# Patient Record
Sex: FEMALE | Race: WHITE | NOT HISPANIC OR LATINO | Employment: OTHER | URBAN - METROPOLITAN AREA
[De-identification: names, ages, dates, MRNs, and addresses within clinical notes are randomized per-mention and may not be internally consistent; named-entity substitution may affect disease eponyms.]

---

## 2017-02-03 ENCOUNTER — OFFICE VISIT (OUTPATIENT)
Dept: FAMILY MEDICINE | Facility: CLINIC | Age: 62
End: 2017-02-03
Payer: COMMERCIAL

## 2017-02-03 VITALS
WEIGHT: 239.75 LBS | HEART RATE: 67 BPM | BODY MASS INDEX: 35.51 KG/M2 | DIASTOLIC BLOOD PRESSURE: 80 MMHG | OXYGEN SATURATION: 98 % | HEIGHT: 69 IN | SYSTOLIC BLOOD PRESSURE: 126 MMHG | TEMPERATURE: 98 F

## 2017-02-03 DIAGNOSIS — E03.9 ACQUIRED HYPOTHYROIDISM: ICD-10-CM

## 2017-02-03 DIAGNOSIS — Z12.11 COLON CANCER SCREENING: ICD-10-CM

## 2017-02-03 DIAGNOSIS — E78.2 MIXED HYPERLIPIDEMIA: ICD-10-CM

## 2017-02-03 DIAGNOSIS — E66.01 SEVERE OBESITY (BMI 35.0-35.9 WITH COMORBIDITY): ICD-10-CM

## 2017-02-03 DIAGNOSIS — I10 ESSENTIAL HYPERTENSION: ICD-10-CM

## 2017-02-03 DIAGNOSIS — R73.03 PREDIABETES: ICD-10-CM

## 2017-02-03 DIAGNOSIS — Z86.39 HISTORY OF VITAMIN D DEFICIENCY: ICD-10-CM

## 2017-02-03 DIAGNOSIS — D32.0 BENIGN MENINGIOMA OF BRAIN: ICD-10-CM

## 2017-02-03 DIAGNOSIS — Z00.00 ROUTINE MEDICAL EXAM: Primary | ICD-10-CM

## 2017-02-03 PROCEDURE — 3074F SYST BP LT 130 MM HG: CPT | Mod: S$GLB,,, | Performed by: INTERNAL MEDICINE

## 2017-02-03 PROCEDURE — 3079F DIAST BP 80-89 MM HG: CPT | Mod: S$GLB,,, | Performed by: INTERNAL MEDICINE

## 2017-02-03 PROCEDURE — 99999 PR PBB SHADOW E&M-EST. PATIENT-LVL III: CPT | Mod: PBBFAC,,, | Performed by: INTERNAL MEDICINE

## 2017-02-03 PROCEDURE — 99396 PREV VISIT EST AGE 40-64: CPT | Mod: S$GLB,,, | Performed by: INTERNAL MEDICINE

## 2017-02-03 RX ORDER — ERGOCALCIFEROL 1.25 MG/1
CAPSULE ORAL
Qty: 12 CAPSULE | Refills: 1 | Status: SHIPPED | OUTPATIENT
Start: 2017-02-03 | End: 2017-05-03 | Stop reason: SDUPTHER

## 2017-02-03 RX ORDER — ATENOLOL 50 MG/1
50 TABLET ORAL 2 TIMES DAILY
Qty: 180 TABLET | Refills: 1 | Status: SHIPPED | OUTPATIENT
Start: 2017-02-03 | End: 2017-05-03 | Stop reason: SDUPTHER

## 2017-02-03 RX ORDER — METFORMIN HYDROCHLORIDE 500 MG/1
1000 TABLET, EXTENDED RELEASE ORAL
Qty: 180 TABLET | Refills: 1 | Status: SHIPPED | OUTPATIENT
Start: 2017-02-03 | End: 2017-05-03 | Stop reason: SDUPTHER

## 2017-02-03 NOTE — MR AVS SNAPSHOT
[facility]  [address]  [place] [address]  Phone: [phone]  Fax: [phone]                  Claudia Rodriguez   2/3/2017 8:00 AM   Office Visit    Description:  Female : 1955   Provider:  Jessica Marquez MD   Department:  [facility]           Reason for Visit     Hypertension     Follow-up           Diagnoses this Visit        Comments    Routine medical exam    -  Primary     Essential hypertension         Mixed hyperlipidemia         Prediabetes         Acquired hypothyroidism         History of vitamin D deficiency         Severe obesity (BMI 35.0-35.9 with comorbidity)         Colon cancer screening         Benign meningioma of brain                To Do List           Goals (5 Years of Data)              10/26/16    7/25/16    2/17/16    HEMOGLOBIN A1C < 7.0   5.7  5.7  6.0    Related Problems    Prediabetes      Follow-Up and Disposition     Return in about 6 months (around 8/3/2017), or if symptoms worsen or fail to improve, for follow up chronic medical conditions..       These Medications        Disp Refills Start End    atenolol (TENORMIN) 50 MG tablet 180 tablet 1 2/3/2017     Take 1 tablet (50 mg total) by mouth 2 (two) times daily. - Oral    Pharmacy: [facility] [address] Ph #: [phone]       ergocalciferol (VITAMIN D2) 50,000 unit Cap 12 capsule 1 2/3/2017     TAKE TWO CAPSULES BY MOUTH EVERY 2 weeks    Pharmacy: [facility] - [address] Ph #: [phone]       metformin (GLUCOPHAGE-XR) 500 MG 24 hr tablet 180 tablet 1 2/3/2017 2/3/2018    Take 2 tablets (1,000 mg total) by mouth daily with dinner or evening meal. - Oral    Pharmacy: [facility] - [address] Ph #: [phone]         [facility]     [facility] -  Assistance  Registered nurses in the [facility] provide  "clinical advisement, health education, appointment booking, and other advisory services.  Call for this free service at [phone].             Medications           Message regarding Medications     Verify the changes and/or additions to your medication regime listed below are the same as discussed with your clinician today.  If any of these changes or additions are incorrect, please notify your healthcare provider.             Verify that the below list of medications is an accurate representation of the medications you are currently taking.  If none reported, the list may be blank. If incorrect, please contact your healthcare provider. Carry this list with you in case of emergency.           Current Medications     amlodipine (NORVASC) 10 MG tablet Take 1 tablet (10 mg total) by mouth once daily.    atenolol (TENORMIN) 50 MG tablet Take 1 tablet (50 mg total) by mouth 2 (two) times daily.    CALCIUM CARBONATE/VITAMIN D3 (CALCIUM 500 + D, D3, ORAL)     ergocalciferol (VITAMIN D2) 50,000 unit Cap TAKE TWO CAPSULES BY MOUTH EVERY 2 weeks    FOLIC ACID/MV,FE,OTHER MIN (CENTRUM ORAL) Take 1 tablet by mouth.    levothyroxine (SYNTHROID) 75 MCG tablet Take 1 tablet (75 mcg total) by mouth once daily.    metformin (GLUCOPHAGE-XR) 500 MG 24 hr tablet Take 2 tablets (1,000 mg total) by mouth daily with dinner or evening meal.    omega-3 fatty acids 1,000 mg Cap Take 1 capsule by mouth Daily.    terconazole (TERAZOL 3) 0.8 % vaginal cream     triamterene-hydrochlorothiazide 37.5-25 mg (MAXZIDE-25) 37.5-25 mg per tablet Take 1 tablet by mouth once daily.           Clinical Reference Information           Your Vitals Were     BP Pulse Temp Height Weight SpO2    126/80 67 98.3 °F (36.8 °C) (Oral) 5' 9" (1.753 m) 108.7 kg (239 lb 12 oz) 98%    BMI                35.4 kg/m2          Blood Pressure          Most Recent Value    BP  126/80      Allergies as of 2/3/2017     Zostavax [Zoster Vaccine Live (Pf)]    " Amlodipine-olmesartan    Atorvastatin    Demerol [Meperidine]    Ezetimibe-simvastatin    Lotrel [Amlodipine-benazepril]    Nsaids (Non-steroidal Anti-inflammatory Drug)    Phenytoin Sodium Extended      Immunizations Administered on Date of Encounter - 2/3/2017     None      Orders Placed During Today's Visit     Future Labs/Procedures Expected by Expires    Occult blood x 1, stool  2/3/2017 2/3/2018    Occult blood x 1, stool  2/3/2017 2/3/2018    Occult blood x 1, stool  2/3/2017 2/3/2018      Instructions    Physicians for weight loss management: Dr. Garrison, Dr. Lyle       Language Assistance Services     ATTENTION: Language assistance services are available, free of charge. Please call [phone].      ATENCIÓN: Si elena sherwood, tiene a toth disposición servicios gratuitos de asistencia lingüística. Llame al [phone].     CHÚ Ý: N?u b?n nói Ti?ng Vi?t, có các d?ch v? h? tr? ngôn ng? mi?n phí dành cho b?n. G?i s? [phone].         [facility] complies with applicable Federal civil rights laws and does not discriminate on the basis of race, color, national origin, age, disability, or sex.

## 2017-02-03 NOTE — PROGRESS NOTES
HISTORY OF PRESENT ILLNESS:  Claudia Rodriguez is a 61 y.o. female who presents to the clinic today for a routine medical physical exam. Her last physical exam was approximately 1 years(s) ago.    Contraception: n/a      PAST MEDICAL HISTORY:  Past Medical History   Diagnosis Date    DDD (degenerative disc disease), lumbar     Goiter, nodular     HTN (hypertension)     Hyperglycemia     Hyperlipidemia     Hypothyroidism     Hypovitaminosis D     Meningioma      right frontal lobe ; followed by Dr. Enciso once a year    Obesity     Prediabetes     Urinary bladder disorder        PAST SURGICAL HISTORY:  Past Surgical History   Procedure Laterality Date    Bladder suspension      Thyroid biopsy  7/11/12    Tonsillectomy         SOCIAL HISTORY:  Social History     Social History    Marital status:      Spouse name: N/A    Number of children: 1    Years of education: N/A     Occupational History    human  [employer]     Social History Main Topics    Smoking status: Never Smoker    Smokeless tobacco: Not on file    Alcohol use No    Drug use: No    Sexual activity: Yes     Partners: Male     Other Topics Concern    Are You Pregnant Or Think You May Be? No    Breast-Feeding No     Social History Narrative    3 adopted children.       FAMILY HISTORY:  Family History   Problem Relation Age of Onset    Coronary artery disease Father 55    Heart disease Father     Hypertension Father     Melanoma Father     Hypertension Mother     Stroke Mother      2015    Hypertension Sister      controlled by lifestyle    No Known Problems Brother     Pancreatic cancer Maternal Grandfather     Heart failure Maternal Grandmother     Thyroid nodules Sister      s/p thyroidectomy    Thyroid disease Sister     Thyroid nodules Sister     No Known Problems Brother     Diabetes Neg Hx     Psoriasis Neg Hx     Lupus Neg Hx     Eczema Neg Hx     Thyroid cancer Neg Hx        ALLERGIES AND  MEDICATIONS: updated and reviewed.  Review of patient's allergies indicates:   Allergen Reactions    Zostavax [zoster vaccine live (pf)] Rash     - injection site red, hot, indurated    Amlodipine-olmesartan      Other reaction(s): pounding in ears      Atorvastatin        Other reaction(s): Unknown    Demerol [meperidine] Other (See Comments)     Other reaction(s): Nausea  Other reaction(s): Headache    Ezetimibe-simvastatin      Other reaction(s): Headache      Lotrel [amlodipine-benazepril]        Other reaction(s): constant cough    Nsaids (non-steroidal anti-inflammatory drug) Other (See Comments)       Other reaction(s): Difficulty breathing    Other reaction(s): Difficulty breathing    Phenytoin sodium extended Other (See Comments)     Medication List with Changes/Refills   Current Medications    AMLODIPINE (NORVASC) 10 MG TABLET    Take 1 tablet (10 mg total) by mouth once daily.    CALCIUM CARBONATE/VITAMIN D3 (CALCIUM 500 + D, D3, ORAL)        FOLIC ACID/MV,FE,OTHER MIN (CENTRUM ORAL)    Take 1 tablet by mouth.    LEVOTHYROXINE (SYNTHROID) 75 MCG TABLET    Take 1 tablet (75 mcg total) by mouth once daily.    OMEGA-3 FATTY ACIDS 1,000 MG CAP    Take 1 capsule by mouth Daily.    TERCONAZOLE (TERAZOL 3) 0.8 % VAGINAL CREAM        TRIAMTERENE-HYDROCHLOROTHIAZIDE 37.5-25 MG (MAXZIDE-25) 37.5-25 MG PER TABLET    Take 1 tablet by mouth once daily.   Changed and/or Refilled Medications    Modified Medication Previous Medication    ATENOLOL (TENORMIN) 50 MG TABLET atenolol (TENORMIN) 50 MG tablet       Take 1 tablet (50 mg total) by mouth 2 (two) times daily.    Take 1 tablet (50 mg total) by mouth 2 (two) times daily.    ERGOCALCIFEROL (VITAMIN D2) 50,000 UNIT CAP ergocalciferol (VITAMIN D2) 50,000 unit Cap       TAKE TWO CAPSULES BY MOUTH EVERY 2 weeks    TAKE TWO CAPSULES BY MOUTH EVERY 2 weeks    METFORMIN (GLUCOPHAGE-XR) 500 MG 24 HR TABLET metformin (GLUCOPHAGE-XR) 500 MG 24 hr tablet       Take 2  tablets (1,000 mg total) by mouth daily with dinner or evening meal.    Take 2 tablets (1,000 mg total) by mouth daily with dinner or evening meal.   Discontinued Medications    ATENOLOL (TENORMIN) 50 MG TABLET    Take 50 mg by mouth 2 (two) times daily.             SCREENING HISTORY:  Health Maintenance       Date Due Completion Date    Fecal Occult Blood Test (FOBT) 10/7/2016 10/7/2015    Mammogram 5/25/2018 5/25/2016 (Done)    Override on 5/25/2016: Done    Override on 2/3/2014: Done (DIS - normal)    Override on 12/11/2012: Done    Override on 10/27/2010: Done    Pap Smear 4/12/2019 4/12/2016 (Done)    Override on 4/12/2016: Done    Override on 12/3/2012: Done    Lipid Panel 7/25/2021 7/25/2016    TETANUS VACCINE 9/28/2025 9/28/2015    Colonoscopy 2/3/2027 2/3/2017 (Declined)    Override on 2/3/2017: Declined (patient will do yearly fecal occult blood testing)            REVIEW OF SYSTEMS:   The patient reports fair dietary habits - she feels like she has developed a 'sugar addiction'.  The patient does not exercise regularly.  General ROS: negative for - chills, fever or malaise  Psychological ROS: positive for - sleep disturbances - she is taking half of a benadryl at bedtime to help her sleep  negative for - memory difficulties, obsessive thoughts or suicidal ideation  Ophthalmic ROS: negative for - blurry vision or eye pain  ENT ROS: negative for - epistaxis, headaches, oral lesions or sore throat  Allergy and Immunology ROS: negative for - hives  Hematological and Lymphatic ROS: negative for - swollen lymph nodes  Endocrine ROS: negative for - polydipsia/polyuria or temperature intolerance  Respiratory ROS: no cough, shortness of breath, or wheezing  Cardiovascular ROS: no chest pain or dyspnea on exertion  Gastrointestinal ROS: no abdominal pain, change in bowel habits, or black or bloody stools  Genito-Urinary ROS: no dysuria, trouble voiding, or hematuria  Breast ROS: negative for breast  "lumps  Musculoskeletal ROS: negative for - gait disturbance, joint swelling, muscle pain or muscular weakness  Neurological ROS: no TIA or stroke symptoms  Dermatological ROS: negative for mole changes and rash    Physical Examination:   Vitals:    02/03/17 0802   BP: 126/80   Pulse: 67   Temp: 98.3 °F (36.8 °C)     Weight: 108.7 kg (239 lb 12 oz)   Height: 5' 9" (175.3 cm)   Body mass index is 35.4 kg/(m^2).    General appearance - alert, well appearing, and in no distress and obese  Mental status - alert, oriented to person, place, and time, normal mood, behavior, speech, dress, motor activity, and thought processes  Eyes - pupils equal and reactive, extraocular eye movements intact, sclera anicteric  Mouth - mucous membranes moist, pharynx normal without lesions  Neck - supple, no significant adenopathy, carotids upstroke normal bilaterally, no bruits, thyroid exam: thyroid is normal in size without nodules or tenderness  Lymphatics - no palpable lymphadenopathy  Chest - clear to auscultation, no wheezes, rales or rhonchi, symmetric air entry  Heart - normal rate and regular rhythm, no gallops noted  Abdomen - soft, nontender, nondistended, no masses or organomegaly  Breasts - not examined  Neurological - alert, oriented, normal speech, no focal findings or movement disorder noted, cranial nerves II through XII intact  Musculoskeletal - no joint tenderness, deformity or swelling, no muscular tenderness noted  Extremities - no pedal edema noted, intact peripheral pulses  Skin - normal coloration and turgor, no rashes, no suspicious skin lesions noted      ASSESSMENT AND PLAN:  1. Routine medical exam  Counseled on age appropriate medical preventative services including age appropriate cancer screenings, age appropriate eye and dental exams, over all nutritional health, need for a consistent exercise regimen, and an over all push towards maintaining a vigorous and active lifestyle.  Counseled on age appropriate " vaccines and discussed upcoming health care needs based on age/gender. Discussed good sleep hygiene and stress management.     2. Essential hypertension  Discussed sodium restriction, maintaining ideal body weight and regular exercise program as physiologic means to achieve blood pressure control. The patient will strive towards this. The current medical regimen is effective;  continue present plan and medications. Recommended patient to check home readings to monitor and see me for followup as scheduled or sooner as needed. Patient was educated that both decongestant and anti-inflammatory medication may raise blood pressure.   - atenolol (TENORMIN) 50 MG tablet; Take 1 tablet (50 mg total) by mouth 2 (two) times daily.  Dispense: 180 tablet; Refill: 1    3. Mixed hyperlipidemia  We discussed low fat diet and regular exercise. No need for prescription medication at this time.     4. Prediabetes  Stable. We discussed low sugar/low carbohydrate diet and regular exercise to prevent progression. No need for prescription medication at this time.   - metformin (GLUCOPHAGE-XR) 500 MG 24 hr tablet; Take 2 tablets (1,000 mg total) by mouth daily with dinner or evening meal.  Dispense: 180 tablet; Refill: 1    5. Acquired hypothyroidism  Your urine test for protein shows a small amount of protein in your urine indicating mild kidney damage from your diabetes. Good control of your diabetes will prevent progression. Recheck in one year. Followed by endocrinology.    6. History of vitamin D deficiency  The current medical regimen is effective;  continue present plan and medications.   - ergocalciferol (VITAMIN D2) 50,000 unit Cap; TAKE TWO CAPSULES BY MOUTH EVERY 2 weeks  Dispense: 12 capsule; Refill: 1    7. Severe obesity (BMI 35.0-35.9 with comorbidity)  The patient is asked to make an attempt to improve diet and exercise patterns to aid in medical management of this problem. We will refer her to the [facility] weight loss study.  She may consider seeing a weight loss specialist.    8. Colon cancer screening    - Occult blood x 1, stool; Future  - Occult blood x 1, stool; Future  - Occult blood x 1, stool; Future    9. Benign meningioma of brain  Asymptomatic. Followed by neurosurgery.          Return in about 6 months (around 8/3/2017), or if symptoms worsen or fail to improve, for follow up chronic medical conditions.. or sooner as needed.

## 2017-03-06 DIAGNOSIS — I10 ESSENTIAL HYPERTENSION: ICD-10-CM

## 2017-03-06 RX ORDER — TRIAMTERENE/HYDROCHLOROTHIAZID 37.5-25 MG
1 TABLET ORAL DAILY
Qty: 90 TABLET | Refills: 1 | Status: SHIPPED | OUTPATIENT
Start: 2017-03-06 | End: 2017-08-30 | Stop reason: SDUPTHER

## 2017-03-15 DIAGNOSIS — I10 ESSENTIAL HYPERTENSION: ICD-10-CM

## 2017-03-15 RX ORDER — AMLODIPINE BESYLATE 10 MG/1
10 TABLET ORAL DAILY
Qty: 90 TABLET | Refills: 0 | Status: SHIPPED | OUTPATIENT
Start: 2017-03-15 | End: 2017-05-27 | Stop reason: SDUPTHER

## 2017-05-03 DIAGNOSIS — I10 ESSENTIAL HYPERTENSION: ICD-10-CM

## 2017-05-03 DIAGNOSIS — R73.03 PREDIABETES: ICD-10-CM

## 2017-05-03 DIAGNOSIS — Z86.39 HISTORY OF VITAMIN D DEFICIENCY: ICD-10-CM

## 2017-05-03 RX ORDER — METFORMIN HYDROCHLORIDE 500 MG/1
TABLET, EXTENDED RELEASE ORAL
Qty: 180 TABLET | Refills: 0 | Status: SHIPPED | OUTPATIENT
Start: 2017-05-03 | End: 2017-11-03 | Stop reason: SDUPTHER

## 2017-05-03 RX ORDER — ERGOCALCIFEROL 1.25 MG/1
CAPSULE ORAL
Qty: 12 CAPSULE | Refills: 0 | Status: SHIPPED | OUTPATIENT
Start: 2017-05-03 | End: 2018-01-10 | Stop reason: SDUPTHER

## 2017-05-03 RX ORDER — ATENOLOL 50 MG/1
TABLET ORAL
Qty: 180 TABLET | Refills: 0 | Status: SHIPPED | OUTPATIENT
Start: 2017-05-03 | End: 2017-11-03 | Stop reason: SDUPTHER

## 2017-05-27 DIAGNOSIS — I10 ESSENTIAL HYPERTENSION: ICD-10-CM

## 2017-05-27 RX ORDER — AMLODIPINE BESYLATE 10 MG/1
TABLET ORAL
Qty: 90 TABLET | Refills: 0 | Status: SHIPPED | OUTPATIENT
Start: 2017-05-27 | End: 2017-08-28 | Stop reason: SDUPTHER

## 2017-06-30 ENCOUNTER — PATIENT MESSAGE (OUTPATIENT)
Dept: ENDOCRINOLOGY | Facility: CLINIC | Age: 62
End: 2017-06-30

## 2017-07-05 ENCOUNTER — PATIENT MESSAGE (OUTPATIENT)
Dept: ENDOCRINOLOGY | Facility: CLINIC | Age: 62
End: 2017-07-05

## 2017-07-11 ENCOUNTER — TELEPHONE (OUTPATIENT)
Dept: FAMILY MEDICINE | Facility: CLINIC | Age: 62
End: 2017-07-11

## 2017-07-15 NOTE — TELEPHONE ENCOUNTER
Dr. Anirudh Selby, Alfred[facility], will send for her last visit and mammogram. Fax # [phone], phone # [phone].

## 2017-08-22 ENCOUNTER — PATIENT MESSAGE (OUTPATIENT)
Dept: ENDOCRINOLOGY | Facility: CLINIC | Age: 62
End: 2017-08-22

## 2017-08-23 ENCOUNTER — PATIENT MESSAGE (OUTPATIENT)
Dept: ENDOCRINOLOGY | Facility: CLINIC | Age: 62
End: 2017-08-23

## 2017-08-28 DIAGNOSIS — I10 ESSENTIAL HYPERTENSION: ICD-10-CM

## 2017-08-28 RX ORDER — AMLODIPINE BESYLATE 10 MG/1
TABLET ORAL
Qty: 90 TABLET | Refills: 0 | Status: SHIPPED | OUTPATIENT
Start: 2017-08-28 | End: 2017-12-15 | Stop reason: SDUPTHER

## 2017-08-30 DIAGNOSIS — I10 ESSENTIAL HYPERTENSION: ICD-10-CM

## 2017-08-30 RX ORDER — TRIAMTERENE/HYDROCHLOROTHIAZID 37.5-25 MG
1 TABLET ORAL DAILY
Qty: 90 TABLET | Refills: 0 | Status: SHIPPED | OUTPATIENT
Start: 2017-08-30 | End: 2017-11-27 | Stop reason: SDUPTHER

## 2017-09-27 DIAGNOSIS — E03.9 ACQUIRED HYPOTHYROIDISM: ICD-10-CM

## 2017-09-28 RX ORDER — LEVOTHYROXINE SODIUM 75 UG/1
TABLET ORAL
Qty: 90 TABLET | Refills: 0 | Status: SHIPPED | OUTPATIENT
Start: 2017-09-28 | End: 2017-11-20 | Stop reason: SDUPTHER

## 2017-10-20 ENCOUNTER — HOSPITAL ENCOUNTER (OUTPATIENT)
Dept: ENDOCRINOLOGY | Facility: CLINIC | Age: 62
Discharge: HOME | End: 2017-10-20
Attending: INTERNAL MEDICINE
Payer: COMMERCIAL

## 2017-10-20 DIAGNOSIS — E78.2 MIXED HYPERLIPIDEMIA: ICD-10-CM

## 2017-10-20 DIAGNOSIS — Z86.39 HISTORY OF VITAMIN D DEFICIENCY: ICD-10-CM

## 2017-10-20 DIAGNOSIS — E66.9 OBESITY (BMI 30-39.9): ICD-10-CM

## 2017-10-20 DIAGNOSIS — E03.9 ACQUIRED HYPOTHYROIDISM: ICD-10-CM

## 2017-10-20 DIAGNOSIS — E04.9 GOITER, NODULAR: ICD-10-CM

## 2017-10-20 DIAGNOSIS — R73.03 PREDIABETES: ICD-10-CM

## 2017-10-20 PROCEDURE — 76536 US EXAM OF HEAD AND NECK: CPT | Mod: S$GLB,,, | Performed by: INTERNAL MEDICINE

## 2017-11-03 DIAGNOSIS — I10 ESSENTIAL HYPERTENSION: ICD-10-CM

## 2017-11-03 DIAGNOSIS — R73.03 PREDIABETES: ICD-10-CM

## 2017-11-03 RX ORDER — ATENOLOL 50 MG/1
TABLET ORAL
Qty: 180 TABLET | Refills: 0 | Status: SHIPPED | OUTPATIENT
Start: 2017-11-03 | End: 2018-02-03 | Stop reason: SDUPTHER

## 2017-11-03 RX ORDER — METFORMIN HYDROCHLORIDE 500 MG/1
TABLET, EXTENDED RELEASE ORAL
Qty: 180 TABLET | Refills: 0 | Status: SHIPPED | OUTPATIENT
Start: 2017-11-03 | End: 2018-02-03 | Stop reason: SDUPTHER

## 2017-11-13 ENCOUNTER — OFFICE VISIT (OUTPATIENT)
Dept: FAMILY MEDICINE | Facility: CLINIC | Age: 62
End: 2017-11-13
Payer: COMMERCIAL

## 2017-11-13 VITALS
DIASTOLIC BLOOD PRESSURE: 74 MMHG | OXYGEN SATURATION: 98 % | HEIGHT: 69 IN | WEIGHT: 242.81 LBS | RESPIRATION RATE: 18 BRPM | TEMPERATURE: 99 F | BODY MASS INDEX: 35.96 KG/M2 | HEART RATE: 66 BPM | SYSTOLIC BLOOD PRESSURE: 124 MMHG

## 2017-11-13 DIAGNOSIS — D32.0 BENIGN MENINGIOMA OF BRAIN: ICD-10-CM

## 2017-11-13 DIAGNOSIS — R79.89 ELEVATED LFTS: ICD-10-CM

## 2017-11-13 DIAGNOSIS — R32 URINARY INCONTINENCE, UNSPECIFIED TYPE: ICD-10-CM

## 2017-11-13 DIAGNOSIS — E78.2 MIXED HYPERLIPIDEMIA: ICD-10-CM

## 2017-11-13 DIAGNOSIS — E66.01 SEVERE OBESITY (BMI 35.0-35.9 WITH COMORBIDITY): ICD-10-CM

## 2017-11-13 DIAGNOSIS — I10 ESSENTIAL HYPERTENSION: Primary | ICD-10-CM

## 2017-11-13 DIAGNOSIS — R73.03 PREDIABETES: ICD-10-CM

## 2017-11-13 DIAGNOSIS — E03.9 ACQUIRED HYPOTHYROIDISM: ICD-10-CM

## 2017-11-13 DIAGNOSIS — Z12.11 ENCOUNTER FOR FIT (FECAL IMMUNOCHEMICAL TEST) SCREENING: ICD-10-CM

## 2017-11-13 DIAGNOSIS — Z78.9 STATIN INTOLERANCE: ICD-10-CM

## 2017-11-13 PROCEDURE — 99999 PR PBB SHADOW E&M-EST. PATIENT-LVL IV: CPT | Mod: PBBFAC,,, | Performed by: INTERNAL MEDICINE

## 2017-11-13 PROCEDURE — 99215 OFFICE O/P EST HI 40 MIN: CPT | Mod: S$GLB,,, | Performed by: INTERNAL MEDICINE

## 2017-11-13 RX ORDER — LORAZEPAM 1 MG/1
TABLET ORAL
Refills: 0 | COMMUNITY
Start: 2017-10-30 | End: 2018-03-14

## 2017-11-13 RX ORDER — ESTRADIOL 0.1 MG/G
CREAM VAGINAL
Refills: 98 | COMMUNITY
Start: 2017-08-23 | End: 2018-07-13 | Stop reason: SDUPTHER

## 2017-11-13 NOTE — PROGRESS NOTES
HISTORY OF PRESENT ILLNESS:  Claudia Rodriguez is a 62 y.o. female who presents to the clinic today for Hypertension and prediabetes  .  The patient presents to clinic today for follow-up of her hypertension, hyperlipidemia, and prediabetes.  She recently had blood work done.  She is also followed by endocrinology.  She states she feels well.  She does not check her blood pressures or blood sugars at home on a regular basis.  She denies headaches or visual changes.  She has follow-up with her neurosurgeon regarding her benign meningioma in the near future.  This is imaged about every 2 years now. The patient reports compliance with current medication- patient denies missing any  medication doses.  She does admit to poor dietary habits at times.  She is aware of the need for weight loss.  She is having worsening problems with urinary incontinence which she thinks may be related to her weight gain.  She has had bladder surgery in the past which did not help and which caused bladder rupture and significant morbidity post surgically.  She is not interested in further surgery.  She has tried Kegel exercises which have not been very helpful to her.      PAST MEDICAL HISTORY:  Past Medical History:   Diagnosis Date    DDD (degenerative disc disease), lumbar     Goiter, nodular     HTN (hypertension)     Hyperglycemia     Hyperlipidemia     Hypothyroidism     Hypovitaminosis D     Meningioma     right frontal lobe ; followed by Dr. Enciso once a year    Obesity     Prediabetes     Urinary bladder disorder        PAST SURGICAL HISTORY:  Past Surgical History:   Procedure Laterality Date    BLADDER SUSPENSION      thyroid biopsy  7/11/12    tonsillectomy         SOCIAL HISTORY:  Social History     Social History    Marital status:      Spouse name: N/A    Number of children: 1    Years of education: N/A     Occupational History    human  [employer]     Social History Main Topics     Smoking status: Never Smoker    Smokeless tobacco: Never Used    Alcohol use No    Drug use: No    Sexual activity: Yes     Partners: Male     Other Topics Concern    Are You Pregnant Or Think You May Be? No    Breast-Feeding No     Social History Narrative    3 adopted children.       FAMILY HISTORY:  Family History   Problem Relation Age of Onset    Coronary artery disease Father 55    Heart disease Father     Hypertension Father     Melanoma Father     Hypertension Mother     Stroke Mother      2015    Hypertension Sister      controlled by lifestyle    No Known Problems Brother     Pancreatic cancer Maternal Grandfather     Heart failure Maternal Grandmother     Thyroid nodules Sister      s/p thyroidectomy    Thyroid disease Sister     Thyroid nodules Sister     No Known Problems Brother     Diabetes Neg Hx     Psoriasis Neg Hx     Lupus Neg Hx     Eczema Neg Hx     Thyroid cancer Neg Hx        ALLERGIES AND MEDICATIONS: updated and reviewed.  Review of patient's allergies indicates:   Allergen Reactions    Zostavax [zoster vaccine live (pf)] Rash     - injection site red, hot, indurated    Amlodipine-olmesartan      Other reaction(s): pounding in ears      Atorvastatin        Other reaction(s): Unknown    Demerol [meperidine] Other (See Comments)     Other reaction(s): Nausea  Other reaction(s): Headache    Ezetimibe-simvastatin      Other reaction(s): Headache      Lotrel [amlodipine-benazepril]        Other reaction(s): constant cough    Nsaids (non-steroidal anti-inflammatory drug) Other (See Comments)       Other reaction(s): Difficulty breathing    Other reaction(s): Difficulty breathing    Phenytoin sodium extended Other (See Comments)     Medication List with Changes/Refills   Current Medications    AMLODIPINE (NORVASC) 10 MG TABLET    TAKE ONE TABLET BY MOUTH EVERY DAY    ATENOLOL (TENORMIN) 50 MG TABLET    TAKE ONE Tablet BY MOUTH TWICE DAILY    CALCIUM  CARBONATE/VITAMIN D3 (CALCIUM 500 + D, D3, ORAL)        ESTRACE 0.01 % (0.1 MG/GRAM) VAGINAL CREAM        FOLIC ACID/MV,FE,OTHER MIN (CENTRUM ORAL)    Take 1 tablet by mouth.    LEVOTHYROXINE (SYNTHROID) 75 MCG TABLET    TAKE ONE TABLET BY MOUTH EVERY DAY    LORAZEPAM (ATIVAN) 1 MG TABLET        METFORMIN (GLUCOPHAGE-XR) 500 MG 24 HR TABLET    TAKE TWO TABLETS BY MOUTH EVERY DAY WITH dinner OR EVENING MEAL    OMEGA-3 FATTY ACIDS 1,000 MG CAP    Take 1 capsule by mouth Daily.    TERCONAZOLE (TERAZOL 3) 0.8 % VAGINAL CREAM        TRIAMTERENE-HYDROCHLOROTHIAZIDE 37.5-25 MG (MAXZIDE-25) 37.5-25 MG PER TABLET    Take 1 tablet by mouth once daily.    VITAMIN D2 50,000 UNIT CAPSULE    TAKE TWO CAPSULES BY MOUTH EVERY 2 weeks          CARE TEAM:  Patient Care Team:  Jessica Marquez MD as PCP - General (Internal Medicine)  Bisi Madrigal MD as Consulting Physician (Endocrinology)  Fernie Hammond MD as Consulting Physician (Neurosurgery)         REVIEW OF SYSTEMS:  General ROS: negative for - chills, fever or malaise  Psychological ROS: negative for - memory difficulties, obsessive thoughts or suicidal ideation  Ophthalmic ROS: negative for - blurry vision or eye pain  ENT ROS: negative for - epistaxis, oral lesions or sore throat  Allergy and Immunology ROS: negative for - hives  Hematological and Lymphatic ROS: negative for - swollen lymph nodes  Endocrine ROS: negative for - polydipsia/polyuria or temperature intolerance  Respiratory ROS: no cough, shortness of breath, or wheezing  Cardiovascular ROS: no chest pain or dyspnea on exertion  Gastrointestinal ROS: no abdominal pain, change in bowel habits, or black or bloody stools  Dermatological ROS: negative  Musculoskeletal ROS: negative for - gait disturbance, joint swelling or muscle pain  Neurological ROS: no TIA or stroke symptoms  Genito-Urinary ROS: no dysuria, trouble voiding, or hematuria      PHYSICAL EXAM:   Vitals:    11/13/17 0800   BP: 124/74   Pulse:  "66   Resp: 18   Temp: 98.7 °F (37.1 °C)     Weight: 110.2 kg (242 lb 13.4 oz)   Height: 5' 9" (175.3 cm)   Body mass index is 35.86 kg/m².     General appearance - alert, well appearing, and in no distress and obese  Mental status - alert, oriented to person, place, and time, normal mood, behavior, speech, dress, motor activity, and thought processes  Eyes - pupils equal and reactive, extraocular eye movements intact, sclera anicteric  Mouth - mucous membranes moist, pharynx normal without lesions  Neck - supple, no significant adenopathy, carotids upstroke normal bilaterally, no bruits, thyroid exam: thyroid is normal in size without nodules or tenderness  Lymphatics - no palpable lymphadenopathy  Chest - clear to auscultation, no wheezes, rales or rhonchi, symmetric air entry  Heart - normal rate and regular rhythm, no gallops noted  Pelvic - not examined  Neurological - alert, oriented, normal speech, no focal findings or movement disorder noted, cranial nerves II through XII intact  Musculoskeletal - no joint tenderness, deformity or swelling, no muscular tenderness noted  Extremities - pedal edema trace +  Skin - normal coloration and turgor, no rashes, no suspicious skin lesions noted      Results for orders placed or performed in visit on 10/20/17   Comprehensive metabolic panel   Result Value Ref Range    Sodium 141 136 - 145 mmol/L    Potassium 4.3 3.5 - 5.1 mmol/L    Chloride 103 95 - 110 mmol/L    CO2 29 23 - 29 mmol/L    Glucose 105 70 - 110 mg/dL    BUN, Bld 17 8 - 23 mg/dL    Creatinine 0.7 0.5 - 1.4 mg/dL    Calcium 9.6 8.7 - 10.5 mg/dL    Total Protein 7.1 6.0 - 8.4 g/dL    Albumin 3.7 3.5 - 5.2 g/dL    Total Bilirubin 0.8 0.1 - 1.0 mg/dL    Alkaline Phosphatase 73 55 - 135 U/L    AST 43 (H) 10 - 40 U/L    ALT 60 (H) 10 - 44 U/L    Anion Gap 9 8 - 16 mmol/L    eGFR if African American >60.0 >60 mL/min/1.73 m^2    eGFR if non African American >60.0 >60 mL/min/1.73 m^2   TSH   Result Value Ref Range    " TSH 1.387 0.400 - 4.000 uIU/mL   Hemoglobin A1c   Result Value Ref Range    Hemoglobin A1C 5.7 (H) 4.0 - 5.6 %    Estimated Avg Glucose 117 68 - 131 mg/dL   Vitamin D   Result Value Ref Range    Vit D, 25-Hydroxy 43 30 - 96 ng/mL          ASSESSMENT AND PLAN:  1. Essential hypertension  Discussed sodium restriction, maintaining ideal body weight and regular exercise program as physiologic means to achieve blood pressure control. The patient will strive towards this. The current medical regimen is effective;  continue present plan and medications. Recommended patient to check home readings to monitor and see me for followup as scheduled or sooner as needed. Patient was educated that both decongestant and anti-inflammatory medication may raise blood pressure.     2. Mixed hyperlipidemia/3. Statin intolerance  We discussed low fat diet and regular exercise. She cannot tolerate statin medication.     4. Acquired hypothyroidism  Patient is clinically euthyroid. Continue current regimen.     5. Prediabetes  Stable. We discussed low sugar/low carbohydrate diet and regular exercise to prevent progression. No need for prescription medication at this time.     6. Benign meningioma of brain  Stable. Asymptomatic. Observe. Followed by neurosurgery.    7. Urinary incontinence, unspecified type  The patient is not interested in any type of surgical intervention.  She is interested in possibly trying physical therapy for pelvic muscle strengthening.  Consult order place.  - Ambulatory Referral to Physical/Occupational Therapy    8. Elevated LFTs  I suspect fatty liver.  We discussed the need for weight loss.  I will check a liver ultrasound and refer to hepatology if fatty liver is confirmed. The patient had a NAFLD fibrosis score of 1.17   - US Abdomen Complete; Future    9. Severe obesity (BMI 35.0-35.9 with comorbidity)  The patient is asked to make an attempt to improve diet and exercise patterns to aid in medical management  of this problem.     10. Encounter for FIT (fecal immunochemical test) screening    - Fecal Immunochemical Test (iFOBT); Future          Return in about 6 months (around 5/13/2018) for annual exam. or sooner as needed.

## 2017-11-15 ENCOUNTER — PATIENT MESSAGE (OUTPATIENT)
Dept: FAMILY MEDICINE | Facility: CLINIC | Age: 62
End: 2017-11-15

## 2017-11-15 ENCOUNTER — LAB VISIT (OUTPATIENT)
Dept: LAB | Facility: HOSPITAL | Age: 62
End: 2017-11-15
Attending: INTERNAL MEDICINE
Payer: COMMERCIAL

## 2017-11-15 DIAGNOSIS — R30.0 DYSURIA: Primary | ICD-10-CM

## 2017-11-15 DIAGNOSIS — R30.0 DYSURIA: ICD-10-CM

## 2017-11-15 PROCEDURE — 87086 URINE CULTURE/COLONY COUNT: CPT

## 2017-11-16 ENCOUNTER — PATIENT MESSAGE (OUTPATIENT)
Dept: FAMILY MEDICINE | Facility: CLINIC | Age: 62
End: 2017-11-16

## 2017-11-17 LAB
BACTERIA UR CULT: NORMAL
BACTERIA UR CULT: NORMAL

## 2017-11-17 NOTE — TELEPHONE ENCOUNTER
----- Message from Jessica Marquez MD sent at 11/16/2017  4:28 PM CST -----  Any comment on what the patient stated in her [employer] message regarding the fit kit collection 'brush'?

## 2017-11-17 NOTE — TELEPHONE ENCOUNTER
Spoke w/patient, states according to the instructions a brush is suppose to be in the package. Informed patient no brush comes with the fitkit, the way that the illustration is, it looks like a brush. Actually it is the tip of the test stick.

## 2017-11-20 ENCOUNTER — OFFICE VISIT (OUTPATIENT)
Dept: ENDOCRINOLOGY | Facility: CLINIC | Age: 62
End: 2017-11-20
Payer: COMMERCIAL

## 2017-11-20 VITALS
HEART RATE: 70 BPM | SYSTOLIC BLOOD PRESSURE: 116 MMHG | BODY MASS INDEX: 36.44 KG/M2 | WEIGHT: 246.06 LBS | HEIGHT: 69 IN | DIASTOLIC BLOOD PRESSURE: 80 MMHG

## 2017-11-20 DIAGNOSIS — E78.2 MIXED HYPERLIPIDEMIA: ICD-10-CM

## 2017-11-20 DIAGNOSIS — E04.9 GOITER, NODULAR: Primary | ICD-10-CM

## 2017-11-20 DIAGNOSIS — R73.03 PREDIABETES: ICD-10-CM

## 2017-11-20 DIAGNOSIS — E03.9 ACQUIRED HYPOTHYROIDISM: ICD-10-CM

## 2017-11-20 DIAGNOSIS — E55.9 VITAMIN D DEFICIENCY DISEASE: ICD-10-CM

## 2017-11-20 DIAGNOSIS — R32 URINARY INCONTINENCE, UNSPECIFIED TYPE: ICD-10-CM

## 2017-11-20 DIAGNOSIS — I10 ESSENTIAL HYPERTENSION: ICD-10-CM

## 2017-11-20 PROCEDURE — 99214 OFFICE O/P EST MOD 30 MIN: CPT | Mod: S$GLB,,, | Performed by: INTERNAL MEDICINE

## 2017-11-20 PROCEDURE — 99999 PR PBB SHADOW E&M-EST. PATIENT-LVL III: CPT | Mod: PBBFAC,,, | Performed by: INTERNAL MEDICINE

## 2017-11-20 RX ORDER — LEVOTHYROXINE SODIUM 75 UG/1
75 TABLET ORAL DAILY
Qty: 90 TABLET | Refills: 0 | Status: SHIPPED | OUTPATIENT
Start: 2017-11-20 | End: 2018-03-20 | Stop reason: SDUPTHER

## 2017-11-20 NOTE — PROGRESS NOTES
Subjective:      Patient ID: Claudia Rodriguez is a 62 y.o. female.    Chief Complaint:  Hypothyroidism and Multinodular goiter      History of Present Illness  She initially presented with neck fullness  U/s confirmed the mng  2 FNAs done in 2012- both nondianostic  FNA L/isthmus 10/2013-benign       10/26/16  1.) 1.99 x 0.90 x 1.26 cm heterogeneous, predominately hypoechoic nodule seen in the left mid pole that does not meet criteria for FNA biopsy    2.) 1.08 x 0.77 x 0.88 cm solid hypoechoic nodule seen in the left inferior pole that does not meet criteria for FNA biopsy    3.) 1.15 x 0.80 x 1.04 cm solid hypoechoic nodule seen in the isthmus that does not meet criteria for FNA biopsy    4.) Two subcentimeter hypoechoic nodules seen in the right lobe    10/20/17  Impression       1.) Thyroid gland is normal in size with heterogeneous echotexture    2.) 1.17 x 0.57 x 0.94 cm solid hypoechoic nodule seen at the isthmus/left lobe junction    3.) 1.84 x 0.73 x 0.79 cm heterogeneous, predominately hypoechoic nodule seen in the left mid pole    RECOMMENDATIONS:   Recommend repeat thyroid ultrasound in 1-2 years.         Sister with nodules -1 had thyroidectomy - benign     Patient denies any neck enlargement.   No dysphagia, no dyspnea.   Energy level good.  sleeps well.   No skin/nail hair changes  Denies constipation        She is on lt4 75 mcg qd   Takes medication in the morning without food or medication.    2 children + 1 grandchild - she has custody, works as a        Taking ergo 100k q 2 weeks     She has IGT and is tolerating metformin 1000 mg with dinner   She lost 30 lbs last year and has gained it back     Has had trouble with bladder issues and incontinence since she had her children.     Review of Systems   Constitutional: Negative for unexpected weight change.   Eyes: Negative for visual disturbance.   Respiratory: Negative for shortness of breath.    Cardiovascular: Negative for  chest pain.   Gastrointestinal: Negative for abdominal pain.   Genitourinary:        Incontinence   Musculoskeletal: Negative for arthralgias.   Skin: Negative for wound.   Neurological: Negative for headaches.   Hematological: Does not bruise/bleed easily.   Psychiatric/Behavioral: Negative for sleep disturbance.       Objective:   Physical Exam   Neck: Thyromegaly present.   Cardiovascular: Normal rate.    Pulmonary/Chest: Effort normal.   Abdominal: Soft.   Musculoskeletal: She exhibits no edema.   Vitals reviewed.      Lab Review:   Results for orders placed or performed in visit on 11/17/17   Fecal Immunochemical Test (iFOBT)   Result Value Ref Range    Fecal Immunochemical Test (iFOBT) Negative Negative     Lab Results   Component Value Date    TSH 1.387 10/20/2017           Assessment:     1. Goiter, nodular    2. Acquired hypothyroidism    3. Vitamin D deficiency disease    4. Prediabetes    5. BMI 36.0-36.9,adult    6. Mixed hyperlipidemia    7. Essential hypertension    8. Urinary incontinence, unspecified type      MNG  euthyroid, stable  Discussed  surgical indications (abnormal FNA, compressive symptoms or interval change) as well as indications to repeat fna  Thyroid u/s next year      Hypothyroidism   Clinically and biochemically euthyroid  Goal is a normal TSH  Continue lt4 dose at this time  Avoid exogenous hyperthyroidism as this can accelerate bone loss and increase risk of CV complications.    Vit D deficiency   continue ergo      Obesity with IGT   alerted as to the increased risk of t2dm  Follow hba1c   Continue weight loss efforts and metformin   Gave her Ese Garciaot's contact information, she did not want a referral at this time.      hyperlipidemia   Per PCP note pt can not tolerate statin.   PCP monitoring lipids.    hypertension  Controlled per PCP     Urinary incontinence  Referral to Dr. Coles with Uro Gyn.     Return in about 1 year (around 11/20/2018).  With thyroid US, tsh, a1c,  & Vit D    Case discussed with Dr. Madrigal   Recommendations were discussed with the patient in detail  The patient verbalized understanding and agrees with the plan outlined as above.     IBisi MD,  have personally taken the history and examined the patient and agree with the resident's note as stated above.

## 2017-11-27 ENCOUNTER — TELEPHONE (OUTPATIENT)
Dept: UROGYNECOLOGY | Facility: CLINIC | Age: 62
End: 2017-11-27

## 2017-11-27 ENCOUNTER — TELEPHONE (OUTPATIENT)
Dept: FAMILY MEDICINE | Facility: CLINIC | Age: 62
End: 2017-11-27

## 2017-11-27 DIAGNOSIS — I10 ESSENTIAL HYPERTENSION: ICD-10-CM

## 2017-11-27 RX ORDER — TRIAMTERENE/HYDROCHLOROTHIAZID 37.5-25 MG
TABLET ORAL
Qty: 90 TABLET | Refills: 1 | Status: SHIPPED | OUTPATIENT
Start: 2017-11-27 | End: 2017-12-06 | Stop reason: SDUPTHER

## 2017-11-27 NOTE — TELEPHONE ENCOUNTER
Good Morning,    Dr. Marquez has to put the order in for the CT and the referral for Hepatology. The CT will then be scheduled by the hospital. Neither of them have been put in yet. Thanks

## 2017-11-27 NOTE — TELEPHONE ENCOUNTER
Pt is scheduled to have US done on Monday, 12/4/17. She apologized for not answering she has been working out of town. Thanks.

## 2017-11-27 NOTE — TELEPHONE ENCOUNTER
She needs to complete the ultrasound first. If we find a fatty liver then I will consult hepatology. Ultrasound order was placed 11/13 at her office visit - please schedule.

## 2017-11-27 NOTE — TELEPHONE ENCOUNTER
----- Message from Audra Oliveira sent at 11/27/2017  8:38 AM CST -----  Contact: self  Patient called stating that she should have been set up for CT/Ultrasound on liver and referral for Hepatology. Please contact her [phone].    Thanks!

## 2017-12-04 ENCOUNTER — HOSPITAL ENCOUNTER (OUTPATIENT)
Dept: RADIOLOGY | Facility: HOSPITAL | Age: 62
Discharge: HOME | End: 2017-12-04
Attending: INTERNAL MEDICINE
Payer: COMMERCIAL

## 2017-12-04 DIAGNOSIS — K76.0 FATTY LIVER: Primary | ICD-10-CM

## 2017-12-04 DIAGNOSIS — R79.89 ELEVATED LFTS: ICD-10-CM

## 2017-12-04 PROCEDURE — 76700 US EXAM ABDOM COMPLETE: CPT | Mod: 26,,, | Performed by: RADIOLOGY

## 2017-12-04 PROCEDURE — 76700 US EXAM ABDOM COMPLETE: CPT | Mod: TC

## 2017-12-05 ENCOUNTER — DOCUMENTATION ONLY (OUTPATIENT)
Dept: TRANSPLANT | Facility: CLINIC | Age: 62
End: 2017-12-05

## 2017-12-05 NOTE — NURSING
Pt records reviewed.   Pt will be referred to Hepatology.    Initial referral received  from the [facility]que.   Referring Provider/diagnosis  SYBIL ORTEGA Provider:   Diagnosis: Fatty liver           Referral letter sent to provider and patient.

## 2017-12-05 NOTE — LETTER
December 5, 2017    Claudia Rodriguez  [address] [facility] [address]      Dear Claudia Rodriguez:    Your doctor has referred you to the [facility]. You will be contacted by our office and an initial appointment will then be scheduled for you.    We look forward to seeing you soon. If you have any further questions, please contact us at [phone].       Sincerely,        [facility]   [address] [place] [id number]  [phone]

## 2017-12-06 DIAGNOSIS — I10 ESSENTIAL HYPERTENSION: ICD-10-CM

## 2017-12-06 RX ORDER — TRIAMTERENE/HYDROCHLOROTHIAZID 37.5-25 MG
TABLET ORAL
Qty: 90 TABLET | Refills: 1 | Status: SHIPPED | OUTPATIENT
Start: 2017-12-06 | End: 2018-08-29 | Stop reason: SDUPTHER

## 2017-12-13 ENCOUNTER — INITIAL CONSULT (OUTPATIENT)
Dept: UROGYNECOLOGY | Facility: CLINIC | Age: 62
End: 2017-12-13
Payer: COMMERCIAL

## 2017-12-13 VITALS
WEIGHT: 242.94 LBS | HEIGHT: 69 IN | BODY MASS INDEX: 35.98 KG/M2 | DIASTOLIC BLOOD PRESSURE: 84 MMHG | SYSTOLIC BLOOD PRESSURE: 124 MMHG

## 2017-12-13 DIAGNOSIS — N39.46 URINARY INCONTINENCE, MIXED: ICD-10-CM

## 2017-12-13 DIAGNOSIS — R35.1 NOCTURIA MORE THAN TWICE PER NIGHT: ICD-10-CM

## 2017-12-13 DIAGNOSIS — N95.2 ATROPHIC VAGINITIS: Primary | ICD-10-CM

## 2017-12-13 PROCEDURE — 51701 INSERT BLADDER CATHETER: CPT | Mod: S$GLB,,, | Performed by: OBSTETRICS & GYNECOLOGY

## 2017-12-13 PROCEDURE — 87086 URINE CULTURE/COLONY COUNT: CPT

## 2017-12-13 PROCEDURE — 99999 PR PBB SHADOW E&M-EST. PATIENT-LVL III: CPT | Mod: PBBFAC,,, | Performed by: OBSTETRICS & GYNECOLOGY

## 2017-12-13 PROCEDURE — 99245 OFF/OP CONSLTJ NEW/EST HI 55: CPT | Mod: 25,S$GLB,, | Performed by: OBSTETRICS & GYNECOLOGY

## 2017-12-13 RX ORDER — ESTRADIOL 0.1 MG/G
0.5 CREAM VAGINAL DAILY
Qty: 42.5 G | Refills: 11 | Status: SHIPPED | OUTPATIENT
Start: 2017-12-13 | End: 2019-02-18 | Stop reason: SDUPTHER

## 2017-12-13 NOTE — LETTER
December 13, 2017      Vijaya Cannon, KIRSTY  [address]  [facility] [address]           [facility] [address] [place] [address]  Phone: [phone]          Patient: Claudia Rodriguez   MR Number: [id number]   YOB: 1955   Date of Visit: 12/13/2017       Dear Vijaya Cannon:    Thank you for referring Claudia Rodriguez to me for evaluation. Attached you will find relevant portions of my assessment and plan of care.    If you have questions, please do not hesitate to call me. I look forward to following Claudia Rodriguez along with you.    Sincerely,    Anu Coles MD    Enclosure  CC:  No Recipients    If you would like to receive this communication electronically, please contact [email or web address] or [phone] to request more information on [employer] access.    For providers and/or their staff who would like to refer a patient to [facility], please contact us through our one-stop-shop provider referral line, [facility] , at [phone].    If you feel you have received this communication in error or would no longer like to receive these types of communications, please e-mail [email or web address]

## 2017-12-13 NOTE — LETTER
December 13, 2017        Vijaya Cannon, KIRSTY  [address]  [facility] [address]             [facility] [address] [place] [address]  Phone: [phone]   Patient: Claudia Rodriguez   MR Number: [id number]   YOB: 1955   Date of Visit: 12/13/2017       Dear Dr. Cannon:    Thank you for referring Claudia Rodriguez to me for evaluation. Below are the relevant portions of my assessment and plan of care.            If you have questions, please do not hesitate to call me. I look forward to following Claudia along with you.    Sincerely,      Anu Coles MD           CC  Bisi Madrigal MD

## 2017-12-13 NOTE — PATIENT INSTRUCTIONS
Bladder Irritants  Certain foods and drinks have been associated with worsening symptoms of urinary frequency, urgency, urge incontinence, or bladder pain. If you suffer from any of these conditions, you may wish to try eliminating one or more of these foods from your diet and see if your symptoms improve. If bladder symptoms are related to dietary factors, strict adherence to a diet thateliminates the food should bring marked relief in 10 days. Once you are feeling better, you can begin to add foods back into your diet, one at a time. If symptoms return, you will be able to identify the irritant. As you add foods back to your diet it is very important that you drink significant amounts of water.    -----------------------------------------------------------------------------------------------  List of Common Bladder Irritants*  Alcoholic beverages  Apples and apple juice  Cantaloupe  Carbonated beverages  Chili and spicy foods  Chocolate  Citrus fruit  Coffee (including decaffeinated)  Cranberries and cranberry juice  Grapes  Guava  Milk Products: milk, cheese, cottage cheese, yogurt, ice cream  Peaches  Pineapple  Plums  Strawberries  Sugar especially artificial sweeteners, saccharin, aspartame, corn sweeteners, honey, fructose, sucrose, lactose  Tea  Tomatoes and tomato juice  Vitamin B complex  Vinegar  *Most people are not sensitive to ALL of these products; your goal is to find the foods that make YOUR symptoms worse.  ---------------------------------------------------------------------------------------------------    Low-acid fruit substitutions include apricots, papaya, pears and watermelon. Coffee drinkers can drink Kava or other lowacid instant drinks. Tea drinkers can substitute non-citrus herbal and sun brewed teas. Calcium carbonate co-buffered with calcium ascorbate can be substituted for Vitamin C. Prelief is a dietary supplement that works as an acid blocker for the bladder.    Where to get more  information:        Overcoming Bladder Disorders by Janki Hercules and Vernon Bright, 1990        You Dont Have to Live with Cystitis! By Geri Otero, 1988  · http://www.urologymanagement.org/oab  \-----------------------------------------------------    What is Sleep Hygiene?  Sleep hygiene is the term used to describe good sleep habits.  Considerable research has gone into developing a set of  guidelines and tips which are designed to enhance good  sleeping, and there is much evidence to suggest that these  strategies can provide long-term solutions to sleep difficulties.  There are many medications which are used to treat insomnia,  but these tend to be only effective in the short-term. Ongoing  use of sleeping pills may lead to dependence and interfere  with developing good sleep habits independent of medication,  thereby prolonging sleep difficulties. Talk to your health  professional about what is right for you, but we recommend  good sleep hygiene as an important part of treating insomnia,  either with other strategies such as medication or cognitive  therapy or alone.  Sleep Hygiene Tips  1) Get regular. One of the best ways to train your body to  sleep well is to go to bed and get up at more or less the  same time every day, even on weekends and days off! This  regular rhythm will make you feel better and will give your  body something to work from.  2) Sleep when sleepy. Only try to sleep when you actually  feel tired or sleepy, rather than spending too much time  awake in bed.  3) Get up & try again. If you havent been able to get to  sleep after about 20 minutes or more, get up and do  something calming or boring until you feel sleepy, then  return to bed and try again. Sit quietly on the couch with  the lights off (bright light will tell your brain that it is time  to wake up), or read something boring like the phone  book. Avoid doing anything that is too  stimulating or  interesting, as this will wake you up even more.  4) Avoid caffeine & nicotine. It is best to avoid consuming  any caffeine (in coffee, tea, cola drinks, chocolate, and  some medications) or nicotine (cigarettes) for at least 4-6  hours before going to bed. These substances act as  stimulants and interfere with the ability to fall asleep  5) Avoid alcohol. It is also best to avoid  alcohol for at least 4-6 hours before going to  bed. Many people believe that alcohol is  relaxing and helps them to get to sleep at  first, but it actually interrupts the quality of  sleep.  6) Bed is for sleeping. Try not to use your bed  for anything other than sleeping and sex, so that your body  comes to associate bed with sleep. If you use bed as a  place to watch TV, eat, read, work on your laptop, pay  bills, and other things, your body will not learn this  connection.  [facility] nterventions  PsychotherapyResearchTraining  7) No naps. It is best to avoid taking naps  during the day, to make sure that you  are tired at bedtime. If you cant make it  through the day without a nap, make  sure it is for less than an hour and  before 3pm.  8) Sleep rituals. You can develop your own rituals of things to  remind your body that it is time to sleep - some people find  it useful to do relaxing stretches or breathing exercises for  15 minutes before bed each night, or sit calmly with a cup of  caffeine-free tea.  9) Bathtime. Having a hot bath 1-2 hours before bedtime can  be useful, as it will raise your body temperature, causing you  to feel sleepy as your body temperature drops again.  Research shows that sleepiness is associated with a drop in  body temperature.  10) No clock-watching. Many people who struggle with sleep  tend to watch the clock too much. Frequently checking the  clock during the night can wake you up (especially if you turn  on the light to read the time) and reinforces negative  thoughts  such as Oh no, look how late it is, Ill never get to  sleep or its so early, I have only slept for 5 hours, this is  terrible.  11) Use a sleep diary. This worksheet can be a useful way of  making sure you have the right facts about your sleep, rather  than making assumptions. Because a diary involves watching  the clock (see point 10) it is a good idea to only use it for  two weeks to get an idea of what is going and then  perhaps two months down the track to see how you  are progressing.  12) Exercise. Regular exercise is a good idea to  help with good sleep, but try not to do strenuous  exercise in the 4 hours before bedtime. Morning  walks are a great way to start the day feeling refreshed!  13) Eat right. A healthy, balanced diet will help you to sleep  well, but timing is important. Some people find that a very  empty stomach at bedtime is distracting, so it can be useful  to have a light snack, but a heavy meal soon before bed can  also interrupt sleep. Some people recommend a warm glass  of milk, which contains tryptophan, which acts as a natural  sleep inducer.  14) The right space. It is very important that your bed and  bedroom are quiet and comfortable for sleeping. A cooler  room with enough blankets to stay warm is best, and make  sure you have curtains or an eyemask to block out early  morning light and earplugs if there is noise outside your  room.  15) Keep daytime routine the same. Even if you have a bad  night sleep and are tired it is important that you try to keep  your daytime activities the same as you had planned. That is,  dont avoid activities because you feel tired. This can  reinforce the insomnia.  --------------------------------------------------------------------------------    1)  Mixed urinary incontinence, urge/spontaneous > stress:    --urine C&S sent today  --continue to work on weight loss (even 5-10 lbs can help)  --continue to control diabetes  --Empty bladder every 3  hours.  Empty well: wait a minute, lean forward on toilet.    --Avoid dietary irritants (see sheet).  Keep diary x 3-5 days to determine your irritants.  --start pelvic floor PT with [facility]ple[facility]  --URGE: consider medication in future. Takes 2-4 weeks to see if will have effect.  For dry mouth: get sour, sugar free lozenge or gum.    --STRESS:  Pessary vs. Sling.     2)  Vaginal atrophy (dryness):  Use 0.5 gram of estrogen cream in vagina twice a week thereafter.  Use small amount with finger around vaginal opening/inner lips at same frequency.   --may also help bladder urgency/frequency symptoms    3)  Nocturia (nighttime urination): stop fluids 2 hours before bed/no water by bed.  If have leg swelling:  Elevate feet above chest x 1 hour before bed to get excess fluid off.  Can also use support hose (knee highs).    --work on sleep pattern (sleep hygiene sheet)  --continue to watch snoring--may need sleep apnea evaluation    4)  RTC 3 months.

## 2017-12-13 NOTE — PROGRESS NOTES
[facility] [address] [place] [address]    Claudia Rodriguez  [id number]  1955  December 13, 2017    Consulting Physician: Vijaya Cannon NP (MD Kaitlin)  GYN: Anirudh Selby MD  Primary M.D.: Jessica Marquez MD    Chief Complaint   Patient presents with    Urinary Frequency    Nocturia    Urinary Incontinence       HPI:     1)  UI:  (+) GARY < (+) UUI but mostly just small, spontaneous leaks, alcira with movement.  Was having some U/F/UI, then had bladder lift in her 30s--better.  UI returned after some years, now having more U/F x 2-3 months.   (+) pads (liners):1/day, usually severe wetness and 1/night usually (liner) minimum-moderate wetness.  Daytime frequency: Q 1 hours after taking diuretic; once that wears off Q2h.  Nocturia: Yes: 4-7/night. Does have a little insomnia--started taking 1/4 benadryl.  Has decreased liquid consumption before bed--stops at 6PM, no water by bed.  Has improved nocturia some.  No LE swelling.  Pelvic floor PT:  Has scheduled for next week with [facility].  Is going to work on weight loss. Is emotional crutch for her.   (--) dysuria,  (--) hematuria,  (--) frequent UTIs.  (--) complete bladder emptying. Has to PV to help with moderate 2nd void.      2)  POP:  Absent.  (--) vaginal bleeding. (--) vaginal discharge. (+) sexually active.  (--) dyspareunia.  (+)  Vaginal dryness. Uses lube during intercourse.  (--) vaginal estrogen use.     3)  BM:  (--) constipation/straining.  (--) chronic diarrhea. Does have some IBS--controlled with avoiding dietary triggers.  Has diarrhea with triggers.  (--) hematochezia.  (--) fecal incontinence.  (--) fecal smearing/urgency.  (+) complete evacuation.     Past Medical History  Past Medical History:   Diagnosis Date    Cholelithiasis     DDD (degenerative disc disease), lumbar     Fatty liver     - noted on U/S 11/2017    Goiter, nodular     HTN (hypertension)     Hyperglycemia     Hyperlipidemia      Hypothyroidism     Hypovitaminosis D     Meningioma     right frontal lobe ; followed by Dr. Enciso once a year    Obesity     Prediabetes     Urinary bladder disorder        Past Surgical History  Past Surgical History:   Procedure Laterality Date    BLADDER SUSPENSION      thyroid biopsy  12    tonsillectomy     Bladder suspension in early 30s:  Vaginal procedure for leakage but was also told bladder had fallen.  Was told that bladder ruptured during surgery & had 150 stitches during surgery.     Hysterectomy: No    Past Ob History  .  Fetal demise PP.    x 1.  .    Largest infant weight: 5#12oz.   unknown FAVD. yes episiotomy.      Gynecologic History  LMP: No LMP recorded. Patient is postmenopausal.  Age of menarche: 8 y.o.  Age of menopause: 45 y.o.  Menstrual history: h/o normal  Pap test: 2017, normal per report.  History of abnormal paps: No.  History of STIs:  No  Mammogram: Date of last: .  Result: Normal per report.   Colonoscopy: none.  Worried since had bladder rupture.  Was told by some MDs at [facility] she should never have invasive procedure.  Does have stool CA screening with PCP.   DEXA:  Date of last: .  Result:  Elevated BMD.  Repeat due:  .     Family History  Family History   Problem Relation Age of Onset    Coronary artery disease Father 55    Heart disease Father     Hypertension Father     Melanoma Father     Hypertension Mother     Stroke Mother          Hypertension Sister      controlled by lifestyle    No Known Problems Brother     Pancreatic cancer Maternal Grandfather     Heart failure Maternal Grandmother     Thyroid nodules Sister      s/p thyroidectomy    Thyroid disease Sister     Thyroid nodules Sister     No Known Problems Brother     Diabetes Neg Hx     Psoriasis Neg Hx     Lupus Neg Hx     Eczema Neg Hx     Thyroid cancer Neg Hx       Colon CA: No  Breast CA: No  GYN CA: No   CA: No    Social History  History    Smoking Status    Never Smoker   Smokeless Tobacco    Never Used     History   Alcohol Use No   .    History   Drug Use No     The patient is .  Resides in [place] [address].  Employment status: currently employed director of  for company.      Allergies  Review of patient's allergies indicates:   Allergen Reactions    Zostavax [zoster vaccine live (pf)] Rash     - injection site red, hot, indurated    Amlodipine-olmesartan      Other reaction(s): pounding in ears      Atorvastatin        Other reaction(s): Unknown    Demerol [meperidine] Other (See Comments)     Other reaction(s): Nausea  Other reaction(s): Headache    Ezetimibe-simvastatin      Other reaction(s): Headache      Lotrel [amlodipine-benazepril]        Other reaction(s): constant cough    Nsaids (non-steroidal anti-inflammatory drug) Other (See Comments)       Other reaction(s): Difficulty breathing    Other reaction(s): Difficulty breathing    Phenytoin sodium extended Other (See Comments)       Medications  Current Outpatient Prescriptions on File Prior to Visit   Medication Sig Dispense Refill    amlodipine (NORVASC) 10 MG tablet TAKE ONE TABLET BY MOUTH EVERY DAY 90 tablet 0    atenolol (TENORMIN) 50 MG tablet TAKE ONE Tablet BY MOUTH TWICE DAILY 180 tablet 0    CALCIUM CARBONATE/VITAMIN D3 (CALCIUM 500 + D, D3, ORAL)       ESTRACE 0.01 % (0.1 mg/gram) vaginal cream   98    FOLIC ACID/MV,FE,OTHER MIN (CENTRUM ORAL) Take 1 tablet by mouth.      levothyroxine (SYNTHROID) 75 MCG tablet Take 1 tablet (75 mcg total) by mouth once daily. 90 tablet 0    metFORMIN (GLUCOPHAGE-XR) 500 MG 24 hr tablet TAKE TWO TABLETS BY MOUTH EVERY DAY WITH dinner OR EVENING MEAL 180 tablet 0    omega-3 fatty acids 1,000 mg Cap Take 1 capsule by mouth Daily.      terconazole (TERAZOL 3) 0.8 % vaginal cream   2    triamterene-hydrochlorothiazide 37.5-25 mg (MAXZIDE-25) 37.5-25 mg per tablet TAKE ONE TABLET BY MOUTH EVERY DAY 90 tablet 1     "VITAMIN D2 50,000 unit capsule TAKE TWO CAPSULES BY MOUTH EVERY 2 weeks 12 capsule 0    LORazepam (ATIVAN) 1 MG tablet   0     No current facility-administered medications on file prior to visit.        Review of Systems A 14 point ROS was reviewed with pertinent positives as noted above in the history of present illness.      Constitutional: negative  Eyes: negative  Endocrine: negative  Gastrointestinal: negative  Cardiovascular: negative  Respiratory: negative  Allergic/Immunologic: negative  Integumentary: negative  Psychiatric: negative  Musculoskeletal: negative   Ear/Nose/Throat: negative  Neurologic: negative  Genitourinary: SEE HPI  Hematologic/Lymphatic: negative   Breast: negative    Urogynecologic Exam  /84 (BP Location: Right arm, Patient Position: Sitting)   Ht 5' 9" (1.753 m)   Wt 110.2 kg (242 lb 15.2 oz)   BMI 35.88 kg/m²     GENERAL APPEARANCE:  The patient is well-developed, well-nourished.  Neck:  Supple with no thyromegaly, no carotid bruits.  Heart:  Regular rate and rhythm, no murmurs, rubs or gallops.  Lungs:  Clear.  No CVA tenderness.  Abdomen:  Soft, nontender, nondistended, no hepatosplenomegaly. Inhibited by habitus.    Incisions:  none    PELVIC:    External genitalia:  Normal Bartholins, Skenes and labia bilaterally.    Urethra:  No caruncle, diverticulum or masses.  (+) hypermobility.    Vagina:  Atrophy (+) , no bladder masses or tender, no discharge.    Cervix:  normal appearance  Uterus: uterus absent  Adnexa: Not palpable.    POP-Q:    Deferred.  No obvious POP present with valsalva.     NEUROLOGIC:  Cranial nerves 2 through 12 intact.  Strength 5/5.  DTRs 2+ lower extremities.  S2 through 4 normal.  Sacral reflexes intact.    EXT: YADAV, 2+ pulses bilaterally, no C/C/E    COUGH STRESS TEST:  negative  KEGEL: 1 /5    RECTAL:    External:  Normal, (--) hemorrhoids, (--) dovetailing.   Internal:  Deferred    PVR: 10 mL    Impression    No diagnosis found.    Initial " Plan  The patient was counseled regarding these issues. The patient was given a summary sheet containing each of these issues with possible options for evaluation and management. When appropriate, we also reviewed computer-generated diagrams specific to their diagnoses..  All questions were addressed to the patient's satisfaction.    1)  Mixed urinary incontinence, urge/spontaneous > stress:    --urine C&S sent today  --continue to work on weight loss (even 5-10 lbs can help)  --continue to control diabetes  --Empty bladder every 3 hours.  Empty well: wait a minute, lean forward on toilet.    --Avoid dietary irritants (see sheet).  Keep diary x 3-5 days to determine your irritants.  --start pelvic floor PT with [facility]ple[facility]  --URGE: consider medication in future. Takes 2-4 weeks to see if will have effect.  For dry mouth: get sour, sugar free lozenge or gum.    --STRESS:  Pessary vs. Sling.     2)  Vaginal atrophy (dryness):  Use 0.5 gram of estrogen cream in vagina twice a week thereafter.  Use small amount with finger around vaginal opening/inner lips at same frequency.   --may also help bladder urgency/frequency symptoms    3)  Nocturia (nighttime urination): stop fluids 2 hours before bed/no water by bed.  If have leg swelling:  Elevate feet above chest x 1 hour before bed to get excess fluid off.  Can also use support hose (knee highs).    --work on sleep pattern (sleep hygiene sheet)  --continue to watch snoring--may need sleep apnea evaluation    4)  RTC 3 months.     Approximately 60 min were spent in consult, 90 % in discussion.     Thank you for requesting consultation of your patient.  I look forward to participating in their care.    Anu Coles  Female Pelvic Medicine and Reconstructive Surgery  [facility] [place], [place]

## 2017-12-14 LAB — BACTERIA UR CULT: NO GROWTH

## 2017-12-15 DIAGNOSIS — I10 ESSENTIAL HYPERTENSION: ICD-10-CM

## 2017-12-15 RX ORDER — AMLODIPINE BESYLATE 10 MG/1
TABLET ORAL
Qty: 90 TABLET | Refills: 0 | Status: SHIPPED | OUTPATIENT
Start: 2017-12-15 | End: 2017-12-19 | Stop reason: SDUPTHER

## 2017-12-18 ENCOUNTER — CLINICAL SUPPORT (OUTPATIENT)
Dept: REHABILITATION | Facility: HOSPITAL | Age: 62
End: 2017-12-18
Attending: INTERNAL MEDICINE
Payer: COMMERCIAL

## 2017-12-18 ENCOUNTER — TELEPHONE (OUTPATIENT)
Dept: DERMATOLOGY | Facility: CLINIC | Age: 62
End: 2017-12-18

## 2017-12-18 DIAGNOSIS — R35.1 NOCTURIA MORE THAN TWICE PER NIGHT: ICD-10-CM

## 2017-12-18 DIAGNOSIS — N39.46 URINARY INCONTINENCE, MIXED: Primary | ICD-10-CM

## 2017-12-18 DIAGNOSIS — N81.89 PELVIC FLOOR WEAKNESS: ICD-10-CM

## 2017-12-18 PROCEDURE — 97112 NEUROMUSCULAR REEDUCATION: CPT | Mod: PN

## 2017-12-18 PROCEDURE — 97162 PT EVAL MOD COMPLEX 30 MIN: CPT | Mod: PN

## 2017-12-18 NOTE — TELEPHONE ENCOUNTER
----- Message from Cheyanne Mendoza MA sent at 12/18/2017  1:47 PM CST -----  Contact: PT    Stacy JACKSON does not have anything until ending of January  Pt requesting to be seen this month.    Help  ----- Message -----  From: Graeme Shea  Sent: 12/18/2017  12:53 PM  To: [facility] Staff    Pt has established care in the past with Patricia Gamino and Roberto, would like to speak to staff regarding scheduling an appt for a mole check before the end of the year with either provider if possible.

## 2017-12-18 NOTE — PATIENT INSTRUCTIONS
1. Lie comfortably and take a few deep breaths to relax the body completely. Bring your mind down into your pelvis and your pelvic floor muscles and take a few minutes to check in with how those muscles feel then relax completely.    2. Squeeze and lift your pelvic floor muscles, closing the anal opening. Hold 3 seconds.     3. Relax and drop your pelvic floor muscles, relaxing your entire pelvis, hips, back, and legs. Take 1 -2 deep breaths.    4. Repeat 10  times, 3 times/day.

## 2017-12-18 NOTE — PLAN OF CARE
Patient: Claudia GAITAN [facility] [place] #:  [id number]    Date of treatment: 12/18/2017   Time in: 7:51  Time out: 9:07  # Visits: 1  Referral expiration: 12/31/17  POC expiration: 3/12/18    Outpatient Physical Therapy   Initial Evaluation    Claudia is a 62 y.o. female evaluated on 12/18/2017    Physician:  Jessica Marquez MD   Diagnosis:   Encounter Diagnoses   Name Primary?    Urinary incontinence, mixed Yes    Nocturia more than twice per night     Pelvic floor weakness         Treatment ordered: PT    Medical History:   Past Medical History:   Diagnosis Date    Cholelithiasis     DDD (degenerative disc disease), lumbar     Fatty liver     - noted on U/S 11/2017    Goiter, nodular     HTN (hypertension)     Hyperglycemia     Hyperlipidemia     Hypothyroidism     Hypovitaminosis D     Meningioma     right frontal lobe ; followed by Dr. Enciso once a year    Obesity     Prediabetes     Urinary bladder disorder         Surgical History:   Past Surgical History:   Procedure Laterality Date    BLADDER SUSPENSION      thyroid biopsy  7/11/12    tonsillectomy          Medications:   Current Outpatient Prescriptions   Medication Sig    amLODIPine (NORVASC) 10 MG tablet TAKE ONE TABLET BY MOUTH EVERY DAY    atenolol (TENORMIN) 50 MG tablet TAKE ONE Tablet BY MOUTH TWICE DAILY    CALCIUM CARBONATE/VITAMIN D3 (CALCIUM 500 + D, D3, ORAL)     ESTRACE 0.01 % (0.1 mg/gram) vaginal cream     estradiol (ESTRACE) 0.01 % (0.1 mg/gram) vaginal cream Place 0.5 g vaginally once daily.    FOLIC ACID/MV,FE,OTHER MIN (CENTRUM ORAL) Take 1 tablet by mouth.    levothyroxine (SYNTHROID) 75 MCG tablet Take 1 tablet (75 mcg total) by mouth once daily.    LORazepam (ATIVAN) 1 MG tablet     metFORMIN (GLUCOPHAGE-XR) 500 MG 24 hr tablet TAKE TWO TABLETS BY MOUTH EVERY DAY WITH dinner OR EVENING MEAL    omega-3 fatty acids 1,000 mg Cap Take 1 capsule by mouth Daily.    terconazole (TERAZOL 3) 0.8 % vaginal cream      triamterene-hydrochlorothiazide 37.5-25 mg (MAXZIDE-25) 37.5-25 mg per tablet TAKE ONE TABLET BY MOUTH EVERY DAY    VITAMIN D2 50,000 unit capsule TAKE TWO CAPSULES BY MOUTH EVERY 2 weeks     No current facility-administered medications for this visit.        Allergies:   Review of patient's allergies indicates:   Allergen Reactions    Zostavax [zoster vaccine live (pf)] Rash     - injection site red, hot, indurated    Amlodipine-olmesartan      Other reaction(s): pounding in ears      Atorvastatin        Other reaction(s): Unknown    Demerol [meperidine] Other (See Comments)     Other reaction(s): Nausea  Other reaction(s): Headache    Ezetimibe-simvastatin      Other reaction(s): Headache      Lotrel [amlodipine-benazepril]        Other reaction(s): constant cough    Nsaids (non-steroidal anti-inflammatory drug) Other (See Comments)       Other reaction(s): Difficulty breathing    Other reaction(s): Difficulty breathing    Phenytoin sodium extended Other (See Comments)      Precautions: universal   OB/GYN History: 1 vaginal delivery (pt states she lost her baby) with tearing that became infected; post menopausal  Bladder/Bowel History: denies       Barriers to Learning: none  Educational/Spiritual/Cultural needs: none  Environmental Barriers: none noted  Abuse/Neglect: no signs  Nutritional Status: well developed well nourished  Fall Risk: none    Subjective     Pt reports that soon after delivering her baby she started with symptoms of pressure and urinary leakage. She went to her OBGYN who told her that he could fix it with a bladder lift. Pt states she is not a proponent of surgery but she was young and miserable at the time so she went in for it (she also had young children at home that she adopted). Pt was told that when the doctor touched her bladder it ripped open and that she has a lot of estrogen in her body and extrememly sensitive tissues. Pt states they tacked it up and now she has 150  "stitches in her bladder. She went to [facility] and they confirmed an over abudnance of estrogen and was told her body also doesn't replace collagen, took supplements for awhile. Pt states that she thinks that gradually her bladder fell again becacuse she has started leaking again. Has been living with constant leakage for years. Recently it started interrupting her sleep (waking 7-8 times/night). Pt states she drinks a lot of liquids ("I don't smoke or do drugs, I drink a lot of fluids). She mentioned the UI to her PCP at her annual physical and that's when her she was recommended PT. Pt reports she also has a thyroid issue. Has stopped drinking after 6 -7:00 at night, cuts nocturia down to 4 times/night. Pt states she also has to take a diuretic in the morning. From waking at 5:00 until noon, she goes like every 30 min. The afternoon is livable, can sit for an hour and a half without getting up to go to the bathroom. Pt states she knows her weight has something to do with it. Pt states she has also had a very difficult year personally and has increased levels of personal/emotional stress at the moment. Pt reports PMH includes: benign brain tumor, thyroid tumors, HTN at one time that is not controlled - pt states that everything she has going on is "in check."    Pain: Patient reports 0/10 with 0 being the lowest and 10 being the highest. Feeling of urgency, "affects me socially because I can't be anywhere that I don't know if there's a bathroom"    Pain or lack of sensation with vaginal/pelvic exam? Denies; having some issues with intercourse because of vaginal atrophy, intercourse is painful - tissue is rubbing away; is taking vaginal estrogen  Sexually active? Rarely, too painful    Previous treatment included: none    Frequency of Urination: Daytime: every 30 min in the morning, every hour and a half to two hours in the afternoon        Nighttime: 3-4 times    Urine Stream: not weak, not strong, " "unable to stop it    Bladder Leakage: Yes  Frequency of incidents: all day    Do you have difficulty initiating your urine stream? no  Do you feel like you are emptying completely? sometimes    Frequency of Bowel Movements: every day    Do you have difficulty initiating a bowel movement? no  Colon Leakage: Yes (only once, right before becoming very sick with vomiting and diarrhea)  Stool consistency? Very seldom hard due to IBS (soft stool) - can't eat fried foods, heavy cream, raw spinach    Form of Protection: mini pad  Number of Pads required in 24 hours: 1-2 (soaked by the end of the day    Types/amount of Fluid Intake: coffee in the morning (decaf, doesn't drink any caffeine), followed by flavored banegas throughout the rest of the day, maybe a diet soft drink every now and then, varies based on where she is (travels a lot for work)  Current Exercise: walks (has a dog, not every day)    Occupation: Pt works as a  (22 offices) and job related duties include: lots of traveling (pt reports that a 4 hour drive will take her 6 hours due to having to stop frequently)  Living situation? Lives with  and adopted 15 year old.     Patient's Goals: "to improve my quality of life to what it was even a year ago when leakage wasn't as bad"    Objective     ORTHO SCREEN  Posture: rounded shoulders, forward head, slouched    Pelvic Alignment: no deviations noted in supine    ABDOMINALS  Scarring: none      Diastasis: bulge noted above navel   Abdominal strength: Rectus: weak 3/5     Transverse: palpable       VAGINAL PELVIC FLOOR EXAM  EXTERNAL ASSESSMENT  Skin Condition: WNL  Scarring: episiotomy scar noted  Sensation: WNL  Pain: none  Introitus: gaping   Voluntary Contraction: glut contraction  Voluntary Relaxation: present  Bearing down: present    INTERNAL ASSESSMENT  Pain: none  Sensation: able to localize pressure appropriately, able to distinguish pressure from side to side  Vaginal Vault: " fragile and roomy  Muscle Bulk: atrophy  Muscle Power: 1/5  Muscle Endurance: NA  # Reps To Fatigue: NA    Fast Contractions: NA    Quality of Contraction: slow rise, decreased hold and reliant of gluts  Specificity: patient contracts: gluts and adductors   Prolapse Check: none      TREATMENT    Pt received individual neuromuscular reeducation including: keglas in supine with verbal and tactile cueing to improve isolation of PFM; pt relies on overflow    Education: Instructed on anatomy/physiology of urinary/bowel system and PF function using pelvic model; discussed plan of care with patient; instructed in purpose of physical therapy and the  benefits/risks of treatment; instructed in risks of refusing treatment. Patient agreed to treatment plan. Pt was instructed in HEP including 3'' kegals (see patient instructions); she verbalized understanding and was provided with a handout. Claudia demonstrated and verbalized understanding of all education provided.    Assessment     Claudia is a 62 y.o. female referred to outpatient physical therapy with a medical diagnosis of mixed urinary incontinence. Pt presents today with signs and symptoms consistent with referring diagnosis including PFM atropy, decreased coordination, decreased abdominal strength, atrophy noted to hip musculature. Pt with hx of bladder lift and reports having excess of estrogen in her body making her tissues fragile with less strength. Pt also reports emotionally stressful year and tendency to overeat with increased emotional stress. Pt will benefit from physcial therapy services in order to maximize pain free functional independence. The following goals were discussed with the patient and patient is in agreement with them as to be addressed in the treatment plan. Pt was given a HEP as described above. Pt verbally understood the instructions as they were given and demonstrated proper form and technique during therapy. Pt was advise to perform these  exercises free of pain and to stop performing them if pain occurs.     Prognosis: good  No educational, cultural, or spiritual needs identified.    History  Co-morbidities and personal factors that may impact the plan of care Examination  Body Structures and Functions, activity limitations and participation restrictions that may impact the plan of care    Clinical Presentation   Co-morbidities:   IBS  Obesity      Personal Factors:   Pt reports emotional/personal stress Body Regions:   Pelvis, trunk    Body Systems:    Musculoskeletal, neuromuscular      Participation Restrictions:   Limited     Activity limitations:   Learning and applying knowledge  no deficits    General Tasks and Commands  no deficits    Communication  no deficits    Mobility  no deficits    Self care  no deficits    Domestic Life  doing house work (cleaning house, washing dishes, laundry)    Interactions/Relationships  intimate relationships    Life Areas  employment    Community and Social Life  community life  recreation and leisure         evolving clinical presentation with changing clinical characteristics                      moderate   moderate  moderate Decision Making/ Complexity Score:  moderate     GOALS  Short Term Goals: 4 weeks (1/15/18)  1. Pt will verbalize improved awareness of PFM activity as palpated by PT in order to improve activity involvement with HEP.  2. Pt will demonstrate decreased overflow muscle activity during PF muscle contraction.  3. Pt will demonstrated increase in PF muscle endurance to 5 sec x 10 reps with use of overflow mm in order to improve central stabilization.  4. Pt will tolerate HEP to improve impairments and independence with ADL's.    Long Term Goals: 12 weeks (3/12/18)  1. Pt will increase PFM strength to 2/5 in order to improve bladder control.  2. Pt will display improvement in core strength and stability in order to improve central stabilization.  3. Pt will report improvement in UI.  4. Pt  will be independent with HEP and self management.    CMS Impairment/Limitation/Restriction for Urinary Problem Survey  Status Limitation G-Code CMS Severity Modifier  Intake 47% 53% Current Status CK - At least 40 percent but less than 60 percent  Predicted 57% 43% Goal Status+ CK - At least 40 percent but less than 60 percent    Plan     Pt will be treated by physical therapy 1 time a week for 3 months for Pt Education, HEP, therapeutic exercises, neuromuscular re-education, therapeutic activity, gait training, manual therapy, and modalities (including SEMG) PRN to achieve established goals.

## 2017-12-18 NOTE — TELEPHONE ENCOUNTER
I was able to speak to the patient and explain that at this time we do not have any appointments that I could fit her into, however, I made some suggestions about when to check on josener to possibly have better luck to get in before the end of the year. I also was able to check if she was on the wait list and she was and I confirmed with her. She thanked me for the call and the suggestion.

## 2017-12-18 NOTE — PROGRESS NOTES
Patient: Claudia GAITAN [facility] #:  [id number]    Date of treatment: 12/18/2017   Time in: 7:51  Time out: 9:07  # Visits: 1  Referral expiration: 12/31/17  POC expiration: 3/12/18    Outpatient Physical Therapy   Initial Evaluation    Claudia is a 62 y.o. female evaluated on 12/18/2017    Physician:  Jessica Marquez MD   Diagnosis:   Encounter Diagnoses   Name Primary?    Urinary incontinence, mixed Yes    Nocturia more than twice per night     Pelvic floor weakness         Treatment ordered: PT    Medical History:   Past Medical History:   Diagnosis Date    Cholelithiasis     DDD (degenerative disc disease), lumbar     Fatty liver     - noted on U/S 11/2017    Goiter, nodular     HTN (hypertension)     Hyperglycemia     Hyperlipidemia     Hypothyroidism     Hypovitaminosis D     Meningioma     right frontal lobe ; followed by Dr. Enciso once a year    Obesity     Prediabetes     Urinary bladder disorder         Surgical History:   Past Surgical History:   Procedure Laterality Date    BLADDER SUSPENSION      thyroid biopsy  7/11/12    tonsillectomy          Medications:   Current Outpatient Prescriptions   Medication Sig    amLODIPine (NORVASC) 10 MG tablet TAKE ONE TABLET BY MOUTH EVERY DAY    atenolol (TENORMIN) 50 MG tablet TAKE ONE Tablet BY MOUTH TWICE DAILY    CALCIUM CARBONATE/VITAMIN D3 (CALCIUM 500 + D, D3, ORAL)     ESTRACE 0.01 % (0.1 mg/gram) vaginal cream     estradiol (ESTRACE) 0.01 % (0.1 mg/gram) vaginal cream Place 0.5 g vaginally once daily.    FOLIC ACID/MV,FE,OTHER MIN (CENTRUM ORAL) Take 1 tablet by mouth.    levothyroxine (SYNTHROID) 75 MCG tablet Take 1 tablet (75 mcg total) by mouth once daily.    LORazepam (ATIVAN) 1 MG tablet     metFORMIN (GLUCOPHAGE-XR) 500 MG 24 hr tablet TAKE TWO TABLETS BY MOUTH EVERY DAY WITH dinner OR EVENING MEAL    omega-3 fatty acids 1,000 mg Cap Take 1 capsule by mouth Daily.    terconazole (TERAZOL 3) 0.8 % vaginal cream      triamterene-hydrochlorothiazide 37.5-25 mg (MAXZIDE-25) 37.5-25 mg per tablet TAKE ONE TABLET BY MOUTH EVERY DAY    VITAMIN D2 50,000 unit capsule TAKE TWO CAPSULES BY MOUTH EVERY 2 weeks     No current facility-administered medications for this visit.        Allergies:   Review of patient's allergies indicates:   Allergen Reactions    Zostavax [zoster vaccine live (pf)] Rash     - injection site red, hot, indurated    Amlodipine-olmesartan      Other reaction(s): pounding in ears      Atorvastatin        Other reaction(s): Unknown    Demerol [meperidine] Other (See Comments)     Other reaction(s): Nausea  Other reaction(s): Headache    Ezetimibe-simvastatin      Other reaction(s): Headache      Lotrel [amlodipine-benazepril]        Other reaction(s): constant cough    Nsaids (non-steroidal anti-inflammatory drug) Other (See Comments)       Other reaction(s): Difficulty breathing    Other reaction(s): Difficulty breathing    Phenytoin sodium extended Other (See Comments)      Precautions: universal   OB/GYN History: 1 vaginal delivery (pt states she lost her baby) with tearing that became infected; post menopausal  Bladder/Bowel History: denies       Barriers to Learning: none  Educational/Spiritual/Cultural needs: none  Environmental Barriers: none noted  Abuse/Neglect: no signs  Nutritional Status: well developed well nourished  Fall Risk: none    Subjective     Pt reports that soon after delivering her baby she started with symptoms of pressure and urinary leakage. She went to her OBGYN who told her that he could fix it with a bladder lift. Pt states she is not a proponent of surgery but she was young and miserable at the time so she went in for it (she also had young children at home that she adopted). Pt was told that when the doctor touched her bladder it ripped open and that she has a lot of estrogen in her body and extrememly sensitive tissues. Pt states they tacked it up and now she has 150  "stitches in her bladder. She went to [facility] and they confirmed an over abudnance of estrogen and was told her body also doesn't replace collagen, took supplements for awhile. Pt states that she thinks that gradually her bladder fell again becacuse she has started leaking again. Has been living with constant leakage for years. Recently it started interrupting her sleep (waking 7-8 times/night). Pt states she drinks a lot of liquids ("I don't smoke or do drugs, I drink a lot of fluids). She mentioned the UI to her PCP at her annual physical and that's when her she was recommended PT. Pt reports she also has a thyroid issue. Has stopped drinking after 6 -7:00 at night, cuts nocturia down to 4 times/night. Pt states she also has to take a diuretic in the morning. From waking at 5:00 until noon, she goes like every 30 min. The afternoon is livable, can sit for an hour and a half without getting up to go to the bathroom. Pt states she knows her weight has something to do with it. Pt states she has also had a very difficult year personally and has increased levels of personal/emotional stress at the moment. Pt reports PMH includes: benign brain tumor, thyroid tumors, HTN at one time that is not controlled - pt states that everything she has going on is "in check."    Pain: Patient reports 0/10 with 0 being the lowest and 10 being the highest. Feeling of urgency, "affects me socially because I can't be anywhere that I don't know if there's a bathroom"    Pain or lack of sensation with vaginal/pelvic exam? Denies; having some issues with intercourse because of vaginal atrophy, intercourse is painful - tissue is rubbing away; is taking vaginal estrogen  Sexually active? Rarely, too painful    Previous treatment included: none    Frequency of Urination: Daytime: every 30 min in the morning, every hour and a half to two hours in the afternoon        Nighttime: 3-4 times    Urine Stream: not weak, not strong, " "unable to stop it    Bladder Leakage: Yes  Frequency of incidents: all day    Do you have difficulty initiating your urine stream? no  Do you feel like you are emptying completely? sometimes    Frequency of Bowel Movements: every day    Do you have difficulty initiating a bowel movement? no  Colon Leakage: Yes (only once, right before becoming very sick with vomiting and diarrhea)  Stool consistency? Very seldom hard due to IBS (soft stool) - can't eat fried foods, heavy cream, raw spinach    Form of Protection: mini pad  Number of Pads required in 24 hours: 1-2 (soaked by the end of the day    Types/amount of Fluid Intake: coffee in the morning (decaf, doesn't drink any caffeine), followed by flavored banegas throughout the rest of the day, maybe a diet soft drink every now and then, varies based on where she is (travels a lot for work)  Current Exercise: walks (has a dog, not every day)    Occupation: Pt works as a  (22 offices) and job related duties include: lots of traveling (pt reports that a 4 hour drive will take her 6 hours due to having to stop frequently)  Living situation? Lives with  and adopted 15 year old.     Patient's Goals: "to improve my quality of life to what it was even a year ago when leakage wasn't as bad"    Objective     ORTHO SCREEN  Posture: rounded shoulders, forward head, slouched    Pelvic Alignment: no deviations noted in supine    ABDOMINALS  Scarring: none      Diastasis: bulge noted above navel   Abdominal strength: Rectus: weak 3/5     Transverse: palpable       VAGINAL PELVIC FLOOR EXAM  EXTERNAL ASSESSMENT  Skin Condition: WNL  Scarring: episiotomy scar noted  Sensation: WNL  Pain: none  Introitus: gaping   Voluntary Contraction: glut contraction  Voluntary Relaxation: present  Bearing down: present    INTERNAL ASSESSMENT  Pain: none  Sensation: able to localize pressure appropriately, able to distinguish pressure from side to side  Vaginal Vault: " fragile and roomy  Muscle Bulk: atrophy  Muscle Power: 1/5  Muscle Endurance: NA  # Reps To Fatigue: NA    Fast Contractions: NA    Quality of Contraction: slow rise, decreased hold and reliant of gluts  Specificity: patient contracts: gluts and adductors   Prolapse Check: none      TREATMENT    Pt received individual neuromuscular reeducation including: keglas in supine with verbal and tactile cueing to improve isolation of PFM; pt relies on overflow    Education: Instructed on anatomy/physiology of urinary/bowel system and PF function using pelvic model; discussed plan of care with patient; instructed in purpose of physical therapy and the  benefits/risks of treatment; instructed in risks of refusing treatment. Patient agreed to treatment plan. Pt was instructed in HEP including 3'' kegals (see patient instructions); she verbalized understanding and was provided with a handout. Claudia demonstrated and verbalized understanding of all education provided.    Assessment     Claudia is a 62 y.o. female referred to outpatient physical therapy with a medical diagnosis of mixed urinary incontinence. Pt presents today with signs and symptoms consistent with referring diagnosis including PFM atropy, decreased coordination, decreased abdominal strength, atrophy noted to hip musculature. Pt with hx of bladder lift and reports having excess of estrogen in her body making her tissues fragile with less strength. Pt also reports emotionally stressful year and tendency to overeat with increased emotional stress. Pt will benefit from physcial therapy services in order to maximize pain free functional independence. The following goals were discussed with the patient and patient is in agreement with them as to be addressed in the treatment plan. Pt was given a HEP as described above. Pt verbally understood the instructions as they were given and demonstrated proper form and technique during therapy. Pt was advise to perform these  exercises free of pain and to stop performing them if pain occurs.     Prognosis: good  No educational, cultural, or spiritual needs identified.    History  Co-morbidities and personal factors that may impact the plan of care Examination  Body Structures and Functions, activity limitations and participation restrictions that may impact the plan of care    Clinical Presentation   Co-morbidities:   IBS  Obesity      Personal Factors:   Pt reports emotional/personal stress Body Regions:   Pelvis, trunk    Body Systems:    Musculoskeletal, neuromuscular      Participation Restrictions:   Limited     Activity limitations:   Learning and applying knowledge  no deficits    General Tasks and Commands  no deficits    Communication  no deficits    Mobility  no deficits    Self care  no deficits    Domestic Life  doing house work (cleaning house, washing dishes, laundry)    Interactions/Relationships  intimate relationships    Life Areas  employment    Community and Social Life  community life  recreation and leisure         evolving clinical presentation with changing clinical characteristics                      moderate   moderate  moderate Decision Making/ Complexity Score:  moderate     GOALS  Short Term Goals: 4 weeks (1/15/18)  1. Pt will verbalize improved awareness of PFM activity as palpated by PT in order to improve activity involvement with HEP.  2. Pt will demonstrate decreased overflow muscle activity during PF muscle contraction.  3. Pt will demonstrated increase in PF muscle endurance to 5 sec x 10 reps with use of overflow mm in order to improve central stabilization.  4. Pt will tolerate HEP to improve impairments and independence with ADL's.    Long Term Goals: 12 weeks (3/12/18)  1. Pt will increase PFM strength to 2/5 in order to improve bladder control.  2. Pt will display improvement in core strength and stability in order to improve central stabilization.  3. Pt will report improvement in UI.  4. Pt  will be independent with HEP and self management.    CMS Impairment/Limitation/Restriction for Urinary Problem Survey  Status Limitation G-Code CMS Severity Modifier  Intake 47% 53% Current Status CK - At least 40 percent but less than 60 percent  Predicted 57% 43% Goal Status+ CK - At least 40 percent but less than 60 percent    Plan     Pt will be treated by physical therapy 1 time a week for 3 months for Pt Education, HEP, therapeutic exercises, neuromuscular re-education, therapeutic activity, gait training, manual therapy, and modalities (including SEMG) PRN to achieve established goals.

## 2017-12-19 DIAGNOSIS — I10 ESSENTIAL HYPERTENSION: ICD-10-CM

## 2017-12-19 RX ORDER — AMLODIPINE BESYLATE 10 MG/1
TABLET ORAL
Qty: 90 TABLET | Refills: 1 | Status: SHIPPED | OUTPATIENT
Start: 2017-12-19 | End: 2018-03-29 | Stop reason: SDUPTHER

## 2017-12-26 ENCOUNTER — PROCEDURE VISIT (OUTPATIENT)
Dept: HEPATOLOGY | Facility: CLINIC | Age: 62
End: 2017-12-26
Attending: PHYSICIAN ASSISTANT
Payer: COMMERCIAL

## 2017-12-26 ENCOUNTER — INITIAL CONSULT (OUTPATIENT)
Dept: HEPATOLOGY | Facility: CLINIC | Age: 62
End: 2017-12-26
Payer: COMMERCIAL

## 2017-12-26 ENCOUNTER — LAB VISIT (OUTPATIENT)
Dept: LAB | Facility: HOSPITAL | Age: 62
End: 2017-12-26
Payer: COMMERCIAL

## 2017-12-26 VITALS
DIASTOLIC BLOOD PRESSURE: 69 MMHG | HEART RATE: 70 BPM | SYSTOLIC BLOOD PRESSURE: 162 MMHG | BODY MASS INDEX: 36.57 KG/M2 | TEMPERATURE: 97 F | RESPIRATION RATE: 18 BRPM | WEIGHT: 246.94 LBS | HEIGHT: 69 IN | OXYGEN SATURATION: 98 %

## 2017-12-26 DIAGNOSIS — K76.0 FATTY LIVER: ICD-10-CM

## 2017-12-26 DIAGNOSIS — K76.0 FATTY LIVER: Primary | ICD-10-CM

## 2017-12-26 DIAGNOSIS — R74.8 ELEVATED LIVER ENZYMES: ICD-10-CM

## 2017-12-26 LAB
ALBUMIN SERPL BCP-MCNC: 3.9 G/DL
ALP SERPL-CCNC: 71 U/L
ALT SERPL W/O P-5'-P-CCNC: 61 U/L
ANION GAP SERPL CALC-SCNC: 8 MMOL/L
AST SERPL-CCNC: 41 U/L
BASOPHILS # BLD AUTO: 0.06 K/UL
BASOPHILS NFR BLD: 0.6 %
BILIRUB DIRECT SERPL-MCNC: 0.2 MG/DL
BILIRUB SERPL-MCNC: 0.6 MG/DL
BUN SERPL-MCNC: 18 MG/DL
CALCIUM SERPL-MCNC: 9.6 MG/DL
CERULOPLASMIN SERPL-MCNC: 30 MG/DL
CHLORIDE SERPL-SCNC: 104 MMOL/L
CO2 SERPL-SCNC: 29 MMOL/L
CREAT SERPL-MCNC: 0.7 MG/DL
DIFFERENTIAL METHOD: ABNORMAL
EOSINOPHIL # BLD AUTO: 0.1 K/UL
EOSINOPHIL NFR BLD: 1.2 %
ERYTHROCYTE [DISTWIDTH] IN BLOOD BY AUTOMATED COUNT: 12.7 %
EST. GFR  (AFRICAN AMERICAN): >60 ML/MIN/1.73 M^2
EST. GFR  (NON AFRICAN AMERICAN): >60 ML/MIN/1.73 M^2
GLUCOSE SERPL-MCNC: 91 MG/DL
HBV CORE AB SERPL QL IA: NEGATIVE
HBV SURFACE AB SER-ACNC: NEGATIVE M[IU]/ML
HBV SURFACE AG SERPL QL IA: NEGATIVE
HCT VFR BLD AUTO: 40.3 %
HEPATITIS A ANTIBODY, IGG: NEGATIVE
HGB BLD-MCNC: 12.9 G/DL
IGG SERPL-MCNC: 591 MG/DL
IMM GRANULOCYTES # BLD AUTO: 0.07 K/UL
IMM GRANULOCYTES NFR BLD AUTO: 0.7 %
INR PPP: 1
IRON SERPL-MCNC: 78 UG/DL
LYMPHOCYTES # BLD AUTO: 3.5 K/UL
LYMPHOCYTES NFR BLD: 33.9 %
MCH RBC QN AUTO: 31 PG
MCHC RBC AUTO-ENTMCNC: 32 G/DL
MCV RBC AUTO: 97 FL
MONOCYTES # BLD AUTO: 1 K/UL
MONOCYTES NFR BLD: 9.3 %
NEUTROPHILS # BLD AUTO: 5.6 K/UL
NEUTROPHILS NFR BLD: 54.3 %
NRBC BLD-RTO: 0 /100 WBC
PLATELET # BLD AUTO: 334 K/UL
PMV BLD AUTO: 9.5 FL
POTASSIUM SERPL-SCNC: 3.7 MMOL/L
PROT SERPL-MCNC: 7.2 G/DL
PROTHROMBIN TIME: 10.8 SEC
RBC # BLD AUTO: 4.16 M/UL
SATURATED IRON: 20 %
SODIUM SERPL-SCNC: 141 MMOL/L
TOTAL IRON BINDING CAPACITY: 383 UG/DL
TRANSFERRIN SERPL-MCNC: 259 MG/DL
WBC # BLD AUTO: 10.35 K/UL

## 2017-12-26 PROCEDURE — 82784 ASSAY IGA/IGD/IGG/IGM EACH: CPT

## 2017-12-26 PROCEDURE — 80053 COMPREHEN METABOLIC PANEL: CPT

## 2017-12-26 PROCEDURE — 85610 PROTHROMBIN TIME: CPT

## 2017-12-26 PROCEDURE — 86235 NUCLEAR ANTIGEN ANTIBODY: CPT

## 2017-12-26 PROCEDURE — 86704 HEP B CORE ANTIBODY TOTAL: CPT

## 2017-12-26 PROCEDURE — 85025 COMPLETE CBC W/AUTO DIFF WBC: CPT

## 2017-12-26 PROCEDURE — 82248 BILIRUBIN DIRECT: CPT

## 2017-12-26 PROCEDURE — 80321 ALCOHOLS BIOMARKERS 1OR 2: CPT

## 2017-12-26 PROCEDURE — 82390 ASSAY OF CERULOPLASMIN: CPT

## 2017-12-26 PROCEDURE — 86038 ANTINUCLEAR ANTIBODIES: CPT

## 2017-12-26 PROCEDURE — 86256 FLUORESCENT ANTIBODY TITER: CPT | Mod: 91

## 2017-12-26 PROCEDURE — 87340 HEPATITIS B SURFACE AG IA: CPT

## 2017-12-26 PROCEDURE — 86706 HEP B SURFACE ANTIBODY: CPT

## 2017-12-26 PROCEDURE — 86790 VIRUS ANTIBODY NOS: CPT

## 2017-12-26 PROCEDURE — 99999 PR PBB SHADOW E&M-EST. PATIENT-LVL IV: CPT | Mod: PBBFAC,,, | Performed by: PHYSICIAN ASSISTANT

## 2017-12-26 PROCEDURE — 83540 ASSAY OF IRON: CPT

## 2017-12-26 PROCEDURE — 36415 COLL VENOUS BLD VENIPUNCTURE: CPT

## 2017-12-26 PROCEDURE — 99204 OFFICE O/P NEW MOD 45 MIN: CPT | Mod: S$GLB,,, | Performed by: PHYSICIAN ASSISTANT

## 2017-12-26 PROCEDURE — 91200 LIVER ELASTOGRAPHY: CPT | Mod: S$GLB,,, | Performed by: PHYSICIAN ASSISTANT

## 2017-12-26 PROCEDURE — 80307 DRUG TEST PRSMV CHEM ANLYZR: CPT

## 2017-12-26 NOTE — LETTER
December 26, 2017      Jessica Marquez MD  [address]  [place] [address]           [facility] - Hepatitis C  [address]  [facility] [address]  Phone: [phone]  Fax: [phone]          Patient: Claudia Rodriguez   MR Number: [id number]   YOB: 1955   Date of Visit: 12/26/2017       Dear Dr. Jessica Marquez:    Thank you for referring Claudia Rodriguez to me for evaluation. Attached you will find relevant portions of my assessment and plan of care.    If you have questions, please do not hesitate to call me. I look forward to following Claudia Rodriguez along with you.    Sincerely,    King Braga PA-C    Enclosure  CC:  No Recipients    If you would like to receive this communication electronically, please contact [email or web address] or [phone] to request more information on [employer] Link access.    For providers and/or their staff who would like to refer a patient to [facility], please contact us through our one-stop-shop provider referral line, [facility], at [phone].    If you feel you have received this communication in error or would no longer like to receive these types of communications, please e-mail [email or web address]

## 2017-12-26 NOTE — PROCEDURES
Procedures   Fibroscan Procedure     Name: Claudia Rodriguez  Date of Procedure : 2017   :: King Braga PA-C  Diagnosis: NAFLD    Probe: XL    Fibroscan readin.7 KPa    Fibrosis:F4     CAP readin dB/m    Steatosis: :S3

## 2017-12-27 LAB
ANA SER QL IF: NORMAL
MITOCHONDRIA AB TITR SER IF: NORMAL {TITER}

## 2017-12-28 LAB
AMPHETAMINES SERPL QL: NEGATIVE
BARBITURATES SERPL QL SCN: NEGATIVE
BENZODIAZ SERPL QL: NEGATIVE
CANNABINOIDS SERPL QL: NEGATIVE
COCAINE, BLOOD: NEGATIVE
ETHANOL SERPL-MCNC: NEGATIVE MG/DL
METHADONE SERPL QL SCN: NEGATIVE
OPIATES SERPL QL SCN: NEGATIVE
PCP SERPL QL SCN: NEGATIVE
PROPOXYPH SERPL QL: NEGATIVE
SMOOTH MUSCLE AB TITR SER IF: NORMAL {TITER}

## 2017-12-29 ENCOUNTER — CLINICAL SUPPORT (OUTPATIENT)
Dept: REHABILITATION | Facility: HOSPITAL | Age: 62
End: 2017-12-29
Attending: INTERNAL MEDICINE
Payer: COMMERCIAL

## 2017-12-29 DIAGNOSIS — N81.89 PELVIC FLOOR WEAKNESS: ICD-10-CM

## 2017-12-29 PROCEDURE — 97110 THERAPEUTIC EXERCISES: CPT | Mod: PN

## 2017-12-29 PROCEDURE — 97112 NEUROMUSCULAR REEDUCATION: CPT | Mod: PN

## 2017-12-29 NOTE — PATIENT INSTRUCTIONS
DIAPHRAGMATIC BREATHING     The diaphragm is a dome shaped muscle that forms the floor of the rib cage. It is the most efficient muscle for breathing and relaxation, although most people are not used to using the diaphragm. Diaphragmatic or belly breathing is an important technique to learn because it helps settle down or relax the autonomic nervous system. The correct use of diaphragmatic breathing can help to quiet brain activity resulting in the relaxation of all the muscles and organs of the body. This is accomplished by slow rhythmic breathing concentrated in the diaphragm muscle rather than the chest.     How to do proper relaxation breathing:     Start by lying on your back in a relaxed position with knees bent, feet flat or some support under your knees. Place one hand on your abdomen.   Relax your jaw and your eyes.   Take a deep breath in through your nose, letting the belly expand and rise.    As you breathe out through your mouth (as if blowing out candles), allow your abdomen and chest to fall. Exhale completely.   Remember to breathe slowly.  Do not force your breathing, this is a gentle breath. Do not hold your breath.   Repeat for 3-5 breaths.     Bring your hands to your rib cage. Inhale, feel your ribs flare out and open. Exhale and feel your ribs gently relax down and in. 3-5 breaths.   One hand on the belly, one hand on the chest. Inhale to feel the belly, ribs, and chest rise and expand (the entire trunk expands circumferentially outwards, front to back and side to side). Exhale to feel belly, ribs, chest relax down and in. 3-5 breaths.   Repeat for a few rounds.

## 2017-12-29 NOTE — PROGRESS NOTES
"Patient: Claudia GAITAN [facility] #: [id number]   Diagnosis:   Encounter Diagnosis   Name Primary?   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    Pain: Current: NA"    Patient's Goals: "to improve my quality of life to what it was even a year ago when leakage wasn't as bad"    Objective     TREATMENT    Pt received individual therapeutic exercise to develop strength, coordination, endurance, motor control and core stability for 40 minutes including:    - 10 x 5'' kegals in supine with SEMG assist, external perianal electrodes; pt displays slightly elevated resting baseline, good WR rise (work avg 23, rest avg 4), complete derecruitment to baseline, use of gluts  - Side lying clams YTB 2 x 10  - Supine ball squeeze 20 x 5''  - SLR with TA activation x 10 ea  - Dead bug x 2 min    Pt received individual neuromuscular reeducation for 25 minutes including:    - Diaphragmatic breathing with verbal and tactile cueing  - Body scan/relaxation training in Z lie with MHP to lumbar and abdomen    Pt tolerated treatment well with no visible adverse affects. No skin irritation, errythemia, or other adverse affects observed from electrode placement.    Education: Discussed progression of plan of care with patient; educated pt in activity modification; pt instructed to continue with 3-5'' kegals with addition of diaphragmatic breathing, SLR, side lying clams, ball squeeze, dying bug, and legs in a chair; pt demonstrated and verbalized " understanding and was provided with a handout.    Assessment     Pt tolerated session well without visible adverse effects. Pt continues to require use of gluts for PFM activation with good recruitment using this strategy. Will provide PFM tactile cueing at her next session to improve isolation. Pt tolerated progression of core exercises well today. 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 Pt with hx of bladder lift and reports having excess of estrogen in her body making her tissues fragile with less strength. Pt also reports emotionally stressful year and tendency to overeat with increased emotional stress. Pt will benefit from physcial therapy services in order to maximize pain free functional independence. Will provide tactile cueing to PFM at pt's next visit.    Pt is making good progress towards established goals.  No educational, cultural, or spiritual barriers to learning identified.    GOALS  Short Term Goals: 4 weeks (1/15/18)  1. Pt will verbalize improved awareness of PFM activity as palpated by PT in order to improve activity involvement with HEP.  2. Pt will demonstrate decreased overflow muscle activity during PF muscle contraction.  3. Pt will demonstrated increase in PF muscle endurance to 5 sec x 10 reps with use of overflow mm in order to improve central stabilization.  4. Pt will tolerate HEP to improve impairments and independence with ADL's.     Long Term Goals: 12 weeks (3/12/18)  1. Pt will increase PFM strength to 2/5 in order to improve bladder control.  2. Pt will display improvement in core strength and stability in order to improve central stabilization.  3. Pt will report improvement in UI.  4. Pt will be independent with HEP and self management.     CMS Impairment/Limitation/Restriction for Urinary Problem Survey  Status Limitation G-Code CMS Severity Modifier  Intake 47% 53% Current Status CK - At  least 40 percent but less than 60 percent  Predicted 57% 43% Goal Status+ CK - At least 40 percent but less than 60 percent    Plan     Continue with established POC, working toward PT goals.

## 2018-01-01 ENCOUNTER — OFFICE VISIT (OUTPATIENT)
Dept: URGENT CARE | Facility: CLINIC | Age: 63
End: 2018-01-01
Payer: COMMERCIAL

## 2018-01-01 VITALS
HEIGHT: 69 IN | OXYGEN SATURATION: 96 % | HEART RATE: 73 BPM | DIASTOLIC BLOOD PRESSURE: 73 MMHG | WEIGHT: 246 LBS | SYSTOLIC BLOOD PRESSURE: 125 MMHG | TEMPERATURE: 100 F | BODY MASS INDEX: 36.43 KG/M2

## 2018-01-01 DIAGNOSIS — J10.1 INFLUENZA A: Primary | ICD-10-CM

## 2018-01-01 LAB
CTP QC/QA: YES
FLUAV AG NPH QL: POSITIVE
FLUBV AG NPH QL: NEGATIVE

## 2018-01-01 PROCEDURE — 87804 INFLUENZA ASSAY W/OPTIC: CPT | Mod: 59,QW,S$GLB, | Performed by: PHYSICIAN ASSISTANT

## 2018-01-01 PROCEDURE — 99214 OFFICE O/P EST MOD 30 MIN: CPT | Mod: S$GLB,,, | Performed by: PHYSICIAN ASSISTANT

## 2018-01-01 RX ORDER — PROMETHAZINE HYDROCHLORIDE AND DEXTROMETHORPHAN HYDROBROMIDE 6.25; 15 MG/5ML; MG/5ML
5 SYRUP ORAL NIGHTLY PRN
Qty: 60 ML | Refills: 0 | Status: SHIPPED | OUTPATIENT
Start: 2018-01-01 | End: 2018-03-14 | Stop reason: ALTCHOICE

## 2018-01-01 RX ORDER — OSELTAMIVIR PHOSPHATE 75 MG/1
75 CAPSULE ORAL 2 TIMES DAILY
Qty: 10 CAPSULE | Refills: 0 | Status: SHIPPED | OUTPATIENT
Start: 2018-01-01 | End: 2018-01-06

## 2018-01-01 RX ORDER — BENZONATATE 100 MG/1
100 CAPSULE ORAL 3 TIMES DAILY PRN
Qty: 30 CAPSULE | Refills: 0 | Status: SHIPPED | OUTPATIENT
Start: 2018-01-01 | End: 2018-03-14 | Stop reason: ALTCHOICE

## 2018-01-01 RX ORDER — ALBUTEROL SULFATE 90 UG/1
2 AEROSOL, METERED RESPIRATORY (INHALATION) EVERY 6 HOURS PRN
Qty: 18 G | Refills: 0 | Status: SHIPPED | OUTPATIENT
Start: 2018-01-01 | End: 2018-09-12

## 2018-01-01 NOTE — LETTER
January 1, 2018      [facility] [address], [address]  [place] [address]  Phone: [phone]  Fax: [phone]       Patient: Claudia Rodriguez   YOB: 1955  Date of Visit: 01/01/2018    To Whom It May Concern:    Etelvina Rodriguez  was at [facility] on 01/01/2018. She may return to work/school on 1/5/2018 with no restrictions. If you have any questions or concerns, or if I can be of further assistance, please do not hesitate to contact me.    Sincerely,    Tricia Vidal PA-C

## 2018-01-01 NOTE — PROGRESS NOTES
"Subjective:       Patient ID: Claudia Rodriguez is a 62 y.o. female.    Vitals:  height is 5' 9" (1.753 m) and weight is 111.6 kg (246 lb). Her temperature is 99.8 °F (37.7 °C). Her blood pressure is 125/73 and her pulse is 73. Her oxygen saturation is 96%.     Chief Complaint: Influenza    Influenza   This is a new problem. The current episode started yesterday. The problem has been gradually worsening. Associated symptoms include chills, congestion, coughing, a fever, headaches, myalgias, nausea and a sore throat. Pertinent negatives include no abdominal pain or chest pain. Treatments tried: corciden  The treatment provided no relief.     Review of Systems   Constitution: Positive for chills, decreased appetite, fever and malaise/fatigue.   HENT: Positive for congestion and sore throat. Negative for ear pain and hoarse voice.    Eyes: Negative for discharge and redness.   Cardiovascular: Negative for chest pain, dyspnea on exertion and leg swelling.   Respiratory: Positive for cough and sputum production. Negative for shortness of breath and wheezing.    Musculoskeletal: Positive for myalgias.   Gastrointestinal: Positive for diarrhea and nausea. Negative for abdominal pain.   Neurological: Positive for headaches.   All other systems reviewed and are negative.      Objective:      Physical Exam   Constitutional: She is oriented to person, place, and time. Vital signs are normal. She appears well-developed and well-nourished. She appears ill.   HENT:   Head: Normocephalic and atraumatic.   Right Ear: Hearing, tympanic membrane, external ear and ear canal normal.   Left Ear: Hearing, tympanic membrane, external ear and ear canal normal.   Nose: Mucosal edema present. Right sinus exhibits no maxillary sinus tenderness and no frontal sinus tenderness. Left sinus exhibits no maxillary sinus tenderness and no frontal sinus tenderness.   Mouth/Throat: Uvula is midline. Posterior oropharyngeal erythema present. No " oropharyngeal exudate or posterior oropharyngeal edema.   Eyes: EOM and lids are normal. Right eye exhibits no chemosis, no discharge and no exudate. No foreign body present in the right eye. Left eye exhibits no chemosis, no discharge and no exudate. No foreign body present in the left eye. Right conjunctiva is injected. Left conjunctiva is injected.   Neck: Normal range of motion. Neck supple.   Cardiovascular: Normal rate, regular rhythm and normal heart sounds.  Exam reveals no gallop and no friction rub.    No murmur heard.  Pulmonary/Chest: Effort normal. No respiratory distress. She has no decreased breath sounds. She has wheezes (diffuse). She has no rhonchi. She has no rales.   Musculoskeletal: Normal range of motion.   Lymphadenopathy:        Head (right side): No submandibular and no tonsillar adenopathy present.        Head (left side): No submandibular and no tonsillar adenopathy present.   Neurological: She is alert and oriented to person, place, and time.   Skin: Skin is warm and dry. No rash noted. No erythema.   Psychiatric: She has a normal mood and affect. Her behavior is normal.   Nursing note and vitals reviewed.      Assessment:       1. Influenza A        Office Visit on 01/01/2018   Component Date Value Ref Range Status    Rapid Influenza A Ag 01/01/2018 Positive* Negative Final    Rapid Influenza B Ag 01/01/2018 Negative  Negative Final     Acceptable 01/01/2018 Yes   Final     Plan:         Influenza A  -     POCT Influenza A/B  -     oseltamivir (TAMIFLU) 75 MG capsule; Take 1 capsule (75 mg total) by mouth 2 (two) times daily.  Dispense: 10 capsule; Refill: 0  -     albuterol 90 mcg/actuation inhaler; Inhale 2 puffs into the lungs every 6 (six) hours as needed for Wheezing. Rescue  Dispense: 18 g; Refill: 0  -     benzonatate (TESSALON PERLES) 100 MG capsule; Take 1 capsule (100 mg total) by mouth 3 (three) times daily as needed for Cough.  Dispense: 30 capsule; Refill:  0  -     promethazine-dextromethorphan (PROMETHAZINE-DM) 6.25-15 mg/5 mL Syrp; Take 5 mLs by mouth nightly as needed.  Dispense: 60 mL; Refill: 0      Patient Instructions     -Rest and stay hydrated.  -Use tessalon perles daily for cough and cough syrup at night.  -Tylenol every 4 hours OR ibuprofen every 6 hours as needed for pain/fever.  -Flonase OTC or Nasacort OTC for nasal congestion.  -Warm face compresses to help with facial sinus pain/pressure.  -Simple foods like chicken noodle soup.  -Delsym helps with coughing at night  -Zyrtec/Claritin during the day & Benadryl at night may help with allergies.    Please follow up with your primary care provider within 2-5 days if your signs and symptoms have not resolved or worsen.     If your condition worsens or fails to improve we recommend that you receive another evaluation at the emergency room immediately or contact your primary medical clinic to discuss your concerns.   You must understand that you have received an Urgent Care treatment only and that you may be released before all of your medical problems are known or treated. You, the patient, will arrange for follow up care as instructed.           The Flu (Influenza)     The virus that causes the flu spreads through the air in droplets when someone who has the flu coughs, sneezes, laughs, or talks.   The flu (influenza) is an infection that affects your respiratory tract. This tract is made up of your mouth, nose, and lungs, and the passages between them. Unlike a cold, the flu can make you very ill. And it can lead to pneumonia, a serious lung infection. The flu can have serious complications and even cause death.  Who is at risk for the flu?  Anyone can get the flu. But you are more likely to become infected if you:  · Have a weakened immune system  · Work in a healthcare setting where you may be exposed to flu germs  · Live or work with someone who has the flu  · Havent had an annual flu shot  How does  the flu spread?  The flu is caused by a virus. The virus spreads through the air in droplets when someone who has the flu coughs, sneezes, laughs, or talks. You can become infected when you inhale these viruses directly. You can also become infected when you touch a surface on which the droplets have landed and then transfer the germs to your eyes, nose, or mouth. Touching used tissues, or sharing utensils, drinking glasses, or a toothbrush from an infected person can expose you to flu viruses, too.  What are the symptoms of the flu?  Flu symptoms tend to come on quickly and may last a few days to a few weeks. They include:  · Fever usually higher than 100.4°F  (38°C) and chills  · Sore throat and headache  · Dry cough  · Runny nose  · Tiredness and weakness  · Muscle aches  Who is at risk for flu complications?  For some people, the flu can be very serious. The risk for complications is greater for:  · Children younger than age 5  · Adults ages 65 and older  · People with a chronic illness such as diabetes or heart, kidney, or lung disease  · People who live in a nursing home or long-term care facility   How is the flu treated?  The flu usually gets better after 7 days or so. In some cases, your healthcare provider may prescribe an antiviral medicine. This may help you get well a little sooner. For the medicine to help, you need to take it as soon as possible (ideally within 48 hours) after your symptoms start. If you develop pneumonia or other serious illness, you may need to stay in the hospital.  Easing flu symptoms  · Drink lots of fluids such as water, juice, and warm soup. A good rule is to drink enough so that you urinate your normal amount.  · Get plenty of rest.  · Ask your healthcare provider what to take for fever and pain.  · Call your provider if your fever is 100.4°F (38°C) or higher, or you become dizzy, lightheaded, or short of breath.  Taking steps to protect others  · Wash your hands often,  especially after coughing or sneezing. Or clean your hands with an alcohol-based hand  containing at least 60% alcohol.  · Cough or sneeze into a tissue. Then throw the tissue away and wash your hands. If you dont have a tissue, cough and sneeze into your elbow.  · Stay home until at least 24 hours after you no longer have a fever or chills. Be sure the fever isnt being hidden by fever-reducing medicine.  · Dont share food, utensils, drinking glasses, or a toothbrush with others.  · Ask your healthcare provider if others in your household should get antiviral medicine to help them avoid infection.  How can the flu be prevented?  · One of the best ways to avoid the flu is to get a flu vaccine each year. The virus that causes the flu changes from year to year. For that reason, healthcare providers recommend getting the flu vaccine each year, as soon as it's available in your area. The vaccine is given as a shot. Your healthcare provider can tell you which vaccine is right for you. A nasal spray is also available but is not recommended for the 2311-4990 flu season. The CDC says this is because the nasal spray did not seem to protect against the flu over the last several flu seasons. In the past, it was meant for people ages 2 to 49.  · Wash your hands often. Frequent handwashing is a proven way to help prevent infection.  · Carry an alcohol-based hand gel containing at least 60% alcohol. Use it when you can't use soap and water. Then wash your hands as soon as you can.  · Avoid touching your eyes, nose, and mouth.  · At home and work, clean phones, computer keyboards, and toys often with disinfectant wipes.  · If possible, avoid close contact with others who have the flu or symptoms of the flu.  Handwashing tips  Handwashing is one of the best ways to prevent many common infections. If you are caring for or visiting someone with the flu, wash your hands each time you enter and leave the room. Follow these  steps:  · Use warm water and plenty of soap. Rub your hands together well.  · Clean the whole hand, including under your nails, between your fingers, and up the wrists.  · Wash for at least 15 seconds.  · Rinse, letting the water run down your fingers, not up your wrists.  · Dry your hands well. Use a paper towel to turn off the faucet and open the door.  Using alcohol-based hand   Alcohol-based hand  are also a good choice. Use them when you can't use soap and water. Follow these steps:  · Squeeze about a tablespoon of gel into the palm of one hand.  · Rub your hands together briskly, cleaning the backs of your hands, the palms, between your fingers, and up the wrists.  · Rub until the gel is gone and your hands are completely dry.  Preventing the flu in healthcare settings  The flu is a special concern for people in hospitals and long-term care facilities. To help prevent the spread of flu, many hospitals and nursing homes take these steps:  · Healthcare providers wash their hands or use an alcohol-based hand  before and after treating each patient.  · People with the flu have private rooms and bathrooms or share a room with someone with the same infection.  · People who are at high risk for the flu but don't have it are encouraged to get the flu and pneumonia vaccines.  · All healthcare workers are encouraged or required to get flu shots.   Date Last Reviewed: 12/1/2016  © 9414-1385 [employer]. [address] [place] [address], [place] [address]. All rights reserved. This information is not intended as a substitute for professional medical care. Always follow your healthcare professional's instructions.

## 2018-01-01 NOTE — PATIENT INSTRUCTIONS
-Rest and stay hydrated.  -Use tessalon perles daily for cough and cough syrup at night.  -Tylenol every 4 hours OR ibuprofen every 6 hours as needed for pain/fever.  -Flonase OTC or Nasacort OTC for nasal congestion.  -Warm face compresses to help with facial sinus pain/pressure.  -Simple foods like chicken noodle soup.  -Delsym helps with coughing at night  -Zyrtec/Claritin during the day & Benadryl at night may help with allergies.    Please follow up with your primary care provider within 2-5 days if your signs and symptoms have not resolved or worsen.     If your condition worsens or fails to improve we recommend that you receive another evaluation at the emergency room immediately or contact your primary medical clinic to discuss your concerns.   You must understand that you have received an Urgent Care treatment only and that you may be released before all of your medical problems are known or treated. You, the patient, will arrange for follow up care as instructed.           The Flu (Influenza)     The virus that causes the flu spreads through the air in droplets when someone who has the flu coughs, sneezes, laughs, or talks.   The flu (influenza) is an infection that affects your respiratory tract. This tract is made up of your mouth, nose, and lungs, and the passages between them. Unlike a cold, the flu can make you very ill. And it can lead to pneumonia, a serious lung infection. The flu can have serious complications and even cause death.  Who is at risk for the flu?  Anyone can get the flu. But you are more likely to become infected if you:  · Have a weakened immune system  · Work in a healthcare setting where you may be exposed to flu germs  · Live or work with someone who has the flu  · Havent had an annual flu shot  How does the flu spread?  The flu is caused by a virus. The virus spreads through the air in droplets when someone who has the flu coughs, sneezes, laughs, or talks. You can become  infected when you inhale these viruses directly. You can also become infected when you touch a surface on which the droplets have landed and then transfer the germs to your eyes, nose, or mouth. Touching used tissues, or sharing utensils, drinking glasses, or a toothbrush from an infected person can expose you to flu viruses, too.  What are the symptoms of the flu?  Flu symptoms tend to come on quickly and may last a few days to a few weeks. They include:  · Fever usually higher than 100.4°F  (38°C) and chills  · Sore throat and headache  · Dry cough  · Runny nose  · Tiredness and weakness  · Muscle aches  Who is at risk for flu complications?  For some people, the flu can be very serious. The risk for complications is greater for:  · Children younger than age 5  · Adults ages 65 and older  · People with a chronic illness such as diabetes or heart, kidney, or lung disease  · People who live in a nursing home or long-term care facility   How is the flu treated?  The flu usually gets better after 7 days or so. In some cases, your healthcare provider may prescribe an antiviral medicine. This may help you get well a little sooner. For the medicine to help, you need to take it as soon as possible (ideally within 48 hours) after your symptoms start. If you develop pneumonia or other serious illness, you may need to stay in the hospital.  Easing flu symptoms  · Drink lots of fluids such as water, juice, and warm soup. A good rule is to drink enough so that you urinate your normal amount.  · Get plenty of rest.  · Ask your healthcare provider what to take for fever and pain.  · Call your provider if your fever is 100.4°F (38°C) or higher, or you become dizzy, lightheaded, or short of breath.  Taking steps to protect others  · Wash your hands often, especially after coughing or sneezing. Or clean your hands with an alcohol-based hand  containing at least 60% alcohol.  · Cough or sneeze into a tissue. Then throw the  tissue away and wash your hands. If you dont have a tissue, cough and sneeze into your elbow.  · Stay home until at least 24 hours after you no longer have a fever or chills. Be sure the fever isnt being hidden by fever-reducing medicine.  · Dont share food, utensils, drinking glasses, or a toothbrush with others.  · Ask your healthcare provider if others in your household should get antiviral medicine to help them avoid infection.  How can the flu be prevented?  · One of the best ways to avoid the flu is to get a flu vaccine each year. The virus that causes the flu changes from year to year. For that reason, healthcare providers recommend getting the flu vaccine each year, as soon as it's available in your area. The vaccine is given as a shot. Your healthcare provider can tell you which vaccine is right for you. A nasal spray is also available but is not recommended for the 3942-6890 flu season. The CDC says this is because the nasal spray did not seem to protect against the flu over the last several flu seasons. In the past, it was meant for people ages 2 to 49.  · Wash your hands often. Frequent handwashing is a proven way to help prevent infection.  · Carry an alcohol-based hand gel containing at least 60% alcohol. Use it when you can't use soap and water. Then wash your hands as soon as you can.  · Avoid touching your eyes, nose, and mouth.  · At home and work, clean phones, computer keyboards, and toys often with disinfectant wipes.  · If possible, avoid close contact with others who have the flu or symptoms of the flu.  Handwashing tips  Handwashing is one of the best ways to prevent many common infections. If you are caring for or visiting someone with the flu, wash your hands each time you enter and leave the room. Follow these steps:  · Use warm water and plenty of soap. Rub your hands together well.  · Clean the whole hand, including under your nails, between your fingers, and up the wrists.  · Wash  for at least 15 seconds.  · Rinse, letting the water run down your fingers, not up your wrists.  · Dry your hands well. Use a paper towel to turn off the faucet and open the door.  Using alcohol-based hand   Alcohol-based hand  are also a good choice. Use them when you can't use soap and water. Follow these steps:  · Squeeze about a tablespoon of gel into the palm of one hand.  · Rub your hands together briskly, cleaning the backs of your hands, the palms, between your fingers, and up the wrists.  · Rub until the gel is gone and your hands are completely dry.  Preventing the flu in healthcare settings  The flu is a special concern for people in hospitals and long-term care facilities. To help prevent the spread of flu, many hospitals and nursing homes take these steps:  · Healthcare providers wash their hands or use an alcohol-based hand  before and after treating each patient.  · People with the flu have private rooms and bathrooms or share a room with someone with the same infection.  · People who are at high risk for the flu but don't have it are encouraged to get the flu and pneumonia vaccines.  · All healthcare workers are encouraged or required to get flu shots.   Date Last Reviewed: 12/1/2016  © 9203-3469 [employer]. [address] [place] [address], [place] [address]. All rights reserved. This information is not intended as a substitute for professional medical care. Always follow your healthcare professional's instructions.

## 2018-01-02 LAB — PHOSPHATIDYLETHANOL (PETH): NEGATIVE NG/ML

## 2018-01-05 ENCOUNTER — PATIENT MESSAGE (OUTPATIENT)
Dept: ADMINISTRATIVE | Facility: OTHER | Age: 63
End: 2018-01-05

## 2018-01-08 ENCOUNTER — CLINICAL SUPPORT (OUTPATIENT)
Dept: REHABILITATION | Facility: HOSPITAL | Age: 63
End: 2018-01-08
Attending: INTERNAL MEDICINE
Payer: COMMERCIAL

## 2018-01-08 DIAGNOSIS — N81.89 PELVIC FLOOR WEAKNESS: ICD-10-CM

## 2018-01-08 DIAGNOSIS — N39.46 URINARY INCONTINENCE, MIXED: Primary | ICD-10-CM

## 2018-01-08 DIAGNOSIS — R35.1 NOCTURIA MORE THAN TWICE PER NIGHT: ICD-10-CM

## 2018-01-08 PROCEDURE — 97112 NEUROMUSCULAR REEDUCATION: CPT | Mod: PN

## 2018-01-08 NOTE — PROGRESS NOTES
"Patient: Claudia GAITAN [facility] #: [id number]   Diagnosis:   Encounter Diagnoses   Name Primary?    Pelvic floor weakness     Urinary incontinence, mixed Yes    Nocturia more than twice per night       Date of start of care: 12/18/17  Date of treatment: 01/08/2018   Time in: 8:01  Time out: 8:55  Total treatment time: 54 minutes  # Visits: 3 (1/35)  Auth expiration: 12/31/18  POC expiration: 3/12/17  Outpatient Physical Therapy   Daily Note    Subjective     Pt reports she has been better. She says she got the flu on new year's mabel and has been very sick. Pt reports she has gotten back into her PT exercises over the past few days.    Pain: Current: NA"    Patient's Goals: "to improve my quality of life to what it was even a year ago when leakage wasn't as bad"    Objective     TREATMENT    Pt received individual therapeutic exercise to develop strength, coordination, endurance, motor control and core stability for 00 minutes including:    - 10 x 5'' kegals in supine with SEMG assist, external perianal electrodes; pt displays slightly elevated resting baseline, good WR rise (work avg 23, rest avg 4), complete derecruitment to baseline, use of gluts  - Side lying clams YTB 2 x 10  - Supine ball squeeze 20 x 5''  - SLR with TA activation x 10 ea  - Dead bug x 2 min    Pt received individual neuromuscular reeducation for 54 minutes including:    - Diaphragmatic breathing with verbal and tactile cueing - NP  - PFM training with max verbal and tactile cueing; pt instructed to "closes anal opening" with improved activity noted and decreased use of overflow (minimal glut and adductor support); tactile identification to urethra and layer 2 muscles with "urethral straw" instructions  - Body scan/relaxation training with wedge and MHP to lumbar and abdomen    Pt tolerated treatment well with no visible adverse affects. No skin irritation, errythemia, or other adverse affects observed from electrode placement.    Education: " Discussed progression of plan of care with patient; educated pt in activity modification; pt instructed to continue with 3-5'' kegals, diaphragmatic breathing, SLR, side lying clams, ball squeeze, dying bug, and legs in a chair with addition of mindfulness (urethral straw); pt demonstrated and verbalized understanding and was provided with a handout.    Assessment     Pt tolerated session well without visible adverse effects. Pt arrived today with terrible cough. Worked on PFM training and mindfulness with verbal and tactile cues today; pt with improved activity when instructed to focus on anal opening, she may benefit from tactile anal cueing. 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 Pt with hx of bladder lift and reports having excess of estrogen in her body making her tissues fragile with less strength. Pt also reports emotionally stressful year and tendency to overeat with increased emotional stress. Pt will continue to benefit from physcial therapy services in order to maximize pain free functional independence. EMG and progression of hip strengthening with SLS at next visit. FOTO    Pt is making good progress towards established goals.  No educational, cultural, or spiritual barriers to learning identified.    GOALS  Short Term Goals: 4 weeks (1/15/18)  1. Pt will verbalize improved awareness of PFM activity as palpated by PT in order to improve activity involvement with HEP.  2. Pt will demonstrate decreased overflow muscle activity during PF muscle contraction.  3. Pt will demonstrated increase in PF muscle endurance to 5 sec x 10 reps with use of overflow mm in order to improve central stabilization.  4. Pt will tolerate HEP to improve impairments and independence with ADL's.     Long Term Goals: 12 weeks (3/12/18)  1. Pt will increase PFM strength to 2/5 in order to improve bladder control.  2. Pt will display  improvement in core strength and stability in order to improve central stabilization.  3. Pt will report improvement in UI.  4. Pt will be independent with HEP and self management.     CMS Impairment/Limitation/Restriction for Urinary Problem Survey  Status Limitation G-Code CMS Severity Modifier  Intake 47% 53% Current Status CK - At least 40 percent but less than 60 percent  Predicted 57% 43% Goal Status+ CK - At least 40 percent but less than 60 percent    Plan     Continue with established POC, working toward PT goals.

## 2018-01-10 DIAGNOSIS — Z86.39 HISTORY OF VITAMIN D DEFICIENCY: ICD-10-CM

## 2018-01-10 RX ORDER — ERGOCALCIFEROL 1.25 MG/1
CAPSULE ORAL
Qty: 12 CAPSULE | Refills: 5 | Status: SHIPPED | OUTPATIENT
Start: 2018-01-10 | End: 2019-02-15 | Stop reason: SDUPTHER

## 2018-01-17 ENCOUNTER — TELEPHONE (OUTPATIENT)
Dept: HEPATOLOGY | Facility: CLINIC | Age: 63
End: 2018-01-17

## 2018-01-17 NOTE — TELEPHONE ENCOUNTER
----- Message from Tremaine Rubio, PharmD sent at 1/9/2018  3:18 PM CST -----  Regarding: twinrix  Good afternoon! Mrs. Rodriguez's immunization is covered in the pharmacy at $55. We were not able to reach the patient, but we will continue to try to contact the patient. Thank you!

## 2018-01-22 ENCOUNTER — PATIENT MESSAGE (OUTPATIENT)
Dept: ADMINISTRATIVE | Facility: OTHER | Age: 63
End: 2018-01-22

## 2018-01-22 ENCOUNTER — CLINICAL SUPPORT (OUTPATIENT)
Dept: REHABILITATION | Facility: HOSPITAL | Age: 63
End: 2018-01-22
Attending: INTERNAL MEDICINE
Payer: COMMERCIAL

## 2018-01-22 DIAGNOSIS — N39.46 URINARY INCONTINENCE, MIXED: Primary | ICD-10-CM

## 2018-01-22 DIAGNOSIS — R35.1 NOCTURIA MORE THAN TWICE PER NIGHT: ICD-10-CM

## 2018-01-22 DIAGNOSIS — N81.89 PELVIC FLOOR WEAKNESS: ICD-10-CM

## 2018-01-22 PROCEDURE — 97112 NEUROMUSCULAR REEDUCATION: CPT | Mod: PN

## 2018-01-22 NOTE — PROGRESS NOTES
"Patient: Claudia GAITAN [facility] #: [id number]   Diagnosis:   Encounter Diagnoses   Name Primary?    Pelvic floor weakness     Urinary incontinence, mixed Yes    Nocturia more than twice per night       Date of start of care: 12/18/17  Date of treatment: 01/22/2018   Time in: 8:04  Time out: 9:00  Total treatment time: 56 minutes  # Visits: 4 (2/35)  Auth expiration: 12/31/18  POC expiration: 3/12/17  Outpatient Physical Therapy   Daily Note    Subjective     Pt reports she is doing better, no longer sick. Pt is compliant with her HEP.    Pain: Current: NA"    Patient's Goals: "to improve my quality of life to what it was even a year ago when leakage wasn't as bad"    Objective     TREATMENT    Pt received individual therapeutic exercise to develop strength, coordination, endurance, motor control and core stability for 00 minutes including:    - Side lying clams YTB 2 x 10  - Supine ball squeeze 20 x 5''  - SLR with TA activation x 10 ea  - Dead bug x 2 min    Pt received individual neuromuscular reeducation for 56 minutes including:    - Diaphragmatic breathing   - 10 x 5'' kegals in supine (knees bolstered) with SEMG assist, external perianal leads; good WR rise, complete derecruitment, improved holding  - 10 quick flicks  - Sit to stand squat; pt instructed to use exhale to stand and to count out loud while lowering  - SLS 3 x 30'' ea    Pt tolerated treatment well with no visible adverse affects. No skin irritation, errythemia, or other adverse affects observed from electrode placement.    Education: Discussed progression of plan of care with patient; educated pt in activity modification; pt instructed to continue with 5'' kegals, diaphragmatic breathing, SLR, side lying clams (GTB), ball squeeze, dying bug, legs in a chair, and mindfulness (urethral straw); added quick flicks, sit to stand squat; and SLS; pt demonstrated and verbalized understanding and was provided with a handout.    Assessment     Pt tolerated " session well without visible adverse effects. Pt with improved holding today and good return demonstration of activities. 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 Pt with hx of bladder lift and reports having excess of estrogen in her body making her tissues fragile with less strength. Pt also reports emotionally stressful year and tendency to overeat with increased emotional stress. Pt will continue to benefit from physcial therapy services in order to maximize pain free functional independence. Will follow up in a few weeks to give patient time to work on her exercises and decide what is in her best interest financially (pt will call her insurance company). Will reassess next time to update POC accordingly based on assessment and pt report.    Pt is making good progress towards established goals.  No educational, cultural, or spiritual barriers to learning identified.    GOALS  Short Term Goals: 4 weeks (1/15/18)  1. Pt will verbalize improved awareness of PFM activity as palpated by PT in order to improve activity involvement with HEP. Met 1/22/18  2. Pt will demonstrate decreased overflow muscle activity during PF muscle contraction. Met 1/22/18  3. Pt will demonstrated increase in PF muscle endurance to 5 sec x 10 reps with use of overflow mm in order to improve central stabilization. Met 1/22/18  4. Pt will tolerate HEP to improve impairments and independence with ADL's. Met 1/22/18     Long Term Goals: 12 weeks (3/12/18)  1. Pt will increase PFM strength to 2/5 in order to improve bladder control.  2. Pt will display improvement in core strength and stability in order to improve central stabilization.  3. Pt will report improvement in UI.  4. Pt will be independent with HEP and self management.     CMS Impairment/Limitation/Restriction for Urinary Problem Survey  Status Limitation G-Code CMS Severity Modifier  Intake 47%  53%  Predicted 57% 43% Goal Status+ CK - At least 40 percent but less than 60 percent  1/22/2018 43% 57% Current Status CK - At least 40 percent but less than 60 percent    Plan     Continue with established POC, working toward PT goals.

## 2018-01-23 ENCOUNTER — PATIENT OUTREACH (OUTPATIENT)
Dept: OTHER | Facility: OTHER | Age: 63
End: 2018-01-23

## 2018-01-23 NOTE — PROGRESS NOTES
Ms. Claudia Rodriguez is a 62 y.o. female who is newly enrolled in the [facility]pertension [facility].     The following information was reviewed/updated:  Preferred pharmacy   [facility] [place] [id number] [facility]  [address] [place] [address]  Phone: [phone] Fax: [phone]    [facility] [id number] [place], [place] [address] - [address] [facility]  [address]  [facility] [address]  Phone: [phone] Fax: [phone]    Patient prefers a 90 days supply    Review of patient's allergies indicates:   Allergen Reactions    Zostavax [zoster vaccine live (pf)] Rash     - injection site red, hot, indurated    Amlodipine-olmesartan      Other reaction(s): pounding in ears      Atorvastatin        Other reaction(s): Unknown    Demerol [meperidine] Other (See Comments)     Other reaction(s): Nausea  Other reaction(s): Headache    Ezetimibe-simvastatin      Other reaction(s): Headache      Lotrel [amlodipine-benazepril]        Other reaction(s): constant cough    Nsaids (non-steroidal anti-inflammatory drug) Other (See Comments)       Other reaction(s): Difficulty breathing    Other reaction(s): Difficulty breathing    Phenytoin sodium extended Other (See Comments)     Current Outpatient Prescriptions on File Prior to Visit   Medication Sig Dispense Refill    albuterol 90 mcg/actuation inhaler Inhale 2 puffs into the lungs every 6 (six) hours as needed for Wheezing. Rescue 18 g 0    amLODIPine (NORVASC) 10 MG tablet TAKE ONE Tablet BY MOUTH EVERY DAY 90 tablet 1    atenolol (TENORMIN) 50 MG tablet TAKE ONE Tablet BY MOUTH TWICE DAILY 180 tablet 0    benzonatate (TESSALON PERLES) 100 MG capsule Take 1 capsule (100 mg total) by mouth 3 (three) times daily as needed for Cough. 30 capsule 0    CALCIUM CARBONATE/VITAMIN D3 (CALCIUM 500 + D, D3, ORAL)       ESTRACE 0.01 % (0.1 mg/gram) vaginal cream   98    estradiol (ESTRACE) 0.01 % (0.1 mg/gram)  vaginal cream Place 0.5 g vaginally once daily. 42.5 g 11    FOLIC ACID/MV,FE,OTHER MIN (CENTRUM ORAL) Take 1 tablet by mouth.      levothyroxine (SYNTHROID) 75 MCG tablet Take 1 tablet (75 mcg total) by mouth once daily. 90 tablet 0    LORazepam (ATIVAN) 1 MG tablet   0    metFORMIN (GLUCOPHAGE-XR) 500 MG 24 hr tablet TAKE TWO TABLETS BY MOUTH EVERY DAY WITH dinner OR EVENING MEAL 180 tablet 0    omega-3 fatty acids 1,000 mg Cap Take 1 capsule by mouth Daily.      promethazine-dextromethorphan (PROMETHAZINE-DM) 6.25-15 mg/5 mL Syrp Take 5 mLs by mouth nightly as needed. 60 mL 0    terconazole (TERAZOL 3) 0.8 % vaginal cream   2    triamterene-hydrochlorothiazide 37.5-25 mg (MAXZIDE-25) 37.5-25 mg per tablet TAKE ONE TABLET BY MOUTH EVERY DAY 90 tablet 1    VITAMIN D2 50,000 unit capsule TAKE TWO CAPSULES BY MOUTH EVERY 2 WEEKS 12 capsule 5     No current facility-administered medications on file prior to visit.        YAKELIN screening results for this patient suggest a high likelihood of sleep apnea, which can contribute to hypertension. Patient has not been previously diagnosed with sleep apnea and is not interested in referral at this time. She wants to continue weight loss efforts at this time.    Jessica Marquez indicated she would like to see patient / have patient  referred to a specialist for further evaluation.     Reviewed non-pharmacologic therapies and impact on BP:  Detox from processed sugar since 12/31, reports 12 pound weight loss.  1. Low-sodium diet- 11 mmHg reduction 2-4 weeks. I have reviewed D.A.S.H diet sodium restrictions (<2000mg/day)  2. Exercise- 7 mmHg reduction 4-12 weeks. I have recommended patient engage in exercise as tolerated 1at least 30 minutes 5x per week to improve cardiovascular health. Walking once per day for about 20 minutes.   3. Alcohol intake- 3 mmHg reduction 4-12 weeks. I have discussed with patient the maximum recommended number of 1 drink(s) per day for women.  Denies alcohol intake    Explained that we expect patient to obtain several blood pressures per week at random times of day.   Our goal is to get  BP to consistently below 130/80mmHg and make the process convenient so patient can avoid extra trips to the office. Getting your blood pressure below 130/80mmHg (definition of control) will reduce your risk for heart attack, kidney failure, stroke and death (as well as kidney failure, eye disease, & dementia).     Patient is meeting the goal already.   When asked what the patient thinks is causing BP to be elevated, she states: Possible white coat syndrome    Instructed patient not to allow anyone else to use phone and BP cuff.   I'm not available for emergencies. Patient will call [facility] on-call ([phone] or [phone]) or [phone] if needed.     Discussed appropriate BP measuring technique:  Before taking your blood pressure  Find a quiet place. You will need to listen for your heartbeat.  Roll up the sleeve on your left arm or remove any tight-sleeved clothing, if needed. (It's best to take your blood pressure from your left arm if you are right-handed.You can use the other arm if you have been told by your health care provider to do so.)  Rest in a chair next to a table for 5 to 10 minutes. (Your left arm should rest comfortably at heart level.)  Sit up straight with your back against the chair, legs uncrossed and on the ground.  Rest your forearm on the table with the palm of your hand facing up.  You should not talk, read the newspaper, or watch television during this process.      Patient and I agreed that she will continue to monitor blood pressure and sodium intake, and continue to remain adherent to medications.     I will plan to follow-up with the patient in 3-4 weeks.   Emailed patient link to [facility] HT[facility] webpage and my contact information in case she has any questions.       Last 5 Patient Entered Readings                                      Current  30 Day Average: 122/76     Recent Readings 1/22/2018    SBP (mmHg) 122    DBP (mmHg) 76    Pulse 55

## 2018-01-23 NOTE — LETTER
Stephanie Guadalupe, PharmD  [address] [place] [address]     Dear Claudia Rodriguez,    Welcome to the [facility]pert[facility] Digital [facility] Program!         My name is Stephanie Guadalupe PharmD and I am your dedicated Digital Medicine clinician.  As an expert in medication management, I will help ensure that the medications you are taking continue to provide you with the intended benefits.      I am Brianna Oleary and I will be your health  for the duration of the program.  My  job is to help you identify lifestyle changes to improve your blood pressure control.  We will talk about nutrition, exercise, and other ways that you may be able to adjust your current habits to better your health. Together, we will work to improve your overall health and encourage you to meet your goals for a healthier lifestyle.    What we expect from YOU:    You will need to take blood pressure readings multiple times a week and no less than one reading per week.   It is important that you take your measurements at different times during the day, when possible.     What you should expect from your Digital Medicine Care Team:   We will provide you with education about high blood pressure, including lifestyle changes that could help you to control your blood pressure.   We will review your weekly readings and provide you with monthly blood pressure progress reports after you have been in the program for more than 30 days.   We will send monthly progress reports on your blood pressure control to your physician so they can follow along with your progress as well.    You will be able to reach me by phone at [phone] or through your [facility] account by clicking my name under Care Team on the right side of the home screen.    I look forward to working with you to achieve your blood pressure goals!    Sincerely,    Stephanie Guadalupe PharmD  Your personal clinician    Please visit  [email or web address] to learn more about high blood pressure and what you can do lower your blood pressure.                                                                                           Claudia Rodriguez  [address] [facility]  [place] [address]

## 2018-01-30 ENCOUNTER — PATIENT OUTREACH (OUTPATIENT)
Dept: OTHER | Facility: OTHER | Age: 63
End: 2018-01-30

## 2018-01-30 NOTE — PROGRESS NOTES
"Last 5 Patient Entered Readings                                      Current 30 Day Average: 133/74     Recent Readings 1/30/2018 1/29/2018 1/26/2018 1/25/2018 1/24/2018    SBP (mmHg) 133 130 132 143 136    DBP (mmHg) 60 66 76 73 91    Pulse 60 62 64 64 64          Hypertension Digital Medicine Program (HDMP): Health  Introduction    Introduced Mrs. Claudia Rodriguez to [facility]. Discussed health  role and recommended lifestyle modifications.    Diet (i.e. Low sodium, food labels): Deferred. Patient reports she has started a eating plan since December and has lost 15 lbs and has detox from sugar. Patient states she "does not want help" with diet at this time and if she needs help she will ask for it.     Exercise: Patient reports she walks her dog when she is at  home once a day or if she is out of town she walks on the machines once a day for 20- 30 minutes.     Alcohol/Tobacco: Patient report she does not smoke or drink.    Medication Adherence: has been compliant with the medicaiton regimen.    Other goals: Patient reports her goal is to improve her health.     Reviewed AHA recommendations:  Limit sodium intake to <2000mg/day  Perform 150 minutes of physical activity per week    Patient is aware of the importance of taking daily BP readings at various times of the day  Patient aware that the health  can be used as a resource for lifestyle modifications to help reduce or maintain a healthy BP  Patient is aware of the importance of medication adherence.  Patient is aware that HDMP team is not available for emergencies. Patient should call 911 or [facility] on Call if one arises.        "

## 2018-02-03 DIAGNOSIS — I10 ESSENTIAL HYPERTENSION: ICD-10-CM

## 2018-02-03 DIAGNOSIS — R73.03 PREDIABETES: ICD-10-CM

## 2018-02-05 RX ORDER — ATENOLOL 50 MG/1
TABLET ORAL
Qty: 180 TABLET | Refills: 0 | Status: SHIPPED | OUTPATIENT
Start: 2018-02-05 | End: 2018-05-04 | Stop reason: SDUPTHER

## 2018-02-05 RX ORDER — METFORMIN HYDROCHLORIDE 500 MG/1
TABLET, EXTENDED RELEASE ORAL
Qty: 180 TABLET | Refills: 0 | Status: SHIPPED | OUTPATIENT
Start: 2018-02-05 | End: 2018-05-04 | Stop reason: SDUPTHER

## 2018-02-20 ENCOUNTER — PATIENT OUTREACH (OUTPATIENT)
Dept: OTHER | Facility: OTHER | Age: 63
End: 2018-02-20

## 2018-02-20 NOTE — PROGRESS NOTES
Last 5 Patient Entered Readings                                      Current 30 Day Average: 136/76     Recent Readings 2/18/2018 2/15/2018 2/9/2018 2/7/2018 2/4/2018    SBP (mmHg) 140 141 123 149 133    DBP (mmHg) 77 74 71 80 81    Pulse 60 61 63 59 63          Patient's BP average is above goal of <130/80.     Patient denies s/s of hypotension (lightheadedness, dizziness, nausea, fatigue) associated with low readings. Instructed patient to inform me if this occurs, patient confirms understanding.      Patient denies s/s of hypertension (SOB, CP, severe headaches, changes in vision) associated with high readings. Instructed patient to go to the ED if BP > 180/110 and accompanied by hypertensive s/s, patient confirms understanding.    Will continue to monitor regularly. Will follow up in 2-3 weeks, sooner if BP begins to trend upward or downward.    Patient has my contact information and knows to call with any concerns or clinical changes.     Current HTN regimen:  Hypertension Medications             amLODIPine (NORVASC) 10 MG tablet TAKE ONE Tablet BY MOUTH EVERY DAY    atenolol (TENORMIN) 50 MG tablet TAKE ONE TABLET BY MOUTH TWICE DAILY    triamterene-hydrochlorothiazide 37.5-25 mg (MAXZIDE-25) 37.5-25 mg per tablet TAKE ONE TABLET BY MOUTH EVERY DAY        Foot injury that caused her not to be able to exercise for 3 weeks; resumed exercise routine 2/18. Patient reports side effects and difficulty tolerating medication changes in past. While not completely against medication changes she is reluctant. Consider changing atenolol to carvedilol if BP remains above goal on follow up.

## 2018-02-21 ENCOUNTER — LAB VISIT (OUTPATIENT)
Dept: LAB | Facility: HOSPITAL | Age: 63
End: 2018-02-21
Payer: COMMERCIAL

## 2018-02-21 ENCOUNTER — OFFICE VISIT (OUTPATIENT)
Dept: HEPATOLOGY | Facility: CLINIC | Age: 63
End: 2018-02-21
Payer: COMMERCIAL

## 2018-02-21 VITALS
WEIGHT: 236.31 LBS | TEMPERATURE: 98 F | OXYGEN SATURATION: 95 % | RESPIRATION RATE: 18 BRPM | DIASTOLIC BLOOD PRESSURE: 65 MMHG | BODY MASS INDEX: 35 KG/M2 | HEART RATE: 69 BPM | HEIGHT: 69 IN | SYSTOLIC BLOOD PRESSURE: 134 MMHG

## 2018-02-21 DIAGNOSIS — K74.60 CIRRHOSIS OF LIVER WITHOUT ASCITES, UNSPECIFIED HEPATIC CIRRHOSIS TYPE: ICD-10-CM

## 2018-02-21 DIAGNOSIS — R74.8 ELEVATED LIVER ENZYMES: Primary | ICD-10-CM

## 2018-02-21 DIAGNOSIS — R74.8 ELEVATED LIVER ENZYMES: ICD-10-CM

## 2018-02-21 LAB
AFP SERPL-MCNC: 3.1 NG/ML
ALBUMIN SERPL BCP-MCNC: 3.7 G/DL
ALP SERPL-CCNC: 70 U/L
ALT SERPL W/O P-5'-P-CCNC: 45 U/L
ANION GAP SERPL CALC-SCNC: 9 MMOL/L
AST SERPL-CCNC: 34 U/L
BILIRUB SERPL-MCNC: 0.8 MG/DL
BUN SERPL-MCNC: 19 MG/DL
CALCIUM SERPL-MCNC: 9.7 MG/DL
CHLORIDE SERPL-SCNC: 103 MMOL/L
CO2 SERPL-SCNC: 29 MMOL/L
CREAT SERPL-MCNC: 0.7 MG/DL
EST. GFR  (AFRICAN AMERICAN): >60 ML/MIN/1.73 M^2
EST. GFR  (NON AFRICAN AMERICAN): >60 ML/MIN/1.73 M^2
GLUCOSE SERPL-MCNC: 116 MG/DL
POTASSIUM SERPL-SCNC: 4.1 MMOL/L
PROT SERPL-MCNC: 6.9 G/DL
SODIUM SERPL-SCNC: 141 MMOL/L

## 2018-02-21 PROCEDURE — 99999 PR PBB SHADOW E&M-EST. PATIENT-LVL V: CPT | Mod: PBBFAC,,, | Performed by: PHYSICIAN ASSISTANT

## 2018-02-21 PROCEDURE — 36415 COLL VENOUS BLD VENIPUNCTURE: CPT

## 2018-02-21 PROCEDURE — 80053 COMPREHEN METABOLIC PANEL: CPT

## 2018-02-21 PROCEDURE — 82105 ALPHA-FETOPROTEIN SERUM: CPT

## 2018-02-21 PROCEDURE — 99214 OFFICE O/P EST MOD 30 MIN: CPT | Mod: S$GLB,,, | Performed by: PHYSICIAN ASSISTANT

## 2018-02-21 PROCEDURE — 3008F BODY MASS INDEX DOCD: CPT | Mod: S$GLB,,, | Performed by: PHYSICIAN ASSISTANT

## 2018-02-21 NOTE — PROGRESS NOTES
HEPATOLOGY CLINIC VISIT NOTE     REFERRING PROVIDER: Dr. Jessica Marquez     REASON FOR VISIT: fatty liver     HISTORY: This is a 62 y.o. White female here for follow up. She was initially seen by me 12/2017 for fatty liver and elevated transaminases. She underwent a full serological work up and fibrosis staging. Her serological work up was unremarkable. Her fibroscan was consistent with cirrhosis at F4. We discussed further staging or monitoring pt as cirrhotic based on fibroscan alone at her initial visit and patient would like to continue monitoring for now. I have discussed MOURA trials and patient is not interested at this time.     Pt reports recent flu illness with bronchitis following. She has lost 10lbs since our last visit.     Her PMH is listed below.     Liver staging:  Fibroscan F4   AST 43, ALT 60  Tbili WNL  Albumin 3.7  PLTs >150                    Past Medical History:   Diagnosis Date    Cholelithiasis     DDD (degenerative disc disease), lumbar     Fatty liver     - noted on U/S 11/2017    Goiter, nodular     HTN (hypertension)     Hyperglycemia     Hyperlipidemia     Hypothyroidism     Hypovitaminosis D     Meningioma     right frontal lobe ; followed by Dr. 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   NECK:  Supple  LUNGS: Normal respiratory effort.   ABDOMEN: Flat, soft, nontender.   SKIN: Warm and dry. No jaundice, No obvious rashes.   EXTREMITIES: No lower extremity edema  NEURO/PSYCH: Normal gate. Memory intact. Thought and speech pattern appropriate. Behavior normal. No depression or anxiety noted.    RECENT LABS:  Lab Results   Component Value Date    WBC 10.35 12/26/2017    HGB 12.9 12/26/2017     12/26/2017     Lab Results   Component Value Date    INR 1.0 12/26/2017     Lab Results   Component Value Date    AST 41 (H) 12/26/2017    ALT 61 (H) 12/26/2017    BILITOT 0.6 12/26/2017    ALBUMIN 3.9 12/26/2017    ALKPHOS 71 12/26/2017    CREATININE 0.7 12/26/2017    BUN 18 12/26/2017     12/26/2017    K 3.7 12/26/2017     RECENT IMAGING:  U/S abdomen 12/2017  Narrative     Complete abdominal ultrasound dated December 4, 2017    Clinical history: Elevated LFTs    Comparison: No prior study    Technique: Study is degraded secondary to patient's body habitus and overlying bowel gas. Sonographic imaging of the abdominal structures were performed.    Findings:  The liver is increased in echogenicity which could suggest fatty infiltration. The liver is enlarged in craniocaudal dimension measuring 22 cm. The liver is otherwise normal in contour without evidence of focal lesions.  Gallbladder demonstrates gallstones, the largest measuring 2.1 cm.  There is no evidence of sonographic Ascencio's sign, pericholecystic fluid or gallbladder wall thickening.  Common bile duct measures 5.2 mm.    The aorta, IVC, partially visualized pancreas, spleen and bilateral kidneys demonstrate no sonographic abnormality.  The right kidney measures 13.8 cm.  Left kidney measures 13.0 cm.    No ascites is seen.   Impression       Fatty infiltration of the liver with hepatomegaly.  Cholelithiasis without ultrasound evidence of cholecystitis  Otherwise unremarkable abdominal ultrasound     ASSESSMENT  62 y.o. White female with:  1.  "?CIRRHOSIS  -- fibroscan F4, NAFLD fibrosis score indeterminate  -- no splenomegaly on U/S and PLTs >150, mild zaman erythema  -- discussed liver biopsy, pt does not wish to proceed at this time; discussed MOURA trial, pt would like to avoid any additional medication  -- discussed weight loss  -- recommended EV screening with EGD, pt reports h/o bladder rupture and "told of collagen deficiency" so has been told to avoid invasive procedures.    EDUCATION:  We discussed the manifestations of NAFLD. At this time there is no FDA approved therapy for NAFLD.   The patient and I discussed the importance of diet, exercise, and weight loss for management of NAFLD. We discussed a low fat, low carb/low sugar, high fiber diet, and a goal of 30 minutes of exercise 5 times per week.   Pt is aware that fatty liver can progress to steatohepatitis and possibly to cirrhosis, putting one at increased risk for liver cancer, liver failure, and death.        PLAN:  1. Labs today  2. HCC screening in 6 months, will review by phone   3. F/u with labs and U/S in 1 year  Thank you for allowing me to participate in the care of Claudia Braga PA-C    "

## 2018-02-21 NOTE — Clinical Note
Please schedule blood work and U/S in 6 months, does not need clinic visit; please recall for f/u in 1 year  thanks

## 2018-02-23 ENCOUNTER — OFFICE VISIT (OUTPATIENT)
Dept: URGENT CARE | Facility: CLINIC | Age: 63
End: 2018-02-23
Payer: COMMERCIAL

## 2018-02-23 VITALS
TEMPERATURE: 98 F | HEIGHT: 69 IN | SYSTOLIC BLOOD PRESSURE: 138 MMHG | DIASTOLIC BLOOD PRESSURE: 82 MMHG | WEIGHT: 235 LBS | HEART RATE: 60 BPM | BODY MASS INDEX: 34.8 KG/M2

## 2018-02-23 DIAGNOSIS — M54.50 ACUTE LEFT-SIDED LOW BACK PAIN WITHOUT SCIATICA: ICD-10-CM

## 2018-02-23 DIAGNOSIS — S39.012A LUMBAR STRAIN, INITIAL ENCOUNTER: Primary | ICD-10-CM

## 2018-02-23 PROCEDURE — 99214 OFFICE O/P EST MOD 30 MIN: CPT | Mod: S$GLB,,, | Performed by: NURSE PRACTITIONER

## 2018-02-23 PROCEDURE — 3008F BODY MASS INDEX DOCD: CPT | Mod: S$GLB,,, | Performed by: NURSE PRACTITIONER

## 2018-02-23 RX ORDER — CODEINE PHOSPHATE AND GUAIFENESIN 10; 100 MG/5ML; MG/5ML
SOLUTION ORAL
Refills: 0 | COMMUNITY
Start: 2018-01-06 | End: 2018-03-14 | Stop reason: ALTCHOICE

## 2018-02-23 RX ORDER — METHYLPREDNISOLONE 4 MG/1
TABLET ORAL
Refills: 0 | COMMUNITY
Start: 2018-01-06 | End: 2018-02-23 | Stop reason: ALTCHOICE

## 2018-02-23 RX ORDER — METHYLPREDNISOLONE 4 MG/1
TABLET ORAL
Qty: 1 PACKAGE | Refills: 0 | Status: SHIPPED | OUTPATIENT
Start: 2018-02-23 | End: 2018-02-28

## 2018-02-23 RX ORDER — TRIAMCINOLONE ACETONIDE 1 MG/G
CREAM TOPICAL
Refills: 98 | COMMUNITY
Start: 2017-12-19 | End: 2019-06-12

## 2018-02-23 RX ORDER — CYCLOBENZAPRINE HCL 10 MG
10 TABLET ORAL NIGHTLY PRN
Qty: 30 TABLET | Refills: 0 | Status: SHIPPED | OUTPATIENT
Start: 2018-02-23 | End: 2018-03-05

## 2018-02-23 NOTE — LETTER
[facility]  [address], [address]  [place] [address]  Phone: [phone]  Fax: [phone]    Pt Name: Claudia Rodriguez  Injury Date: 2/7/2018   Employee ID: [id number] Date of First Treatment: 02/23/2018   Company: [employer]            Appointment Time: Arrived: 10:25 AM CST   Physician: Amanda Blanco NP Time out: 12:43 PM       Office Treatment: Claudia was seen today for back pain.    Diagnoses and all orders for this visit:    Lumbar strain, initial encounter  -     cyclobenzaprine (FLEXERIL) 10 MG tablet; Take 1 tablet (10 mg total) by mouth nightly as needed for Muscle spasms. Do not take and drive, do not take while at work.  Acute left-sided low back pain without sciatica  -     X-Ray Lumbar Spine Complete 5 View  Other orders  -     methylPREDNISolone (MEDROL DOSEPACK) 4 mg tablet; Take as directed on package.     Patient Instructions: Attention not to aggravate affected area, Daily home exercises/warm soaks    Restrictions: NONE   Regular Duty       Return Appointment: 3/7/2018 at 9:30 AM

## 2018-02-23 NOTE — PROGRESS NOTES
"Subjective:       Patient ID: Claudia Rodriguez is a 62 y.o. female.    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 She c/o intermittent left side LBP. Pain is worse with movement. If sitting still "i feel nothing". Pt denies any radiating pain. The only treatment she has had was the use of a heating pad with mild relief. Pt has NSAID allergy. pain 6/10 on pain scale. ajd Previous low back pain about 25 yrs ago which resolved in a week. MWT      Back Pain   The current episode started 1 to 4 weeks ago. The problem occurs intermittently. The problem is unchanged. The pain is present in the lumbar spine. The quality of the pain is described as aching. The pain does not radiate. The pain is at a severity of 6/10. The pain is moderate. The pain is worse during the day. The symptoms are aggravated by bending, position, standing and twisting. Pertinent negatives include no abdominal pain, bladder incontinence, bowel incontinence, dysuria or numbness. She has tried heat for the symptoms. The treatment provided mild relief.     Review of Systems   Constitution: Negative for malaise/fatigue.   Respiratory: Negative for shortness of breath and wheezing.    Skin: Negative for color change and rash.   Musculoskeletal: Positive for back pain. Negative for muscle cramps, muscle weakness and stiffness.   Gastrointestinal: Negative for abdominal pain and bowel incontinence.   Genitourinary: Negative for bladder incontinence, dysuria, hematuria and urgency.   Neurological: Negative for disturbances in coordination and numbness.   All other systems reviewed and are negative.      Objective:      Physical Exam   Constitutional: She is oriented to person, place, and time. She appears well-developed and well-nourished. She appears distressed.   Overweight   HENT:   Right Ear: External ear normal.   Left Ear: External ear normal.   Nose: Nose normal.   Eyes: " Conjunctivae are normal.   Cardiovascular: Normal rate, regular rhythm, normal heart sounds and intact distal pulses.    Pulses:       Dorsalis pedis pulses are 1+ on the right side, and 1+ on the left side.        Posterior tibial pulses are 1+ on the right side, and 1+ on the left side.   Pulmonary/Chest: Effort normal and breath sounds normal.   Abdominal: Soft. Bowel sounds are normal.   Musculoskeletal: She exhibits tenderness.        Lumbar back: She exhibits tenderness and pain. She exhibits normal range of motion and normal pulse.        Back:    Non radiating LBP more to left side. Increased pain with forward flexion. Neg SLRs.   Neurological: She is alert and oriented to person, place, and time.   Reflex Scores:       Patellar reflexes are 2+ on the right side and 2+ on the left side.       Achilles reflexes are 1+ on the right side and 1+ on the left side.  Skin: Skin is warm and dry. Capillary refill takes less than 2 seconds.   Psychiatric: She has a normal mood and affect. Her behavior is normal.       Assessment:       1. Lumbar strain, initial encounter    2. Acute left-sided low back pain without sciatica        Plan:       Narrative     [id number]  Accession # [id number]      Study:  XR LUMBAR SPINE COMPLETE 5 VIEW    Indication: hurt low back lifting heavy box .    Comparison: None.    Findings:     XR LUMBAR SPINE COMPLETE 5 VIEW.    The vertebral body heights and spinal alignment are satisfactorily maintained.  Intervertebral disc space narrowing of L5-S1.  Moderate facet arthropathy of the lower lumbar spine.  No evidence of acute fracture or dislocation.  Degenerative changes of the SI joints bilaterally.  Soft tissue structures demonstrate no significant abnormalities.   Impression         As above.        Electronically signed by: FARZANA AYALA MD  Date: 02/23/18  Time: 12:24     Encounter     View Encounter            Pt education paper given with back exercises & LBP.    Medications Ordered  This Encounter      cyclobenzaprine (FLEXERIL) 10 MG tablet          Sig: Take 1 tablet (10 mg total) by mouth nightly as needed for Muscle spasms. Do not take and drive, do not take while at work.          Dispense:  30 tablet          Refill:  0      methylPREDNISolone (MEDROL DOSEPACK) 4 mg tablet          Sig: Take as directed on package.          Dispense:  1 Package          Refill:  0  Patient Instructions: Attention not to aggravate affected area, Daily home exercises/warm soaks   Restrictions: Regular Duty  Follow-up in about 12 days (around 3/7/2018).  (pt will be out of town on vacation).

## 2018-02-27 ENCOUNTER — PATIENT OUTREACH (OUTPATIENT)
Dept: OTHER | Facility: OTHER | Age: 63
End: 2018-02-27

## 2018-02-27 NOTE — PROGRESS NOTES
"Last 5 Patient Entered Readings                                      Current 30 Day Average: 138/77     Recent Readings 2/26/2018 2/20/2018 2/18/2018 2/15/2018 2/9/2018    SBP (mmHg) 143 143 140 141 123    DBP (mmHg) 82 88 77 74 71    Pulse 63 68 60 61 63          Hypertension Digital Medicine Program (HDMP): Health  Follow Up    Lifestyle Modifications:    1.Low sodium diet: yes. Patient reports she still following her eating plan.     2.Physical activity: no. Patient reports she has stopped exercising d/t hurting her back at work last week. Patient report she is only doing back exercise that the doctor has given her to do.     3.Hypotension/Hypertension symptoms: no. Patient reports she has no s/s of hypo/hypertension.   Frequency/Alleviating factors/Precipitating factors, etc.     4.Patient has been compliant with the medication regimen.     Follow up with Mrs. Claudia Rodriguez completed. No further questions or concerns. I will follow up in a few weeks to assess progress.     Patient states she is doing "fine".  "

## 2018-03-07 ENCOUNTER — OFFICE VISIT (OUTPATIENT)
Dept: URGENT CARE | Facility: CLINIC | Age: 63
End: 2018-03-07
Payer: COMMERCIAL

## 2018-03-07 DIAGNOSIS — M54.50 ACUTE LEFT-SIDED LOW BACK PAIN WITHOUT SCIATICA: ICD-10-CM

## 2018-03-07 DIAGNOSIS — S39.012D LUMBAR STRAIN, SUBSEQUENT ENCOUNTER: Primary | ICD-10-CM

## 2018-03-07 PROCEDURE — 99213 OFFICE O/P EST LOW 20 MIN: CPT | Mod: S$GLB,,, | Performed by: NURSE PRACTITIONER

## 2018-03-07 NOTE — PROGRESS NOTES
"Subjective:       Patient ID: Claudia Rodriguez is a 62 y.o. female.    Chief Complaint: Back Pain    F/u back pain (doi 2/7/2018) left side LBP. Pain with activity 3-4/10, no movement 0/10 on pain scale. Not taking anything for pain. ajd Takes 1/2 Flexeril hs only. Went on vacation and didn't have any problems despite walking all day. Says she is feeling better (50%) "going in the right direction". MWT      Back Pain   The current episode started 1 to 4 weeks ago. The problem occurs intermittently. The problem has been gradually improving since onset. The pain is present in the lumbar spine. The quality of the pain is described as aching. The pain does not radiate. The pain is mild. The symptoms are aggravated by bending, standing and twisting. Pertinent negatives include no abdominal pain, bladder incontinence, bowel incontinence, dysuria or numbness. She has tried nothing for the symptoms.     Review of Systems   Constitution: Negative for malaise/fatigue.   Skin: Negative for color change and rash.   Musculoskeletal: Positive for back pain and myalgias. Negative for muscle cramps, muscle weakness and stiffness.   Gastrointestinal: Negative for abdominal pain and bowel incontinence.   Genitourinary: Negative for bladder incontinence, dysuria, hematuria and urgency.   Neurological: Negative for disturbances in coordination and numbness.   All other systems reviewed and are negative.      Objective:      Physical Exam   Constitutional: She is oriented to person, place, and time. She appears well-developed and well-nourished.   HENT:   Right Ear: External ear normal.   Left Ear: External ear normal.   Nose: Nose normal.   Eyes: Conjunctivae are normal.   Cardiovascular: Intact distal pulses.    Pulmonary/Chest: Effort normal.   Musculoskeletal:        Lumbar back: She exhibits tenderness and pain. She exhibits normal range of motion and normal pulse.        Back:    Cont to have non radiating pain to left side of lower " back. No leg pain or numbness. Good ROM. More pain with left lateral bending. Neg SLRs. Neg SAKINA. Neurovascular intact distally. Some pain when arising from a sitting position. More pain at the end of the day.   Neurological: She is alert and oriented to person, place, and time.   Reflex Scores:       Patellar reflexes are 1+ on the right side and 1+ on the left side.       Achilles reflexes are 1+ on the right side and 1+ on the left side.  Skin: Skin is warm and dry. Capillary refill takes less than 2 seconds.   Psychiatric: She has a normal mood and affect. Her behavior is normal.       Assessment:       1. Lumbar strain, subsequent encounter    2. Acute left-sided low back pain without sciatica        Plan:           Continue Flexeril at bedtime as needed.  Patient Instructions: Attention not to aggravate affected area, Daily home exercises/warm soaks   Restrictions: Regular Duty  Follow-up in about 2 weeks (around 3/21/2018).

## 2018-03-13 ENCOUNTER — PATIENT OUTREACH (OUTPATIENT)
Dept: OTHER | Facility: OTHER | Age: 63
End: 2018-03-13

## 2018-03-13 NOTE — PROGRESS NOTES
Last 5 Patient Entered Readings                                      Current 30 Day Average: 144/82     Recent Readings 3/18/2018 3/17/2018 3/12/2018 3/10/2018 3/8/2018    SBP (mmHg) 150 129 146 144 138    DBP (mmHg) 85 72 88 80 82    Pulse 69 72 59 70 69          Patient's BP average is above goal of <130/80.     Patient denies s/s of hypotension (lightheadedness, dizziness, nausea, fatigue) associated with low readings. Instructed patient to inform me if this occurs, patient confirms understanding.      Patient denies s/s of hypertension (SOB, CP, severe headaches, changes in vision) associated with high readings. Instructed patient to go to the ED if BP > 180/110 and accompanied by hypertensive s/s, patient confirms understanding.    Will continue to monitor regularly. Will follow up in 3-4 weeks, sooner if BP begins to trend upward or downward.    Patient has my contact information and knows to call with any concerns or clinical changes.     Current HTN regimen:  Hypertension Medications             amLODIPine (NORVASC) 10 MG tablet TAKE ONE Tablet BY MOUTH EVERY DAY    atenolol (TENORMIN) 50 MG tablet TAKE ONE TABLET BY MOUTH TWICE DAILY    triamterene-hydrochlorothiazide 37.5-25 mg (MAXZIDE-25) 37.5-25 mg per tablet TAKE ONE TABLET BY MOUTH EVERY DAY        Urgent care visit 3/7 for back pain. Patient reports illness and injuries since the beginning of the year and is just recently feeling better. She does not feel like she has felt better long enough to get into the routine she wants for lifestyle changes. She declines discussing medication changes today and requests I follow up next month. Reviewed potential complications of hypertension and my role in helping to obtain her goal.

## 2018-03-14 ENCOUNTER — OFFICE VISIT (OUTPATIENT)
Dept: UROGYNECOLOGY | Facility: CLINIC | Age: 63
End: 2018-03-14
Payer: COMMERCIAL

## 2018-03-14 VITALS
DIASTOLIC BLOOD PRESSURE: 82 MMHG | HEIGHT: 69 IN | WEIGHT: 239.44 LBS | SYSTOLIC BLOOD PRESSURE: 130 MMHG | BODY MASS INDEX: 35.46 KG/M2

## 2018-03-14 DIAGNOSIS — N39.46 URINARY INCONTINENCE, MIXED: ICD-10-CM

## 2018-03-14 DIAGNOSIS — N81.89 PELVIC FLOOR WEAKNESS: ICD-10-CM

## 2018-03-14 DIAGNOSIS — R35.1 NOCTURIA MORE THAN TWICE PER NIGHT: Primary | ICD-10-CM

## 2018-03-14 PROCEDURE — 99999 PR PBB SHADOW E&M-EST. PATIENT-LVL III: CPT | Mod: PBBFAC,,, | Performed by: OBSTETRICS & GYNECOLOGY

## 2018-03-14 PROCEDURE — 3079F DIAST BP 80-89 MM HG: CPT | Mod: CPTII,S$GLB,, | Performed by: OBSTETRICS & GYNECOLOGY

## 2018-03-14 PROCEDURE — 3075F SYST BP GE 130 - 139MM HG: CPT | Mod: CPTII,S$GLB,, | Performed by: OBSTETRICS & GYNECOLOGY

## 2018-03-14 PROCEDURE — 99213 OFFICE O/P EST LOW 20 MIN: CPT | Mod: S$GLB,,, | Performed by: OBSTETRICS & GYNECOLOGY

## 2018-03-14 NOTE — PROGRESS NOTES
[facility]  [address] [address]  Urogyn follow up  03/18/2018    [facility]  [address] [address]    Claudia Rodriguez  [id number]  1955      Claudia Rodriguez is a 63 y.o. here for a urogyn follow up.    1)  UI:  (+) GARY < (+) UUI but mostly just small, spontaneous leaks, alcira with movement.  Was having some U/F/UI, then had bladder lift in her 30s--better.  UI returned after some years, now having more U/F x 2-3 months.   (+) pads (liners):1/day, usually severe wetness and 1/night usually (liner) minimum-moderate wetness.  Daytime frequency: Q 1 hours after taking diuretic; once that wears off Q2h.  Nocturia: Yes: 4-7/night. Does have a little insomnia--started taking 1/4 benadryl.  Has decreased liquid consumption before bed--stops at 6PM, no water by bed.  Has improved nocturia some.  No LE swelling.  Pelvic floor PT:  Has scheduled for next week with [facility].  Is going to work on weight loss. Is emotional crutch for her.   (--) dysuria,  (--) hematuria,  (--) frequent UTIs.  (--) complete bladder emptying. Has to PV to help with moderate 2nd void.      2)  POP:  Absent.  (--) vaginal bleeding. (--) vaginal discharge. (+) sexually active.  (--) dyspareunia.  (+)  Vaginal dryness. Uses lube during intercourse.  (--) vaginal estrogen use.     3)  BM:  (--) constipation/straining.  (--) chronic diarrhea. Does have some IBS--controlled with avoiding dietary triggers.  Has diarrhea with triggers.  (--) hematochezia.  (--) fecal incontinence.  (--) fecal smearing/urgency.  (+) complete evacuation.     Past Medical History  Past Medical History:   Diagnosis Date    Cholelithiasis     DDD (degenerative disc disease), lumbar     Fatty liver     - noted on U/S 11/2017    Goiter, nodular     HTN (hypertension)     Hyperglycemia     Hyperlipidemia     Hypothyroidism     Hypovitaminosis D     Meningioma     right  frontal lobe ; followed by Dr. Enciso once a year    Obesity     Prediabetes     Urinary bladder disorder        Past Surgical History  Past Surgical History:   Procedure Laterality Date    BLADDER SUSPENSION      thyroid biopsy  12    tonsillectomy      TONSILLECTOMY     Bladder suspension in early 30s:  Vaginal procedure for leakage but was also told bladder had fallen.  Was told that bladder ruptured during surgery & had 150 stitches during surgery.     Hysterectomy: No    Past Ob History  .  Fetal demise PP.    x 1.  .    Largest infant weight: 5#12oz.   unknown FAVD. yes episiotomy.      Gynecologic History  LMP: No LMP recorded. Patient is postmenopausal.  Age of menarche: 8 y.o.  Age of menopause: 45 y.o.  Menstrual history: h/o normal  Pap test: 2017, normal per report.  History of abnormal paps: No.  History of STIs:  No  Mammogram: Date of last: .  Result: Normal per report.   Colonoscopy: none.  Worried since had bladder rupture.  Was told by some MDs at [facility] she should never have invasive procedure.  Does have stool CA screening with PCP.   DEXA:  Date of last: .  Result:  Elevated BMD.  Repeat due:  .     Issues include:  Patient Active Problem List   Diagnosis    Goiter, nodular    HTN (hypertension)    BMI 36.0-36.9,adult    Prediabetes    Hyperlipidemia    Hypothyroidism    Vitamin D deficiency disease    Benign meningioma of brain    Statin intolerance    Urinary incontinence    Fatty liver    Atrophic vaginitis    Urinary incontinence, mixed    Nocturia more than twice per night    Pelvic floor weakness    Cirrhosis of liver without ascites       History since last visit:     1)  Mixed urinary incontinence, urge/spontaneous > stress:    --baseline:   (+) pads (liners):1/day, usually severe wetness and 1/night usually (liner) minimum-moderate wetness.  Daytime frequency: Q 1 hours after taking diuretic; once that wears off Q2h. Nocturia: Yes:  4-7/night.  --today:  Has reduced fluids before bed, which has helped decreased nocturia to 3x/PM.  --working on weight loss  --continues to control pre-diabetes  --dietary irritants: has ID'd some  --did pelvic floor PT with [facility]:  Went to 5-6 sessions but was paying $400/visit; can afford once reaches deductible  --is doing exercises as Rx'd    2)  Vaginal atrophy (dryness):  Use 0.5 gram of estrogen cream in vagina twice a week.     3)  Nocturia (nighttime urination):   --decreased to 3x/PM with fluid reduction before bed    Medications:    Current Outpatient Prescriptions:     albuterol 90 mcg/actuation inhaler, Inhale 2 puffs into the lungs every 6 (six) hours as needed for Wheezing. Rescue, Disp: 18 g, Rfl: 0    amLODIPine (NORVASC) 10 MG tablet, TAKE ONE Tablet BY MOUTH EVERY DAY, Disp: 90 tablet, Rfl: 1    atenolol (TENORMIN) 50 MG tablet, TAKE ONE TABLET BY MOUTH TWICE DAILY, Disp: 180 tablet, Rfl: 0    CALCIUM CARBONATE/VITAMIN D3 (CALCIUM 500 + D, D3, ORAL), , Disp: , Rfl:     ESTRACE 0.01 % (0.1 mg/gram) vaginal cream, , Disp: , Rfl: 98    estradiol (ESTRACE) 0.01 % (0.1 mg/gram) vaginal cream, Place 0.5 g vaginally once daily., Disp: 42.5 g, Rfl: 11    FOLIC ACID/MV,FE,OTHER MIN (CENTRUM ORAL), Take 1 tablet by mouth., Disp: , Rfl:     levothyroxine (SYNTHROID) 75 MCG tablet, Take 1 tablet (75 mcg total) by mouth once daily., Disp: 90 tablet, Rfl: 0    metFORMIN (GLUCOPHAGE-XR) 500 MG 24 hr tablet, TAKE TWO TABLETS BY MOUTH EVERY DAY WITH evening meal, Disp: 180 tablet, Rfl: 0    omega-3 fatty acids 1,000 mg Cap, Take 1 capsule by mouth Daily., Disp: , Rfl:     terconazole (TERAZOL 3) 0.8 % vaginal cream, , Disp: , Rfl: 2    triamterene-hydrochlorothiazide 37.5-25 mg (MAXZIDE-25) 37.5-25 mg per tablet, TAKE ONE TABLET BY MOUTH EVERY DAY, Disp: 90 tablet, Rfl: 1    VITAMIN D2 50,000 unit capsule, TAKE TWO CAPSULES BY MOUTH EVERY 2 WEEKS, Disp: 12 capsule, Rfl: 5    triamcinolone  "acetonide 0.1% (KENALOG) 0.1 % cream, , Disp: , Rfl: 98    ROS:  As per HPI.      Exam  /82   Ht 5' 9" (1.753 m)   Wt 108.6 kg (239 lb 6.7 oz)   BMI 35.36 kg/m²   General: alert and oriented, no acute distress  Remainder of PE deferred.      Impression  1. Nocturia more than twice per night     2. Pelvic floor weakness     3. Urinary incontinence, mixed       We reviewed the above issues and discussed options for short-term versus long-term management of her problems.   Plan:     1)  Mixed urinary incontinence, urge/spontaneous > stress:    --continue to work on weight loss (even 5-10 lbs can help)  --continue to control diabetes  --Empty bladder every 3 hours.  Empty well: wait a minute, lean forward on toilet.    --Avoid dietary irritants (see sheet).  Keep diary x 3-5 days to determine your irritants.  --continue pelvic floor PT exercises at home; restart with [facility] when can afford  --URGE: consider medication in future--let us know if want to start in meantime. Takes 2-4 weeks to see if will have effect.  For dry mouth: get sour, sugar free lozenge or gum.    --STRESS:  Pessary vs. Sling.     2)  Vaginal atrophy (dryness):  Use 0.5 gram of estrogen cream in vagina twice a week.  Use small amount with finger around vaginal opening/inner lips at same frequency.   --may also help bladder urgency/frequency symptoms    3)  Nocturia (nighttime urination): stop fluids 2 hours before bed/no water by bed.  If have leg swelling:  Elevate feet above chest x 1 hour before bed to get excess fluid off.  Can also use support hose (knee highs).    --work on sleep pattern (sleep hygiene sheet)  --continue to watch snoring--may need sleep apnea evaluation    4)  RTC 6 months.     30 minutes were spent in face to face time with this patient  100 % of this time was spent in counseling and/or coordination of care     Anu Coles MD  [facility]  Division of Female Pelvic Medicine and Reconstructive " Surgery  Department of Obstetrics & Gynecology

## 2018-03-14 NOTE — PATIENT INSTRUCTIONS
1)  Mixed urinary incontinence, urge/spontaneous > stress:    --continue to work on weight loss (even 5-10 lbs can help)  --continue to control diabetes  --Empty bladder every 3 hours.  Empty well: wait a minute, lean forward on toilet.    --Avoid dietary irritants (see sheet).  Keep diary x 3-5 days to determine your irritants.  --continue pelvic floor PT exercises at home; restart with [facility] when can afford  --URGE: consider medication in future--let us know if want to start in meantime. Takes 2-4 weeks to see if will have effect.  For dry mouth: get sour, sugar free lozenge or gum.    --STRESS:  Pessary vs. Sling.     2)  Vaginal atrophy (dryness):  Use 0.5 gram of estrogen cream in vagina twice a week.  Use small amount with finger around vaginal opening/inner lips at same frequency.   --may also help bladder urgency/frequency symptoms    3)  Nocturia (nighttime urination): stop fluids 2 hours before bed/no water by bed.  If have leg swelling:  Elevate feet above chest x 1 hour before bed to get excess fluid off.  Can also use support hose (knee highs).    --work on sleep pattern (sleep hygiene sheet)  --continue to watch snoring--may need sleep apnea evaluation    4)  RTC 6 months.

## 2018-03-20 DIAGNOSIS — E03.9 ACQUIRED HYPOTHYROIDISM: ICD-10-CM

## 2018-03-20 RX ORDER — LEVOTHYROXINE SODIUM 75 UG/1
TABLET ORAL
Qty: 90 TABLET | Refills: 3 | Status: SHIPPED | OUTPATIENT
Start: 2018-03-20 | End: 2019-02-26 | Stop reason: SDUPTHER

## 2018-03-22 ENCOUNTER — PATIENT MESSAGE (OUTPATIENT)
Dept: REHABILITATION | Facility: HOSPITAL | Age: 63
End: 2018-03-22

## 2018-03-23 ENCOUNTER — OFFICE VISIT (OUTPATIENT)
Dept: URGENT CARE | Facility: CLINIC | Age: 63
End: 2018-03-23
Payer: COMMERCIAL

## 2018-03-23 DIAGNOSIS — M54.50 ACUTE LEFT-SIDED LOW BACK PAIN WITHOUT SCIATICA: ICD-10-CM

## 2018-03-23 DIAGNOSIS — S39.012D LUMBAR STRAIN, SUBSEQUENT ENCOUNTER: Primary | ICD-10-CM

## 2018-03-23 PROCEDURE — 99213 OFFICE O/P EST LOW 20 MIN: CPT | Mod: S$GLB,,, | Performed by: NURSE PRACTITIONER

## 2018-03-23 NOTE — PROGRESS NOTES
Subjective:       Patient ID: Claudia Rodriguez is a 63 y.o. female.    Chief Complaint: Back Injury    F/u for back injury (doi 2/7/2018) pt states she feels a little pull in her low back. It's not painful. She states she's about 95 percent, and ready to be released. ajd      Review of Systems   Constitution: Negative for malaise/fatigue.   Skin: Negative for color change and rash.   Musculoskeletal: Negative for back pain, muscle cramps, muscle weakness and stiffness.   Gastrointestinal: Negative for abdominal pain and bowel incontinence.   Genitourinary: Negative for bladder incontinence, dysuria, hematuria and urgency.   Neurological: Negative for disturbances in coordination and numbness.   All other systems reviewed and are negative.      Objective:      Physical Exam   Constitutional: She is oriented to person, place, and time. She appears well-developed and well-nourished.   HENT:   Right Ear: External ear normal.   Left Ear: External ear normal.   Nose: Nose normal.   Eyes: Conjunctivae are normal.   Cardiovascular: Intact distal pulses.    Pulmonary/Chest: Effort normal.   Musculoskeletal:        Lumbar back: She exhibits tenderness and pain. She exhibits normal range of motion and normal pulse.        Back:     Good ROM. Neg SLRs. Neurovascular intact distally.  Full ROM to back without pain.   Neurological: She is alert and oriented to person, place, and time.   Reflex Scores:       Patellar reflexes are 1+ on the right side and 1+ on the left side.       Achilles reflexes are 1+ on the right side and 1+ on the left side.  Skin: Skin is warm and dry. Capillary refill takes less than 2 seconds.   Psychiatric: She has a normal mood and affect. Her behavior is normal.       Assessment:       1. Lumbar strain, subsequent encounter    2. Acute left-sided low back pain without sciatica        Plan:       Pt education sheet given on back exercises.     Patient Instructions: Attention not to aggravate affected  area, Daily home exercises/warm soaks   Restrictions: Regular Duty, Discharged from Occupational Health  Follow-up if symptoms worsen or fail to improve.

## 2018-03-23 NOTE — LETTER
[facility] [place]  [address], [address]  [place] [address]  Phone: [phone]  Fax: [phone]    Pt Name: Claudia Rodriguez  Injury Date: 02/08/2018   Employee ID: [id number] Date: 03/23/2018   Company: [employer]            Appointment Time: 09:30 AM Arrived:  9:30 AM CDT   Physician: Amanda Blanco NP Time out: 10:46 AM       Office Treatment: Claudia was seen today for back injury.    Diagnoses and all orders for this visit:    Lumbar strain, subsequent encounter    Acute left-sided low back pain without sciatica       Patient Instructions: Attention not to aggravate affected area, Daily home exercises/warm soaks    Restrictions: NONE,  Regular Duty,   Discharged from Occupational Health

## 2018-03-27 ENCOUNTER — PATIENT OUTREACH (OUTPATIENT)
Dept: OTHER | Facility: OTHER | Age: 63
End: 2018-03-27

## 2018-03-27 NOTE — PROGRESS NOTES
"Patient states she is doing "fine". Patient reporst her back has been doing better since the last time I spoken with her. Asked patient what she thinks is causing her BP to fluctuating , patient states she has no clue and that her eating habits have not changed.   Last 5 Patient Entered Readings                                      Current 30 Day Average: 142/81     Recent Readings 3/25/2018 3/24/2018 3/22/2018 3/21/2018 3/18/2018    SBP (mmHg) 129 138 141 146 150    DBP (mmHg) 75 76 70 95 85    Pulse 68 63 62 65 69        Hypertension Digital Medicine Program (HDMP): Health  Follow Up    Lifestyle Modifications:    1.Low sodium diet: no. Patient reports she still following her eating plan but does break from her eating plan seldomly. Encouraged the patient to stick with her eating plan and to chose low sodium options.     2.Physical activity: yes. Patient reports she still doing her back exercises.     3.Hypotension/Hypertension symptoms: no. Patient reports  She has no s/s of hypotension ( dizziness, LH , nausea, or fatigue 0with low readings.  Patient reports she has no s/s of hypertension ( CP , SOB, severe headaches, or change in vision ) with high readings.   Frequency/Alleviating factors/Precipitating factors, etc.     4.Patient has been compliant with the medication regimen.     Follow up with Mrs. Claudia GAITAN Michael completed. No further questions or concerns.    Assessment: Patient's current 30 day average BP is not at goal.    Plan:  I will follow up in a few weeks to assess progress with diet and exercise.   "

## 2018-03-29 DIAGNOSIS — I10 ESSENTIAL HYPERTENSION: ICD-10-CM

## 2018-03-29 RX ORDER — AMLODIPINE BESYLATE 10 MG/1
TABLET ORAL
Qty: 90 TABLET | Refills: 0 | Status: SHIPPED | OUTPATIENT
Start: 2018-03-29 | End: 2018-11-12 | Stop reason: SDUPTHER

## 2018-04-03 ENCOUNTER — PATIENT MESSAGE (OUTPATIENT)
Dept: UROGYNECOLOGY | Facility: CLINIC | Age: 63
End: 2018-04-03

## 2018-04-17 ENCOUNTER — PATIENT OUTREACH (OUTPATIENT)
Dept: OTHER | Facility: OTHER | Age: 63
End: 2018-04-17

## 2018-04-17 NOTE — PROGRESS NOTES
Last 5 Patient Entered Readings                                      Current 30 Day Average: 132/79     Recent Readings 4/19/2018 4/18/2018 4/18/2018 4/17/2018 4/16/2018    SBP (mmHg) 122 129 162 134 134    DBP (mmHg) 73 78 80 78 83    Pulse 62 71 75 72 60          Patient's BP average is slightly above goal of <130/80.     Patient denies s/s of hypotension (lightheadedness, dizziness, nausea, fatigue) associated with low readings. Instructed patient to inform me if this occurs, patient confirms understanding.      Patient denies s/s of hypertension (SOB, CP, severe headaches, changes in vision) associated with high readings. Instructed patient to go to the ED if BP > 180/110 and accompanied by hypertensive s/s, patient confirms understanding.    Will continue to monitor regularly. Will follow up in 3-4 weeks, sooner if BP begins to trend upward or downward.    Patient has my contact information and knows to call with any concerns or clinical changes.     Current HTN regimen:  Hypertension Medications             amLODIPine (NORVASC) 10 MG tablet TAKE ONE TABLET BY MOUTH EVERY DAY    atenolol (TENORMIN) 50 MG tablet TAKE ONE TABLET BY MOUTH TWICE DAILY    triamterene-hydrochlorothiazide 37.5-25 mg (MAXZIDE-25) 37.5-25 mg per tablet TAKE ONE TABLET BY MOUTH EVERY DAY        Patient reports she was traveling last week and was busy. She is also recovering from a sinus infection. She denies use of systemic decongestants. BP average continues to improve.

## 2018-04-24 ENCOUNTER — PATIENT OUTREACH (OUTPATIENT)
Dept: OTHER | Facility: OTHER | Age: 63
End: 2018-04-24

## 2018-04-24 NOTE — PROGRESS NOTES
Last 5 Patient Entered Readings                                      Current 30 Day Average: 132/79     Recent Readings 4/19/2018 4/18/2018 4/18/2018 4/17/2018 4/16/2018    SBP (mmHg) 122 129 162 134 134    DBP (mmHg) 73 78 80 78 83    Pulse 62 71 75 72 60

## 2018-05-04 DIAGNOSIS — R73.03 PREDIABETES: ICD-10-CM

## 2018-05-04 DIAGNOSIS — I10 ESSENTIAL HYPERTENSION: ICD-10-CM

## 2018-05-04 RX ORDER — ATENOLOL 50 MG/1
TABLET ORAL
Qty: 180 TABLET | Refills: 0 | Status: SHIPPED | OUTPATIENT
Start: 2018-05-04 | End: 2018-08-06 | Stop reason: SDUPTHER

## 2018-05-04 RX ORDER — METFORMIN HYDROCHLORIDE 500 MG/1
TABLET, EXTENDED RELEASE ORAL
Qty: 180 TABLET | Refills: 0 | Status: SHIPPED | OUTPATIENT
Start: 2018-05-04 | End: 2018-08-06 | Stop reason: SDUPTHER

## 2018-05-04 NOTE — TELEPHONE ENCOUNTER
Spoke with pt, scheduled a follow up appt and mailed the appt reminder to the home.  Patient verbalized understandings.

## 2018-05-15 NOTE — PROGRESS NOTES
Called patient to follow- up. Patient reports she is still traveling.   Last 5 Patient Entered Readings                                      Current 30 Day Average: 136/77     Recent Readings 5/14/2018 5/11/2018 5/8/2018 5/5/2018 4/27/2018    SBP (mmHg) 138 132 147 139 151    DBP (mmHg) 74 80 69 73 80    Pulse 60 68 69 61 61        Digital Medicine: Health  Follow Up    Lifestyle Modifications:    1.Dietary Modifications (Sodium intake <2,000mg/day, food labels, dining out): Patient reports she still watching her sodium intake and following her eating plan.     2.Physical Activity: Patient reports she is still exercising but not as much since she is still traveling.     3.Medication Therapy: Patient has been compliant with the medication regimen.    4.Patient has the following medication side effects/concerns: Patient reports she has no s/s of hypotension ( dizziness, LH, nausea, or fatigue) with low readings. Patient reports she has no s/s of hypertension ( CP, SOB, severe headaches, or change in vision ) with high readings.   (Frequency/Alleviating factors/Precipitating factors, etc.)     Follow up with Mrs. Claudia Rodriguez completed. No further questions or concerns.     Plan: Will continue follow up to achieve health goals on diet and exercise.

## 2018-05-22 ENCOUNTER — PATIENT OUTREACH (OUTPATIENT)
Dept: OTHER | Facility: OTHER | Age: 63
End: 2018-05-22

## 2018-06-01 ENCOUNTER — PATIENT MESSAGE (OUTPATIENT)
Dept: ENDOCRINOLOGY | Facility: CLINIC | Age: 63
End: 2018-06-01

## 2018-06-12 ENCOUNTER — PATIENT OUTREACH (OUTPATIENT)
Dept: OTHER | Facility: OTHER | Age: 63
End: 2018-06-12

## 2018-06-12 NOTE — PROGRESS NOTES
Last 5 Patient Entered Readings                                      Current 30 Day Average: 127/76     Recent Readings 6/12/2018 6/9/2018 6/5/2018 6/5/2018 6/5/2018    SBP (mmHg) 122 129 118 125 136    DBP (mmHg) 76 81 73 98 75    Pulse 61 61 62 63 64          Left voicemail and sent [facility] message to follow up with Ms. Claudia GAITAN Michael.    Per 30 day average, blood pressure is well controlled 127/76 mmHg. Encouraged adherence to low sodium diet and physical activity guidelines. Advised patient to call or message with questions or concerns. Follow up in 8 weeks.       Current HTN regimen:  Hypertension Medications             amLODIPine (NORVASC) 10 MG tablet TAKE ONE TABLET BY MOUTH EVERY DAY    atenolol (TENORMIN) 50 MG tablet TAKE ONE TABLET BY MOUTH TWICE DAILY    triamterene-hydrochlorothiazide 37.5-25 mg (MAXZIDE-25) 37.5-25 mg per tablet TAKE ONE TABLET BY MOUTH EVERY DAY

## 2018-07-09 ENCOUNTER — PATIENT OUTREACH (OUTPATIENT)
Dept: OTHER | Facility: OTHER | Age: 63
End: 2018-07-09

## 2018-07-09 NOTE — PROGRESS NOTES
Last 5 Patient Entered Readings                                      Current 30 Day Average: 131/77     Recent Readings 7/8/2018 7/6/2018 7/2/2018 6/29/2018 6/28/2018    SBP (mmHg) 142 142 130 140 140    DBP (mmHg) 78 86 78 68 76    Pulse 60 63 67 67 61        7/9-Digital Medicine: Health  Follow Up  Left voicemail to follow up with Panda Claudia GAITAN Michael.  Current BP average 131/77 mmHg is not at goal, [<130/80].

## 2018-07-11 ENCOUNTER — PATIENT MESSAGE (OUTPATIENT)
Dept: ADMINISTRATIVE | Facility: OTHER | Age: 63
End: 2018-07-11

## 2018-07-11 ENCOUNTER — PATIENT MESSAGE (OUTPATIENT)
Dept: ENDOCRINOLOGY | Facility: CLINIC | Age: 63
End: 2018-07-11

## 2018-07-13 ENCOUNTER — OFFICE VISIT (OUTPATIENT)
Dept: FAMILY MEDICINE | Facility: CLINIC | Age: 63
End: 2018-07-13
Payer: COMMERCIAL

## 2018-07-13 ENCOUNTER — LAB VISIT (OUTPATIENT)
Dept: LAB | Facility: HOSPITAL | Age: 63
End: 2018-07-13
Attending: INTERNAL MEDICINE
Payer: COMMERCIAL

## 2018-07-13 VITALS
TEMPERATURE: 99 F | BODY MASS INDEX: 35.7 KG/M2 | HEART RATE: 62 BPM | WEIGHT: 241.06 LBS | OXYGEN SATURATION: 97 % | HEIGHT: 69 IN | DIASTOLIC BLOOD PRESSURE: 84 MMHG | SYSTOLIC BLOOD PRESSURE: 134 MMHG

## 2018-07-13 DIAGNOSIS — E03.9 ACQUIRED HYPOTHYROIDISM: ICD-10-CM

## 2018-07-13 DIAGNOSIS — E78.2 MIXED HYPERLIPIDEMIA: ICD-10-CM

## 2018-07-13 DIAGNOSIS — Z23 NEED FOR PROPHYLACTIC VACCINATION AGAINST VIRAL HEPATITIS: ICD-10-CM

## 2018-07-13 DIAGNOSIS — I10 ESSENTIAL HYPERTENSION: ICD-10-CM

## 2018-07-13 DIAGNOSIS — E66.01 SEVERE OBESITY (BMI 35.0-39.9) WITH COMORBIDITY: ICD-10-CM

## 2018-07-13 DIAGNOSIS — N81.89 PELVIC FLOOR WEAKNESS: ICD-10-CM

## 2018-07-13 DIAGNOSIS — Z78.9 STATIN INTOLERANCE: ICD-10-CM

## 2018-07-13 DIAGNOSIS — R73.03 PREDIABETES: ICD-10-CM

## 2018-07-13 DIAGNOSIS — K76.0 FATTY LIVER: ICD-10-CM

## 2018-07-13 DIAGNOSIS — Z00.00 ROUTINE MEDICAL EXAM: Primary | ICD-10-CM

## 2018-07-13 DIAGNOSIS — Z12.11 ENCOUNTER FOR FIT (FECAL IMMUNOCHEMICAL TEST) SCREENING: ICD-10-CM

## 2018-07-13 LAB
ALBUMIN SERPL BCP-MCNC: 4.1 G/DL
ALP SERPL-CCNC: 70 U/L
ALT SERPL W/O P-5'-P-CCNC: 37 U/L
ANION GAP SERPL CALC-SCNC: 11 MMOL/L
AST SERPL-CCNC: 28 U/L
BASOPHILS # BLD AUTO: 0.05 K/UL
BASOPHILS NFR BLD: 0.6 %
BILIRUB SERPL-MCNC: 1 MG/DL
BUN SERPL-MCNC: 20 MG/DL
CALCIUM SERPL-MCNC: 10.1 MG/DL
CHLORIDE SERPL-SCNC: 103 MMOL/L
CHOLEST SERPL-MCNC: 254 MG/DL
CHOLEST/HDLC SERPL: 4.6 {RATIO}
CO2 SERPL-SCNC: 27 MMOL/L
CREAT SERPL-MCNC: 0.7 MG/DL
DIFFERENTIAL METHOD: ABNORMAL
EOSINOPHIL # BLD AUTO: 0.1 K/UL
EOSINOPHIL NFR BLD: 1.7 %
ERYTHROCYTE [DISTWIDTH] IN BLOOD BY AUTOMATED COUNT: 12.9 %
EST. GFR  (AFRICAN AMERICAN): >60 ML/MIN/1.73 M^2
EST. GFR  (NON AFRICAN AMERICAN): >60 ML/MIN/1.73 M^2
ESTIMATED AVG GLUCOSE: 108 MG/DL
GLUCOSE SERPL-MCNC: 120 MG/DL
HBA1C MFR BLD HPLC: 5.4 %
HCT VFR BLD AUTO: 42.1 %
HDLC SERPL-MCNC: 55 MG/DL
HDLC SERPL: 21.7 %
HGB BLD-MCNC: 13.6 G/DL
IMM GRANULOCYTES # BLD AUTO: 0.03 K/UL
IMM GRANULOCYTES NFR BLD AUTO: 0.4 %
LDLC SERPL CALC-MCNC: 167.6 MG/DL
LYMPHOCYTES # BLD AUTO: 3.5 K/UL
LYMPHOCYTES NFR BLD: 45.1 %
MCH RBC QN AUTO: 31.9 PG
MCHC RBC AUTO-ENTMCNC: 32.3 G/DL
MCV RBC AUTO: 99 FL
MONOCYTES # BLD AUTO: 0.7 K/UL
MONOCYTES NFR BLD: 9.5 %
NEUTROPHILS # BLD AUTO: 3.3 K/UL
NEUTROPHILS NFR BLD: 42.7 %
NONHDLC SERPL-MCNC: 199 MG/DL
NRBC BLD-RTO: 0 /100 WBC
PLATELET # BLD AUTO: 355 K/UL
PMV BLD AUTO: 9.9 FL
POTASSIUM SERPL-SCNC: 4.2 MMOL/L
PROT SERPL-MCNC: 7.1 G/DL
RBC # BLD AUTO: 4.27 M/UL
SODIUM SERPL-SCNC: 141 MMOL/L
TRIGL SERPL-MCNC: 157 MG/DL
WBC # BLD AUTO: 7.79 K/UL

## 2018-07-13 PROCEDURE — 90632 HEPA VACCINE ADULT IM: CPT | Mod: S$GLB,,, | Performed by: INTERNAL MEDICINE

## 2018-07-13 PROCEDURE — 99396 PREV VISIT EST AGE 40-64: CPT | Mod: 25,S$GLB,, | Performed by: INTERNAL MEDICINE

## 2018-07-13 PROCEDURE — 3079F DIAST BP 80-89 MM HG: CPT | Mod: CPTII,S$GLB,, | Performed by: INTERNAL MEDICINE

## 2018-07-13 PROCEDURE — 80061 LIPID PANEL: CPT

## 2018-07-13 PROCEDURE — 83036 HEMOGLOBIN GLYCOSYLATED A1C: CPT

## 2018-07-13 PROCEDURE — 85025 COMPLETE CBC W/AUTO DIFF WBC: CPT

## 2018-07-13 PROCEDURE — 36415 COLL VENOUS BLD VENIPUNCTURE: CPT | Mod: PO

## 2018-07-13 PROCEDURE — 90472 IMMUNIZATION ADMIN EACH ADD: CPT | Mod: S$GLB,,, | Performed by: INTERNAL MEDICINE

## 2018-07-13 PROCEDURE — 99999 PR PBB SHADOW E&M-EST. PATIENT-LVL III: CPT | Mod: PBBFAC,,, | Performed by: INTERNAL MEDICINE

## 2018-07-13 PROCEDURE — 90746 HEPB VACCINE 3 DOSE ADULT IM: CPT | Mod: S$GLB,,, | Performed by: INTERNAL MEDICINE

## 2018-07-13 PROCEDURE — 3075F SYST BP GE 130 - 139MM HG: CPT | Mod: CPTII,S$GLB,, | Performed by: INTERNAL MEDICINE

## 2018-07-13 PROCEDURE — 80053 COMPREHEN METABOLIC PANEL: CPT

## 2018-07-13 PROCEDURE — 90471 IMMUNIZATION ADMIN: CPT | Mod: S$GLB,,, | Performed by: INTERNAL MEDICINE

## 2018-07-13 RX ORDER — FLUCONAZOLE 150 MG/1
TABLET ORAL
Refills: 0 | COMMUNITY
Start: 2018-05-17 | End: 2018-07-13 | Stop reason: ALTCHOICE

## 2018-07-13 RX ORDER — CLOTRIMAZOLE AND BETAMETHASONE DIPROPIONATE 10; .64 MG/G; MG/G
CREAM TOPICAL
Refills: 98 | COMMUNITY
Start: 2018-05-17 | End: 2019-06-12

## 2018-07-13 NOTE — PROGRESS NOTES
Hep-A and Hep-B vaccination administered. Tolerated well, instructed to wait 15 min for observation. No reaction noted at discharge.

## 2018-07-13 NOTE — PROGRESS NOTES
HISTORY OF PRESENT ILLNESS:  Claudia Rodriguez is a 63 y.o. female who presents to the clinic today for a routine medical physical exam. Her last physical exam was approximately 1 years(s) ago.        PAST MEDICAL HISTORY:  Past Medical History:   Diagnosis Date    Cholelithiasis     DDD (degenerative disc disease), lumbar     Fatty liver     - noted on U/S 11/2017    Goiter, nodular     HTN (hypertension)     Hyperglycemia     Hyperlipidemia     Hypothyroidism     Hypovitaminosis D     Meningioma     right frontal lobe ; followed by Dr. Enciso once a year    Obesity     Prediabetes     Urinary bladder disorder        PAST SURGICAL HISTORY:  Past Surgical History:   Procedure Laterality Date    BLADDER SUSPENSION      thyroid biopsy  7/11/12    tonsillectomy      TONSILLECTOMY         SOCIAL HISTORY:  Social History     Social History    Marital status:      Spouse name: N/A    Number of children: 1    Years of education: N/A     Occupational History    human  [employer]     Social History Main Topics    Smoking status: Never Smoker    Smokeless tobacco: Never Used    Alcohol use No    Drug use: No    Sexual activity: Yes     Partners: Male     Other Topics Concern    Are You Pregnant Or Think You May Be? No    Breast-Feeding No     Social History Narrative    3 adopted children.       FAMILY HISTORY:  Family History   Problem Relation Age of Onset    Coronary artery disease Father 55    Heart disease Father     Hypertension Father     Melanoma Father     Hypertension Mother     Stroke Mother         2015    Hypertension Sister         controlled by lifestyle    No Known Problems Brother     Pancreatic cancer Maternal Grandfather     Heart failure Maternal Grandmother     Thyroid nodules Sister         s/p thyroidectomy    Thyroid disease Sister     Thyroid nodules Sister     No Known Problems Brother     Diabetes Neg Hx     Psoriasis Neg Hx     Lupus  Neg Hx     Eczema Neg Hx     Thyroid cancer Neg Hx     Breast cancer Neg Hx     Ovarian cancer Neg Hx     Cervical cancer Neg Hx     Endometrial cancer Neg Hx     Vaginal cancer Neg Hx        ALLERGIES AND MEDICATIONS: updated and reviewed.  Review of patient's allergies indicates:   Allergen Reactions    Zostavax [zoster vaccine live (pf)] Rash     - injection site red, hot, indurated    Amlodipine-olmesartan      Other reaction(s): pounding in ears      Atorvastatin        Other reaction(s): Unknown    Demerol [meperidine] Other (See Comments)     Other reaction(s): Nausea  Other reaction(s): Headache    Ezetimibe-simvastatin      Other reaction(s): Headache      Lotrel [amlodipine-benazepril]        Other reaction(s): constant cough    Nsaids (non-steroidal anti-inflammatory drug) Other (See Comments)       Other reaction(s): Difficulty breathing    Other reaction(s): Difficulty breathing    Phenytoin sodium extended Other (See Comments)     Medication List with Changes/Refills   Current Medications    ALBUTEROL 90 MCG/ACTUATION INHALER    Inhale 2 puffs into the lungs every 6 (six) hours as needed for Wheezing. Rescue    AMLODIPINE (NORVASC) 10 MG TABLET    TAKE ONE TABLET BY MOUTH EVERY DAY    ATENOLOL (TENORMIN) 50 MG TABLET    TAKE ONE TABLET BY MOUTH TWICE DAILY    CALCIUM CARBONATE/VITAMIN D3 (CALCIUM 500 + D, D3, ORAL)        CLOTRIMAZOLE-BETAMETHASONE 1-0.05% (LOTRISONE) CREAM        ESTRADIOL (ESTRACE) 0.01 % (0.1 MG/GRAM) VAGINAL CREAM    Place 0.5 g vaginally once daily.    FOLIC ACID/MV,FE,OTHER MIN (CENTRUM ORAL)    Take 1 tablet by mouth.    LEVOTHYROXINE (SYNTHROID) 75 MCG TABLET    TAKE ONE TABLET BY MOUTH EVERY DAY    METFORMIN (GLUCOPHAGE-XR) 500 MG 24 HR TABLET    TAKE TWO TABLETS BY MOUTH EVERY DAY WITH EVENING MEAL    OMEGA-3 FATTY ACIDS 1,000 MG CAP    Take 1 capsule by mouth Daily.    TERCONAZOLE (TERAZOL 3) 0.8 % VAGINAL CREAM        TRIAMCINOLONE ACETONIDE 0.1% (KENALOG)  0.1 % CREAM        TRIAMTERENE-HYDROCHLOROTHIAZIDE 37.5-25 MG (MAXZIDE-25) 37.5-25 MG PER TABLET    TAKE ONE TABLET BY MOUTH EVERY DAY    VITAMIN D2 50,000 UNIT CAPSULE    TAKE TWO CAPSULES BY MOUTH EVERY 2 WEEKS   Discontinued Medications    ESTRACE 0.01 % (0.1 MG/GRAM) VAGINAL CREAM        FLUCONAZOLE (DIFLUCAN) 150 MG TAB             CARE TEAM:  Patient Care Team:  Jessica Marquez MD as PCP - General (Internal Medicine)  Bisi Madrigal MD as Consulting Physician (Endocrinology)  Fernie Hammond MD as Consulting Physician (Neurosurgery)  Jessica Marquez MD as Hypertension Digital Medicine Responsible Provider (Internal Medicine)  Neeraj MonsivaisD as Hypertension Digital Medicine Clinician (Pharmacist)  Brianna Oleary as Digital Medicine Health            SCREENING HISTORY:  Health Maintenance       Date Due Completion Date    Hepatitis B Vaccines (1 of 3 - Primary Series) 1955 ---    Hepatitis A Vaccines (1 of 2 - Standard Series) 03/11/1956 ---    Influenza Vaccine 08/01/2018 10/17/2017 (Done)    Override on 10/17/2017: Done    Override on 10/1/2014: Done (at work)    Override on 10/3/2013: Done    Override on 10/1/2012: Done    Fecal Occult Blood Test (FOBT)/[employer] 11/17/2018 11/17/2017    Pap Smear with HPV Cotest 04/21/2019 4/21/2016    Override on 4/12/2016: Done    Override on 12/3/2012: Done    Mammogram 05/30/2019 5/30/2017    Override on 5/25/2016: Done    Override on 2/3/2014: Done (DIS - normal)    Override on 12/11/2012: Done    Override on 10/27/2010: Done    Lipid Panel 07/25/2021 7/25/2016    TETANUS VACCINE 09/28/2025 9/28/2015            REVIEW OF SYSTEMS:   The patient reports fair dietary habits. She travels a lot and finds it hard to make good dietary choices.  The patient does not exercise regularly.  Review of Systems   Constitutional: Negative for activity change, chills, fatigue, fever and unexpected weight change.   HENT: Negative for congestion, ear discharge,  "ear pain, hearing loss, postnasal drip, rhinorrhea and trouble swallowing.    Eyes: Negative for photophobia, pain, discharge and visual disturbance.   Respiratory: Negative for cough, chest tightness, shortness of breath and wheezing.    Cardiovascular: Positive for palpitations (- rare feeling of palpitations - immediately resolves with coughing - has had negative cardiac workup in the past). Negative for chest pain and leg swelling.        - working with the digital hypertension program to control BP   Gastrointestinal: Negative for abdominal pain, blood in stool, constipation, diarrhea, nausea and vomiting.   Endocrine: Negative for polydipsia and polyuria.        - sees endocrine once a year for thyroid nodule   Genitourinary: Positive for frequency (- has seen urology; has done some pelvic floor therapy (was very costly)). Negative for difficulty urinating, dysuria, hematuria, menstrual problem, urgency and vaginal discharge.   Musculoskeletal: Negative for arthralgias, back pain, joint swelling, neck pain and neck stiffness.   Skin: Negative for rash.   Neurological: Negative for weakness and headaches.   Psychiatric/Behavioral: Negative for confusion, dysphoric mood and sleep disturbance. The patient is not nervous/anxious.         - she reports a lot of personal stressors which often increase her BP     Breast ROS: negative for breast lumps             Physical Examination:   Vitals:    07/13/18 0750   BP: 134/84   Pulse: 62   Temp: 98.9 °F (37.2 °C)     Weight: 109.4 kg (241 lb 1.2 oz)   Height: 5' 9" (175.3 cm)   Body mass index is 35.6 kg/m².      Patient did not require to have a chaperone present during the exam today.  General appearance - alert, well appearing, and in no distress and obese  Mental status - alert, oriented to person, place, and time, normal mood, behavior, speech, dress, motor activity, and thought processes  Eyes - pupils equal and reactive, extraocular eye movements intact, sclera " anicteric  Mouth - mucous membranes moist, pharynx normal without lesions  Neck - supple, no significant adenopathy, carotids upstroke normal bilaterally, no bruits, thyroid exam: not examined  Lymphatics - no palpable lymphadenopathy  Chest - clear to auscultation, no wheezes, rales or rhonchi, symmetric air entry  Heart - normal rate and regular rhythm, no gallops noted  Abdomen - soft, nontender, nondistended, no masses or organomegaly  Breasts - not examined  Pelvic - not examined  Back exam - full range of motion, no tenderness, palpable spasm or pain on motion  Neurological - alert, oriented, normal speech, no focal findings or movement disorder noted, cranial nerves II through XII intact  Musculoskeletal - no joint tenderness, deformity or swelling, no muscular tenderness noted  Extremities - peripheral pulses normal, no pedal edema, no clubbing or cyanosis  Skin - normal coloration and turgor, no rashes, no suspicious skin lesions noted      ASSESSMENT AND PLAN:  1. Routine medical exam  Counseled on age appropriate medical preventative services including age appropriate cancer screenings, age appropriate eye and dental exams, over all nutritional health, need for a consistent exercise regimen, and an over all push towards maintaining a vigorous and active lifestyle.  Counseled on age appropriate vaccines and discussed upcoming health care needs based on age/gender. Discussed good sleep hygiene and stress management.     2. Essential hypertension  Discussed sodium restriction, maintaining ideal body weight and regular exercise program as physiologic means to achieve blood pressure control. The patient will strive towards this. The current medical regimen is effective most of the time; she is enrolled in the digital hypertension program;  continue present plan and medications. Recommended patient to check home readings to monitor and see me for followup as scheduled or sooner as needed. Patient was educated  that both decongestant and anti-inflammatory medication may raise blood pressure.   We discussed that even if stress is the cause of her elevated blood pressure it is not okay for her blood pressure to be elevated.  - CBC auto differential; Future    3. Mixed hyperlipidemia/4. Statin intolerance  We discussed low fat diet and regular exercise. Patient is statin intolerant.   - Lipid panel; Future  - Comprehensive metabolic panel; Future    5. Prediabetes  Stable. We discussed low sugar/low carbohydrate diet and regular exercise to prevent progression. No need for prescription medication at this time.   - Hemoglobin A1c; Future    6. Acquired hypothyroidism  Patient is clinically euthyroid. Continue current regimen. Followed by endocrinology.    7. Fatty liver  Asymptomatic. We discussed the need for weight loss to prevent progression to cirrhosis of the liver. She has seen hepatology. We will initiate hepatitis A/B immunizations.    8. Pelvic floor weakness  Continue HEP. Followed by urology.    9. Encounter for FIT (fecal immunochemical test) screening    - Fecal Immunochemical Test (iFOBT); Future    10. Need for prophylactic vaccination against viral hepatitis    - Hepatitis A Vaccine (Adult) (IM)  - Hepatitis B Vaccine (Adult) (IM)    11. Severe obesity (BMI 35.0-39.9) with comorbidity  The patient is asked to make an attempt to improve diet and exercise patterns to aid in medical management of this problem.           Follow-up in about 1 year (around 7/13/2019) for annual exam. or sooner as needed.

## 2018-07-23 ENCOUNTER — PATIENT MESSAGE (OUTPATIENT)
Dept: ENDOCRINOLOGY | Facility: CLINIC | Age: 63
End: 2018-07-23

## 2018-07-23 NOTE — PROGRESS NOTES
Last 5 Patient Entered Readings                                      Current 30 Day Average: 135/77     Recent Readings 7/22/2018 7/22/2018 7/19/2018 7/16/2018 7/8/2018    SBP (mmHg) 128 148 134 144 142    DBP (mmHg) 76 80 85 78 78    Pulse 62 63 62 60 60        7/23-Digital Medicine: Health  Follow Up  Left voicemail to follow up with Mrs. Claudia Rodriguez.  Current BP average 135/77 mmHg is not at goal, [<130/80].    Digital Medicine: Health  Follow Up    Lifestyle Modifications:    1.Dietary Modifications (Sodium intake <2,000mg/day, food labels, dining out): Patient reports she still watching her sodium intake.     2.Physical Activity: Patient reports she has not been exercising for 3 -4 weeks d/t it being outside. Advised patient to walk 1-2 laps in the mall or store to get enough physical activity as possible.     3.Medication Therapy: Patient has been compliant with the medication regimen.    4.Patient has the following medication side effects/concerns: Patient reports she is having some SOB and that she has been experiencing this since last Friday. Advised patient to call PCP and if symptoms get worse to call [facility] on call or 911. Will let her PharmD Anayeli know about the SOB.   (Frequency/Alleviating factors/Precipitating factors, etc.)     Follow up with Mrs. Claudia Rodriguez completed. No further questions or concerns.     Plan: Will continue follow up to achieve health goals.

## 2018-07-25 ENCOUNTER — TELEPHONE (OUTPATIENT)
Dept: FAMILY MEDICINE | Facility: CLINIC | Age: 63
End: 2018-07-25

## 2018-07-25 NOTE — TELEPHONE ENCOUNTER
Spoke with the pt, she is complaining of shortness of breath.  I offered to schedule an appt for tomorrow, pt declined.  Scheduled the pt for this Friday; at the pt's request.  Patient verbalized understandings.

## 2018-07-25 NOTE — TELEPHONE ENCOUNTER
----- Message from Phoebe Enriquez sent at 7/25/2018 10:23 AM CDT -----  Contact: Self/ [phone]  Claudia calling to speak to nurse. Calling to speak to about her health. Please call to advise. Thank you.

## 2018-07-27 ENCOUNTER — TELEPHONE (OUTPATIENT)
Dept: FAMILY MEDICINE | Facility: CLINIC | Age: 63
End: 2018-07-27

## 2018-07-27 ENCOUNTER — PATIENT OUTREACH (OUTPATIENT)
Dept: OTHER | Facility: OTHER | Age: 63
End: 2018-07-27

## 2018-07-27 ENCOUNTER — HOSPITAL ENCOUNTER (OUTPATIENT)
Dept: RADIOLOGY | Facility: HOSPITAL | Age: 63
Discharge: HOME | End: 2018-07-27
Attending: NURSE PRACTITIONER
Payer: COMMERCIAL

## 2018-07-27 ENCOUNTER — OFFICE VISIT (OUTPATIENT)
Dept: FAMILY MEDICINE | Facility: CLINIC | Age: 63
End: 2018-07-27
Payer: COMMERCIAL

## 2018-07-27 VITALS
OXYGEN SATURATION: 94 % | DIASTOLIC BLOOD PRESSURE: 90 MMHG | SYSTOLIC BLOOD PRESSURE: 138 MMHG | WEIGHT: 241.19 LBS | TEMPERATURE: 98 F | HEART RATE: 70 BPM | BODY MASS INDEX: 35.72 KG/M2 | HEIGHT: 69 IN

## 2018-07-27 DIAGNOSIS — R06.02 SOB (SHORTNESS OF BREATH): Primary | ICD-10-CM

## 2018-07-27 DIAGNOSIS — I49.8 PERIODIC HEART FLUTTER: ICD-10-CM

## 2018-07-27 DIAGNOSIS — R06.02 SOB (SHORTNESS OF BREATH): ICD-10-CM

## 2018-07-27 PROCEDURE — 71046 X-RAY EXAM CHEST 2 VIEWS: CPT | Mod: 26,,, | Performed by: RADIOLOGY

## 2018-07-27 PROCEDURE — 3080F DIAST BP >= 90 MM HG: CPT | Mod: CPTII,S$GLB,, | Performed by: NURSE PRACTITIONER

## 2018-07-27 PROCEDURE — 3075F SYST BP GE 130 - 139MM HG: CPT | Mod: CPTII,S$GLB,, | Performed by: NURSE PRACTITIONER

## 2018-07-27 PROCEDURE — 93005 ELECTROCARDIOGRAM TRACING: CPT | Mod: S$GLB,,, | Performed by: NURSE PRACTITIONER

## 2018-07-27 PROCEDURE — 99215 OFFICE O/P EST HI 40 MIN: CPT | Mod: S$GLB,,, | Performed by: NURSE PRACTITIONER

## 2018-07-27 PROCEDURE — 3008F BODY MASS INDEX DOCD: CPT | Mod: CPTII,S$GLB,, | Performed by: NURSE PRACTITIONER

## 2018-07-27 PROCEDURE — 71046 X-RAY EXAM CHEST 2 VIEWS: CPT | Mod: TC,FY,PO

## 2018-07-27 PROCEDURE — 99999 PR PBB SHADOW E&M-EST. PATIENT-LVL IV: CPT | Mod: PBBFAC,,, | Performed by: NURSE PRACTITIONER

## 2018-07-27 PROCEDURE — 93010 ELECTROCARDIOGRAM REPORT: CPT | Mod: S$GLB,,, | Performed by: INTERNAL MEDICINE

## 2018-07-27 NOTE — PROGRESS NOTES
This dictation has been generated using Dragon Dictation some phonetic errors may occur.     Problem List Items Addressed This Visit     None      Visit Diagnoses     SOB (shortness of breath)    -  Primary    Relevant Orders    EKG 12-lead    X-Ray Chest PA And Lateral (Completed)    Complete PFT w/ bronchodilator    Cardiac event monitor (30 day)    Exercise stress echo    Periodic heart flutter        Relevant Orders    Cardiac event monitor (30 day)        Orders Placed This Encounter    X-Ray Chest PA And Lateral    Cardiac event monitor (30 day)    EKG 12-lead    Exercise stress echo    Complete PFT w/ bronchodilator     Short of breath, discussed with the patient with 2% chance of current long-term meds contributing to symptoms.  Check EKG.  I reviewed the image.  No acute changes.  Check chest x-ray as above and evaluate cardiopulmonary structures.  Doubtful of CHF did with stable weight add lungs are clear.  She does have faint wheezing on examination check pulmonary function tests.  With the occasional flutter (palpitation) proceed with event monitor.  Also check stress echo.  She does have some cardiac murmur.  Discussed appropriate self-referral to emergency department for worsening shortness of breath or chest pain.  Also, I consider DVT no evidence to support.    Follow-up in about 1 week (around 8/3/2018).    ________________________________________________________________  ________________________________________________________________      Chief Complaint   Patient presents with    Shortness of Breath     History of present illness  This 63 y.o. presents today for complaint of short of breath.  Patient denies any pain.  Symptoms started after most recent visit with primary care physician.  She had an appointment on the 13th and experience shortness of breath the following day.  There were no med changes.  She has been in the same job for years and notes traveling 5-6 hours.  She does take  breaks and get out of the vehicle.  She denies any extremity swelling or trauma.  She does not smoke.  No family history of chronic lung disease.  On her father's side there was some heart disease.  Review of systems  No fever or chills.  Weight is stable.  Patient denies coughing  No chest pain  Patient does note orthopnea.  Shortness of breath is constant.  Does not seem to be exacerbated by movement.  She does note palpitations.  She notes a flutter in her chest.  She has had more of these lately.  No nausea vomiting or diarrhea  No urinary urgency frequency or dysuria  No polyuria or polydipsia  Answers for HPI/ROS submitted by the patient on 7/25/2018   Shortness of breath  Chronicity: new  Onset: in the past 7 days  Frequency: every few minutes  Progression since onset: waxing and waning  Episode duration: 5 seconds  abdominal pain: No  chest pain: No  claudication: No  coryza: No  ear pain: No  fever: No  headaches: No  hemoptysis: No  leg pain: No  leg swelling: No  neck pain: No  orthopnea: Yes  PND: No  rash: No  rhinorrhea: No  sore throat: No  sputum production: No  swollen glands: No  syncope: No  vomiting: No  wheezing: No  Improvement on treatment: no relief  Risk factors for DVT/PE: no known risk factors  Treatments tried: nothing  asthma: No  allergies: No  COPD: No  pneumonia: No  aspirin allergies: No  CAD: No  DVT: No  heart failure: No  PE: No  recent surgery: No  bronchiolitis: No  chronic lung disease: No      Reviewed history is patient is new to me.  Follows with [facility].      Past Medical History:   Diagnosis Date    Cholelithiasis     DDD (degenerative disc disease), lumbar     Fatty liver     - noted on U/S 11/2017    Goiter, nodular     HTN (hypertension)     Hyperglycemia     Hyperlipidemia     Hypothyroidism     Hypovitaminosis D     Meningioma     right frontal lobe ; followed by Dr. Enciso once a year    Obesity     Prediabetes     Urinary bladder disorder         Past Surgical History:   Procedure Laterality Date    BLADDER SUSPENSION      thyroid biopsy  7/11/12    tonsillectomy      TONSILLECTOMY         Family History   Problem Relation Age of Onset    Coronary artery disease Father 55    Heart disease Father     Hypertension Father     Melanoma Father     Hypertension Mother     Stroke Mother         2015    Hypertension Sister         controlled by lifestyle    No Known Problems Brother     Pancreatic cancer Maternal Grandfather     Heart failure Maternal Grandmother     Thyroid nodules Sister         s/p thyroidectomy    Thyroid disease Sister     Thyroid nodules Sister     No Known Problems Brother     Diabetes Neg Hx     Psoriasis Neg Hx     Lupus Neg Hx     Eczema Neg Hx     Thyroid cancer Neg Hx     Breast cancer Neg Hx     Ovarian cancer Neg Hx     Cervical cancer Neg Hx     Endometrial cancer Neg Hx     Vaginal cancer Neg Hx        Social History     Social History    Marital status:      Spouse name: N/A    Number of children: 1    Years of education: N/A     Occupational History    [employer]     Social History Main Topics    Smoking status: Never Smoker    Smokeless tobacco: Never Used    Alcohol use No    Drug use: No    Sexual activity: Yes     Partners: Male     Other Topics Concern    Are You Pregnant Or Think You May Be? No    Breast-Feeding No     Social History Narrative    3 adopted children.       Current Outpatient Prescriptions   Medication Sig Dispense Refill    albuterol 90 mcg/actuation inhaler Inhale 2 puffs into the lungs every 6 (six) hours as needed for Wheezing. Rescue 18 g 0    amLODIPine (NORVASC) 10 MG tablet TAKE ONE TABLET BY MOUTH EVERY DAY 90 tablet 0    atenolol (TENORMIN) 50 MG tablet TAKE ONE TABLET BY MOUTH TWICE DAILY 180 tablet 0    CALCIUM CARBONATE/VITAMIN D3 (CALCIUM 500 + D, D3, ORAL)       clotrimazole-betamethasone 1-0.05% (LOTRISONE) cream   98     estradiol (ESTRACE) 0.01 % (0.1 mg/gram) vaginal cream Place 0.5 g vaginally once daily. 42.5 g 11    FOLIC ACID/MV,FE,OTHER MIN (CENTRUM ORAL) Take 1 tablet by mouth.      levothyroxine (SYNTHROID) 75 MCG tablet TAKE ONE TABLET BY MOUTH EVERY DAY 90 tablet 3    metFORMIN (GLUCOPHAGE-XR) 500 MG 24 hr tablet TAKE TWO TABLETS BY MOUTH EVERY DAY WITH EVENING MEAL 180 tablet 0    omega-3 fatty acids 1,000 mg Cap Take 1 capsule by mouth Daily.      terconazole (TERAZOL 3) 0.8 % vaginal cream   2    triamcinolone acetonide 0.1% (KENALOG) 0.1 % cream   98    triamterene-hydrochlorothiazide 37.5-25 mg (MAXZIDE-25) 37.5-25 mg per tablet TAKE ONE TABLET BY MOUTH EVERY DAY 90 tablet 1    VITAMIN D2 50,000 unit capsule TAKE TWO CAPSULES BY MOUTH EVERY 2 WEEKS 12 capsule 5     No current facility-administered medications for this visit.        Review of patient's allergies indicates:   Allergen Reactions    Zostavax [zoster vaccine live (pf)] Rash     - injection site red, hot, indurated    Amlodipine-olmesartan      Other reaction(s): pounding in ears      Atorvastatin        Other reaction(s): Unknown    Demerol [meperidine] Other (See Comments)     Other reaction(s): Nausea  Other reaction(s): Headache    Ezetimibe-simvastatin      Other reaction(s): Headache      Lotrel [amlodipine-benazepril]        Other reaction(s): constant cough    Nsaids (non-steroidal anti-inflammatory drug) Other (See Comments)       Other reaction(s): Difficulty breathing    Other reaction(s): Difficulty breathing    Phenytoin sodium extended Other (See Comments)       Physical examination  Vitals Reviewed  Gen. Well-dressed well-nourished no apparent distress  Skin warm dry and intact.  No rashes noted.  HEENT.  TM intact bilateral with normal light reflex.  No mastoid tenderness during percussion.  Nares patent bilateral.  Pharynx is unremarkable.  No maxillary or frontal sinus tenderness when percussed.    Neck is supple  without adenopathy  Chest.  Respirations are even unlabored.  Lungs are clear to auscultation.  Cardiac regular rate and rhythm.  No chest wall adenopathy noted.  Neuro. Awake alert oriented x4.  Normal judgment and cognition noted.  Extremities no clubbing cyanosis or edema noted.     Call or return to clinic prn if these symptoms worsen or fail to improve as anticipated.

## 2018-07-27 NOTE — PROGRESS NOTES
Last 5 Patient Entered Readings                                      Current 30 Day Average: 138/78     Recent Readings 7/25/2018 7/22/2018 7/22/2018 7/19/2018 7/16/2018    SBP (mmHg) 138 128 148 134 144    DBP (mmHg) 81 76 80 85 78    Pulse 62 62 63 62 60          7/27 - Chart reviewed. Patient seen by Primary care today for SOB. CXR normal and she will be undergoing work up. Continue to monitor BP.

## 2018-07-27 NOTE — TELEPHONE ENCOUNTER
----- Message from Phoebe Enriquez sent at 7/27/2018  1:58 PM CDT -----  Contact: Self/ [phone]  Pt returning call to office for results. Thank you.

## 2018-07-30 ENCOUNTER — PATIENT MESSAGE (OUTPATIENT)
Dept: ENDOCRINOLOGY | Facility: CLINIC | Age: 63
End: 2018-07-30

## 2018-07-30 ENCOUNTER — PATIENT MESSAGE (OUTPATIENT)
Dept: FAMILY MEDICINE | Facility: CLINIC | Age: 63
End: 2018-07-30

## 2018-07-31 ENCOUNTER — TELEPHONE (OUTPATIENT)
Dept: FAMILY MEDICINE | Facility: CLINIC | Age: 63
End: 2018-07-31

## 2018-07-31 NOTE — TELEPHONE ENCOUNTER
Left message for patient the [employer] form was faxed 3 times and the confirmation stated transmission failed the fax number on form my not be correct.

## 2018-07-31 NOTE — LETTER
July 31, 2018    Claudia Rodriguez  [address] [facility]  [place] [address]             [address] [address]  Phone: [phone]  Fax: [phone] Dear MsPanda Michael:    I have been unable to reach you by phone for your appointment to Arrhythmia .  Please call me at the clinic [phone] to book your appointment.      If you have any questions or concerns, please don't hesitate to call.    Sincerely,        Edna Butler MA

## 2018-08-06 ENCOUNTER — PATIENT OUTREACH (OUTPATIENT)
Dept: OTHER | Facility: OTHER | Age: 63
End: 2018-08-06

## 2018-08-06 DIAGNOSIS — I10 ESSENTIAL HYPERTENSION: ICD-10-CM

## 2018-08-06 DIAGNOSIS — R73.03 PREDIABETES: ICD-10-CM

## 2018-08-06 RX ORDER — ATENOLOL 50 MG/1
TABLET ORAL
Qty: 180 TABLET | Refills: 3 | Status: SHIPPED | OUTPATIENT
Start: 2018-08-06 | End: 2018-11-21 | Stop reason: ALTCHOICE

## 2018-08-06 RX ORDER — METFORMIN HYDROCHLORIDE 500 MG/1
TABLET, EXTENDED RELEASE ORAL
Qty: 180 TABLET | Refills: 3 | Status: SHIPPED | OUTPATIENT
Start: 2018-08-06 | End: 2019-07-26 | Stop reason: SDUPTHER

## 2018-08-06 NOTE — PROGRESS NOTES
Last 5 Patient Entered Readings                                      Current 30 Day Average: 141/81     Recent Readings 8/6/2018 8/3/2018 8/2/2018 8/1/2018 7/30/2018    SBP (mmHg) 138 145 146 140 150    DBP (mmHg) 83 82 85 77 81    Pulse 63 66 60 67 61        [facility]: Health  Follow Up    Lifestyle Modifications:    1.Dietary Modifications (Sodium intake <2,000mg/day, food labels, dining out): Patient reports she still watching her sodium intake closely.     2.Physical Activity: Patient has been walking around but not much d/t being short of breath.    3.Medication Therapy: Patient has been compliant with the medication regimen.    4.Patient has the following medication side effects/concerns: Patient reports she still having SOB and has been having symptoms for 3 weeks. Patient states she has some test to see what is causing her to have SOB. Patient states she has a stress test to do on 8/13th.  (Frequency/Alleviating factors/Precipitating factors, etc.)     Follow up with Mrs. Claudia GAITAN Michael completed. No further questions or concerns.     Patient's current 30 day average BP is not at goal.     Plan: Will continue follow up to achieve health goals.

## 2018-08-07 ENCOUNTER — HOSPITAL ENCOUNTER (OUTPATIENT)
Dept: PULMONOLOGY | Facility: CLINIC | Age: 63
Discharge: HOME | End: 2018-08-07
Payer: COMMERCIAL

## 2018-08-07 DIAGNOSIS — R06.02 SOB (SHORTNESS OF BREATH): ICD-10-CM

## 2018-08-07 LAB
POST FEV1 FVC: 0.77
POST FEV1: 2.23
POST FVC: 2.9
PRE FEV1 FVC: 73
PRE FEV1: 2.18
PRE FVC: 2.98
PREDICTED FEV1 FVC: 78
PREDICTED FEV1: 2.57
PREDICTED FVC: 3.28

## 2018-08-07 PROCEDURE — 94060 EVALUATION OF WHEEZING: CPT | Mod: S$GLB,,, | Performed by: INTERNAL MEDICINE

## 2018-08-07 PROCEDURE — 94729 DIFFUSING CAPACITY: CPT | Mod: S$GLB,,, | Performed by: INTERNAL MEDICINE

## 2018-08-07 NOTE — PROGRESS NOTES
Last 5 Patient Entered Readings                                      Current 30 Day Average: 141/81     Recent Readings 8/7/2018 8/6/2018 8/3/2018 8/2/2018 8/1/2018    SBP (mmHg) 138 138 145 146 140    DBP (mmHg) 84 83 82 85 77    Pulse 64 63 66 60 67          Left voicemail. Underwent PFT testing today and stress echo scheduled 8/13.

## 2018-08-08 NOTE — PROGRESS NOTES
Last 5 Patient Entered Readings                                      Current 30 Day Average: 140/81     Recent Readings 8/7/2018 8/6/2018 8/3/2018 8/2/2018 8/1/2018    SBP (mmHg) 138 138 145 146 140    DBP (mmHg) 84 83 82 85 77    Pulse 64 63 66 60 67          Patient's BP average is above goal of <130/80.     Patient denies s/s of hypotension (lightheadedness, dizziness, nausea, fatigue) associated with low readings. Instructed patient to inform me if this occurs, patient confirms understanding.      Patient denies s/s of hypertension (SOB, CP, severe headaches, changes in vision) associated with high readings. Instructed patient to go to the ED if BP > 180/110 and accompanied by hypertensive s/s, patient confirms understanding.    Will continue to monitor regularly. Will follow up in 2-3 weeks, sooner if BP begins to trend upward or downward.    Patient has my contact information and knows to call with any concerns or clinical changes.     Current HTN regimen:  Hypertension Medications             amLODIPine (NORVASC) 10 MG tablet TAKE ONE TABLET BY MOUTH EVERY DAY    atenolol (TENORMIN) 50 MG tablet TAKE ONE TABLET BY MOUTH TWICE DAILY    triamterene-hydrochlorothiazide 37.5-25 mg (MAXZIDE-25) 37.5-25 mg per tablet TAKE ONE TABLET BY MOUTH EVERY DAY        Patient relates increased BP to SOB. She has also decreased her cardio exercise until she better understands her SOB. She wants to delay any BP medication adjustments at least until after stress test 8/13.

## 2018-08-13 ENCOUNTER — PATIENT MESSAGE (OUTPATIENT)
Dept: ENDOCRINOLOGY | Facility: CLINIC | Age: 63
End: 2018-08-13

## 2018-08-13 ENCOUNTER — HOSPITAL ENCOUNTER (OUTPATIENT)
Dept: CARDIOLOGY | Facility: CLINIC | Age: 63
Discharge: HOME | End: 2018-08-13
Attending: NURSE PRACTITIONER
Payer: COMMERCIAL

## 2018-08-13 DIAGNOSIS — R06.02 SOB (SHORTNESS OF BREATH): ICD-10-CM

## 2018-08-13 LAB
DIASTOLIC DYSFUNCTION: NO
RETIRED EF AND QEF - SEE NOTES: 60 (ref 55–65)

## 2018-08-13 PROCEDURE — 93321 DOPPLER ECHO F-UP/LMTD STD: CPT | Mod: S$GLB,,, | Performed by: INTERNAL MEDICINE

## 2018-08-13 PROCEDURE — 93351 STRESS TTE COMPLETE: CPT | Mod: S$GLB,,, | Performed by: INTERNAL MEDICINE

## 2018-08-21 NOTE — PROGRESS NOTES
Last 5 Patient Entered Readings                                      Current 30 Day Average: 139/81     Recent Readings 8/20/2018 8/17/2018 8/16/2018 8/15/2018 8/11/2018    SBP (mmHg) 133 139 134 140 124    DBP (mmHg) 80 85 83 75 70    Pulse 63 67 62 72 63          BP improving. No medication recommendations at this time.

## 2018-08-27 ENCOUNTER — PATIENT MESSAGE (OUTPATIENT)
Dept: ENDOCRINOLOGY | Facility: CLINIC | Age: 63
End: 2018-08-27

## 2018-08-27 ENCOUNTER — TELEPHONE (OUTPATIENT)
Dept: ENDOCRINOLOGY | Facility: CLINIC | Age: 63
End: 2018-08-27

## 2018-08-27 NOTE — TELEPHONE ENCOUNTER
----- Message from Bárbara Brown sent at 8/27/2018  2:33 PM CDT -----  Contact: Self [phone]  PT wants to know if she can move Ultrasound to any day between Oct 15-26.

## 2018-08-27 NOTE — TELEPHONE ENCOUNTER
Patient notified nothing else available. She will keep as planned.  Labs scheduled same day as well and will wait for November to open to schedule her visit with Dr Madrigal. She will email me in a couple of weeks.

## 2018-08-27 NOTE — TELEPHONE ENCOUNTER
Follow up visit scheduled. U/S orders please for Radiology to do so can get in before her visit. Thanks!

## 2018-08-29 DIAGNOSIS — I10 ESSENTIAL HYPERTENSION: ICD-10-CM

## 2018-08-29 RX ORDER — TRIAMTERENE/HYDROCHLOROTHIAZID 37.5-25 MG
1 TABLET ORAL DAILY
Qty: 90 TABLET | Refills: 2 | Status: SHIPPED | OUTPATIENT
Start: 2018-08-29 | End: 2019-07-26 | Stop reason: SDUPTHER

## 2018-09-04 NOTE — PROGRESS NOTES
Last 5 Patient Entered Readings                                      Current 30 Day Average: 137/81     Recent Readings 8/28/2018 8/23/2018 8/20/2018 8/17/2018 8/16/2018    SBP (mmHg) 129 140 133 139 134    DBP (mmHg) 78 78 80 85 83    Pulse 65 65 63 67 62          BP continues to improve. No medication recommendations at this time.

## 2018-09-11 ENCOUNTER — PATIENT OUTREACH (OUTPATIENT)
Dept: OTHER | Facility: OTHER | Age: 63
End: 2018-09-11

## 2018-09-11 NOTE — PROGRESS NOTES
Last 5 Patient Entered Readings                                      Current 30 Day Average: 133/79     Recent Readings 9/9/2018 9/8/2018 9/5/2018 8/28/2018 8/23/2018    SBP (mmHg) 132 127 127 129 140    DBP (mmHg) 71 79 78 78 78    Pulse 60 65 67 65 65        Digital Medicine: Health  Follow Up    Lifestyle Modifications:    1.Dietary Modifications (Sodium intake <2,000mg/day, food labels, dining out): Patient reports she still watching her sodium intake closely.     2.Physical Activity: Patient reports she is back to walking d/t the SOB has gone away.     3.Medication Therapy: Patient has been compliant with the medication regimen.    4.Patient has the following medication side effects/concerns: Patient denies any s/s of hypotension (dizziness, LH,nausea, or fatigue) with low readings. Patient denies any s/s of hypertension (CP,SOB,severe headaches,or change in vision) with high readings.   (Frequency/Alleviating factors/Precipitating factors, etc.)     Follow up with Panda Rebollara ARNIE Rodriguez completed. No further questions or concerns.     Patient's current 30 day average BP is not at goal but has improved.     Plan: Will continue follow up to achieve health goals.

## 2018-09-12 ENCOUNTER — PATIENT MESSAGE (OUTPATIENT)
Dept: FAMILY MEDICINE | Facility: CLINIC | Age: 63
End: 2018-09-12

## 2018-09-12 RX ORDER — TERCONAZOLE 8 MG/G
1 CREAM VAGINAL NIGHTLY
Qty: 20 G | Refills: 2 | Status: SHIPPED | OUTPATIENT
Start: 2018-09-12 | End: 2019-06-12

## 2018-09-18 ENCOUNTER — PATIENT OUTREACH (OUTPATIENT)
Dept: OTHER | Facility: OTHER | Age: 63
End: 2018-09-18

## 2018-09-18 DIAGNOSIS — I10 ESSENTIAL HYPERTENSION: Primary | ICD-10-CM

## 2018-09-18 NOTE — PROGRESS NOTES
Last 5 Patient Entered Readings                                      Current 30 Day Average: 132/78     Recent Readings 9/16/2018 9/9/2018 9/8/2018 9/5/2018 8/28/2018    SBP (mmHg) 138 132 127 127 129    DBP (mmHg) 82 71 79 78 78    Pulse 63 60 65 67 65          Left voicemail to follow up. BP average continues to improve. Encourage continued healthy lifestyle habits to prevent upward trend. No medication recommendations at this time.

## 2018-10-12 ENCOUNTER — PATIENT MESSAGE (OUTPATIENT)
Dept: HEPATOLOGY | Facility: CLINIC | Age: 63
End: 2018-10-12

## 2018-10-16 NOTE — PROGRESS NOTES
Last 5 Patient Entered Readings                                      Current 30 Day Average: 142/82     Recent Readings 10/15/2018 10/12/2018 10/11/2018 10/10/2018 10/6/2018    SBP (mmHg) 145 147 143 148 139    DBP (mmHg) 85 88 77 86 81    Pulse 63 61 65 68 66        Dr. Marquez in agreement with both medication recommendations but suggests changing atenolol to carvedilol first to avoid adding additional agent. If unable to achieve BP control with beta-blocker change, consider initiating irbesartan.

## 2018-10-16 NOTE — PROGRESS NOTES
Last 5 Patient Entered Readings                                      Current 30 Day Average: 142/82     Recent Readings 10/15/2018 10/12/2018 10/11/2018 10/10/2018 10/6/2018    SBP (mmHg) 145 147 143 148 139    DBP (mmHg) 85 88 77 86 81    Pulse 63 61 65 68 66          Patient's BP average is above goal of <130/80.     Patient denies s/s of hypotension (lightheadedness, dizziness, nausea, fatigue) associated with low readings. Instructed patient to inform me if this occurs, patient confirms understanding.      Patient denies s/s of hypertension (SOB, CP, severe headaches, changes in vision) associated with high readings. Instructed patient to go to the ED if BP > 180/110 and accompanied by hypertensive s/s, patient confirms understanding.    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 She states life is very fast paced right now and does not expect it to improve until at least after beginning of trial. She declines resources on stress management or deep breathing exercises from health . States when BP was near goal it was because she did not have the extra stress and was able to focus on lifestyle. Discussed my role in program to make medication changes and option of hiatus. Discussed potential risks of longterm uncontrolled BP. She is unsure if Dr. Marquez would want to make medication changes. I will discuss possible changes with Dr. Marquez then follow up with patient; she is agreement with plan.    Will continue to monitor regularly. Will follow up in 2-3 weeks, sooner if BP begins to trend upward or downward.    Patient has my contact information and knows to call with any concerns or clinical changes.     Current HTN regimen:  Hypertension Medications             amLODIPine (NORVASC) 10 MG tablet TAKE ONE TABLET BY MOUTH EVERY DAY    atenolol (TENORMIN) 50 MG tablet TAKE ONE TABLET BY MOUTH TWICE DAILY    triamterene-hydrochlorothiazide 37.5-25 mg (MAXZIDE-25)  37.5-25 mg per tablet Take 1 tablet by mouth once daily.

## 2018-10-23 NOTE — PROGRESS NOTES
Last 5 Patient Entered Readings                                      Current 30 Day Average: 143/82     Recent Readings 10/21/2018 10/19/2018 10/16/2018 10/15/2018 10/12/2018    SBP (mmHg) 151 145 146 145 147    DBP (mmHg) 85 78 81 85 88    Pulse 60 59 69 63 61        Left voicemail. Discuss changing atenolol to carvedilol 25 mg BID or carvedilol CR 80 mg daily.

## 2018-10-30 NOTE — PROGRESS NOTES
Last 5 Patient Entered Readings                                      Current 30 Day Average: 142/83     Recent Readings 10/29/2018 10/25/2018 10/24/2018 10/21/2018 10/19/2018    SBP (mmHg) 148 137 129 151 145    DBP (mmHg) 83 86 75 85 78    Pulse 61 58 62 60 59          Left voicemail and sent [facility] message. Discuss changing atenolol to carvedilol 25 mg BID or carvedilol CR 80 mg daily.

## 2018-10-31 ENCOUNTER — HOSPITAL ENCOUNTER (OUTPATIENT)
Dept: RADIOLOGY | Facility: HOSPITAL | Age: 63
Discharge: HOME | End: 2018-10-31
Attending: PHYSICIAN ASSISTANT
Payer: COMMERCIAL

## 2018-10-31 ENCOUNTER — HOSPITAL ENCOUNTER (OUTPATIENT)
Dept: ENDOCRINOLOGY | Facility: CLINIC | Age: 63
Discharge: HOME | End: 2018-10-31
Attending: NURSE PRACTITIONER
Payer: COMMERCIAL

## 2018-10-31 DIAGNOSIS — R74.8 ELEVATED LIVER ENZYMES: ICD-10-CM

## 2018-10-31 DIAGNOSIS — K74.60 CIRRHOSIS OF LIVER WITHOUT ASCITES, UNSPECIFIED HEPATIC CIRRHOSIS TYPE: ICD-10-CM

## 2018-10-31 DIAGNOSIS — E04.9 GOITER, NODULAR: ICD-10-CM

## 2018-10-31 PROCEDURE — 76705 ECHO EXAM OF ABDOMEN: CPT | Mod: 26,,, | Performed by: INTERNAL MEDICINE

## 2018-10-31 PROCEDURE — 76536 US EXAM OF HEAD AND NECK: CPT | Mod: S$GLB,,, | Performed by: INTERNAL MEDICINE

## 2018-10-31 PROCEDURE — 76705 ECHO EXAM OF ABDOMEN: CPT | Mod: TC

## 2018-11-01 NOTE — PROGRESS NOTES
Last 5 Patient Entered Readings                                      Current 30 Day Average: 142/83     Recent Readings 10/29/2018 10/25/2018 10/24/2018 10/21/2018 10/19/2018    SBP (mmHg) 148 137 129 151 145    DBP (mmHg) 83 86 75 85 78    Pulse 61 58 62 60 59        Patient has not fully charged cuff in a while. She agreed to charge cuff overnight tonight and will try to obtain 3-4 BP readings over the next week. Discussed changing atenolol to carvedilol if BP readings elevated on follow up. She continues to attribute elevations to increased stress related to upcoming trial in January but appears open to medication change if needed. May need to communicate via [facility] as patient typically unable to communicate during the business day.

## 2018-11-06 ENCOUNTER — PATIENT OUTREACH (OUTPATIENT)
Dept: OTHER | Facility: OTHER | Age: 63
End: 2018-11-06

## 2018-11-06 NOTE — PROGRESS NOTES
Last 5 Patient Entered Readings                                      Current 30 Day Average: 144/82     Recent Readings 11/4/2018 11/2/2018 11/2/2018 11/2/2018 10/29/2018    SBP (mmHg) 144 140 142 155 148    DBP (mmHg) 81 81 81 84 83    Pulse 64 63 61 68 61        11/6-Digital Medicine: Health  Follow Up  Left voicemail to follow up with Panda Claudia GAITAN Michael.  Current BP average 144/82 mmHg is not at goal, [<130/80].

## 2018-11-08 ENCOUNTER — OFFICE VISIT (OUTPATIENT)
Dept: FAMILY MEDICINE | Facility: CLINIC | Age: 63
End: 2018-11-08
Payer: COMMERCIAL

## 2018-11-08 VITALS
HEIGHT: 68 IN | BODY MASS INDEX: 36.45 KG/M2 | DIASTOLIC BLOOD PRESSURE: 80 MMHG | OXYGEN SATURATION: 98 % | TEMPERATURE: 98 F | HEART RATE: 59 BPM | SYSTOLIC BLOOD PRESSURE: 123 MMHG | WEIGHT: 240.5 LBS

## 2018-11-08 DIAGNOSIS — I10 ESSENTIAL HYPERTENSION: ICD-10-CM

## 2018-11-08 DIAGNOSIS — G62.9 NEUROPATHY: Primary | ICD-10-CM

## 2018-11-08 DIAGNOSIS — E55.9 VITAMIN D DEFICIENCY DISEASE: ICD-10-CM

## 2018-11-08 DIAGNOSIS — E03.9 ACQUIRED HYPOTHYROIDISM: ICD-10-CM

## 2018-11-08 PROCEDURE — 3008F BODY MASS INDEX DOCD: CPT | Mod: CPTII,S$GLB,, | Performed by: INTERNAL MEDICINE

## 2018-11-08 PROCEDURE — 99214 OFFICE O/P EST MOD 30 MIN: CPT | Mod: S$GLB,,, | Performed by: INTERNAL MEDICINE

## 2018-11-08 PROCEDURE — 3079F DIAST BP 80-89 MM HG: CPT | Mod: CPTII,S$GLB,, | Performed by: INTERNAL MEDICINE

## 2018-11-08 PROCEDURE — 3074F SYST BP LT 130 MM HG: CPT | Mod: CPTII,S$GLB,, | Performed by: INTERNAL MEDICINE

## 2018-11-08 PROCEDURE — 99999 PR PBB SHADOW E&M-EST. PATIENT-LVL III: CPT | Mod: PBBFAC,,, | Performed by: INTERNAL MEDICINE

## 2018-11-08 NOTE — PROGRESS NOTES
Subjective:     Chief Complaint  Chief Complaint   Patient presents with    Numbness     right leg       HPI  Claudia Rodriguez is a 63 y.o. female with medical diagnoses as listed in the medical history and problem list that presents for right leg numbness.  Last clinic visit was 7/27/2018.      2 weeks ago with right leg numbness in anterior thigh and calf  Estimates about 80 percent loss of feeling/sensation  Has been stretching  Does walk for exercise  Not extremely painful  No tingling sensation but does have mild discomfort 4/10 in severity  Was diagnosed with 2 herniated discs several years prior   No trauma/history of surgical intervention  Hasn't hindered her level of activity - just came back from a [place] trip          PAST MEDICAL HISTORY:  Past Medical History:   Diagnosis Date    Cholelithiasis     DDD (degenerative disc disease), lumbar     Fatty liver     - noted on U/S 11/2017    Goiter, nodular     HTN (hypertension)     Hyperglycemia     Hyperlipidemia     Hypothyroidism     Hypovitaminosis D     Meningioma     right frontal lobe ; followed by Dr. Enciso once a year    Obesity     Prediabetes     Urinary bladder disorder        PAST SURGICAL HISTORY:  Past Surgical History:   Procedure Laterality Date    BLADDER SUSPENSION      thyroid biopsy  7/11/12    tonsillectomy      TONSILLECTOMY         SOCIAL HISTORY:  Social History     Socioeconomic History    Marital status:      Spouse name: Not on file    Number of children: 1    Years of education: Not on file    Highest education level: Not on file   Social Needs    Financial resource strain: Not on file    Food insecurity - worry: Not on file    Food insecurity - inability: Not on file    Transportation needs - medical: Not on file    Transportation needs - non-medical: Not on file   Occupational History    Occupation: human      Employer: [employer]   Tobacco Use    Smoking status: Never Smoker     Smokeless tobacco: Never Used   Substance and Sexual Activity    Alcohol use: No    Drug use: No    Sexual activity: Yes     Partners: Male   Other Topics Concern    Are you pregnant or think you may be? No    Breast-feeding No   Social History Narrative    3 adopted children.       FAMILY HISTORY:  Family History   Problem Relation Age of Onset    Coronary artery disease Father 55    Heart disease Father     Hypertension Father     Melanoma Father     Hypertension Mother     Stroke Mother         2015    Hypertension Sister         controlled by lifestyle    No Known Problems Brother     Pancreatic cancer Maternal Grandfather     Heart failure Maternal Grandmother     Thyroid nodules Sister         s/p thyroidectomy    Thyroid disease Sister     Thyroid nodules Sister     No Known Problems Brother     Diabetes Neg Hx     Psoriasis Neg Hx     Lupus Neg Hx     Eczema Neg Hx     Thyroid cancer Neg Hx     Breast cancer Neg Hx     Ovarian cancer Neg Hx     Cervical cancer Neg Hx     Endometrial cancer Neg Hx     Vaginal cancer Neg Hx        ALLERGIES AND MEDICATIONS: updated and reviewed.  Review of patient's allergies indicates:   Allergen Reactions    Zostavax [zoster vaccine live (pf)] Rash     - injection site red, hot, indurated    Amlodipine-olmesartan      Other reaction(s): pounding in ears      Atorvastatin        Other reaction(s): Unknown    Demerol [meperidine] Other (See Comments)     Other reaction(s): Nausea  Other reaction(s): Headache    Ezetimibe-simvastatin      Other reaction(s): Headache      Lotrel [amlodipine-benazepril]        Other reaction(s): constant cough    Nsaids (non-steroidal anti-inflammatory drug) Other (See Comments)       Other reaction(s): Difficulty breathing    Other reaction(s): Difficulty breathing    Phenytoin sodium extended Other (See Comments)     Current Outpatient Medications   Medication Sig Dispense Refill    amLODIPine (NORVASC) 10  MG tablet TAKE ONE TABLET BY MOUTH EVERY DAY 90 tablet 0    atenolol (TENORMIN) 50 MG tablet TAKE ONE TABLET BY MOUTH TWICE DAILY 180 tablet 3    CALCIUM CARBONATE/VITAMIN D3 (CALCIUM 500 + D, D3, ORAL)       clotrimazole-betamethasone 1-0.05% (LOTRISONE) cream   98    estradiol (ESTRACE) 0.01 % (0.1 mg/gram) vaginal cream Place 0.5 g vaginally once daily. 42.5 g 11    FOLIC ACID/MV,FE,OTHER MIN (CENTRUM ORAL) Take 1 tablet by mouth.      levothyroxine (SYNTHROID) 75 MCG tablet TAKE ONE TABLET BY MOUTH EVERY DAY 90 tablet 3    metFORMIN (GLUCOPHAGE-XR) 500 MG 24 hr tablet TAKE TWO TABLETS BY MOUTH EVERY DAY with evening meal 180 tablet 3    omega-3 fatty acids 1,000 mg Cap Take 1 capsule by mouth Daily.      triamcinolone acetonide 0.1% (KENALOG) 0.1 % cream   98    triamterene-hydrochlorothiazide 37.5-25 mg (MAXZIDE-25) 37.5-25 mg per tablet Take 1 tablet by mouth once daily. 90 tablet 2    VITAMIN D2 50,000 unit capsule TAKE TWO CAPSULES BY MOUTH EVERY 2 WEEKS 12 capsule 5    terconazole (TERAZOL 3) 0.8 % vaginal cream Place 1 applicator vaginally nightly. for 7 days 20 g 2     No current facility-administered medications for this visit.          ROS:  Review of Systems   Constitutional: Negative for activity change and unexpected weight change.   HENT: Negative for hearing loss, rhinorrhea and trouble swallowing.    Eyes: Negative for discharge and visual disturbance.   Respiratory: Negative for chest tightness and wheezing.    Cardiovascular: Negative for chest pain and palpitations.   Gastrointestinal: Negative for blood in stool, constipation, diarrhea and vomiting.   Endocrine: Negative for polydipsia and polyuria.   Genitourinary: Negative for difficulty urinating, dysuria, hematuria and menstrual problem.   Musculoskeletal: Negative for arthralgias, joint swelling and neck pain.   Neurological: Positive for numbness. Negative for weakness and headaches.   Psychiatric/Behavioral: Negative for  "confusion and dysphoric mood.       Objective:       Physical Exam  Vitals:    11/08/18 0839   BP: 123/80   Pulse: (!) 59   Temp: 98.4 °F (36.9 °C)   TempSrc: Oral   SpO2: 98%   Weight: 109.1 kg (240 lb 8 oz)   Height: 5' 8" (1.727 m)    Body mass index is 36.57 kg/m².  Weight: 109.1 kg (240 lb 8 oz)   Height: 5' 8" (172.7 cm)   Physical Exam   HENT:   Head: Normocephalic and atraumatic.   Musculoskeletal: Normal range of motion. She exhibits no edema, tenderness or deformity.   Neurological: She is alert. A sensory deficit (diminished sensation to light touch/pinprick R anteriomedial/posterior thigh/calf ) is present. No cranial nerve deficit. She exhibits normal muscle tone. Coordination normal.   Bilateral LE strength 5/5   Skin: Skin is warm. No erythema.   Psychiatric: She has a normal mood and affect. Her behavior is normal.   Vitals reviewed.          Assessment:     1. L4/L5 Sensory Neuropathy     2. Acquired hypothyroidism    3. Vitamin D deficiency disease    4. Essential hypertension      Plan:     Claudia was seen today for numbness.    Diagnoses and all orders for this visit:    L4/L5 Sensory Neuropathy   Discussed natural history of neuropathy symptoms in patient with history of L4/L5 disc problems  No weakness/loss of strength/significant pain  Recommend observation presently  RTC in 1 month approximately if symptoms unresolving    Acquired hypothyroidism  Patient on thyroid replacement therapy with recent thyroid indices appropriate - continue current Synthroid dosage    Vitamin D deficiency disease  On vitamin D supplementation - continue    Essential hypertension  BP controlled presently - reviewed anti-hypertensive regimen - continue current therapy    Health Maintenance       Date Due Completion Date    Hepatitis B Vaccines (2 of 3 - Risk 3-dose series) 08/10/2018 7/13/2018    Fecal Occult Blood Test (FOBT)/FitKit 11/17/2018 11/17/2017    Hepatitis A Vaccines (2 of 2 - Risk 2-dose series) " 01/13/2019 7/13/2018    Pap Smear with HPV Cotest 04/21/2019 4/21/2016    Override on 4/12/2016: Done    Override on 12/3/2012: Done    Mammogram 05/30/2019 5/30/2017    Override on 5/25/2016: Done    Override on 2/3/2014: Done (DIS - normal)    Override on 12/11/2012: Done    Override on 10/27/2010: Done    Lipid Panel 07/13/2023 7/13/2018    TETANUS VACCINE 09/28/2025 9/28/2015          Follow-up in about 4 weeks (around 12/6/2018), or if symptoms worsen or fail to improve, for neuropathy.    The patient expressed understanding and no barriers to adherence were identified.     1. The patient indicates understanding of these issues and agrees with the plan. Brief care plan is updated and reviewed with the patient as applicable.     2. The patient is given an After Visit Summary that lists all medications with directions, allergies, orders placed during this encounter and follow-up instructions.     3. I have reviewed the patient's medical information including past medical, family, and social history sections including the medications and allergies.     4. We discussed the patient's current medications. I reconciled the patient's medication list and prepared and supplied needed refills.       Franklin Hill MD  Internal Medicine-Pediatrics

## 2018-11-12 DIAGNOSIS — I10 ESSENTIAL HYPERTENSION: ICD-10-CM

## 2018-11-12 RX ORDER — AMLODIPINE BESYLATE 10 MG/1
TABLET ORAL
Qty: 90 TABLET | Refills: 1 | Status: SHIPPED | OUTPATIENT
Start: 2018-11-12 | End: 2019-02-18 | Stop reason: SDUPTHER

## 2018-11-13 ENCOUNTER — OFFICE VISIT (OUTPATIENT)
Dept: ENDOCRINOLOGY | Facility: CLINIC | Age: 63
End: 2018-11-13
Payer: COMMERCIAL

## 2018-11-13 VITALS
WEIGHT: 239.81 LBS | SYSTOLIC BLOOD PRESSURE: 138 MMHG | HEIGHT: 68 IN | HEART RATE: 66 BPM | BODY MASS INDEX: 36.35 KG/M2 | DIASTOLIC BLOOD PRESSURE: 80 MMHG

## 2018-11-13 DIAGNOSIS — E04.9 GOITER, NODULAR: Primary | ICD-10-CM

## 2018-11-13 DIAGNOSIS — R73.03 PREDIABETES: ICD-10-CM

## 2018-11-13 DIAGNOSIS — E55.9 VITAMIN D DEFICIENCY DISEASE: ICD-10-CM

## 2018-11-13 DIAGNOSIS — E03.9 ACQUIRED HYPOTHYROIDISM: ICD-10-CM

## 2018-11-13 PROCEDURE — 3079F DIAST BP 80-89 MM HG: CPT | Mod: CPTII,S$GLB,, | Performed by: INTERNAL MEDICINE

## 2018-11-13 PROCEDURE — 3008F BODY MASS INDEX DOCD: CPT | Mod: CPTII,S$GLB,, | Performed by: INTERNAL MEDICINE

## 2018-11-13 PROCEDURE — 99999 PR PBB SHADOW E&M-EST. PATIENT-LVL III: CPT | Mod: PBBFAC,,, | Performed by: INTERNAL MEDICINE

## 2018-11-13 PROCEDURE — 99214 OFFICE O/P EST MOD 30 MIN: CPT | Mod: S$GLB,,, | Performed by: INTERNAL MEDICINE

## 2018-11-13 PROCEDURE — 3075F SYST BP GE 130 - 139MM HG: CPT | Mod: CPTII,S$GLB,, | Performed by: INTERNAL MEDICINE

## 2018-11-13 NOTE — ASSESSMENT & PLAN NOTE
Clinically and biochemically euthyroid  Goal is a normal TSH  Continue lt4 dose at this time  Avoid exogenous hyperthyroidism as this can accelerate bone loss and increase risk of CV complications.

## 2018-11-13 NOTE — PROGRESS NOTES
Subjective:      Patient ID: Claudia Rodriguez is a 63 y.o. female.    Chief Complaint:  Hypothyroidism and Multinodular goiter      History of Present Illness    With regards to the MNG  She initially presented with neck fullness  U/s confirmed the mng  2 FNAs done in 2012- both nondianostic  FNA L/isthmus 10/2013-benign         10/2018  Impression       1.  Thyroid gland is normal in size with heterogenous echotexture.    2.  Three subcentimeter nodules in the right thyroid.  None meet criteria for fine-needle aspiration.    3.  Previously biopsied isthmus nodule is not significantly changed in size or appearance.    4.  Two nodules in the left thyroid described above.  Consider biopsy of the left middle lobe nodule versus follow-up ultrasound in 1 year.    5.  Benign appearing left level 3 lymph node.    RECOMMENDATIONS:  Consider FNA of left middle thyroid nodule although may be technically challenging due to depth.    Follow-up ultrasound in 1 year is recommended.       2 Sister with nodules -1 had thyroidectomy - benign      Patient denies any neck enlargement.   No dysphagia, no dyspnea.   Energy level good.  sleeps well.   No skin/nail hair changes  Denies constipation      With regards to the Hypothyroidism   She is on lt4 75 mcg qd   Takes medication in the morning without food or medication.     2 children + 1 grandchild - she has custody, works as a       With regards to the Vit D deficiency   Taking ergo 100k q 2 weeks      With regards to the IGT and obesity   tolerating metformin 1000 mg with dinner   She lost 30 lbs last year and has gained it back          2015 nl bmd     Review of Systems   Constitutional: Negative for unexpected weight change.   Eyes: Negative for visual disturbance.   Respiratory: Negative for shortness of breath.    Cardiovascular: Negative for chest pain.   Gastrointestinal: Negative for abdominal pain.   Musculoskeletal: Negative for arthralgias.    Skin: Negative for wound.   Neurological: Negative for headaches.   Hematological: Does not bruise/bleed easily.   Psychiatric/Behavioral: Negative for sleep disturbance.       Objective:   Physical Exam   Neck: Thyromegaly present.   Thyroid palpable, irregular, non tender, mobile       Cardiovascular: Normal rate.   Pulmonary/Chest: Effort normal.   Abdominal: Soft.   Musculoskeletal: She exhibits no edema.   Lymphadenopathy:     She has no cervical adenopathy.   Vitals reviewed.      Body mass index is 36.46 kg/m².    Lab Review:   Lab Results   Component Value Date    HGBA1C 5.6 10/31/2018     Lab Results   Component Value Date    CHOL 254 (H) 07/13/2018    HDL 55 07/13/2018    LDLCALC 167.6 (H) 07/13/2018    TRIG 157 (H) 07/13/2018    CHOLHDL 21.7 07/13/2018     Lab Results   Component Value Date     10/31/2018    K 3.8 10/31/2018     10/31/2018    CO2 28 10/31/2018     10/31/2018    BUN 17 10/31/2018    CREATININE 0.8 10/31/2018    CALCIUM 9.6 10/31/2018    PROT 7.1 10/31/2018    ALBUMIN 4.0 10/31/2018    BILITOT 1.0 10/31/2018    ALKPHOS 74 10/31/2018    AST 39 10/31/2018    ALT 50 (H) 10/31/2018    ANIONGAP 7 (L) 10/31/2018    ESTGFRAFRICA >60.0 10/31/2018    EGFRNONAA >60.0 10/31/2018    TSH 1.188 10/31/2018       Assessment and Plan     Vitamin D deficiency disease  continue ergo 100 k every 2 weeks     Hypothyroidism  Clinically and biochemically euthyroid  Goal is a normal TSH  Continue lt4 dose at this time  Avoid exogenous hyperthyroidism as this can accelerate bone loss and increase risk of CV complications.       Prediabetes  alerted as to the increased risk of t2dm  Follow hba1c   Continue weight loss efforts and metformin        Goiter, nodular     plan repeat fna before the years end

## 2018-11-13 NOTE — ASSESSMENT & PLAN NOTE
alerted as to the increased risk of t2dm  Follow hba1c   Continue weight loss efforts and metformin

## 2018-11-14 ENCOUNTER — OFFICE VISIT (OUTPATIENT)
Dept: PODIATRY | Facility: CLINIC | Age: 63
End: 2018-11-14
Payer: COMMERCIAL

## 2018-11-14 VITALS — BODY MASS INDEX: 36.22 KG/M2 | HEIGHT: 68 IN | WEIGHT: 239 LBS

## 2018-11-14 DIAGNOSIS — B35.1 ONYCHOMYCOSIS DUE TO DERMATOPHYTE: Primary | ICD-10-CM

## 2018-11-14 PROCEDURE — 99999 PR PBB SHADOW E&M-EST. PATIENT-LVL III: CPT | Mod: PBBFAC,,, | Performed by: PODIATRIST

## 2018-11-14 PROCEDURE — 99213 OFFICE O/P EST LOW 20 MIN: CPT | Mod: S$GLB,,, | Performed by: PODIATRIST

## 2018-11-14 PROCEDURE — 3008F BODY MASS INDEX DOCD: CPT | Mod: CPTII,S$GLB,, | Performed by: PODIATRIST

## 2018-11-19 NOTE — PROGRESS NOTES
Last 5 Patient Entered Readings                                      Current 30 Day Average: 144/81     Recent Readings 11/18/2018 11/17/2018 11/11/2018 11/4/2018 11/2/2018    SBP (mmHg) 142 152 153 144 140    DBP (mmHg) 65 78 87 81 81    Pulse 64 62 59 64 63        BP remains elevated. Sent [facility] message to patient to discuss changing atenolol to carvedilol.  Addendum: Patient responded stating recent office BP readings were at goal. Chart reviewed, office BP readings were 123/80 and 138/80 mmHg. Sent [facility] message reviewing proper BP measurement technique and [facility] locations if patient would like to have cuff evaluated, however, office readings similar to home readings.

## 2018-11-19 NOTE — PROGRESS NOTES
Subjective:      Patient ID: Claudia Rodriguez is a 63 y.o. female.    Chief Complaint: Nail Problem (toenail fungal (PCP Dr Marquez))    Claudia is a 63 y.o. female who presents to the clinic complaining of thick and discolored toenails on the right foot. Claudia is inquiring about treatment options. H/o fungal nails has tried topical and oral treatments however discoloration persists Inquiring regarding treatment options Also notices right great toenail is detached distally.       Review of Systems   Constitution: Negative for chills, diaphoresis, fever and weakness.   Cardiovascular: Negative for claudication, cyanosis, leg swelling and syncope.   Respiratory: Negative for cough and shortness of breath.    Skin: Positive for color change, nail changes and suspicious lesions.   Musculoskeletal: Negative for falls, joint pain, muscle cramps and muscle weakness.   Gastrointestinal: Negative for diarrhea, nausea and vomiting.   Neurological: Negative for disturbances in coordination, numbness, paresthesias, sensory change and tremors.   Psychiatric/Behavioral: Negative for altered mental status.           Objective:      Physical Exam   Constitutional: She appears well-developed. She is cooperative.   Oriented to time, place, and person.   Cardiovascular:   DP and PT pulses are palpable bilaterally. 3 sec capillary refill time and toes and feet are warm to touch proximally .  There is  hair growth on the feet and toes b/l. There is no edema b/l. No spider veins or varicosities present b/l.      Musculoskeletal:   Equinus noted b/l ankles with < 10 deg DF noted. MMT 5/5 in DF/PF/Inv/Ev resistance with no reproduction of pain in any direction. Passive range of motion of ankle and pedal joints is painless b/l.     Feet:   Right Foot:   Skin Integrity: Negative for callus or dry skin.   Left Foot:   Skin Integrity: Negative for callus or dry skin.   Lymphadenopathy:   Negative lymphadenopathy bilateral popliteal fossa and  tarsal tunnel.   Neurological: She is alert.   Light touch, proprioception, and sharp/dull sensation are all intact bilaterally. Protective threshold with the [place]-Wienstein monofilament is intact bilaterally.    Skin:   No open lesions, lacerations or wounds noted.Interdigital spaces clean, dry and intact b/l. No erythema noted to b/l foot.    Toenails 1-5 right foot , thickened by 2-3 mm, discolored/yellowed, dystrophic, brittle with subungual debris.    RIght hallux nail noted to be detached by 50% from distal nail bed.        Psychiatric: She has a normal mood and affect.             Assessment:       Encounter Diagnosis   Name Primary?    Onychomycosis due to dermatophyte Yes         Plan:       Claudia was seen today for nail problem.    Diagnoses and all orders for this visit:    Onychomycosis due to dermatophyte      I counseled the patient on her conditions, their implications and medical management.    With patient's permission detached portion of right hallux nail trimmed,no blood drawn. Pt. Relates relief following procedure.     Discussed treatment options for fungal toenails to include topical vs oral vs laser vs combined therapy in detail. Prescription for antifungal soak sent to [facility] pharmacy.     F/u prn    Kathryn Teran DPM

## 2018-11-21 RX ORDER — CARVEDILOL 25 MG/1
25 TABLET ORAL 2 TIMES DAILY WITH MEALS
Qty: 60 TABLET | Refills: 2 | Status: SHIPPED | OUTPATIENT
Start: 2018-11-21 | End: 2019-02-16 | Stop reason: SDUPTHER

## 2018-11-21 NOTE — PROGRESS NOTES
Last 5 Patient Entered Readings                                      Current 30 Day Average: 145/80     Recent Readings 11/20/2018 11/18/2018 11/17/2018 11/11/2018 11/4/2018    SBP (mmHg) 157 142 152 153 144    DBP (mmHg) 83 65 78 87 81    Pulse 59 64 62 59 64          Patient in agreement to make medication change. Atenolol changed to carvedilol 25 mg BID. Fatty liver noted, patient has history of slightly elevated AST/ALT.

## 2018-11-26 ENCOUNTER — PATIENT MESSAGE (OUTPATIENT)
Dept: HEPATOLOGY | Facility: CLINIC | Age: 63
End: 2018-11-26

## 2018-11-28 ENCOUNTER — PATIENT MESSAGE (OUTPATIENT)
Dept: OTHER | Facility: OTHER | Age: 63
End: 2018-11-28

## 2018-11-28 ENCOUNTER — PATIENT MESSAGE (OUTPATIENT)
Dept: FAMILY MEDICINE | Facility: CLINIC | Age: 63
End: 2018-11-28

## 2018-12-05 NOTE — TELEPHONE ENCOUNTER
----- Message from Leny Caal sent at 11/27/2017 12:57 PM CST -----  _X  1st Request  _  2nd Request  _  3rd Request        Who:LONG WHITE [[id number]]     Why: Requesting a call back in regards to getting an appt with Dr. Coles. Pt states she's being referred by Dr. Madrigal for pelvic floor issues. Please call to schedule.    What Number to Call Back:[phone]    When to Expect a call back: (Within 24 hours)    Please return the call at earliest convenience. Thanks!                             Progress Notes by Deb Nuno at 02/07/18 09:40 AM     Author:  Deb Nuno Service:  (none) Author Type:  Patient      Filed:  02/07/18 09:40 AM Encounter Date:  2/7/2018 Status:  Signed     :  Deb Nuno (Patient )            HMO insurance, Call to schedule.[CV1.1T]         Revision History        User Key Date/Time User Provider Type Action    > CV1.1 02/07/18 09:40 AM Deb Nuno Patient  Sign    T - Template

## 2018-12-06 ENCOUNTER — PATIENT OUTREACH (OUTPATIENT)
Dept: OTHER | Facility: OTHER | Age: 63
End: 2018-12-06

## 2018-12-06 NOTE — PROGRESS NOTES
Last 5 Patient Entered Readings                                      Current 30 Day Average: 147/83     Recent Readings 12/6/2018 12/2/2018 12/1/2018 11/29/2018 11/24/2018    SBP (mmHg) 141 143 147 134 150    DBP (mmHg) 90 82 87 85 88    Pulse 74 73 74 71 73          Sent [facility] message to follow up on headaches since change from atenolol to carvedilol

## 2018-12-10 NOTE — PROGRESS NOTES
Last 5 Patient Entered Readings                                      Current 30 Day Average: 146/82     Recent Readings 12/7/2018 12/6/2018 12/2/2018 12/1/2018 11/29/2018    SBP (mmHg) 139 141 143 147 134    DBP (mmHg) 79 90 82 87 85    Pulse 70 74 73 74 71          Patient reports still experiencing headaches since change from atenolol to carvedilol but they are improving. BP remains above goal but on downward trend since medication change.  See [facility] messages, patient agreeable to continuing carvedilol at this time.

## 2018-12-18 ENCOUNTER — HOSPITAL ENCOUNTER (OUTPATIENT)
Dept: ENDOCRINOLOGY | Facility: CLINIC | Age: 63
Discharge: HOME | End: 2018-12-18
Attending: INTERNAL MEDICINE
Payer: COMMERCIAL

## 2018-12-18 DIAGNOSIS — E04.9 GOITER, NODULAR: ICD-10-CM

## 2018-12-18 DIAGNOSIS — E55.9 VITAMIN D DEFICIENCY DISEASE: ICD-10-CM

## 2018-12-18 DIAGNOSIS — E03.9 ACQUIRED HYPOTHYROIDISM: ICD-10-CM

## 2018-12-18 DIAGNOSIS — R73.03 PREDIABETES: ICD-10-CM

## 2018-12-18 PROCEDURE — 76942 ECHO GUIDE FOR BIOPSY: CPT | Mod: S$GLB,,, | Performed by: INTERNAL MEDICINE

## 2018-12-18 PROCEDURE — 10022 US FINE NEEDLE ASPIRATION WITH IMAGING: CPT | Mod: S$GLB,,, | Performed by: INTERNAL MEDICINE

## 2018-12-18 PROCEDURE — 88173 CYTOPATH EVAL FNA REPORT: CPT | Performed by: PATHOLOGY

## 2018-12-20 ENCOUNTER — TELEPHONE (OUTPATIENT)
Dept: ENDOCRINOLOGY | Facility: CLINIC | Age: 63
End: 2018-12-20

## 2018-12-20 NOTE — TELEPHONE ENCOUNTER
Called and updated patient on the negative biopsy result. All questions were answered.     Ray Todd MD  Endocrine Fellow  12/20/2018 11:46 AM

## 2018-12-27 ENCOUNTER — TELEPHONE (OUTPATIENT)
Dept: ENDOCRINOLOGY | Facility: CLINIC | Age: 63
End: 2018-12-27

## 2018-12-27 DIAGNOSIS — E03.9 ACQUIRED HYPOTHYROIDISM: ICD-10-CM

## 2018-12-27 DIAGNOSIS — E04.9 GOITER, NODULAR: Primary | ICD-10-CM

## 2019-01-10 NOTE — PROGRESS NOTES
"Last 5 Patient Entered Readings                                      Current 30 Day Average: 130/76     Recent Readings 1/6/2019 1/3/2019 12/29/2018 12/24/2018 12/18/2018    SBP (mmHg) 130 118 136 119 134    DBP (mmHg) 73 71 70 73 82    Pulse 77 73 70 68 75        Sent [facility] message to follow up on reported headaches. BP improved and 30 day average now at goal.  Addendum: Patient responded. States headaches have mostly resolved during the day but she still experiences them 4-6 nights a week. States she is "dealing with them." Continue to monitor.    "

## 2019-01-16 NOTE — PROGRESS NOTES
Last 5 Patient Entered Readings                                      Current 30 Day Average: 132/74     Recent Readings 1/14/2019 1/6/2019 1/3/2019 12/29/2018 12/24/2018    SBP (mmHg) 154 130 118 136 119    DBP (mmHg) 76 73 71 70 73    Pulse 76 77 73 70 68        1/16-Digital [facility]: Health  Follow Up  Left voicemail to follow up with Mrs. Claudia Rodriguez.  Current BP average 132/74 mmHg is not at goal, [<130/80].    Patient states her headaches in the afternoon have gone away but she still has headaches at night from 2 am-5 am that they are not bad. Asked patient does she have a lot of stress and does she think caused her BP to be elevated on 1/14. Patient states she has a lot of stress sometimes and she deals with it by disconnecting form her phone and computer.  Patient reports she does believe this is what casued her BP to spike on 1/14. Dicussed ways to relieve stress by listen to music or watch a movie she likes.     Digital [facility]: Health  Follow Up    Lifestyle Modifications:    1.Dietary Modifications (Sodium intake <2,000mg/day, food labels, dining out): Patient reports she still watching her sodium intake.     2.Physical Activity: Patient reports she still goes for walks.    3.Medication Therapy: Patient has been compliant with the medication regimen.    4.Patient has the following medication side effects/concerns: Patient denies any s/s of hypotension (dizziness, LH,nausea,or fatigue) with low readings. Patient denies any s/s of hypertension (CP,SOB,severe headaches, or change in vision) with high readings.   (Frequency/Alleviating factors/Precipitating factors, etc.)     Follow up with Mrs. Claudia Rodriguez completed. No further questions or concerns.     Plan: Will continue to follow up to achieve health goals.

## 2019-01-18 ENCOUNTER — PATIENT MESSAGE (OUTPATIENT)
Dept: ADMINISTRATIVE | Facility: HOSPITAL | Age: 64
End: 2019-01-18

## 2019-01-23 ENCOUNTER — OFFICE VISIT (OUTPATIENT)
Dept: FAMILY MEDICINE | Facility: CLINIC | Age: 64
End: 2019-01-23
Payer: COMMERCIAL

## 2019-01-23 VITALS
BODY MASS INDEX: 36.22 KG/M2 | WEIGHT: 239 LBS | DIASTOLIC BLOOD PRESSURE: 76 MMHG | TEMPERATURE: 98 F | HEART RATE: 66 BPM | HEIGHT: 68 IN | OXYGEN SATURATION: 97 % | SYSTOLIC BLOOD PRESSURE: 108 MMHG

## 2019-01-23 DIAGNOSIS — J32.9 SINUSITIS, UNSPECIFIED CHRONICITY, UNSPECIFIED LOCATION: Primary | ICD-10-CM

## 2019-01-23 DIAGNOSIS — G43.919 INTRACTABLE MIGRAINE WITHOUT STATUS MIGRAINOSUS, UNSPECIFIED MIGRAINE TYPE: ICD-10-CM

## 2019-01-23 DIAGNOSIS — I10 ESSENTIAL HYPERTENSION: ICD-10-CM

## 2019-01-23 PROCEDURE — 3078F DIAST BP <80 MM HG: CPT | Mod: CPTII,S$GLB,, | Performed by: NURSE PRACTITIONER

## 2019-01-23 PROCEDURE — 99214 OFFICE O/P EST MOD 30 MIN: CPT | Mod: S$GLB,,, | Performed by: NURSE PRACTITIONER

## 2019-01-23 PROCEDURE — 3008F BODY MASS INDEX DOCD: CPT | Mod: CPTII,S$GLB,, | Performed by: NURSE PRACTITIONER

## 2019-01-23 PROCEDURE — 3078F PR MOST RECENT DIASTOLIC BLOOD PRESSURE < 80 MM HG: ICD-10-PCS | Mod: CPTII,S$GLB,, | Performed by: NURSE PRACTITIONER

## 2019-01-23 PROCEDURE — 3008F PR BODY MASS INDEX (BMI) DOCUMENTED: ICD-10-PCS | Mod: CPTII,S$GLB,, | Performed by: NURSE PRACTITIONER

## 2019-01-23 PROCEDURE — 3074F SYST BP LT 130 MM HG: CPT | Mod: CPTII,S$GLB,, | Performed by: NURSE PRACTITIONER

## 2019-01-23 PROCEDURE — 99999 PR PBB SHADOW E&M-EST. PATIENT-LVL V: CPT | Mod: PBBFAC,,, | Performed by: NURSE PRACTITIONER

## 2019-01-23 PROCEDURE — 99214 PR OFFICE/OUTPT VISIT, EST, LEVL IV, 30-39 MIN: ICD-10-PCS | Mod: S$GLB,,, | Performed by: NURSE PRACTITIONER

## 2019-01-23 PROCEDURE — 99999 PR PBB SHADOW E&M-EST. PATIENT-LVL V: ICD-10-PCS | Mod: PBBFAC,,, | Performed by: NURSE PRACTITIONER

## 2019-01-23 PROCEDURE — 3074F PR MOST RECENT SYSTOLIC BLOOD PRESSURE < 130 MM HG: ICD-10-PCS | Mod: CPTII,S$GLB,, | Performed by: NURSE PRACTITIONER

## 2019-01-23 RX ORDER — KETOCONAZOLE 20 MG/G
CREAM TOPICAL
Refills: 3 | COMMUNITY
Start: 2018-12-27 | End: 2019-06-12

## 2019-01-23 RX ORDER — ERYTHROMYCIN 20 MG/G
GEL TOPICAL
Refills: 3 | COMMUNITY
Start: 2018-12-27 | End: 2019-06-12

## 2019-01-23 RX ORDER — AMOXICILLIN AND CLAVULANATE POTASSIUM 875; 125 MG/1; MG/1
1 TABLET, FILM COATED ORAL 2 TIMES DAILY
Qty: 20 TABLET | Refills: 0 | Status: SHIPPED | OUTPATIENT
Start: 2019-01-23 | End: 2019-02-02

## 2019-01-23 RX ORDER — BUTALBITAL, ASPIRIN, CAFFEINE AND CODEINE PHOSPHATE 50; 325; 40; 30 MG/1; MG/1; MG/1; MG/1
1 CAPSULE ORAL EVERY 4 HOURS PRN
Qty: 10 CAPSULE | Refills: 0 | Status: SHIPPED | OUTPATIENT
Start: 2019-01-23 | End: 2019-02-02

## 2019-01-23 RX ORDER — PREDNISONE 20 MG/1
20 TABLET ORAL 2 TIMES DAILY
Qty: 10 TABLET | Refills: 0 | Status: SHIPPED | OUTPATIENT
Start: 2019-01-23 | End: 2019-01-28

## 2019-01-23 RX ORDER — VANCOMYCIN HYDROCHLORIDE 250 MG/1
CAPSULE ORAL
Refills: 3 | COMMUNITY
Start: 2018-12-27 | End: 2019-06-12

## 2019-01-23 RX ORDER — MUPIROCIN 20 MG/G
OINTMENT TOPICAL
Refills: 1 | COMMUNITY
Start: 2018-12-07 | End: 2019-06-12

## 2019-01-23 NOTE — PATIENT INSTRUCTIONS
Take all of the antibiotics with food for the full 10 days  Prednisone 20 mg one twice a day for 5 days  Coricidin HBP as directed for congestion  Fiorinal as needed  If still having headaches on Monday and want to see a neurology with [facility] send me a [facility] message and I will refer you to one   Mail in fit kit ASAP be sure to date and time and mail as soon as you obtain

## 2019-01-23 NOTE — PROGRESS NOTES
Subjective:       Patient ID: Claudia Rodriguez is a 63 y.o. female.    Chief Complaint: Migraine (5 days)    63-year-old female presents to the clinic today with complaint of migraine headaches off and on for the past 5 days.  She said the headache started after she had a facial wash she was in [place].  He does have nausea and did vomit once with him.  She states the headache is mostly over her forehead and she describes it as just intense pain. She has no or and is not sensitive to light or noise.  She takes Excedrin migraine which relieves the headache but it keeps her up she says.  She says she had migraines about 15 years ago bit this headaches seems to be different.  In the past when she had headaches her blood pressure was elevated.  Presently,her blood pressure is 108/76.  She has a lot of pressure to her forehead but not to her maxillary sinus.  She denies any fever or chills.  She denied denies any particular sinus congestion, ear pain, coughing, wheezing, shortness of breath, or abdominal pain.       Past Medical History:   Diagnosis Date    Cholelithiasis     DDD (degenerative disc disease), lumbar     Fatty liver     - noted on U/S 11/2017    Goiter, nodular     HTN (hypertension)     Hyperglycemia     Hyperlipidemia     Hypothyroidism     Hypovitaminosis D     Meningioma     right frontal lobe ; followed by Dr. Enciso once a year    Obesity     Prediabetes     Urinary bladder disorder      Past Surgical History:   Procedure Laterality Date    BLADDER SUSPENSION      thyroid biopsy  7/11/12    tonsillectomy      TONSILLECTOMY        reports that  has never smoked. she has never used smokeless tobacco. She reports that she does not drink alcohol or use drugs.  Review of Systems   Constitutional: Negative for chills and fever.   HENT: Positive for sinus pressure. Negative for congestion, ear discharge, postnasal drip, rhinorrhea and sore throat.    Eyes: Negative for photophobia and  visual disturbance.   Respiratory: Negative for cough, shortness of breath and wheezing.    Gastrointestinal: Positive for nausea and vomiting. Negative for abdominal pain.   Musculoskeletal: Negative for gait problem, neck pain and neck stiffness.   Neurological: Positive for headaches. Negative for dizziness, weakness and light-headedness.       Objective:      Physical Exam   Constitutional: She is oriented to person, place, and time. She appears well-developed and well-nourished. No distress.   HENT:   Head: Normocephalic and atraumatic.   Right Ear: External ear normal.   Left Ear: External ear normal.   Mouth/Throat: Oropharynx is clear and moist. No oropharyngeal exudate.   Both nares red and inflamed with tenderness over both frontal sinuses    Eyes: Conjunctivae and EOM are normal. Pupils are equal, round, and reactive to light. Right eye exhibits no discharge. Left eye exhibits no discharge. No scleral icterus.   Neck: Normal range of motion. Neck supple.   Neck FROM without any difficulty bilateral cervical adenopathy noted    Cardiovascular: Normal rate and regular rhythm. Exam reveals no gallop and no friction rub.   No murmur heard.  Pulmonary/Chest: Effort normal and breath sounds normal. No respiratory distress. She has no wheezes. She has no rales.   Abdominal: Soft. Bowel sounds are normal. There is no tenderness.   Musculoskeletal: Normal range of motion. She exhibits no edema.   Lymphadenopathy:     She has cervical adenopathy.   Neurological: She is alert and oriented to person, place, and time.   Skin: Skin is warm and dry. No rash noted. She is not diaphoretic.   Psychiatric: She has a normal mood and affect.       Assessment:       1. Sinusitis, unspecified chronicity, unspecified location    2. Intractable migraine without status migrainosus, unspecified migraine type    3. Essential hypertension        Plan:         Sinusitis, unspecified chronicity, unspecified location  -      amoxicillin-clavulanate 875-125mg (AUGMENTIN) 875-125 mg per tablet; Take 1 tablet by mouth 2 (two) times daily. for 10 days  Dispense: 20 tablet; Refill: 0  -     predniSONE (DELTASONE) 20 MG tablet; Take 1 tablet (20 mg total) by mouth 2 (two) times daily. for 5 days  Dispense: 10 tablet; Refill: 0    Intractable migraine without status migrainosus, unspecified migraine type  -     codeine-butalbital-ASA-caffeine (BUTALBITAL COMPOUND-CODEINE) 00--40 mg Cap; Take 1 capsule by mouth every 4 (four) hours as needed.  Dispense: 10 capsule; Refill: 0    Essential hypertension  - The current medical regimen is effective;  continue present plan and medications.

## 2019-01-29 ENCOUNTER — LAB VISIT (OUTPATIENT)
Dept: LAB | Facility: HOSPITAL | Age: 64
End: 2019-01-29
Attending: INTERNAL MEDICINE
Payer: COMMERCIAL

## 2019-01-29 DIAGNOSIS — Z12.11 ENCOUNTER FOR FIT (FECAL IMMUNOCHEMICAL TEST) SCREENING: ICD-10-CM

## 2019-01-29 PROCEDURE — 82274 ASSAY TEST FOR BLOOD FECAL: CPT

## 2019-01-30 ENCOUNTER — OFFICE VISIT (OUTPATIENT)
Dept: FAMILY MEDICINE | Facility: CLINIC | Age: 64
End: 2019-01-30
Payer: COMMERCIAL

## 2019-01-30 VITALS
OXYGEN SATURATION: 99 % | HEART RATE: 73 BPM | TEMPERATURE: 99 F | WEIGHT: 239 LBS | HEIGHT: 68 IN | DIASTOLIC BLOOD PRESSURE: 72 MMHG | SYSTOLIC BLOOD PRESSURE: 130 MMHG | BODY MASS INDEX: 36.22 KG/M2

## 2019-01-30 DIAGNOSIS — D32.0 BENIGN MENINGIOMA OF BRAIN: ICD-10-CM

## 2019-01-30 DIAGNOSIS — R51.9 INTRACTABLE HEADACHE, UNSPECIFIED CHRONICITY PATTERN, UNSPECIFIED HEADACHE TYPE: ICD-10-CM

## 2019-01-30 DIAGNOSIS — J32.9 SINUSITIS, UNSPECIFIED CHRONICITY, UNSPECIFIED LOCATION: Primary | ICD-10-CM

## 2019-01-30 PROCEDURE — 3008F PR BODY MASS INDEX (BMI) DOCUMENTED: ICD-10-PCS | Mod: CPTII,S$GLB,, | Performed by: NURSE PRACTITIONER

## 2019-01-30 PROCEDURE — 3008F BODY MASS INDEX DOCD: CPT | Mod: CPTII,S$GLB,, | Performed by: NURSE PRACTITIONER

## 2019-01-30 PROCEDURE — 3075F SYST BP GE 130 - 139MM HG: CPT | Mod: CPTII,S$GLB,, | Performed by: NURSE PRACTITIONER

## 2019-01-30 PROCEDURE — 99999 PR PBB SHADOW E&M-EST. PATIENT-LVL V: CPT | Mod: PBBFAC,,, | Performed by: NURSE PRACTITIONER

## 2019-01-30 PROCEDURE — 3078F PR MOST RECENT DIASTOLIC BLOOD PRESSURE < 80 MM HG: ICD-10-PCS | Mod: CPTII,S$GLB,, | Performed by: NURSE PRACTITIONER

## 2019-01-30 PROCEDURE — 99999 PR PBB SHADOW E&M-EST. PATIENT-LVL V: ICD-10-PCS | Mod: PBBFAC,,, | Performed by: NURSE PRACTITIONER

## 2019-01-30 PROCEDURE — 3078F DIAST BP <80 MM HG: CPT | Mod: CPTII,S$GLB,, | Performed by: NURSE PRACTITIONER

## 2019-01-30 PROCEDURE — 3075F PR MOST RECENT SYSTOLIC BLOOD PRESS GE 130-139MM HG: ICD-10-PCS | Mod: CPTII,S$GLB,, | Performed by: NURSE PRACTITIONER

## 2019-01-30 PROCEDURE — 99214 OFFICE O/P EST MOD 30 MIN: CPT | Mod: S$GLB,,, | Performed by: NURSE PRACTITIONER

## 2019-01-30 PROCEDURE — 99214 PR OFFICE/OUTPT VISIT, EST, LEVL IV, 30-39 MIN: ICD-10-PCS | Mod: S$GLB,,, | Performed by: NURSE PRACTITIONER

## 2019-01-30 NOTE — PROGRESS NOTES
Subjective:       Patient ID: Claudia Rodriguez is a 63 y.o. female.    Chief Complaint: Headache    63-year-old female presents to the clinic today with complaint of a headache that started again yesterday mostly over her forehead area. I saw her on 1/23/2019 for headaches and Sinusitis and treated her with Augmentin, Coricidin, Prednisone and Fiorinal with Codeine which she says she took only once.   She states she took a Fiorinal with codeine yesterday morning and went to bed and she states that it helped with the headache but when she woke up she had the headache again.  She states that when she was being treated with Augmentin and  taking the prednisone she had no headaches. After she completed the prednisone the headaches returned.  She still has a 4 Augmentin pills left to take for her sinusitis.  She states she has been taking Coricidin HBP along with the antibiotics. She says she has a pretty bad headache right now. She does have a history of benign Meningioma of brain which is followed by Dr. Barragan at [facility] who she sees every year and has a MRI of the brain. She is due for her next MRI of the brain in 8/2019. I told her when she does not have to work to take the Fiorinal with Codeine and the Excedrin Migraine when she has to work as needed until she sees the neurologist.       Past Medical History:   Diagnosis Date    Cholelithiasis     DDD (degenerative disc disease), lumbar     Fatty liver     - noted on U/S 11/2017    Goiter, nodular     HTN (hypertension)     Hyperglycemia     Hyperlipidemia     Hypothyroidism     Hypovitaminosis D     Meningioma     right frontal lobe ; followed by Dr. Enciso once a year    Obesity     Prediabetes     Urinary bladder disorder      Past Surgical History:   Procedure Laterality Date    BLADDER SUSPENSION      thyroid biopsy  7/11/12    tonsillectomy      TONSILLECTOMY        reports that  has never smoked. she has never used smokeless  tobacco. She reports that she does not drink alcohol or use drugs.  Review of Systems   Constitutional: Negative for activity change and unexpected weight change.   HENT: Positive for congestion. Negative for hearing loss, rhinorrhea and trouble swallowing.    Eyes: Negative for discharge and visual disturbance.   Respiratory: Negative for chest tightness and wheezing.    Cardiovascular: Negative for chest pain and palpitations.   Gastrointestinal: Negative for blood in stool, constipation, diarrhea and vomiting.   Endocrine: Negative for polydipsia and polyuria.   Genitourinary: Negative for difficulty urinating, dysuria, hematuria and menstrual problem.   Musculoskeletal: Negative for arthralgias, joint swelling and neck pain.   Neurological: Positive for headaches. Negative for weakness.   Psychiatric/Behavioral: Negative for confusion and dysphoric mood.       Objective:      Physical Exam   Constitutional: She is oriented to person, place, and time. She appears well-developed and well-nourished. No distress.   HENT:   Head: Normocephalic and atraumatic.   Right Ear: External ear normal.   Left Ear: External ear normal.   Mouth/Throat: Oropharynx is clear and moist. No oropharyngeal exudate.   Left nares mild redness but much less than on 1/23/2019 tenderness over the forehead    Eyes: Conjunctivae and EOM are normal. Pupils are equal, round, and reactive to light. Right eye exhibits no discharge. Left eye exhibits no discharge. No scleral icterus.   Neck: Normal range of motion. Neck supple.   Cardiovascular: Normal rate and regular rhythm. Exam reveals no gallop and no friction rub.   No murmur heard.  Pulmonary/Chest: Effort normal and breath sounds normal. No respiratory distress. She has no wheezes. She has no rales.   Abdominal: Soft. Bowel sounds are normal. There is no tenderness.   Musculoskeletal: Normal range of motion. She exhibits no edema.   Lymphadenopathy:     She has no cervical adenopathy.    Neurological: She is alert and oriented to person, place, and time.   Skin: Skin is warm and dry. No rash noted. She is not diaphoretic.   Psychiatric: She has a normal mood and affect.       Assessment:       1. Sinusitis, unspecified chronicity, unspecified location    2. Intractable headache, unspecified chronicity pattern, unspecified headache type    3. Benign meningioma of brain        Plan:         Sinusitis, unspecified chronicity, unspecified location  - complete the Augmentin prescription   - Continue Coricidin HBP    Intractable headache, unspecified chronicity pattern, unspecified headache type  -     Ambulatory referral to Neurology  - Use Fiorinal and Excedrin as needed     Benign meningioma of brain  -     Ambulatory referral to Neurology  - followed by Dr. Hammond at [facility]

## 2019-01-31 LAB — HEMOCCULT STL QL IA: NEGATIVE

## 2019-02-14 NOTE — PROGRESS NOTES
Last 5 Patient Entered Readings                                      Current 30 Day Average: 149/81     Recent Readings 2/11/2019 2/8/2019 2/7/2019 2/6/2019 1/29/2019    SBP (mmHg) 134 150 160 143 154    DBP (mmHg) 76 78 75 81 83    Pulse 70 69 77 71 69        Left voicemail.  BP had increased, starting to trend back down. Patient seen by primary care for headache, will follow up with neurology. Prescribed course of prednisone for chronic sinusitis.

## 2019-02-15 DIAGNOSIS — Z86.39 HISTORY OF VITAMIN D DEFICIENCY: ICD-10-CM

## 2019-02-15 RX ORDER — ERGOCALCIFEROL 1.25 MG/1
CAPSULE ORAL
Qty: 12 CAPSULE | Refills: 0 | Status: SHIPPED | OUTPATIENT
Start: 2019-02-15 | End: 2019-02-18 | Stop reason: SDUPTHER

## 2019-02-16 DIAGNOSIS — I10 ESSENTIAL HYPERTENSION: ICD-10-CM

## 2019-02-18 DIAGNOSIS — I10 ESSENTIAL HYPERTENSION: ICD-10-CM

## 2019-02-18 DIAGNOSIS — Z86.39 HISTORY OF VITAMIN D DEFICIENCY: ICD-10-CM

## 2019-02-18 DIAGNOSIS — N95.2 ATROPHIC VAGINITIS: ICD-10-CM

## 2019-02-18 RX ORDER — ERGOCALCIFEROL 1.25 MG/1
CAPSULE ORAL
Qty: 12 CAPSULE | Refills: 0 | Status: SHIPPED | OUTPATIENT
Start: 2019-02-18 | End: 2019-07-26 | Stop reason: SDUPTHER

## 2019-02-18 RX ORDER — AMLODIPINE BESYLATE 10 MG/1
TABLET ORAL
Qty: 90 TABLET | Refills: 0 | Status: SHIPPED | OUTPATIENT
Start: 2019-02-18 | End: 2019-07-26 | Stop reason: SDUPTHER

## 2019-02-18 RX ORDER — ESTRADIOL 0.1 MG/G
CREAM VAGINAL
Qty: 42.5 G | Refills: 11 | Status: SHIPPED | OUTPATIENT
Start: 2019-02-18 | End: 2019-06-12

## 2019-02-18 RX ORDER — CARVEDILOL 25 MG/1
TABLET ORAL
Qty: 60 TABLET | Refills: 2 | Status: SHIPPED | OUTPATIENT
Start: 2019-02-18 | End: 2019-05-08 | Stop reason: SDUPTHER

## 2019-02-25 ENCOUNTER — PATIENT OUTREACH (OUTPATIENT)
Dept: ADMINISTRATIVE | Facility: HOSPITAL | Age: 64
End: 2019-02-25

## 2019-02-26 DIAGNOSIS — E03.9 ACQUIRED HYPOTHYROIDISM: ICD-10-CM

## 2019-02-26 RX ORDER — LEVOTHYROXINE SODIUM 75 UG/1
TABLET ORAL
Qty: 90 TABLET | Refills: 3 | Status: SHIPPED | OUTPATIENT
Start: 2019-02-26 | End: 2020-01-29 | Stop reason: SDUPTHER

## 2019-02-28 NOTE — PROGRESS NOTES
Last 5 Patient Entered Readings                                      Current 30 Day Average: 143/79     Recent Readings 2/21/2019 2/17/2019 2/11/2019 2/8/2019 2/7/2019    SBP (mmHg) 126 135 134 150 160    DBP (mmHg) 82 79 76 78 75    Pulse 71 69 70 69 77        Sent [facility] message. BP continues to improve.

## 2019-03-11 ENCOUNTER — OFFICE VISIT (OUTPATIENT)
Dept: FAMILY MEDICINE | Facility: CLINIC | Age: 64
End: 2019-03-11
Payer: COMMERCIAL

## 2019-03-11 VITALS
SYSTOLIC BLOOD PRESSURE: 122 MMHG | HEIGHT: 68 IN | OXYGEN SATURATION: 98 % | WEIGHT: 241.19 LBS | TEMPERATURE: 98 F | DIASTOLIC BLOOD PRESSURE: 78 MMHG | BODY MASS INDEX: 36.55 KG/M2 | HEART RATE: 66 BPM

## 2019-03-11 DIAGNOSIS — Z23 NEED FOR PROPHYLACTIC VACCINATION AGAINST VIRAL HEPATITIS: ICD-10-CM

## 2019-03-11 DIAGNOSIS — E34.9 ENDOCRINE DISORDER: ICD-10-CM

## 2019-03-11 DIAGNOSIS — E66.01 SEVERE OBESITY (BMI 35.0-39.9) WITH COMORBIDITY: ICD-10-CM

## 2019-03-11 DIAGNOSIS — E03.9 ACQUIRED HYPOTHYROIDISM: ICD-10-CM

## 2019-03-11 DIAGNOSIS — D32.0 BENIGN MENINGIOMA OF BRAIN: ICD-10-CM

## 2019-03-11 DIAGNOSIS — K76.0 FATTY LIVER: ICD-10-CM

## 2019-03-11 DIAGNOSIS — K74.60 CIRRHOSIS OF LIVER WITHOUT ASCITES, UNSPECIFIED HEPATIC CIRRHOSIS TYPE: ICD-10-CM

## 2019-03-11 DIAGNOSIS — Z78.9 STATIN INTOLERANCE: ICD-10-CM

## 2019-03-11 DIAGNOSIS — R73.03 PREDIABETES: ICD-10-CM

## 2019-03-11 DIAGNOSIS — I10 ESSENTIAL HYPERTENSION: Primary | ICD-10-CM

## 2019-03-11 PROCEDURE — 90632 HEPA VACCINE ADULT IM: CPT | Mod: S$GLB,,, | Performed by: INTERNAL MEDICINE

## 2019-03-11 PROCEDURE — 99214 OFFICE O/P EST MOD 30 MIN: CPT | Mod: 25,S$GLB,, | Performed by: INTERNAL MEDICINE

## 2019-03-11 PROCEDURE — 3074F SYST BP LT 130 MM HG: CPT | Mod: CPTII,S$GLB,, | Performed by: INTERNAL MEDICINE

## 2019-03-11 PROCEDURE — 90472 HEPATITIS B VACCINE ADULT IM: ICD-10-PCS | Mod: S$GLB,,, | Performed by: INTERNAL MEDICINE

## 2019-03-11 PROCEDURE — 3008F BODY MASS INDEX DOCD: CPT | Mod: CPTII,S$GLB,, | Performed by: INTERNAL MEDICINE

## 2019-03-11 PROCEDURE — 90472 IMMUNIZATION ADMIN EACH ADD: CPT | Mod: S$GLB,,, | Performed by: INTERNAL MEDICINE

## 2019-03-11 PROCEDURE — 90632 HEPATITIS A VACCINE ADULT IM: ICD-10-PCS | Mod: S$GLB,,, | Performed by: INTERNAL MEDICINE

## 2019-03-11 PROCEDURE — 99999 PR PBB SHADOW E&M-EST. PATIENT-LVL III: CPT | Mod: PBBFAC,,, | Performed by: INTERNAL MEDICINE

## 2019-03-11 PROCEDURE — 90471 IMMUNIZATION ADMIN: CPT | Mod: S$GLB,,, | Performed by: INTERNAL MEDICINE

## 2019-03-11 PROCEDURE — 90471 HEPATITIS A VACCINE ADULT IM: ICD-10-PCS | Mod: S$GLB,,, | Performed by: INTERNAL MEDICINE

## 2019-03-11 PROCEDURE — 90746 HEPATITIS B VACCINE ADULT IM: ICD-10-PCS | Mod: S$GLB,,, | Performed by: INTERNAL MEDICINE

## 2019-03-11 PROCEDURE — 1101F PT FALLS ASSESS-DOCD LE1/YR: CPT | Mod: CPTII,S$GLB,, | Performed by: INTERNAL MEDICINE

## 2019-03-11 PROCEDURE — 3008F PR BODY MASS INDEX (BMI) DOCUMENTED: ICD-10-PCS | Mod: CPTII,S$GLB,, | Performed by: INTERNAL MEDICINE

## 2019-03-11 PROCEDURE — 3074F PR MOST RECENT SYSTOLIC BLOOD PRESSURE < 130 MM HG: ICD-10-PCS | Mod: CPTII,S$GLB,, | Performed by: INTERNAL MEDICINE

## 2019-03-11 PROCEDURE — 90746 HEPB VACCINE 3 DOSE ADULT IM: CPT | Mod: S$GLB,,, | Performed by: INTERNAL MEDICINE

## 2019-03-11 PROCEDURE — 3078F PR MOST RECENT DIASTOLIC BLOOD PRESSURE < 80 MM HG: ICD-10-PCS | Mod: CPTII,S$GLB,, | Performed by: INTERNAL MEDICINE

## 2019-03-11 PROCEDURE — 99214 PR OFFICE/OUTPT VISIT, EST, LEVL IV, 30-39 MIN: ICD-10-PCS | Mod: 25,S$GLB,, | Performed by: INTERNAL MEDICINE

## 2019-03-11 PROCEDURE — 1101F PR PT FALLS ASSESS DOC 0-1 FALLS W/OUT INJ PAST YR: ICD-10-PCS | Mod: CPTII,S$GLB,, | Performed by: INTERNAL MEDICINE

## 2019-03-11 PROCEDURE — 3078F DIAST BP <80 MM HG: CPT | Mod: CPTII,S$GLB,, | Performed by: INTERNAL MEDICINE

## 2019-03-11 PROCEDURE — 99999 PR PBB SHADOW E&M-EST. PATIENT-LVL III: ICD-10-PCS | Mod: PBBFAC,,, | Performed by: INTERNAL MEDICINE

## 2019-03-11 NOTE — PROGRESS NOTES
HISTORY OF PRESENT ILLNESS:  Claudia Rodriguez is a 64 y.o. female who presents to the clinic today for 6 month follow up  .   The patient presents to clinic today for follow-up of her hypertension, hyperlipidemia, hypothyroidism, and prediabetes.  She reports fair dietary intake.  She does not exercise regularly.  She plans to schedule follow-up with hepatology for her fatty liver in the near future.  She sees her gyn every April.  She denies cardiac chest pain or shortness of breath.  She was seen in November by 1 of my partners for left leg numbness.  She states this completely resolved.      PAST MEDICAL HISTORY:  Past Medical History:   Diagnosis Date    Cholelithiasis     DDD (degenerative disc disease), lumbar     Fatty liver     - noted on U/S 11/2017    Goiter, nodular     HTN (hypertension)     Hyperglycemia     Hyperlipidemia     Hypothyroidism     Hypovitaminosis D     Meningioma     right frontal lobe ; followed by Dr. Enciso once a year    Obesity     Prediabetes     Urinary bladder disorder        PAST SURGICAL HISTORY:  Past Surgical History:   Procedure Laterality Date    BLADDER SUSPENSION      thyroid biopsy  7/11/12    tonsillectomy      TONSILLECTOMY         SOCIAL HISTORY:  Social History     Socioeconomic History    Marital status:      Spouse name: Not on file    Number of children: 1    Years of education: Not on file    Highest education level: Not on file   Social Needs    Financial resource strain: Not on file    Food insecurity - worry: Not on file    Food insecurity - inability: Not on file    Transportation needs - medical: Not on file    Transportation needs - non-medical: Not on file   Occupational History    Occupation: human      Employer: [employer]   Tobacco Use    Smoking status: Never Smoker    Smokeless tobacco: Never Used   Substance and Sexual Activity    Alcohol use: No    Drug use: No    Sexual activity: Yes     Partners:  Male   Other Topics Concern    Are you pregnant or think you may be? No    Breast-feeding No   Social History Narrative    3 adopted children.       FAMILY HISTORY:  Family History   Problem Relation Age of Onset    Coronary artery disease Father 55    Heart disease Father     Hypertension Father     Melanoma Father     Hypertension Mother     Stroke Mother         2015    Hypertension Sister         controlled by lifestyle    No Known Problems Brother     Pancreatic cancer Maternal Grandfather     Heart failure Maternal Grandmother     Thyroid nodules Sister         s/p thyroidectomy    Thyroid disease Sister     Thyroid nodules Sister     No Known Problems Brother     Diabetes Neg Hx     Psoriasis Neg Hx     Lupus Neg Hx     Eczema Neg Hx     Thyroid cancer Neg Hx     Breast cancer Neg Hx     Ovarian cancer Neg Hx     Cervical cancer Neg Hx     Endometrial cancer Neg Hx     Vaginal cancer Neg Hx        ALLERGIES AND MEDICATIONS: updated and reviewed.  Review of patient's allergies indicates:   Allergen Reactions    Zostavax [zoster vaccine live (pf)] Rash     - injection site red, hot, indurated    Amlodipine-olmesartan      Other reaction(s): pounding in ears      Atorvastatin        Other reaction(s): Unknown    Demerol [meperidine] Other (See Comments)     Other reaction(s): Nausea  Other reaction(s): Headache    Ezetimibe-simvastatin      Other reaction(s): Headache      Lotrel [amlodipine-benazepril]        Other reaction(s): constant cough    Nsaids (non-steroidal anti-inflammatory drug) Other (See Comments)       Other reaction(s): Difficulty breathing    Other reaction(s): Difficulty breathing    Phenytoin sodium extended Other (See Comments)     Medication List with Changes/Refills   Current Medications    AMLODIPINE (NORVASC) 10 MG TABLET    TAKE ONE TABLET BY MOUTH EVERY DAY    CALCIUM CARBONATE/VITAMIN D3 (CALCIUM 500 + D, D3, ORAL)        CARVEDILOL (COREG) 25 MG  TABLET    TAKE ONE TABLET BY MOUTH TWICE DAILY WITH MEALS *REPLACES ATENOLOL*    CLOTRIMAZOLE-BETAMETHASONE 1-0.05% (LOTRISONE) CREAM        ERYTHROMYCIN WITH ETHANOL (EMGEL) 2 % GEL    ADD 30GM (1 TUBE) TO THE SOAKING DEVICE AND ALLOW WATER TO AGITATE. PLACE AFFECTED AREAS INTO WATER AND SOAK FOR 10 MINUTES 1-2 TIMES DAILY    ESTRACE 0.01 % (0.1 MG/GRAM) VAGINAL CREAM    place 0.5 GRAM VAGINALLY once DAILY    FOLIC ACID/MV,FE,OTHER MIN (CENTRUM ORAL)    Take 1 tablet by mouth.    KETOCONAZOLE (NIZORAL) 2 % CREAM    ADD 30GM (1 TUBE) TO THE SOAKING DEVICE AND ALLOW WATER TO AGITATE. PLACE AFFECTED AREAS INTO WATER AND SOAK FOR 10 MINUTES 1-2 TIMES DAILY    LEVOTHYROXINE (SYNTHROID) 75 MCG TABLET    TAKE ONE TABLET BY MOUTH EVERY DAY    METFORMIN (GLUCOPHAGE-XR) 500 MG 24 HR TABLET    TAKE TWO TABLETS BY MOUTH EVERY DAY with evening meal    MUPIROCIN (BACTROBAN) 2 % OINTMENT        OMEGA-3 FATTY ACIDS 1,000 MG CAP    Take 1 capsule by mouth Daily.    TERCONAZOLE (TERAZOL 3) 0.8 % VAGINAL CREAM    Place 1 applicator vaginally nightly. for 7 days    TRIAMCINOLONE ACETONIDE 0.1% (KENALOG) 0.1 % CREAM        TRIAMTERENE-HYDROCHLOROTHIAZIDE 37.5-25 MG (MAXZIDE-25) 37.5-25 MG PER TABLET    Take 1 tablet by mouth once daily.    VANCOMYCIN (VANCOCIN) 250 MG CAPSULE    ADD 6 CAPSULES TO THE SOAKING DEVICE AND ALLOW WATER TO AGITATE. PLACE AFFECTED AREAS INTO WATER AND SOAK FOR 10 MINUTES 1-2 TIMES DAILY    VITAMIN D2 50,000 UNIT CAPSULE    TAKE TWO CAPSULES BY MOUTH EVERY 2 WEEKS          CARE TEAM:  Patient Care Team:  Jessica Marquez MD as PCP - General (Internal Medicine)  Bisi Madrigal MD as Consulting Physician (Endocrinology)  Fernie Hammond MD as Consulting Physician (Neurosurgery)  Jessica Marquez MD as Hypertension Digital Medicine Responsible Provider (Internal Medicine)  Stephanie Guadalupe, PharmD as Hypertension Digital Medicine Clinician (Pharmacist)  Brianna Oleary as writewith Health  "  Anirudh Selby MD as Consulting Physician (Obstetrics and Gynecology)         REVIEW OF SYSTEMS:  Review of Systems   Constitutional: Negative for chills, fatigue, fever and unexpected weight change.   HENT: Negative for congestion and postnasal drip.    Eyes: Negative for pain and visual disturbance.   Respiratory: Negative for cough, shortness of breath and wheezing.    Cardiovascular: Negative for chest pain, palpitations and leg swelling.   Gastrointestinal: Negative for abdominal pain, constipation, diarrhea, nausea and vomiting.   Genitourinary: Negative for dysuria.   Musculoskeletal: Negative for arthralgias and back pain.   Skin: Negative for rash.   Neurological: Positive for headaches (- had a headache for 2 weeks recently after a facial massage - now completely resolved.). Negative for weakness.   Psychiatric/Behavioral: Negative for dysphoric mood and sleep disturbance. The patient is not nervous/anxious.          PHYSICAL EXAM:   Vitals:    03/11/19 0759   BP: 122/78   Pulse: 66   Temp: 98 °F (36.7 °C)     Weight: 109.4 kg (241 lb 2.9 oz)   Height: 5' 8" (172.7 cm)   Body mass index is 36.67 kg/m².     General appearance - alert, well appearing, and in no distress and obese  Mental status - alert, oriented to person, place, and time, normal mood, behavior, speech, dress, motor activity, and thought processes  Eyes - pupils equal and reactive, extraocular eye movements intact, sclera anicteric  Mouth - mucous membranes moist, pharynx normal without lesions  Neck - supple, no significant adenopathy, carotids upstroke normal bilaterally, no bruits, thyroid exam: thyroid is normal in size without nodules or tenderness  Lymphatics - no palpable lymphadenopathy  Chest - clear to auscultation, no wheezes, rales or rhonchi, symmetric air entry  Heart - normal rate and regular rhythm, no gallops noted  Neurological - alert, oriented, normal speech, no focal findings or movement disorder noted, cranial " nerves II through XII intact  Musculoskeletal - no muscular tenderness noted  Extremities - peripheral pulses normal, no pedal edema, no clubbing or cyanosis  Skin - normal coloration and turgor, no rashes, no suspicious skin lesions noted      ASSESSMENT AND PLAN:  1. Essential hypertension  Discussed sodium restriction, maintaining ideal body weight and regular exercise program as physiologic means to achieve blood pressure control. The patient will strive towards this. The current medical regimen is effective;  continue present plan and medications. Recommended patient to check home readings to monitor and see me for followup as scheduled or sooner as needed. Patient was educated that both decongestant and anti-inflammatory medication may raise blood pressure.  She is active on the digital hypertension program.    2. Prediabetes  Stable. We discussed low sugar/low carbohydrate diet and regular exercise to prevent progression. No need for prescription medication at this time.     3. Acquired hypothyroidism  Patient is clinically euthyroid. Continue current regimen. Followed by endocrinology.    4. Hyperlipidemia/Statin intolerance  We discussed low fat diet and regular exercise. Patient is statin intolerant.     5. Benign meningioma of brain  Stable. Asymptomatic. Observe.     6. Fatty liver/7. Cirrhosis of liver without ascites, unspecified hepatic cirrhosis type  Stable. Followed by hepatology.    8. Severe obesity (BMI 35.0-39.9) with comorbidity  The patient is asked to make an attempt to improve diet and exercise patterns to aid in medical management of this problem.     9. Need for prophylactic vaccination against viral hepatitis    - Hepatitis A Vaccine (Adult) (IM)  - Hepatitis B Vaccine (Adult) (IM)    10. Endocrine disorder    - DXA Bone Density Spine And Hip; Future       Will complete pneumonia vaccination at next office visit.    Follow-up in about 6 months (around 9/11/2019), or if symptoms worsen or  fail to improve, for annual exam. or sooner as needed.

## 2019-03-28 ENCOUNTER — PATIENT MESSAGE (OUTPATIENT)
Dept: OTHER | Facility: OTHER | Age: 64
End: 2019-03-28

## 2019-04-04 ENCOUNTER — PATIENT OUTREACH (OUTPATIENT)
Dept: OTHER | Facility: OTHER | Age: 64
End: 2019-04-04

## 2019-04-04 NOTE — PROGRESS NOTES
Last 5 Patient Entered Readings                                      Current 30 Day Average: 142/79     Recent Readings 3/26/2019 3/17/2019 3/9/2019 3/1/2019 2/21/2019    SBP (mmHg) 142 140 145 137 126    DBP (mmHg) 75 84 77 78 82    Pulse 76 71 75 85 71          Left voicemail.  3/11 office BP reading at goal.  Possibly discuss beginning ARB.

## 2019-04-29 ENCOUNTER — HOSPITAL ENCOUNTER (OUTPATIENT)
Dept: RADIOLOGY | Facility: CLINIC | Age: 64
Discharge: HOME | End: 2019-04-29
Attending: INTERNAL MEDICINE
Payer: COMMERCIAL

## 2019-04-29 DIAGNOSIS — E34.9 ENDOCRINE DISORDER: ICD-10-CM

## 2019-04-29 PROCEDURE — 77080 DEXA BONE DENSITY SPINE HIP: ICD-10-PCS | Mod: 26,,, | Performed by: INTERNAL MEDICINE

## 2019-04-29 PROCEDURE — 77080 DXA BONE DENSITY AXIAL: CPT | Mod: TC,PO

## 2019-04-29 PROCEDURE — 77080 DXA BONE DENSITY AXIAL: CPT | Mod: 26,,, | Performed by: INTERNAL MEDICINE

## 2019-05-08 ENCOUNTER — PATIENT OUTREACH (OUTPATIENT)
Dept: OTHER | Facility: OTHER | Age: 64
End: 2019-05-08

## 2019-05-08 ENCOUNTER — TELEPHONE (OUTPATIENT)
Dept: HEPATOLOGY | Facility: CLINIC | Age: 64
End: 2019-05-08

## 2019-05-08 DIAGNOSIS — I10 ESSENTIAL HYPERTENSION: ICD-10-CM

## 2019-05-08 RX ORDER — CARVEDILOL 25 MG/1
TABLET ORAL
Qty: 60 TABLET | Refills: 5 | Status: SHIPPED | OUTPATIENT
Start: 2019-05-08 | End: 2020-01-29

## 2019-05-08 NOTE — TELEPHONE ENCOUNTER
----- Message from Supriya Harris MA sent at 5/7/2019  4:17 PM CDT -----  Contact: PT Portal Request      ----- Message -----  From: Allie Gamino  Sent: 5/7/2019   4:05 PM  To: [facility]      Appointment Request From: Claudia Rodriguez    With Provider: King Braga PA-C [[facility] - Hepatology]    Preferred Date Range: 6/12/2019 - 6/28/2019    Preferred Times: Monday Morning, Tuesday Morning, Wednesday Morning, Thursday Morning, Friday Morning    Reason for visit: Existing Patient    Comments:  Follow-up visit from 1 year ago.  Has fatty liver progressed?  Requesting appointment time between 9:30 and 11:30 a.m.

## 2019-05-08 NOTE — PROGRESS NOTES
Last 5 Patient Entered Readings                                      Current 30 Day Average: 134/79     Recent Readings 5/7/2019 5/7/2019 5/1/2019 4/29/2019 4/23/2019    SBP (mmHg) 137 163 125 141 138    DBP (mmHg) 77 94 79 76 75    Pulse 70 67 73 72 64        Patient stated that it was not a good time.  She stated that she was waiting for a call back from some dr's.  She asked that I call back 5/9/19 in the AM.

## 2019-05-10 NOTE — PROGRESS NOTES
"Last 5 Patient Entered Readings                                      Current 30 Day Average: 134/79     Recent Readings 5/7/2019 5/7/2019 5/1/2019 4/29/2019 4/23/2019    SBP (mmHg) 137 163 125 141 138    DBP (mmHg) 77 94 79 76 75    Pulse 70 67 73 72 64          Digital Medicine: Health  Follow Up    Introduced myself to Mrs. Rodriguez as her new health  and to discuss readings.  She states that she had a "tremendous" amount of stress in her life right now with both family and work.  She states that she is retiring in October, so the work stress will be ending and she plans on focusing on her health more then.    Lifestyle Modifications:    1.Dietary Modifications (Sodium intake <2,000mg/day, food labels, dining out): Patient states that her diet is not very good.  She travels for work Mon - Fri and is home only on the weekends.  She eats out during the week and on the weekends she doesn't have a lot of time for cooking because she is preparing to leave town again on Monday.  She reports that she drinks more than 64 ounces of water per day.    2.Physical Activity: Patient reports that she does not currently follow any exercise program.    3.Medication Therapy: Patient has been compliant with the medication regimen.    4.Patient has the following medication side effects/concerns: none reported  (Frequency/Alleviating factors/Precipitating factors, etc.)     Follow up with Mrs. Claudia Rodriguez completed. No further questions or concerns. Will continue to follow up to achieve health goals.  "

## 2019-05-16 ENCOUNTER — OFFICE VISIT (OUTPATIENT)
Dept: HEPATOLOGY | Facility: CLINIC | Age: 64
End: 2019-05-16
Payer: COMMERCIAL

## 2019-05-16 ENCOUNTER — LAB VISIT (OUTPATIENT)
Dept: LAB | Facility: HOSPITAL | Age: 64
End: 2019-05-16
Payer: COMMERCIAL

## 2019-05-16 VITALS
HEART RATE: 66 BPM | HEIGHT: 69 IN | OXYGEN SATURATION: 97 % | WEIGHT: 241.63 LBS | SYSTOLIC BLOOD PRESSURE: 143 MMHG | BODY MASS INDEX: 35.79 KG/M2 | DIASTOLIC BLOOD PRESSURE: 72 MMHG

## 2019-05-16 DIAGNOSIS — K74.00 LIVER FIBROSIS: Primary | ICD-10-CM

## 2019-05-16 DIAGNOSIS — K74.00 LIVER FIBROSIS: ICD-10-CM

## 2019-05-16 DIAGNOSIS — K76.0 HEPATIC STEATOSIS: ICD-10-CM

## 2019-05-16 LAB
AFP SERPL-MCNC: 3.5 NG/ML (ref 0–8.4)
ALBUMIN SERPL BCP-MCNC: 4 G/DL (ref 3.5–5.2)
ALP SERPL-CCNC: 75 U/L (ref 55–135)
ALT SERPL W/O P-5'-P-CCNC: 23 U/L (ref 10–44)
ANION GAP SERPL CALC-SCNC: 9 MMOL/L (ref 8–16)
AST SERPL-CCNC: 21 U/L (ref 10–40)
BASOPHILS # BLD AUTO: 0.05 K/UL (ref 0–0.2)
BASOPHILS NFR BLD: 0.6 % (ref 0–1.9)
BILIRUB SERPL-MCNC: 0.7 MG/DL (ref 0.1–1)
BUN SERPL-MCNC: 17 MG/DL (ref 8–23)
CALCIUM SERPL-MCNC: 10.3 MG/DL (ref 8.7–10.5)
CHLORIDE SERPL-SCNC: 103 MMOL/L (ref 95–110)
CO2 SERPL-SCNC: 28 MMOL/L (ref 23–29)
CREAT SERPL-MCNC: 0.7 MG/DL (ref 0.5–1.4)
DIFFERENTIAL METHOD: ABNORMAL
EOSINOPHIL # BLD AUTO: 0.1 K/UL (ref 0–0.5)
EOSINOPHIL NFR BLD: 1 % (ref 0–8)
ERYTHROCYTE [DISTWIDTH] IN BLOOD BY AUTOMATED COUNT: 12.5 % (ref 11.5–14.5)
EST. GFR  (AFRICAN AMERICAN): >60 ML/MIN/1.73 M^2
EST. GFR  (NON AFRICAN AMERICAN): >60 ML/MIN/1.73 M^2
GLUCOSE SERPL-MCNC: 106 MG/DL (ref 70–110)
HCT VFR BLD AUTO: 41.9 % (ref 37–48.5)
HGB BLD-MCNC: 13.7 G/DL (ref 12–16)
IMM GRANULOCYTES # BLD AUTO: 0.04 K/UL (ref 0–0.04)
IMM GRANULOCYTES NFR BLD AUTO: 0.5 % (ref 0–0.5)
INR PPP: 1 (ref 0.8–1.2)
LYMPHOCYTES # BLD AUTO: 3.6 K/UL (ref 1–4.8)
LYMPHOCYTES NFR BLD: 45.4 % (ref 18–48)
MCH RBC QN AUTO: 31.1 PG (ref 27–31)
MCHC RBC AUTO-ENTMCNC: 32.7 G/DL (ref 32–36)
MCV RBC AUTO: 95 FL (ref 82–98)
MONOCYTES # BLD AUTO: 0.7 K/UL (ref 0.3–1)
MONOCYTES NFR BLD: 8.4 % (ref 4–15)
NEUTROPHILS # BLD AUTO: 3.5 K/UL (ref 1.8–7.7)
NEUTROPHILS NFR BLD: 44.1 % (ref 38–73)
NRBC BLD-RTO: 0 /100 WBC
PLATELET # BLD AUTO: 338 K/UL (ref 150–350)
PMV BLD AUTO: 9.5 FL (ref 9.2–12.9)
POTASSIUM SERPL-SCNC: 4.1 MMOL/L (ref 3.5–5.1)
PROT SERPL-MCNC: 6.9 G/DL (ref 6–8.4)
PROTHROMBIN TIME: 10.3 SEC (ref 9–12.5)
RBC # BLD AUTO: 4.41 M/UL (ref 4–5.4)
SODIUM SERPL-SCNC: 140 MMOL/L (ref 136–145)
WBC # BLD AUTO: 7.84 K/UL (ref 3.9–12.7)

## 2019-05-16 PROCEDURE — 80053 COMPREHEN METABOLIC PANEL: CPT

## 2019-05-16 PROCEDURE — 3077F SYST BP >= 140 MM HG: CPT | Mod: CPTII,S$GLB,, | Performed by: PHYSICIAN ASSISTANT

## 2019-05-16 PROCEDURE — 85025 COMPLETE CBC W/AUTO DIFF WBC: CPT

## 2019-05-16 PROCEDURE — 85610 PROTHROMBIN TIME: CPT

## 2019-05-16 PROCEDURE — 3077F PR MOST RECENT SYSTOLIC BLOOD PRESSURE >= 140 MM HG: ICD-10-PCS | Mod: CPTII,S$GLB,, | Performed by: PHYSICIAN ASSISTANT

## 2019-05-16 PROCEDURE — 1101F PT FALLS ASSESS-DOCD LE1/YR: CPT | Mod: CPTII,S$GLB,, | Performed by: PHYSICIAN ASSISTANT

## 2019-05-16 PROCEDURE — 99214 OFFICE O/P EST MOD 30 MIN: CPT | Mod: S$GLB,,, | Performed by: PHYSICIAN ASSISTANT

## 2019-05-16 PROCEDURE — 36415 COLL VENOUS BLD VENIPUNCTURE: CPT

## 2019-05-16 PROCEDURE — 1101F PR PT FALLS ASSESS DOC 0-1 FALLS W/OUT INJ PAST YR: ICD-10-PCS | Mod: CPTII,S$GLB,, | Performed by: PHYSICIAN ASSISTANT

## 2019-05-16 PROCEDURE — 3008F BODY MASS INDEX DOCD: CPT | Mod: CPTII,S$GLB,, | Performed by: PHYSICIAN ASSISTANT

## 2019-05-16 PROCEDURE — 99214 PR OFFICE/OUTPT VISIT, EST, LEVL IV, 30-39 MIN: ICD-10-PCS | Mod: S$GLB,,, | Performed by: PHYSICIAN ASSISTANT

## 2019-05-16 PROCEDURE — 99999 PR PBB SHADOW E&M-EST. PATIENT-LVL V: CPT | Mod: PBBFAC,,, | Performed by: PHYSICIAN ASSISTANT

## 2019-05-16 PROCEDURE — 82105 ALPHA-FETOPROTEIN SERUM: CPT

## 2019-05-16 PROCEDURE — 3078F PR MOST RECENT DIASTOLIC BLOOD PRESSURE < 80 MM HG: ICD-10-PCS | Mod: CPTII,S$GLB,, | Performed by: PHYSICIAN ASSISTANT

## 2019-05-16 PROCEDURE — 3078F DIAST BP <80 MM HG: CPT | Mod: CPTII,S$GLB,, | Performed by: PHYSICIAN ASSISTANT

## 2019-05-16 PROCEDURE — 3008F PR BODY MASS INDEX (BMI) DOCUMENTED: ICD-10-PCS | Mod: CPTII,S$GLB,, | Performed by: PHYSICIAN ASSISTANT

## 2019-05-16 PROCEDURE — 99999 PR PBB SHADOW E&M-EST. PATIENT-LVL V: ICD-10-PCS | Mod: PBBFAC,,, | Performed by: PHYSICIAN ASSISTANT

## 2019-05-16 NOTE — PROGRESS NOTES
HEPATOLOGY CLINIC VISIT NOTE     REFERRING PROVIDER: Dr. Jessica Marquez     REASON FOR VISIT: fatty liver     HISTORY: This is a 64 y.o. White female here for follow up. She was initially seen by me 12/2017 for fatty liver and elevated transaminases. She underwent a full serological work up and fibrosis staging. Her serological work up was unremarkable. Her fibroscan was consistent with cirrhosis at F4. We discussed further staging or monitoring pt as cirrhotic based on fibroscan alone at her initial visit and patient would like to continue monitoring for now. I have discussed MOURA trials and patient is not interested at this time.     PMH unchanged from visit. Weight stable. Pt reports high stress job and irregular diet. Plans to retired 10/2019 or so.     Her PMH is listed below.     Liver staging:  Fibroscan F4   AST 43, ALT 60  Tbili WNL  Albumin 3.7  PLTs >150                    Past Medical History:   Diagnosis Date    Cholelithiasis     DDD (degenerative disc disease), lumbar     Fatty liver     - noted on U/S 11/2017    Goiter, nodular     HTN (hypertension)     Hyperglycemia     Hyperlipidemia     Hypothyroidism     Hypovitaminosis D     Meningioma     right frontal lobe ; followed by Dr. 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   NECK: Supple  LUNGS: Normal respiratory effort.   ABDOMEN: Flat, soft, nontender.   SKIN: Warm and dry. No jaundice, No obvious rashes.   EXTREMITIES: No lower extremity edema  NEURO/PSYCH: Normal gate. Memory intact. Thought and speech pattern appropriate. Behavior normal. No depression or anxiety noted.    RECENT LABS:  Lab Results   Component Value Date    WBC 8.26 10/31/2018    HGB 14.1 10/31/2018     (H) 10/31/2018     Lab Results   Component Value Date    INR 1.0 10/31/2018     Lab Results   Component Value Date    AST 39 10/31/2018    ALT 50 (H) 10/31/2018    BILITOT 1.0 10/31/2018    ALBUMIN 4.0 10/31/2018    ALKPHOS 74 10/31/2018    CREATININE 0.8 10/31/2018    BUN 17 10/31/2018     10/31/2018    K 3.8 10/31/2018    AFP 3.3 10/31/2018     RECENT IMAGING:  U/S abdomen 12/2017  Narrative     Complete abdominal ultrasound dated December 4, 2017    Clinical history: Elevated LFTs    Comparison: No prior study    Technique: Study is degraded secondary to patient's body habitus and overlying bowel gas. Sonographic imaging of the abdominal structures were performed.    Findings:  The liver is increased in echogenicity which could suggest fatty infiltration. The liver is enlarged in craniocaudal dimension measuring 22 cm. The liver is otherwise normal in contour without evidence of focal lesions.  Gallbladder demonstrates gallstones, the largest measuring 2.1 cm.  There is no evidence of sonographic Ascencio's sign, pericholecystic fluid or gallbladder wall thickening.  Common bile duct measures 5.2 mm.    The aorta, IVC, partially visualized pancreas, spleen and bilateral kidneys demonstrate no sonographic abnormality.  The right kidney measures 13.8 cm.  Left kidney measures 13.0 cm.    No ascites is seen.   Impression       Fatty infiltration of the liver with  "hepatomegaly.  Cholelithiasis without ultrasound evidence of cholecystitis  Otherwise unremarkable abdominal ultrasound     ASSESSMENT  64 y.o. White female with:  1. ?CIRRHOSIS  -- fibroscan F4, NAFLD fibrosis score indeterminate  -- no splenomegaly on U/S and PLTs >150, mild zaman erythema  -- discussed liver biopsy, pt does not wish to proceed at this time; discussed MOURA trial, pt would like to avoid any additional medication  -- discussed weight loss  -- recommended EV screening with EGD, pt reports h/o bladder rupture and "told of collagen deficiency" so has been told to avoid invasive procedures.    EDUCATION:  We discussed the manifestations of NAFLD. At this time there is no FDA approved therapy for NAFLD.   The patient and I discussed the importance of diet, exercise, and weight loss for management of NAFLD. We discussed a low fat, low carb/low sugar, high fiber diet, and a goal of 30 minutes of exercise 5 times per week.   Pt is aware that fatty liver can progress to steatohepatitis and possibly to cirrhosis, putting one at increased risk for liver cancer, liver failure, and death.        PLAN:  1. HCC screening now   2. HCC screening in 6 months, will review by phone   3. F/u with labs and U/S in 1 year    Thank you for allowing me to participate in the care of Claudia Braga PA-C    "

## 2019-05-20 ENCOUNTER — TELEPHONE (OUTPATIENT)
Dept: HEPATOLOGY | Facility: CLINIC | Age: 64
End: 2019-05-20

## 2019-05-20 DIAGNOSIS — K74.00 LIVER FIBROSIS: Primary | ICD-10-CM

## 2019-05-20 NOTE — TELEPHONE ENCOUNTER
----- Message from King Braga PA-C sent at 5/20/2019  9:53 AM CDT -----  Please schedule HCC screening in 6 months, will review by phone.

## 2019-05-21 ENCOUNTER — PATIENT MESSAGE (OUTPATIENT)
Dept: ENDOCRINOLOGY | Facility: CLINIC | Age: 64
End: 2019-05-21

## 2019-05-24 ENCOUNTER — PATIENT MESSAGE (OUTPATIENT)
Dept: ENDOCRINOLOGY | Facility: CLINIC | Age: 64
End: 2019-05-24

## 2019-05-24 ENCOUNTER — HOSPITAL ENCOUNTER (OUTPATIENT)
Dept: RADIOLOGY | Facility: HOSPITAL | Age: 64
Discharge: HOME | End: 2019-05-24
Attending: PHYSICIAN ASSISTANT
Payer: COMMERCIAL

## 2019-05-24 DIAGNOSIS — K74.00 LIVER FIBROSIS: ICD-10-CM

## 2019-05-24 PROCEDURE — 76700 US EXAM ABDOM COMPLETE: CPT | Mod: 26,,, | Performed by: RADIOLOGY

## 2019-05-24 PROCEDURE — 76700 US ABDOMEN COMPLETE: ICD-10-PCS | Mod: 26,,, | Performed by: RADIOLOGY

## 2019-05-24 PROCEDURE — 76700 US EXAM ABDOM COMPLETE: CPT | Mod: TC

## 2019-05-29 ENCOUNTER — PATIENT MESSAGE (OUTPATIENT)
Dept: ENDOCRINOLOGY | Facility: CLINIC | Age: 64
End: 2019-05-29

## 2019-05-31 ENCOUNTER — PATIENT OUTREACH (OUTPATIENT)
Dept: OTHER | Facility: OTHER | Age: 64
End: 2019-05-31

## 2019-05-31 ENCOUNTER — PATIENT MESSAGE (OUTPATIENT)
Dept: HEPATOLOGY | Facility: CLINIC | Age: 64
End: 2019-05-31

## 2019-05-31 NOTE — PROGRESS NOTES
Last 5 Patient Entered Readings                                      Current 30 Day Average: 135/83     Recent Readings 5/29/2019 5/21/2019 5/21/2019 5/17/2019 5/14/2019    SBP (mmHg) 137 148 150 137 127    DBP (mmHg) 85 76 91 80 82    Pulse 70 73 67 72 68        Digital Medicine: Health  Follow Up    Patient states that nothing has really changed regarding lifestyle and will not change until she retires in October.  She thanked me for calling.      Lifestyle Modifications:    3.Medication Therapy: Patient has been compliant with the medication regimen.    4.Patient has the following medication side effects/concerns: None reported  (Frequency/Alleviating factors/Precipitating factors, etc.)     Follow up with MsPanda Rebollara ARNIE Rodriguez completed. No further questions or concerns. Will continue to follow up to achieve health goals.

## 2019-06-12 ENCOUNTER — OFFICE VISIT (OUTPATIENT)
Dept: ENDOCRINOLOGY | Facility: CLINIC | Age: 64
End: 2019-06-12
Payer: COMMERCIAL

## 2019-06-12 VITALS
HEIGHT: 69 IN | WEIGHT: 242.5 LBS | SYSTOLIC BLOOD PRESSURE: 112 MMHG | BODY MASS INDEX: 35.92 KG/M2 | HEART RATE: 64 BPM | DIASTOLIC BLOOD PRESSURE: 78 MMHG

## 2019-06-12 DIAGNOSIS — E66.01 SEVERE OBESITY (BMI 35.0-39.9) WITH COMORBIDITY: ICD-10-CM

## 2019-06-12 DIAGNOSIS — E55.9 VITAMIN D DEFICIENCY DISEASE: ICD-10-CM

## 2019-06-12 DIAGNOSIS — E04.9 GOITER, NODULAR: Primary | ICD-10-CM

## 2019-06-12 DIAGNOSIS — R73.03 PREDIABETES: ICD-10-CM

## 2019-06-12 DIAGNOSIS — E03.9 ACQUIRED HYPOTHYROIDISM: ICD-10-CM

## 2019-06-12 PROCEDURE — 99999 PR PBB SHADOW E&M-EST. PATIENT-LVL III: ICD-10-PCS | Mod: PBBFAC,,, | Performed by: INTERNAL MEDICINE

## 2019-06-12 PROCEDURE — 1101F PR PT FALLS ASSESS DOC 0-1 FALLS W/OUT INJ PAST YR: ICD-10-PCS | Mod: CPTII,S$GLB,, | Performed by: INTERNAL MEDICINE

## 2019-06-12 PROCEDURE — 1101F PT FALLS ASSESS-DOCD LE1/YR: CPT | Mod: CPTII,S$GLB,, | Performed by: INTERNAL MEDICINE

## 2019-06-12 PROCEDURE — 99214 OFFICE O/P EST MOD 30 MIN: CPT | Mod: S$GLB,,, | Performed by: INTERNAL MEDICINE

## 2019-06-12 PROCEDURE — 3008F PR BODY MASS INDEX (BMI) DOCUMENTED: ICD-10-PCS | Mod: CPTII,S$GLB,, | Performed by: INTERNAL MEDICINE

## 2019-06-12 PROCEDURE — 3078F PR MOST RECENT DIASTOLIC BLOOD PRESSURE < 80 MM HG: ICD-10-PCS | Mod: CPTII,S$GLB,, | Performed by: INTERNAL MEDICINE

## 2019-06-12 PROCEDURE — 99214 PR OFFICE/OUTPT VISIT, EST, LEVL IV, 30-39 MIN: ICD-10-PCS | Mod: S$GLB,,, | Performed by: INTERNAL MEDICINE

## 2019-06-12 PROCEDURE — 3074F PR MOST RECENT SYSTOLIC BLOOD PRESSURE < 130 MM HG: ICD-10-PCS | Mod: CPTII,S$GLB,, | Performed by: INTERNAL MEDICINE

## 2019-06-12 PROCEDURE — 99999 PR PBB SHADOW E&M-EST. PATIENT-LVL III: CPT | Mod: PBBFAC,,, | Performed by: INTERNAL MEDICINE

## 2019-06-12 PROCEDURE — 3074F SYST BP LT 130 MM HG: CPT | Mod: CPTII,S$GLB,, | Performed by: INTERNAL MEDICINE

## 2019-06-12 PROCEDURE — 3008F BODY MASS INDEX DOCD: CPT | Mod: CPTII,S$GLB,, | Performed by: INTERNAL MEDICINE

## 2019-06-12 PROCEDURE — 3078F DIAST BP <80 MM HG: CPT | Mod: CPTII,S$GLB,, | Performed by: INTERNAL MEDICINE

## 2019-06-12 RX ORDER — ESTRADIOL 10 UG/1
INSERT VAGINAL
Refills: 1 | COMMUNITY
Start: 2019-06-04 | End: 2019-10-21

## 2019-06-12 NOTE — ASSESSMENT & PLAN NOTE
I have reviewed management options       Discussed   surgical indications (abnormal FNA, compressive symptoms or interval change)     plan follow up in 1 year with TSH and thyroid u/s prior

## 2019-06-12 NOTE — PROGRESS NOTES
Subjective:      Patient ID: Claudia Rodriguez is a 64 y.o. female.    Chief Complaint:  thyroid nodules      History of Present Illness  Looking forward to retiring in October -then she will make her health a priority     She is here for f/u but she also wants me to be aware that the last FNA was not a good experience and she yoder snot want another ever- she would rather have surgery than a fna       With regards to the MNG  She initially presented with neck fullness  U/s confirmed the mng  2 FNAs done in 2012- both nondianostic  FNA L/isthmus 10/2013-benign     12/18/18  THYROID, LEFT MID (ASPIRATION):  [place] Thyroid Cytology Category: Benign  [place] follicular cells and colloid     10/2018  Impression       1.  Thyroid gland is normal in size with heterogenous echotexture.    2.  Three subcentimeter nodules in the right thyroid.  None meet criteria for fine-needle aspiration.    3.  Previously biopsied isthmus nodule is not significantly changed in size or appearance.    4.  Two nodules in the left thyroid described above.  Consider biopsy of the left middle lobe nodule versus follow-up ultrasound in 1 year.    5.  Benign appearing left level 3 lymph node.    RECOMMENDATIONS:  Consider FNA of left middle thyroid nodule although may be technically challenging due to depth.    Follow-up ultrasound in 1 year is recommended.       2 Sister with nodules -1 had thyroidectomy - benign      Patient denies any neck enlargement.   No dysphagia, no dyspnea.   Energy level good.  sleeps well.   No skin/nail hair changes  Denies constipation      With regards to the Hypothyroidism   She is on lt4 75 mcg qd   Takes medication in the morning without food or medication.     2 children + 1 grandchild - she has custody, works as a       With regards to the Vit D deficiency   Taking ergo 100k q 2 weeks      With regards to the IGT and obesity   tolerating metformin 1000 mg with dinner            2019  nl bmd     Review of Systems   Constitutional: Negative for unexpected weight change.   Eyes: Negative for visual disturbance.   Respiratory: Negative for shortness of breath.    Cardiovascular: Negative for chest pain.   Gastrointestinal: Negative for abdominal pain.   Musculoskeletal: Negative for arthralgias.   Skin: Negative for wound.   Neurological: Negative for headaches.   Hematological: Does not bruise/bleed easily.   Psychiatric/Behavioral: Negative for sleep disturbance.       Objective:   Physical Exam   Neck: Thyromegaly present.   Thyroid palpable, irregular, non tender, mobile       Cardiovascular: Normal rate.   Pulmonary/Chest: Effort normal.   Abdominal: Soft.   Musculoskeletal: She exhibits no edema.   Lymphadenopathy:     She has no cervical adenopathy.   Vitals reviewed.      Body mass index is 35.81 kg/m².    Lab Review:   Lab Results   Component Value Date    HGBA1C 5.6 10/31/2018     Lab Results   Component Value Date    CHOL 254 (H) 07/13/2018    HDL 55 07/13/2018    LDLCALC 167.6 (H) 07/13/2018    TRIG 157 (H) 07/13/2018    CHOLHDL 21.7 07/13/2018     Lab Results   Component Value Date     05/16/2019    K 4.1 05/16/2019     05/16/2019    CO2 28 05/16/2019     05/16/2019    BUN 17 05/16/2019    CREATININE 0.7 05/16/2019    CALCIUM 10.3 05/16/2019    PROT 6.9 05/16/2019    ALBUMIN 4.0 05/16/2019    BILITOT 0.7 05/16/2019    ALKPHOS 75 05/16/2019    AST 21 05/16/2019    ALT 23 05/16/2019    ANIONGAP 9 05/16/2019    ESTGFRAFRICA >60.0 05/16/2019    EGFRNONAA >60.0 05/16/2019    TSH 1.188 10/31/2018       Assessment and Plan     Goiter, nodular   I have reviewed management options       Discussed   surgical indications (abnormal FNA, compressive symptoms or interval change)     plan follow up in 1 year with TSH and thyroid u/s prior        Hypothyroidism  Clinically and biochemically euthyroid  Goal is a normal TSH  Continue lt4 dose at this time  Avoid exogenous  hyperthyroidism as this can accelerate bone loss and increase risk of CV complications.       Prediabetes  alerted as to the increased risk of t2dm  Follow hba1c   Restart weight loss efforts and continue  metformin        Severe obesity (BMI 35.0-39.9) with comorbidity  As above     Vitamin D deficiency disease  continue ergo 100 k every 2 weeks

## 2019-06-12 NOTE — ASSESSMENT & PLAN NOTE
alerted as to the increased risk of t2dm  Follow hba1c   Restart weight loss efforts and continue  metformin

## 2019-06-14 NOTE — PROGRESS NOTES
Last 5 Patient Entered Readings                                      Current 30 Day Average: 143/84     Recent Readings 6/12/2019 6/4/2019 5/31/2019 5/31/2019 5/29/2019    SBP (mmHg) 139 148 150 148 137    DBP (mmHg) 80 84 83 89 85    Pulse 69 69 66 67 70          Left voicemail  Majority of office readings this year at goal  Suspect patient may not be resting prior to home readings  She has told her health  she is under a lot of stress. She expects stress to improve once she retires in October but is unable to make lifestyle changes until that time.  Possibly discuss starting ARB.

## 2019-06-28 ENCOUNTER — PATIENT OUTREACH (OUTPATIENT)
Dept: OTHER | Facility: OTHER | Age: 64
End: 2019-06-28

## 2019-06-28 NOTE — PROGRESS NOTES
Last 5 Patient Entered Readings                                      Current 30 Day Average: 144/84     Recent Readings 6/28/2019 6/24/2019 6/19/2019 6/14/2019 6/12/2019    SBP (mmHg) 144 145 140 150 139    DBP (mmHg) 89 76 83 84 80    Pulse 76 63 63 67 69        Digital Medicine: Health  Follow Up    Left voicemail to follow up with Ms. Claudia Rodriguez.  Current BP average 144/84 mmHg is not at goal, 130/80 mmHg.

## 2019-07-05 NOTE — PROGRESS NOTES
Last 5 Patient Entered Readings                                      Current 30 Day Average: 141/81     Recent Readings 7/1/2019 6/28/2019 6/24/2019 6/19/2019 6/14/2019    SBP (mmHg) 125 144 145 140 150    DBP (mmHg) 74 89 76 83 84    Pulse 69 76 63 63 67          Digital Medicine: Health  Follow Up    Left voicemail to follow up with Ms. Claudia Rodriguez.  Current BP average 141/81 mmHg is not at goal, 130/80 mmHg.

## 2019-07-08 ENCOUNTER — PATIENT MESSAGE (OUTPATIENT)
Dept: FAMILY MEDICINE | Facility: CLINIC | Age: 64
End: 2019-07-08

## 2019-07-26 DIAGNOSIS — E78.2 MIXED HYPERLIPIDEMIA: Primary | ICD-10-CM

## 2019-07-26 DIAGNOSIS — I10 ESSENTIAL HYPERTENSION: ICD-10-CM

## 2019-07-26 DIAGNOSIS — Z86.39 HISTORY OF VITAMIN D DEFICIENCY: ICD-10-CM

## 2019-07-26 DIAGNOSIS — R73.03 PREDIABETES: ICD-10-CM

## 2019-07-26 RX ORDER — AMLODIPINE BESYLATE 10 MG/1
TABLET ORAL
Qty: 90 TABLET | Refills: 0 | Status: SHIPPED | OUTPATIENT
Start: 2019-07-26 | End: 2019-10-29 | Stop reason: SDUPTHER

## 2019-07-26 RX ORDER — TRIAMTERENE/HYDROCHLOROTHIAZID 37.5-25 MG
TABLET ORAL
Qty: 90 TABLET | Refills: 0 | Status: SHIPPED | OUTPATIENT
Start: 2019-07-26 | End: 2019-10-21 | Stop reason: SDUPTHER

## 2019-07-26 RX ORDER — ERGOCALCIFEROL 1.25 MG/1
CAPSULE ORAL
Qty: 12 CAPSULE | Refills: 0 | Status: SHIPPED | OUTPATIENT
Start: 2019-07-26 | End: 2019-10-29 | Stop reason: SDUPTHER

## 2019-07-26 RX ORDER — METFORMIN HYDROCHLORIDE 500 MG/1
TABLET, EXTENDED RELEASE ORAL
Qty: 180 TABLET | Refills: 1 | Status: SHIPPED | OUTPATIENT
Start: 2019-07-26 | End: 2020-02-03

## 2019-07-26 NOTE — TELEPHONE ENCOUNTER
Spoke with the pt and scheduled a follow up appt, 10/21/19.  Scheduled fasting labs. Patient verbalized understandings.please advise

## 2019-08-14 ENCOUNTER — PATIENT OUTREACH (OUTPATIENT)
Dept: ADMINISTRATIVE | Facility: OTHER | Age: 64
End: 2019-08-14

## 2019-08-16 ENCOUNTER — PATIENT MESSAGE (OUTPATIENT)
Dept: FAMILY MEDICINE | Facility: CLINIC | Age: 64
End: 2019-08-16

## 2019-08-16 ENCOUNTER — TELEPHONE (OUTPATIENT)
Dept: FAMILY MEDICINE | Facility: CLINIC | Age: 64
End: 2019-08-16

## 2019-08-16 DIAGNOSIS — N39.0 URINARY TRACT INFECTION WITHOUT HEMATURIA, SITE UNSPECIFIED: Primary | ICD-10-CM

## 2019-08-16 NOTE — TELEPHONE ENCOUNTER
----- Message from Rhina Fraire sent at 8/16/2019 11:56 AM CDT -----  Contact: Patient ph [phone]  Type:  Patient Returning Call    Who Called: Patient    Who Left Message for Patient: Sridevi GAITAN    Does the patient know what this is regarding?: Message patient sent    Would the patient rather a call back or a response via [facility]? Call back    Best Call Back Number: [phone]

## 2019-08-16 NOTE — TELEPHONE ENCOUNTER
Pt. Feels like she has a UTI. Every time she empty's her bladder,still feels like her bladder is full. Having urinary frequency. Denies any pain, burning feeling or itching.. Pt. Had Augmentin from an urgent care, out of town .Please advise.

## 2019-08-19 ENCOUNTER — OFFICE VISIT (OUTPATIENT)
Dept: PODIATRY | Facility: CLINIC | Age: 64
End: 2019-08-19
Payer: COMMERCIAL

## 2019-08-19 VITALS — WEIGHT: 242 LBS | BODY MASS INDEX: 35.84 KG/M2 | HEIGHT: 69 IN

## 2019-08-19 DIAGNOSIS — L60.0 INGROWN NAIL: ICD-10-CM

## 2019-08-19 DIAGNOSIS — B35.1 ONYCHOMYCOSIS DUE TO DERMATOPHYTE: Primary | ICD-10-CM

## 2019-08-19 PROCEDURE — 1101F PT FALLS ASSESS-DOCD LE1/YR: CPT | Mod: CPTII,S$GLB,, | Performed by: PODIATRIST

## 2019-08-19 PROCEDURE — 1101F PR PT FALLS ASSESS DOC 0-1 FALLS W/OUT INJ PAST YR: ICD-10-PCS | Mod: CPTII,S$GLB,, | Performed by: PODIATRIST

## 2019-08-19 PROCEDURE — 99213 PR OFFICE/OUTPT VISIT, EST, LEVL III, 20-29 MIN: ICD-10-PCS | Mod: S$GLB,,, | Performed by: PODIATRIST

## 2019-08-19 PROCEDURE — 3008F BODY MASS INDEX DOCD: CPT | Mod: CPTII,S$GLB,, | Performed by: PODIATRIST

## 2019-08-19 PROCEDURE — 99999 PR PBB SHADOW E&M-EST. PATIENT-LVL II: CPT | Mod: PBBFAC,,, | Performed by: PODIATRIST

## 2019-08-19 PROCEDURE — 99213 OFFICE O/P EST LOW 20 MIN: CPT | Mod: S$GLB,,, | Performed by: PODIATRIST

## 2019-08-19 PROCEDURE — 99999 PR PBB SHADOW E&M-EST. PATIENT-LVL II: ICD-10-PCS | Mod: PBBFAC,,, | Performed by: PODIATRIST

## 2019-08-19 PROCEDURE — 3008F PR BODY MASS INDEX (BMI) DOCUMENTED: ICD-10-PCS | Mod: CPTII,S$GLB,, | Performed by: PODIATRIST

## 2019-08-20 RX ORDER — SULFAMETHOXAZOLE AND TRIMETHOPRIM 800; 160 MG/1; MG/1
1 TABLET ORAL 2 TIMES DAILY
Qty: 6 TABLET | Refills: 0 | Status: SHIPPED | OUTPATIENT
Start: 2019-08-20 | End: 2019-08-23

## 2019-08-20 NOTE — PROGRESS NOTES
Subjective:      Patient ID: Claudia Rodriguez is a 64 y.o. female.    Chief Complaint: Ingrown Toenail (Pcp Dr. Marquez 3/11/19); Foot Problem; and Nail Problem      Claudia Rodriguez is a 64 y.o. female who presents to the clinic complaining of painful ingrown toenail on the right foot. Patient has attempted self treatment with nail nippers. She has also been seen by my colleagues for this issue.  She has never had a Phenol and alcohol procedure.    This is a recurrent problem. Patient denies history of trauma. Patient  denies purulent drainage.    Current shoe gear:  Casual shoes    Patient Active Problem List   Diagnosis    Goiter, nodular    HTN (hypertension)    Severe obesity (BMI 35.0-39.9) with comorbidity    Prediabetes    Hyperlipidemia    Hypothyroidism    Vitamin D deficiency disease    Benign meningioma of brain    Statin intolerance    Urinary incontinence    Fatty liver    Atrophic vaginitis    Urinary incontinence, mixed    Nocturia more than twice per night    Pelvic floor weakness    Cirrhosis of liver without ascites       Current Outpatient Medications on File Prior to Visit   Medication Sig Dispense Refill    amLODIPine (NORVASC) 10 MG tablet TAKE ONE TABLET BY MOUTH EVERY DAY 90 tablet 0    CALCIUM CARBONATE/VITAMIN D3 (CALCIUM 500 + D, D3, ORAL)       carvedilol (COREG) 25 MG tablet TAKE ONE TABLET BY MOUTH TWICE DAILY WITH MEALS 60 tablet 5    FOLIC ACID/MV,FE,OTHER MIN (CENTRUM ORAL) Take 1 tablet by mouth.      IMVEXXY STARTER PACK 10 mcg InPk   1    levothyroxine (SYNTHROID) 75 MCG tablet TAKE ONE TABLET BY MOUTH EVERY DAY 90 tablet 3    metFORMIN (GLUCOPHAGE-XR) 500 MG 24 hr tablet TAKE TWO TABLETS BY MOUTH EVERY DAY WITH EVENING MEAL 180 tablet 1    omega-3 fatty acids 1,000 mg Cap Take 1 capsule by mouth Daily.      triamterene-hydrochlorothiazide 37.5-25 mg (MAXZIDE-25) 37.5-25 mg per tablet TAKE ONE TABLET BY MOUTH once DAILY 90 tablet 0    VITAMIN D2 50,000 unit  capsule TAKE TWO CAPSULES BY MOUTH EVERY 2 WEEKS 12 capsule 0     No current facility-administered medications on file prior to visit.        Review of patient's allergies indicates:   Allergen Reactions    Zostavax [zoster vaccine live (pf)] Rash     - injection site red, hot, indurated    Amlodipine-olmesartan      Other reaction(s): pounding in ears      Atorvastatin        Other reaction(s): Unknown    Demerol [meperidine] Other (See Comments)     Other reaction(s): Nausea  Other reaction(s): Headache    Ezetimibe-simvastatin      Other reaction(s): Headache      Lotrel [amlodipine-benazepril]        Other reaction(s): constant cough    Nsaids (non-steroidal anti-inflammatory drug) Other (See Comments)       Other reaction(s): Difficulty breathing    Other reaction(s): Difficulty breathing    Phenytoin sodium extended Other (See Comments)       Past Surgical History:   Procedure Laterality Date    BLADDER SUSPENSION      thyroid biopsy  7/11/12    tonsillectomy      TONSILLECTOMY         Family History   Problem Relation Age of Onset    Coronary artery disease Father 55    Heart disease Father     Hypertension Father     Melanoma Father     Hypertension Mother     Stroke Mother         2015    Hypertension Sister         controlled by lifestyle    No Known Problems Brother     Pancreatic cancer Maternal Grandfather     Heart failure Maternal Grandmother     Thyroid nodules Sister         s/p thyroidectomy    Thyroid disease Sister     Thyroid nodules Sister     No Known Problems Brother     Diabetes Neg Hx     Psoriasis Neg Hx     Lupus Neg Hx     Eczema Neg Hx     Thyroid cancer Neg Hx     Breast cancer Neg Hx     Ovarian cancer Neg Hx     Cervical cancer Neg Hx     Endometrial cancer Neg Hx     Vaginal cancer Neg Hx        Social History     Socioeconomic History    Marital status:      Spouse name: Not on file    Number of children: 1    Years of education: Not on  "file    Highest education level: Not on file   Occupational History    Occupation: human      Employer: [employer]   Social Needs    Financial resource strain: Not on file    Food insecurity:     Worry: Not on file     Inability: Not on file    Transportation needs:     Medical: Not on file     Non-medical: Not on file   Tobacco Use    Smoking status: Never Smoker    Smokeless tobacco: Never Used   Substance and Sexual Activity    Alcohol use: No    Drug use: No    Sexual activity: Yes     Partners: Male   Lifestyle    Physical activity:     Days per week: Not on file     Minutes per session: Not on file    Stress: Not on file   Relationships    Social connections:     Talks on phone: Not on file     Gets together: Not on file     Attends Confucianism service: Not on file     Active member of club or organization: Not on file     Attends meetings of clubs or organizations: Not on file     Relationship status: Not on file   Other Topics Concern    Are you pregnant or think you may be? No    Breast-feeding No   Social History Narrative    3 adopted children.       Review of Systems   Constitution: Negative for chills, diaphoresis and fever.   Cardiovascular: Negative for claudication, cyanosis, leg swelling and syncope.   Respiratory: Negative for cough and shortness of breath.    Skin: Positive for color change, dry skin, nail changes and suspicious lesions. Negative for itching and rash.   Musculoskeletal: Negative for falls, joint pain, joint swelling, muscle cramps and muscle weakness.   Gastrointestinal: Negative for diarrhea, nausea and vomiting.   Neurological: Negative for disturbances in coordination, numbness, paresthesias, sensory change, tremors and weakness.   Psychiatric/Behavioral: Negative for altered mental status and hallucinations.           Objective:      Vitals:    08/19/19 1822   Weight: 109.8 kg (242 lb)   Height: 5' 9" (1.753 m)       Physical Exam   Constitutional:  " Non-toxic appearance. She does not have a sickly appearance. No distress.   Cardiovascular:   Pulses:       Dorsalis pedis pulses are 2+ on the right side, and 2+ on the left side.        Posterior tibial pulses are 2+ on the right side, and 2+ on the left side.   dorsalis pedis and posterior tibial pulses are palpable bilaterally. Capillary refill time is within normal limits.   Pulmonary/Chest: No respiratory distress.   Musculoskeletal:        Right ankle: She exhibits normal range of motion and no swelling. No tenderness. No lateral malleolus, no medial malleolus, no AITFL, no CF ligament and no posterior TFL tenderness found. Achilles tendon exhibits no pain, no defect and normal Owusu's test results.        Left ankle: She exhibits normal range of motion and no swelling. No tenderness. No lateral malleolus, no medial malleolus, no AITFL, no CF ligament and no posterior TFL tenderness found. Achilles tendon exhibits no pain, no defect and normal Owusu's test results.        Right foot: There is tenderness (hallux nail). There is no bony tenderness.        Left foot: There is no tenderness and no bony tenderness.   Neurological: She has normal reflexes. She displays no atrophy and no tremor. No sensory deficit. She exhibits normal muscle tone.   Cylinder-Leana 5.07 monofilament is intact bilateral feet. Sharp/dull sensation is also intact Bilateral feet.     Skin: Skin is warm, dry and intact. No bruising, no burn, no laceration, no lesion and no rash noted. She is not diaphoretic. No cyanosis. No pallor. Nails show no clubbing.   Tenderness to lateral hallux nail margin of right foot with ingrown nail plate and HPK buildup. Surrounding erythema and minimal edema is noted there is no granuloma formation noted. No malodor     Toenails 1-5 bilaterally are elongated by 2-3 mm, thickened by 2-3 mm, discolored/yellowed, dystrophic, brittle with subungual debris.    Psychiatric: Her mood appears not anxious.  Her affect is not inappropriate. Her speech is not slurred. She is not combative. She is communicative. She is attentive.   Nursing note reviewed.            Assessment:       Encounter Diagnoses   Name Primary?    Onychomycosis due to dermatophyte Yes    Ingrown nail          Plan:       Claudia was seen today for ingrown toenail, foot problem and nail problem.    Diagnoses and all orders for this visit:    Onychomycosis due to dermatophyte    Ingrown nail      I counseled the patient on her conditions, their implications and medical management.     In depth conversation on the treatment of ingrown nail; partial nail avulsion vs chemical matrixectomy vs conservative treatment of soaking and nail trimming    Informed patient that many nail problems can be prevented by wearing the right shoes and trimming your nails properly.   The right shoes: Feet were measured.  Patient is to wear shoes that are supportive and roomy enough for toes to wiggle. Look for shoes made of natural materials such as leather, which allow  feet to breathe.   Proper trimming: To avoid problems, she was instructed to trim toenails straight across without cutting down into the corners.      RTC PRN or if patient wishes to pursue ingrown nail procedure        Procedures

## 2019-09-24 ENCOUNTER — PATIENT MESSAGE (OUTPATIENT)
Dept: OTHER | Facility: OTHER | Age: 64
End: 2019-09-24

## 2019-10-01 ENCOUNTER — PATIENT MESSAGE (OUTPATIENT)
Dept: HEPATOLOGY | Facility: CLINIC | Age: 64
End: 2019-10-01

## 2019-10-01 ENCOUNTER — PATIENT MESSAGE (OUTPATIENT)
Dept: ADMINISTRATIVE | Facility: OTHER | Age: 64
End: 2019-10-01

## 2019-10-07 ENCOUNTER — PATIENT OUTREACH (OUTPATIENT)
Dept: ADMINISTRATIVE | Facility: HOSPITAL | Age: 64
End: 2019-10-07

## 2019-10-07 RX ORDER — AMOXICILLIN AND CLAVULANATE POTASSIUM 875; 125 MG/1; MG/1
TABLET, FILM COATED ORAL
Refills: 0 | COMMUNITY
Start: 2019-08-02 | End: 2019-12-13

## 2019-10-07 RX ORDER — PROMETHAZINE HYDROCHLORIDE AND DEXTROMETHORPHAN HYDROBROMIDE 6.25; 15 MG/5ML; MG/5ML
SYRUP ORAL
Refills: 0 | COMMUNITY
Start: 2019-08-02 | End: 2019-10-21

## 2019-10-07 RX ORDER — ESTRADIOL 10 UG/1
INSERT VAGINAL
Refills: 5 | COMMUNITY
Start: 2019-08-20 | End: 2021-12-21

## 2019-10-15 NOTE — PROGRESS NOTES
Digital Medicine: Health  Follow-Up    Patient states that she recently retired and is looking forward to focusing on her health.      The history is provided by the patient.     Follow Up  Follow-up reason(s): reading review      Routine Education Topics: meal planning            Diet:   Patient reports eating or drinking the following: fast food, water and restaurant foodShe does not skip meals regularly.  She has no standard fasting periods.      Patient did not have times when they could not afford food or ran out.      Patient says she recently retired and is looking forward to cooking more.  Sent meal planning websites.    Intervention(s): portion control, meal planning and help with shopping    Assigning the following patient goals: meal plan and maintain low sodium diet    Physical Activity:   When asked if exercising, patient responded: no    She identified the following barriers to physical activity: motivation    Patient states she would like to focus on diet before starting exercise.    Medication Adherence:   She misses doses: never        SDOH    INTERVENTION(S)  recommended diet modifications and reviewed monitoring technique    PLAN  patient verbalizes understanding and patient amenable to changes          Topic    Lipid (Cholesterol) Test        Last 5 Patient Entered Readings                                      Current 30 Day Average: 141/81     Recent Readings 10/12/2019 9/25/2019 9/9/2019 8/27/2019 7/1/2019    SBP (mmHg) 142 140 126 148 125    DBP (mmHg) 82 79 75 84 74    Pulse 70 74 68 68 69

## 2019-10-17 ENCOUNTER — LAB VISIT (OUTPATIENT)
Dept: LAB | Facility: HOSPITAL | Age: 64
End: 2019-10-17
Attending: INTERNAL MEDICINE
Payer: COMMERCIAL

## 2019-10-17 DIAGNOSIS — R73.03 PREDIABETES: ICD-10-CM

## 2019-10-17 DIAGNOSIS — K74.00 LIVER FIBROSIS: ICD-10-CM

## 2019-10-17 DIAGNOSIS — E78.2 MIXED HYPERLIPIDEMIA: ICD-10-CM

## 2019-10-17 LAB
AFP SERPL-MCNC: 3.7 NG/ML (ref 0–8.4)
ALBUMIN SERPL BCP-MCNC: 3.9 G/DL (ref 3.5–5.2)
ALP SERPL-CCNC: 65 U/L (ref 55–135)
ALT SERPL W/O P-5'-P-CCNC: 23 U/L (ref 10–44)
ANION GAP SERPL CALC-SCNC: 11 MMOL/L (ref 8–16)
AST SERPL-CCNC: 18 U/L (ref 10–40)
BASOPHILS # BLD AUTO: 0.04 K/UL (ref 0–0.2)
BASOPHILS NFR BLD: 0.5 % (ref 0–1.9)
BILIRUB SERPL-MCNC: 0.7 MG/DL (ref 0.1–1)
BUN SERPL-MCNC: 18 MG/DL (ref 8–23)
CALCIUM SERPL-MCNC: 9.5 MG/DL (ref 8.7–10.5)
CHLORIDE SERPL-SCNC: 105 MMOL/L (ref 95–110)
CHOLEST SERPL-MCNC: 260 MG/DL (ref 120–199)
CHOLEST/HDLC SERPL: 5.5 {RATIO} (ref 2–5)
CO2 SERPL-SCNC: 26 MMOL/L (ref 23–29)
CREAT SERPL-MCNC: 0.8 MG/DL (ref 0.5–1.4)
DIFFERENTIAL METHOD: ABNORMAL
EOSINOPHIL # BLD AUTO: 0.1 K/UL (ref 0–0.5)
EOSINOPHIL NFR BLD: 0.9 % (ref 0–8)
ERYTHROCYTE [DISTWIDTH] IN BLOOD BY AUTOMATED COUNT: 12.9 % (ref 11.5–14.5)
EST. GFR  (AFRICAN AMERICAN): >60 ML/MIN/1.73 M^2
EST. GFR  (NON AFRICAN AMERICAN): >60 ML/MIN/1.73 M^2
ESTIMATED AVG GLUCOSE: 111 MG/DL (ref 68–131)
GLUCOSE SERPL-MCNC: 127 MG/DL (ref 70–110)
HBA1C MFR BLD HPLC: 5.5 % (ref 4–5.6)
HCT VFR BLD AUTO: 40.3 % (ref 37–48.5)
HDLC SERPL-MCNC: 47 MG/DL (ref 40–75)
HDLC SERPL: 18.1 % (ref 20–50)
HGB BLD-MCNC: 12.7 G/DL (ref 12–16)
IMM GRANULOCYTES # BLD AUTO: 0.04 K/UL (ref 0–0.04)
IMM GRANULOCYTES NFR BLD AUTO: 0.5 % (ref 0–0.5)
INR PPP: 1 (ref 0.8–1.2)
LDLC SERPL CALC-MCNC: 173.2 MG/DL (ref 63–159)
LYMPHOCYTES # BLD AUTO: 3.1 K/UL (ref 1–4.8)
LYMPHOCYTES NFR BLD: 42.2 % (ref 18–48)
MCH RBC QN AUTO: 31.2 PG (ref 27–31)
MCHC RBC AUTO-ENTMCNC: 31.5 G/DL (ref 32–36)
MCV RBC AUTO: 99 FL (ref 82–98)
MONOCYTES # BLD AUTO: 0.7 K/UL (ref 0.3–1)
MONOCYTES NFR BLD: 9 % (ref 4–15)
NEUTROPHILS # BLD AUTO: 3.5 K/UL (ref 1.8–7.7)
NEUTROPHILS NFR BLD: 46.9 % (ref 38–73)
NONHDLC SERPL-MCNC: 213 MG/DL
NRBC BLD-RTO: 0 /100 WBC
PLATELET # BLD AUTO: 305 K/UL (ref 150–350)
PMV BLD AUTO: 9.8 FL (ref 9.2–12.9)
POTASSIUM SERPL-SCNC: 4.1 MMOL/L (ref 3.5–5.1)
PROT SERPL-MCNC: 6.6 G/DL (ref 6–8.4)
PROTHROMBIN TIME: 10.8 SEC (ref 9–12.5)
RBC # BLD AUTO: 4.07 M/UL (ref 4–5.4)
SODIUM SERPL-SCNC: 142 MMOL/L (ref 136–145)
TRIGL SERPL-MCNC: 199 MG/DL (ref 30–150)
WBC # BLD AUTO: 7.37 K/UL (ref 3.9–12.7)

## 2019-10-17 PROCEDURE — 85610 PROTHROMBIN TIME: CPT

## 2019-10-17 PROCEDURE — 80061 LIPID PANEL: CPT

## 2019-10-17 PROCEDURE — 83036 HEMOGLOBIN GLYCOSYLATED A1C: CPT

## 2019-10-17 PROCEDURE — 82105 ALPHA-FETOPROTEIN SERUM: CPT

## 2019-10-17 PROCEDURE — 36415 COLL VENOUS BLD VENIPUNCTURE: CPT | Mod: PO

## 2019-10-17 PROCEDURE — 85025 COMPLETE CBC W/AUTO DIFF WBC: CPT

## 2019-10-17 PROCEDURE — 80053 COMPREHEN METABOLIC PANEL: CPT

## 2019-10-21 ENCOUNTER — OFFICE VISIT (OUTPATIENT)
Dept: FAMILY MEDICINE | Facility: CLINIC | Age: 64
End: 2019-10-21
Payer: MEDICARE

## 2019-10-21 VITALS
BODY MASS INDEX: 36.15 KG/M2 | HEART RATE: 72 BPM | SYSTOLIC BLOOD PRESSURE: 128 MMHG | HEIGHT: 69 IN | TEMPERATURE: 98 F | WEIGHT: 244.06 LBS | OXYGEN SATURATION: 98 % | DIASTOLIC BLOOD PRESSURE: 70 MMHG

## 2019-10-21 DIAGNOSIS — R73.03 PREDIABETES: ICD-10-CM

## 2019-10-21 DIAGNOSIS — K74.60 CIRRHOSIS OF LIVER WITHOUT ASCITES, UNSPECIFIED HEPATIC CIRRHOSIS TYPE: ICD-10-CM

## 2019-10-21 DIAGNOSIS — Z23 NEED FOR STREPTOCOCCUS PNEUMONIAE VACCINATION: ICD-10-CM

## 2019-10-21 DIAGNOSIS — I10 ESSENTIAL HYPERTENSION: ICD-10-CM

## 2019-10-21 DIAGNOSIS — D32.0 BENIGN MENINGIOMA OF BRAIN: ICD-10-CM

## 2019-10-21 DIAGNOSIS — Z00.00 ROUTINE MEDICAL EXAM: Primary | ICD-10-CM

## 2019-10-21 DIAGNOSIS — Z78.9 STATIN INTOLERANCE: ICD-10-CM

## 2019-10-21 DIAGNOSIS — M65.4 DE QUERVAIN'S TENOSYNOVITIS, RIGHT: ICD-10-CM

## 2019-10-21 DIAGNOSIS — E66.01 SEVERE OBESITY (BMI 35.0-39.9) WITH COMORBIDITY: ICD-10-CM

## 2019-10-21 DIAGNOSIS — E78.2 MIXED HYPERLIPIDEMIA: ICD-10-CM

## 2019-10-21 DIAGNOSIS — Z71.89 ADVANCED DIRECTIVES, COUNSELING/DISCUSSION: ICD-10-CM

## 2019-10-21 PROCEDURE — 99999 PR PBB SHADOW E&M-EST. PATIENT-LVL III: ICD-10-PCS | Mod: PBBFAC,,, | Performed by: INTERNAL MEDICINE

## 2019-10-21 PROCEDURE — 99397 PER PM REEVAL EST PAT 65+ YR: CPT | Mod: S$PBB,,, | Performed by: INTERNAL MEDICINE

## 2019-10-21 PROCEDURE — 99397 PR PREVENTIVE VISIT,EST,65 & OVER: ICD-10-PCS | Mod: S$PBB,,, | Performed by: INTERNAL MEDICINE

## 2019-10-21 PROCEDURE — G0009 ADMIN PNEUMOCOCCAL VACCINE: HCPCS | Mod: PBBFAC,PO

## 2019-10-21 PROCEDURE — 99213 OFFICE O/P EST LOW 20 MIN: CPT | Mod: PBBFAC,PO,25 | Performed by: INTERNAL MEDICINE

## 2019-10-21 PROCEDURE — 99999 PR PBB SHADOW E&M-EST. PATIENT-LVL III: CPT | Mod: PBBFAC,,, | Performed by: INTERNAL MEDICINE

## 2019-10-21 RX ORDER — TRIAMTERENE/HYDROCHLOROTHIAZID 37.5-25 MG
1 TABLET ORAL DAILY
Qty: 90 TABLET | Refills: 1 | Status: SHIPPED | OUTPATIENT
Start: 2019-10-21 | End: 2020-10-28

## 2019-10-21 NOTE — PROGRESS NOTES
Patient received Pneumococcal 13 vaccine and tolerated it well. Advise 15 min wait for any possible reactions. Patient received VIS information.

## 2019-10-21 NOTE — PROGRESS NOTES
HISTORY OF PRESENT ILLNESS:  Claudia Rodriguez is a 64 y.o. female who presents to the clinic today for a routine medical physical exam. Her last physical exam was approximately 1 years(s) ago.        PAST MEDICAL HISTORY:  Past Medical History:   Diagnosis Date    Cholelithiasis     DDD (degenerative disc disease), lumbar     Fatty liver     - noted on U/S 11/2017    Goiter, nodular     HTN (hypertension)     Hyperglycemia     Hyperlipidemia     Hypothyroidism     Hypovitaminosis D     Meningioma     right frontal lobe ; followed by Dr. Enciso once a year    Obesity     Prediabetes     Urinary bladder disorder        PAST SURGICAL HISTORY:  Past Surgical History:   Procedure Laterality Date    BLADDER SUSPENSION      thyroid biopsy  7/11/12    tonsillectomy      TONSILLECTOMY         SOCIAL HISTORY:  Social History     Socioeconomic History    Marital status:      Spouse name: Not on file    Number of children: 1    Years of education: Not on file    Highest education level: Not on file   Occupational History    Occupation: human      Employer: [employer]   Social Needs    Financial resource strain: Not on file    Food insecurity:     Worry: Not on file     Inability: Not on file    Transportation needs:     Medical: Not on file     Non-medical: Not on file   Tobacco Use    Smoking status: Never Smoker    Smokeless tobacco: Never Used   Substance and Sexual Activity    Alcohol use: No    Drug use: No    Sexual activity: Yes     Partners: Male   Lifestyle    Physical activity:     Days per week: Not on file     Minutes per session: Not on file    Stress: Not on file   Relationships    Social connections:     Talks on phone: Not on file     Gets together: Not on file     Attends [employer] service: Not on file     Active member of club or organization: Not on file     Attends meetings of clubs or organizations: Not on file     Relationship status: Not on file   Other  Topics Concern    Are you pregnant or think you may be? No    Breast-feeding No   Social History Narrative    3 adopted children.       FAMILY HISTORY:  Family History   Problem Relation Age of Onset    Coronary artery disease Father 55    Heart disease Father     Hypertension Father     Melanoma Father     Hypertension Mother     Stroke Mother         2015    Hypertension Sister         controlled by lifestyle    No Known Problems Brother     Pancreatic cancer Maternal Grandfather     Heart failure Maternal Grandmother     Thyroid nodules Sister         s/p thyroidectomy    Thyroid disease Sister     Thyroid nodules Sister     No Known Problems Brother     Diabetes Neg Hx     Psoriasis Neg Hx     Lupus Neg Hx     Eczema Neg Hx     Thyroid cancer Neg Hx     Breast cancer Neg Hx     Ovarian cancer Neg Hx     Cervical cancer Neg Hx     Endometrial cancer Neg Hx     Vaginal cancer Neg Hx        ALLERGIES AND MEDICATIONS: updated and reviewed.  Review of patient's allergies indicates:   Allergen Reactions    Zostavax [zoster vaccine live (pf)] Rash     - injection site red, hot, indurated    Amlodipine-olmesartan      Other reaction(s): pounding in ears      Atorvastatin        Other reaction(s): Unknown    Demerol [meperidine] Other (See Comments)     Other reaction(s): Nausea  Other reaction(s): Headache    Ezetimibe-simvastatin      Other reaction(s): Headache      Lotrel [amlodipine-benazepril]        Other reaction(s): constant cough    Nsaids (non-steroidal anti-inflammatory drug) Other (See Comments)       Other reaction(s): Difficulty breathing    Other reaction(s): Difficulty breathing    Phenytoin sodium extended Other (See Comments)     Medication List with Changes/Refills   Current Medications    AMLODIPINE (NORVASC) 10 MG TABLET    TAKE ONE TABLET BY MOUTH EVERY DAY    AMOXICILLIN-CLAVULANATE 875-125MG (AUGMENTIN) 875-125 MG PER TABLET        CALCIUM CARBONATE/VITAMIN D3  (CALCIUM 500 + D, D3, ORAL)        CARVEDILOL (COREG) 25 MG TABLET    TAKE ONE TABLET BY MOUTH TWICE DAILY WITH MEALS    FOLIC ACID/MV,FE,OTHER MIN (CENTRUM ORAL)    Take 1 tablet by mouth.    IMVEXXY MAINTENANCE PACK 10 MCG INST        LEVOTHYROXINE (SYNTHROID) 75 MCG TABLET    TAKE ONE TABLET BY MOUTH EVERY DAY    METFORMIN (GLUCOPHAGE-XR) 500 MG 24 HR TABLET    TAKE TWO TABLETS BY MOUTH EVERY DAY WITH EVENING MEAL    OMEGA-3 FATTY ACIDS 1,000 MG CAP    Take 1 capsule by mouth Daily.    TRIAMTERENE-HYDROCHLOROTHIAZIDE 37.5-25 MG (MAXZIDE-25) 37.5-25 MG PER TABLET    TAKE ONE TABLET BY MOUTH once DAILY    VITAMIN D2 50,000 UNIT CAPSULE    TAKE TWO CAPSULES BY MOUTH EVERY 2 WEEKS   Discontinued Medications    IMVEXXY STARTER PACK 10 MCG INPK        PROMETHAZINE-DEXTROMETHORPHAN (PROMETHAZINE-DM) 6.25-15 MG/5 ML SYRP             CARE TEAM:  Patient Care Team:  Jessica Marquez MD as PCP - General (Internal Medicine)  Bisi Madrigal MD as Consulting Physician (Endocrinology)  Fernie Hammond MD as Consulting Physician (Neurosurgery)  Jessica Marquez MD as Hypertension Digital Medicine Responsible Provider (Internal Medicine)  Neeraj MonsivaisD as Hypertension Digital Medicine Clinician (Pharmacist)  Anirudh Selby MD as Consulting Physician (Obstetrics and Gynecology)  June Sherman as Digital Medicine Health   Albertina Pickens LPN as Licensed Practical Nurse           SCREENING HISTORY:  Health Maintenance       Date Due Completion Date    Shingles Vaccine (2 of 3) 08/17/2016 6/22/2016    Pneumococcal Vaccine (65+ Low/Medium Risk) (1 of 2 - PCV13) 03/11/2019 ---    Fecal Occult Blood Test (FOBT)/[employer] 01/26/2020 1/26/2019    Lipid Panel 10/17/2020 10/17/2019    Hemoglobin A1c 10/17/2020 10/17/2019    Mammogram 05/31/2021 5/31/2019 (Done)    Override on 5/31/2019: Done ([facility]inet [facility])    Override on 5/25/2016: Done    Override on 2/3/2014: Done ([facility] -  "normal)    Override on 12/11/2012: Done    Override on 10/27/2010: Done    TETANUS VACCINE 09/28/2025 9/28/2015            REVIEW OF SYSTEMS:   The patient reports: they are improving their dietary habits - recently retired.  The patient reports : will start exercising now that she is retired.  Review of Systems   Constitutional: Negative for chills, fatigue, fever and unexpected weight change.   HENT: Negative for congestion and postnasal drip.    Eyes: Negative for pain and visual disturbance.   Respiratory: Negative for cough, shortness of breath and wheezing.    Cardiovascular: Negative for chest pain, palpitations and leg swelling.   Gastrointestinal: Negative for abdominal pain, constipation, diarrhea, nausea and vomiting.   Genitourinary: Negative for dysuria.   Musculoskeletal: Positive for arthralgias (- pain at base of right thumb x several months; pain relieved with splinting). Negative for back pain.   Skin: Negative for rash.   Neurological: Negative for weakness and headaches.   Psychiatric/Behavioral: Positive for sleep disturbance (- has been sleeping more now that she is retired; takes low dose benadryl at night to sleep). Negative for dysphoric mood. The patient is not nervous/anxious.       ROS (Optional)-: no pelvic pain  Breast ROS (Optional)-: negative for breast lumps/discharge            Physical Examination:   Vitals:    10/21/19 0852   BP: 128/70   Pulse: 72   Temp: 97.9 °F (36.6 °C)     Weight: 110.7 kg (244 lb 0.8 oz)   Height: 5' 9" (175.3 cm)   Body mass index is 36.04 kg/m².      Patient did not require to have a chaperone present during the exam today.    General appearance - alert, well appearing, and in no distress, obese  Mental status - alert, oriented to person, place, and time, normal mood, behavior, speech, dress, motor activity, and thought processes  Eyes - pupils equal and reactive, extraocular eye movements intact, sclera anicteric  Ears - bilateral TM's and external ear " canals normal  Mouth - mucous membranes moist, pharynx normal without lesions  Neck - supple, no significant adenopathy, carotids upstroke normal bilaterally, no bruits  Lymphatics - no palpable cervical lymphadenopathy  Chest - clear to auscultation, no wheezes, rales or rhonchi, symmetric air entry  Heart - normal rate and regular rhythm, no gallops noted  Abdomen - soft, nontender, nondistended, no masses or organomegaly  Breasts - not examined  Back exam - not examined  Neurological - alert, normal speech, no focal findings or movement disorder noted, cranial nerves II through XII intact  Musculoskeletal - patient noted to have Mild-Moderate osteoarthritic changes to both knee joints. No joint effusions noted., no muscular tenderness noted, Finkelstein test positive - right  Extremities - no pedal edema noted  Skin - normal coloration and turgor, no rashes, no suspicious skin lesions noted      Labs:  Results for orders placed or performed in visit on 10/17/19   AFP tumor marker   Result Value Ref Range    AFP 3.7 0.0 - 8.4 ng/mL   CBC auto differential   Result Value Ref Range    WBC 7.37 3.90 - 12.70 K/uL    RBC 4.07 4.00 - 5.40 M/uL    Hemoglobin 12.7 12.0 - 16.0 g/dL    Hematocrit 40.3 37.0 - 48.5 %    Mean Corpuscular Volume 99 (H) 82 - 98 fL    Mean Corpuscular Hemoglobin 31.2 (H) 27.0 - 31.0 pg    Mean Corpuscular Hemoglobin Conc 31.5 (L) 32.0 - 36.0 g/dL    RDW 12.9 11.5 - 14.5 %    Platelets 305 150 - 350 K/uL    MPV 9.8 9.2 - 12.9 fL    Immature Granulocytes 0.5 0.0 - 0.5 %    Gran # (ANC) 3.5 1.8 - 7.7 K/uL    Immature Grans (Abs) 0.04 0.00 - 0.04 K/uL    Lymph # 3.1 1.0 - 4.8 K/uL    Mono # 0.7 0.3 - 1.0 K/uL    Eos # 0.1 0.0 - 0.5 K/uL    Baso # 0.04 0.00 - 0.20 K/uL    nRBC 0 0 /100 WBC    Gran% 46.9 38.0 - 73.0 %    Lymph% 42.2 18.0 - 48.0 %    Mono% 9.0 4.0 - 15.0 %    Eosinophil% 0.9 0.0 - 8.0 %    Basophil% 0.5 0.0 - 1.9 %    Differential Method Automated    Comprehensive metabolic panel    Result Value Ref Range    Sodium 142 136 - 145 mmol/L    Potassium 4.1 3.5 - 5.1 mmol/L    Chloride 105 95 - 110 mmol/L    CO2 26 23 - 29 mmol/L    Glucose 127 (H) 70 - 110 mg/dL    BUN, Bld 18 8 - 23 mg/dL    Creatinine 0.8 0.5 - 1.4 mg/dL    Calcium 9.5 8.7 - 10.5 mg/dL    Total Protein 6.6 6.0 - 8.4 g/dL    Albumin 3.9 3.5 - 5.2 g/dL    Total Bilirubin 0.7 0.1 - 1.0 mg/dL    Alkaline Phosphatase 65 55 - 135 U/L    AST 18 10 - 40 U/L    ALT 23 10 - 44 U/L    Anion Gap 11 8 - 16 mmol/L    eGFR if African American >60.0 >60 mL/min/1.73 m^2    eGFR if non African American >60.0 >60 mL/min/1.73 m^2   Protime-INR   Result Value Ref Range    Prothrombin Time 10.8 9.0 - 12.5 sec    INR 1.0 0.8 - 1.2   Lipid panel   Result Value Ref Range    Cholesterol 260 (H) 120 - 199 mg/dL    Triglycerides 199 (H) 30 - 150 mg/dL    HDL 47 40 - 75 mg/dL    LDL Cholesterol 173.2 (H) 63.0 - 159.0 mg/dL    Hdl/Cholesterol Ratio 18.1 (L) 20.0 - 50.0 %    Total Cholesterol/HDL Ratio 5.5 (H) 2.0 - 5.0    Non-HDL Cholesterol 213 mg/dL   Hemoglobin A1c   Result Value Ref Range    Hemoglobin A1C 5.5 4.0 - 5.6 %    Estimated Avg Glucose 111 68 - 131 mg/dL          ASSESSMENT AND PLAN:  1. Routine medical exam  Counseled on age appropriate medical preventative services including age appropriate cancer screenings, age appropriate eye and dental exams, over all nutritional health, need for a consistent exercise regimen, and an over all push towards maintaining a vigorous and active lifestyle.  Counseled on age appropriate vaccines and discussed upcoming health care needs based on age/gender. Discussed good sleep hygiene and stress management.     2. Essential hypertension  Discussed sodium restriction, maintaining ideal body weight and regular exercise program as physiologic means to achieve blood pressure control. The patient will strive towards this. The current medical regimen is effective;  continue present plan and medications. Recommended  patient to check home readings to monitor and see me for followup as scheduled or sooner as needed. Patient was educated that both decongestant and anti-inflammatory medication may raise blood pressure.   She is active on the digital hypertension program.    3. Mixed hyperlipidemia/4. Statin intolerance  We discussed low fat diet and regular exercise. Patient is statin intolerant.     5. Prediabetes  Stable. We discussed low sugar/low carbohydrate diet and regular exercise to prevent progression. No need for prescription medication at this time.     6. Cirrhosis of liver without ascites, unspecified hepatic cirrhosis type  Asymptomatic. She has follow up with hepatology in the near future. She is planning to work on weight loss.    7. Benign meningioma of brain  Stable. Asymptomatic. Observe. Followed by [facility].    8. Severe obesity (BMI 35.0-39.9) with comorbidity  The patient is asked to make an attempt to improve diet and exercise patterns to aid in medical management of this problem.     9. Advanced directives, counseling/discussion  Advance Care Planning   I initiated the process and explained the importance of advance care planning today with the patient.  Advanced directives were discussed due to patient's age and/or chronic medical conditions. Prognosis based on current condition: good.   Paperwork was given to complete living will (at this point in time, the patient does have full decision-making capacity.  We discussed different extreme health states that she could experience, and reviewed what kind of medical care she would want in those situations) and medical POA (The patient received detailed information about the importance of designating a Health Care Power of . She was also instructed to communicate with this person about their wishes for future healthcare, should she become sick and lose decision-making capacity).   [place] was not discussed.   Approximately 3 minute(s) were spent on  counseling/discussion regarding end of life decision making.            10. Need for Streptococcus pneumoniae vaccination  Completed today.          Follow up in about 6 months (around 4/21/2020), or if symptoms worsen or fail to improve, for follow up chronic medical conditions.. or sooner as needed.

## 2019-10-29 ENCOUNTER — OFFICE VISIT (OUTPATIENT)
Dept: FAMILY MEDICINE | Facility: CLINIC | Age: 64
End: 2019-10-29
Payer: COMMERCIAL

## 2019-10-29 VITALS
HEIGHT: 69 IN | TEMPERATURE: 99 F | SYSTOLIC BLOOD PRESSURE: 130 MMHG | WEIGHT: 240.94 LBS | DIASTOLIC BLOOD PRESSURE: 82 MMHG | BODY MASS INDEX: 35.69 KG/M2 | HEART RATE: 72 BPM | OXYGEN SATURATION: 96 %

## 2019-10-29 DIAGNOSIS — I10 ESSENTIAL HYPERTENSION: ICD-10-CM

## 2019-10-29 DIAGNOSIS — Z86.39 HISTORY OF VITAMIN D DEFICIENCY: ICD-10-CM

## 2019-10-29 DIAGNOSIS — J06.9 VIRAL URI WITH COUGH: Primary | ICD-10-CM

## 2019-10-29 PROCEDURE — 99999 PR PBB SHADOW E&M-EST. PATIENT-LVL III: CPT | Mod: PBBFAC,,, | Performed by: NURSE PRACTITIONER

## 2019-10-29 PROCEDURE — 99999 PR PBB SHADOW E&M-EST. PATIENT-LVL III: ICD-10-PCS | Mod: PBBFAC,,, | Performed by: NURSE PRACTITIONER

## 2019-10-29 PROCEDURE — 99213 OFFICE O/P EST LOW 20 MIN: CPT | Mod: PBBFAC,PO | Performed by: NURSE PRACTITIONER

## 2019-10-29 PROCEDURE — 99214 PR OFFICE/OUTPT VISIT, EST, LEVL IV, 30-39 MIN: ICD-10-PCS | Mod: S$PBB,,, | Performed by: NURSE PRACTITIONER

## 2019-10-29 PROCEDURE — 99214 OFFICE O/P EST MOD 30 MIN: CPT | Mod: S$PBB,,, | Performed by: NURSE PRACTITIONER

## 2019-10-29 RX ORDER — AMLODIPINE BESYLATE 10 MG/1
TABLET ORAL
Qty: 90 TABLET | Refills: 1 | Status: SHIPPED | OUTPATIENT
Start: 2019-10-29 | End: 2020-07-30

## 2019-10-29 RX ORDER — FLUTICASONE PROPIONATE 50 MCG
2 SPRAY, SUSPENSION (ML) NASAL DAILY
Qty: 16 G | Refills: 0 | Status: SHIPPED | OUTPATIENT
Start: 2019-10-29 | End: 2019-12-13

## 2019-10-29 RX ORDER — ERGOCALCIFEROL 1.25 MG/1
CAPSULE ORAL
Qty: 12 CAPSULE | Refills: 1 | Status: SHIPPED | OUTPATIENT
Start: 2019-10-29 | End: 2020-06-30 | Stop reason: SDUPTHER

## 2019-10-29 RX ORDER — PREDNISONE 20 MG/1
20 TABLET ORAL 2 TIMES DAILY
Qty: 10 TABLET | Refills: 0 | Status: SHIPPED | OUTPATIENT
Start: 2019-10-29 | End: 2019-11-03

## 2019-10-29 RX ORDER — DEXAMETHASONE SODIUM PHOSPHATE 4 MG/ML
8 INJECTION, SOLUTION INTRA-ARTICULAR; INTRALESIONAL; INTRAMUSCULAR; INTRAVENOUS; SOFT TISSUE
Status: DISCONTINUED | OUTPATIENT
Start: 2019-10-29 | End: 2019-10-29

## 2019-10-29 RX ORDER — PROMETHAZINE HYDROCHLORIDE AND DEXTROMETHORPHAN HYDROBROMIDE 6.25; 15 MG/5ML; MG/5ML
5 SYRUP ORAL EVERY 12 HOURS PRN
Qty: 180 ML | Refills: 0 | Status: SHIPPED | OUTPATIENT
Start: 2019-10-29 | End: 2019-11-08

## 2019-10-29 NOTE — PATIENT INSTRUCTIONS
Viral Upper Respiratory Illness (Adult)  You have a viral upper respiratory illness (URI), which is another term for the common cold. This illness is contagious during the first few days. It is spread through the air by coughing and sneezing. It may also be spread by direct contact (touching the sick person and then touching your own eyes, nose, or mouth). Frequent handwashing will decrease risk of spread. Most viral illnesses go away within 7 to 10 days with rest and simple home remedies. Sometimes the illness may last for several weeks. Antibiotics will not kill a virus, and they are generally not prescribed for this condition.    Home care  · If symptoms are severe, rest at home for the first 2 to 3 days. When you resume activity, don't let yourself get too tired.  · Avoid being exposed to cigarette smoke (yours or others).  · You may use acetaminophen or ibuprofen to control pain and fever, unless another medicine was prescribed. (Note: If you have chronic liver or kidney disease, have ever had a stomach ulcer or gastrointestinal bleeding, or are taking blood-thinning medicines, talk with your healthcare provider before using these medicines.) Aspirin should never be given to anyone under 18 years of age who is ill with a viral infection or fever. It may cause severe liver or brain damage.  · Your appetite may be poor, so a light diet is fine. Avoid dehydration by drinking 6 to 8 glasses of fluids per day (water, soft drinks, juices, tea, or soup). Extra fluids will help loosen secretions in the nose and lungs.  · Over-the-counter cold medicines will not shorten the length of time youre sick, but they may be helpful for the following symptoms: cough, sore throat, and nasal and sinus congestion. (Note: Do not use decongestants if you have high blood pressure.)  Follow-up care  Follow up with your healthcare provider, or as advised.  When to seek medical advice  Call your healthcare provider right away if any  of these occur:  · Cough with lots of colored sputum (mucus)  · Severe headache; face, neck, or ear pain  · Difficulty swallowing due to throat pain  · Fever of 100.4°F (38°C)  Call 911, or get immediate medical care  Call emergency services right away if any of these occur:  · Chest pain, shortness of breath, wheezing, or difficulty breathing  · Coughing up blood  · Inability to swallow due to throat pain  Date Last Reviewed: 9/13/2015  © 8159-7477 [employer]. [address] [place] [address]. All rights reserved. This information is not intended as a substitute for professional medical care. Always follow your healthcare professional's instructions.

## 2019-10-29 NOTE — PROGRESS NOTES
Subjective:       Patient ID: Claudia Rodriguez is a 64 y.o. female.    Chief Complaint: URI (cough,congestion, heaviness in chest, wheezing, soreness due to cough X Friday )    Cough   This is a new problem. The current episode started in the past 7 days. The problem has been gradually worsening. The problem occurs every few minutes. The cough is productive of sputum and productive of blood-tinged sputum. Associated symptoms include ear pain, a fever, nasal congestion, postnasal drip, rhinorrhea, a sore throat and wheezing. Pertinent negatives include no chest pain, chills, headaches, heartburn or shortness of breath. The symptoms are aggravated by lying down. She has tried OTC cough suppressant for the symptoms. The treatment provided no relief. Her past medical history is significant for bronchitis and pneumonia. There is no history of asthma, bronchiectasis, COPD, emphysema or environmental allergies.       Past Medical History:   Diagnosis Date    Cholelithiasis     DDD (degenerative disc disease), lumbar     Fatty liver     - noted on U/S 11/2017    Goiter, nodular     HTN (hypertension)     Hyperglycemia     Hyperlipidemia     Hypothyroidism     Hypovitaminosis D     Meningioma     right frontal lobe ; followed by Dr. Enciso once a year    Obesity     Prediabetes     Urinary bladder disorder        Social History     Socioeconomic History    Marital status:      Spouse name: Not on file    Number of children: 1    Years of education: Not on file    Highest education level: Not on file   Occupational History    Occupation: human      Employer: [employer]   Social Needs    Financial resource strain: Not on file    Food insecurity:     Worry: Not on file     Inability: Not on file    Transportation needs:     Medical: Not on file     Non-medical: Not on file   Tobacco Use    Smoking status: Never Smoker    Smokeless tobacco: Never Used   Substance and Sexual Activity     "Alcohol use: No    Drug use: No    Sexual activity: Yes     Partners: Male   Lifestyle    Physical activity:     Days per week: Not on file     Minutes per session: Not on file    Stress: Not on file   Relationships    Social connections:     Talks on phone: Not on file     Gets together: Not on file     Attends Faith service: Not on file     Active member of club or organization: Not on file     Attends meetings of clubs or organizations: Not on file     Relationship status: Not on file   Other Topics Concern    Are you pregnant or think you may be? No    Breast-feeding No   Social History Narrative    3 adopted children.       Past Surgical History:   Procedure Laterality Date    BLADDER SUSPENSION      thyroid biopsy  7/11/12    tonsillectomy      TONSILLECTOMY         Review of Systems   Constitutional: Positive for fever. Negative for chills.   HENT: Positive for congestion, ear pain, postnasal drip, rhinorrhea, sinus pressure and sore throat. Negative for sinus pain, sneezing, tinnitus and trouble swallowing.    Respiratory: Positive for cough and wheezing. Negative for shortness of breath.    Cardiovascular: Negative for chest pain.   Gastrointestinal: Negative for heartburn.   Allergic/Immunologic: Negative for environmental allergies.   Neurological: Negative for dizziness, light-headedness and headaches.   All other systems reviewed and are negative.      Objective:   /82 (BP Location: Left arm, Patient Position: Sitting, BP Method: Large (Manual))   Pulse 72   Temp 99 °F (37.2 °C) (Oral)   Ht 5' 9" (1.753 m)   Wt 109.3 kg (240 lb 15.4 oz)   SpO2 96%   BMI 35.58 kg/m²      Physical Exam   Constitutional: She is oriented to person, place, and time. She appears well-developed and well-nourished. She is cooperative.   HENT:   Head: Normocephalic and atraumatic.   Right Ear: Hearing, external ear and ear canal normal. A middle ear effusion is present.   Left Ear: Hearing, tympanic " membrane, external ear and ear canal normal.  No middle ear effusion.   Nose: No rhinorrhea. Right sinus exhibits frontal sinus tenderness. Right sinus exhibits no maxillary sinus tenderness. Left sinus exhibits frontal sinus tenderness. Left sinus exhibits no maxillary sinus tenderness.   Mouth/Throat: Posterior oropharyngeal erythema present. No oropharyngeal exudate or posterior oropharyngeal edema.   +PND   Cardiovascular: Normal rate and regular rhythm.   Pulmonary/Chest: Effort normal and breath sounds normal. No respiratory distress. She has no decreased breath sounds. She has no wheezes. She has no rhonchi. She has no rales.   Lymphadenopathy:     She has no cervical adenopathy.   Neurological: She is alert and oriented to person, place, and time.   Skin: Skin is warm, dry and intact. She is not diaphoretic. No pallor.   Psychiatric: She has a normal mood and affect. Her speech is normal and behavior is normal.   Vitals reviewed.      Assessment:       1. Viral URI with cough        Plan:       Claudia was seen today for uri.    Diagnoses and all orders for this visit:    Viral URI with cough  -     promethazine-dextromethorphan (PROMETHAZINE-DM) 6.25-15 mg/5 mL Syrp; Take 5 mLs by mouth every 12 (twelve) hours as needed.  -     dexamethasone injection 8 mg  -     fluticasone propionate (FLONASE) 50 mcg/actuation nasal spray; 2 sprays (100 mcg total) by Each Nostril route once daily.  -     Increase your water intake to 64-80 oz daily to help thin mucus  -     Nasal Saline spray (Over the counter [place] spray or [place])  2 sprays each nostril 2-3 times a day for nasal congestion  -     Tylenol 500 mg 2 tablets or Ibuprofen 200 mg 2 tablets every 4-6 hours as needed for fever, headaches, sore throat, ear pain, bodyaches, and/or nasal/sinus inflammation  -     Warm salt water gargles with 1/2 cup water and 1 tablespoon salt every 4 hours  -     Warm tea with honey and lemon      Follow up if symptoms worsen or fail  to improve.

## 2019-11-07 ENCOUNTER — HOSPITAL ENCOUNTER (OUTPATIENT)
Dept: RADIOLOGY | Facility: HOSPITAL | Age: 64
Discharge: HOME | End: 2019-11-07
Attending: PHYSICIAN ASSISTANT
Payer: MEDICARE

## 2019-11-07 DIAGNOSIS — K74.00 LIVER FIBROSIS: ICD-10-CM

## 2019-11-07 PROCEDURE — 76700 US EXAM ABDOM COMPLETE: CPT | Mod: TC

## 2019-11-07 PROCEDURE — 76700 US ABDOMEN COMPLETE: ICD-10-PCS | Mod: 26,,, | Performed by: RADIOLOGY

## 2019-11-07 PROCEDURE — 76700 US EXAM ABDOM COMPLETE: CPT | Mod: 26,,, | Performed by: RADIOLOGY

## 2019-11-08 ENCOUNTER — TELEPHONE (OUTPATIENT)
Dept: HEPATOLOGY | Facility: CLINIC | Age: 64
End: 2019-11-08

## 2019-11-08 ENCOUNTER — PATIENT MESSAGE (OUTPATIENT)
Dept: HEPATOLOGY | Facility: CLINIC | Age: 64
End: 2019-11-08

## 2019-11-08 DIAGNOSIS — K74.60 CIRRHOSIS OF LIVER WITHOUT ASCITES, UNSPECIFIED HEPATIC CIRRHOSIS TYPE: Primary | ICD-10-CM

## 2019-11-08 NOTE — PROGRESS NOTES
No further BP readings since 10/12/19, prior to last health  outreach  Next health  outreach planned for next week

## 2019-11-08 NOTE — TELEPHONE ENCOUNTER
----- Message from King Braga PA-C sent at 11/8/2019 12:26 PM CST -----  HCC screening in 6 months with clinic visit, please schedule

## 2019-11-11 ENCOUNTER — PATIENT MESSAGE (OUTPATIENT)
Dept: HEPATOLOGY | Facility: CLINIC | Age: 64
End: 2019-11-11

## 2019-11-19 ENCOUNTER — PATIENT OUTREACH (OUTPATIENT)
Dept: OTHER | Facility: OTHER | Age: 64
End: 2019-11-19

## 2019-11-26 NOTE — PROGRESS NOTES
"Digital Medicine: Health  Follow-Up    Patient says that she is doing very well since retiring.  She says that not having the stress of her job has been a positive along with having more time to focus on her health.  She reports that she has lost 15 lbs since 10/15/19.    The history is provided by the patient. No  was used.     Follow Up  Follow-up reason(s): reading review      Readings are trending down       INTERVENTION(S)  encouragement/support    PLAN  patient verbalizes understanding and continue monitoring    Gave praise to patient for making healthy and reasonable lifestyle changes that have resulted in her 15 lb weight loss.      There are no preventive care reminders to display for this patient.    Last 5 Patient Entered Readings                                      Current 30 Day Average: 132/78     Recent Readings 11/20/2019 11/12/2019 11/3/2019 10/23/2019 10/12/2019    SBP (mmHg) 128 135 133 140 142    DBP (mmHg) 72 83 79 67 82    Pulse 63 71 68 72 70                      Diet Screening   Patient reports eating or drinking the following: water, fresh vegetables and lean proteins, whole grainsShe has the following dietary restrictions: weight-loss and low sodium dietShe does not skip meals regularly.  She has no standard fasting periods.      Patient did not have times when they could not afford food or ran out.  She cooks for self.    Patient does the shopping for groceries.  She gets groceries from the grocery store.      She does not find cooking difficult.      Patient reports that she is doing a "Weight Watchers" type of diet.  She reports that she has lost 15 pounds since 10/15/19.  She states that she has cut back on carbohydrates, processed food, and restaurant food. She says she is enjoying grocery shopping and cooking for herself.    Assigning the following patient goals: maintain low sodium diet    Physical Activity Screening   When asked if exercising, patient " responded: yes7 day(s) a week.      Patient participates in the following activities: walking    Assigning the following patient goal(s): 150 minutes of exercise per week      SDOH

## 2019-12-12 NOTE — PROGRESS NOTES
HEPATOLOGY CLINIC VISIT NOTE     REFERRING PROVIDER: Dr. Jessica Marquez     REASON FOR VISIT: fatty liver     HISTORY: This is a 64 y.o. White female here for follow up. She was initially seen by me 12/2017 for fatty liver and elevated transaminases. She underwent a full serological work up and fibrosis staging. Her serological work up was unremarkable. Her fibroscan was consistent with cirrhosis at F4. We discussed further staging or monitoring pt as cirrhotic based on fibroscan alone at her initial visit and patient would like to continue monitoring for now. I have discussed MOURA trials and patient is not interested at this time.     PMH unchanged from visit. Weight trending down. BMI 34. Recently retired and reports 15 lb weight loss with that.  Her PMH is listed below.     Liver staging:  Fibroscan F4   AST 43, ALT 60  Tbili WNL  Albumin 3.7  PLTs >150                  Past Medical History:   Diagnosis Date    Cholelithiasis     DDD (degenerative disc disease), lumbar     Fatty liver     - noted on U/S 11/2017    Goiter, nodular     HTN (hypertension)     Hyperglycemia     Hyperlipidemia     Hypothyroidism     Hypovitaminosis D     Meningioma     right frontal lobe ; followed by Dr. 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   NECK: Supple  LUNGS: Normal respiratory effort.   ABDOMEN: Flat, soft, nontender.   SKIN: Warm and dry. No jaundice, No obvious rashes.   EXTREMITIES: No lower extremity edema  NEURO/PSYCH: Normal gate. Memory intact. Thought and speech pattern appropriate. Behavior normal. No depression or anxiety noted.    RECENT LABS:  Lab Results   Component Value Date    WBC 7.37 10/17/2019    HGB 12.7 10/17/2019     10/17/2019     Lab Results   Component Value Date    INR 1.0 10/17/2019     Lab Results   Component Value Date    AST 18 10/17/2019    ALT 23 10/17/2019    BILITOT 0.7 10/17/2019    ALBUMIN 3.9 10/17/2019    ALKPHOS 65 10/17/2019    CREATININE 0.8 10/17/2019    BUN 18 10/17/2019     10/17/2019    K 4.1 10/17/2019    AFP 3.7 10/17/2019     RECENT IMAGING:  US Abdomen Complete   Order: [id number]   Status:  Final result   Visible to patient:  Yes (Patient Portal)   Next appt:  12/23/2019 at 10:30 AM in Podiatry (Kathryn Teran DPM)   Dx:  Liver fibrosis   Details     Reading Physician Reading Date Result Priority   Konstantin Cantrell DO 11/7/2019 Routine   Narendra Miller MD 11/7/2019       Narrative     EXAMINATION:  US ABDOMEN COMPLETE    CLINICAL HISTORY:  Hepatic fibrosis    TECHNIQUE:  Complete abdominal ultrasound (including pancreas, aorta, liver, gallbladder, common bile duct, IVC, kidneys, and spleen) was performed.    COMPARISON:  Ultrasound 05/24/2019    FINDINGS:  Pancreas: The visualized portions of pancreas appear normal.    Aorta: No aneurysm.    Liver: 17.3 cm, upper limits of normal in size.  Diffusely increased parenchymal echogenicity consistent with steatosis. HRI measures 2.0.  No focal lesions.    Gallbladder: Single gallstone present measuring 2.0 cm.  No gallbladder wall thickening or pericholecystic fluid.  No sonographic Ascencio sign.    Biliary system: 3  "mm common bile duct.  No intrahepatic ductal dilatation.    Inferior vena cava: Normal in appearance.    Right kidney: 13.8 cm. No hydronephrosis.    Left kidney: 13.9 cm. No hydronephrosis.    Spleen: 11.0 x 4.4 cm.  Normal in size with homogeneous echotexture.    Miscellaneous: No ascites.      Impression       Hepatic steatosis.    Cholelithiasis.           ASSESSMENT  64 y.o. White female with:  1. ?CIRRHOSIS  -- fibroscan F4, NAFLD fibrosis score indeterminate  -- no splenomegaly on U/S and PLTs >150, mild zaman erythema  -- discussed liver biopsy, pt does not wish to proceed at this time; discussed MOURA trial, pt would like to avoid any additional medication  -- discussed weight loss  -- recommended EV screening with EGD, pt reports h/o bladder rupture and "told of collagen deficiency" so has been told to avoid invasive procedures.    EDUCATION:  We discussed the manifestations of NAFLD. At this time there is no FDA approved therapy for NAFLD.   The patient and I discussed the importance of diet, exercise, and weight loss for management of NAFLD. We discussed a low fat, low carb/low sugar, high fiber diet, and a goal of 30 minutes of exercise 5 times per week.   Pt is aware that fatty liver can progress to steatohepatitis and possibly to cirrhosis, putting one at increased risk for liver cancer, liver failure, and death.        PLAN:  1. HCC screening in 6 months, will review by phone   2. F/u with labs and U/S in 1 year    Thank you for allowing me to participate in the care of Claudia Braga PA-C    "

## 2019-12-13 ENCOUNTER — OFFICE VISIT (OUTPATIENT)
Dept: HEPATOLOGY | Facility: CLINIC | Age: 64
End: 2019-12-13
Payer: MEDICARE

## 2019-12-13 VITALS
OXYGEN SATURATION: 99 % | HEIGHT: 69 IN | SYSTOLIC BLOOD PRESSURE: 133 MMHG | HEART RATE: 62 BPM | WEIGHT: 231.94 LBS | DIASTOLIC BLOOD PRESSURE: 78 MMHG | BODY MASS INDEX: 34.35 KG/M2 | TEMPERATURE: 98 F

## 2019-12-13 DIAGNOSIS — K74.60 CIRRHOSIS OF LIVER WITHOUT ASCITES, UNSPECIFIED HEPATIC CIRRHOSIS TYPE: Primary | ICD-10-CM

## 2019-12-13 PROCEDURE — 99214 OFFICE O/P EST MOD 30 MIN: CPT | Mod: S$PBB,,, | Performed by: PHYSICIAN ASSISTANT

## 2019-12-13 PROCEDURE — 99999 PR PBB SHADOW E&M-EST. PATIENT-LVL IV: ICD-10-PCS | Mod: PBBFAC,,, | Performed by: PHYSICIAN ASSISTANT

## 2019-12-13 PROCEDURE — 99214 PR OFFICE/OUTPT VISIT, EST, LEVL IV, 30-39 MIN: ICD-10-PCS | Mod: S$PBB,,, | Performed by: PHYSICIAN ASSISTANT

## 2019-12-13 PROCEDURE — 99999 PR PBB SHADOW E&M-EST. PATIENT-LVL IV: CPT | Mod: PBBFAC,,, | Performed by: PHYSICIAN ASSISTANT

## 2019-12-13 PROCEDURE — 99214 OFFICE O/P EST MOD 30 MIN: CPT | Mod: PBBFAC | Performed by: PHYSICIAN ASSISTANT

## 2019-12-13 NOTE — PROGRESS NOTES
Digital Medicine: Clinician Follow-Up    Patient states she is doing well, denies s/s of hypotension    The history is provided by the patient.     Follow Up  Follow-up reason(s): reading review          INTERVENTION(S)  reviewed appropriate dose schedule and encouragement/support    PLAN  patient verbalizes understanding and continue monitoring    BP improved and at goal since patient retired in October  Reviewed s/s of hypotension and advised patient to contact me if she begins to develop symptoms  BMP 10/2019 WNL  Continue current regimen      There are no preventive care reminders to display for this patient.    Last 5 Patient Entered Readings                                      Current 30 Day Average: 128/72     Recent Readings 12/7/2019 11/29/2019 11/20/2019 11/12/2019 11/3/2019    SBP (mmHg) 126 129 128 135 133    DBP (mmHg) 71 72 72 83 79    Pulse 62 67 63 71 68             Hypertension Medications             amLODIPine (NORVASC) 10 MG tablet TAKE ONE Tablet BY MOUTH EVERY DAY    carvedilol (COREG) 25 MG tablet TAKE ONE TABLET BY MOUTH TWICE DAILY WITH MEALS    triamterene-hydrochlorothiazide 37.5-25 mg (MAXZIDE-25) 37.5-25 mg per tablet Take 1 tablet by mouth once daily.                             Medication Adherence Screening     Patient is not selectively taking diuretics.    She does not wonder if medications are working.  She knows purpose of medications.

## 2019-12-30 ENCOUNTER — OFFICE VISIT (OUTPATIENT)
Dept: PODIATRY | Facility: CLINIC | Age: 64
End: 2019-12-30
Payer: MEDICARE

## 2019-12-30 VITALS
BODY MASS INDEX: 34.35 KG/M2 | WEIGHT: 231.94 LBS | HEIGHT: 69 IN | DIASTOLIC BLOOD PRESSURE: 74 MMHG | SYSTOLIC BLOOD PRESSURE: 124 MMHG

## 2019-12-30 DIAGNOSIS — B35.1 ONYCHOMYCOSIS DUE TO DERMATOPHYTE: Primary | ICD-10-CM

## 2019-12-30 PROCEDURE — 99213 OFFICE O/P EST LOW 20 MIN: CPT | Mod: PBBFAC,PO | Performed by: PODIATRIST

## 2019-12-30 PROCEDURE — 99999 PR PBB SHADOW E&M-EST. PATIENT-LVL III: ICD-10-PCS | Mod: PBBFAC,,, | Performed by: PODIATRIST

## 2019-12-30 PROCEDURE — 99213 OFFICE O/P EST LOW 20 MIN: CPT | Mod: S$PBB,,, | Performed by: PODIATRIST

## 2019-12-30 PROCEDURE — 99213 PR OFFICE/OUTPT VISIT, EST, LEVL III, 20-29 MIN: ICD-10-PCS | Mod: S$PBB,,, | Performed by: PODIATRIST

## 2019-12-30 PROCEDURE — 99999 PR PBB SHADOW E&M-EST. PATIENT-LVL III: CPT | Mod: PBBFAC,,, | Performed by: PODIATRIST

## 2019-12-30 NOTE — PROGRESS NOTES
Subjective:      Patient ID: Claudia Rodriguez is a 64 y.o. female.    Chief Complaint: Nail Problem (2nd toe on left foot nail grows thick) and Nail Problem (fungus on right great toe, ov 10/21/19 Dr Marquez PCP)      Claudia Rodriguez is a 64 y.o. female who presents to the clinic complaining of painful ingrown toenail on the right foot. Patient has attempted self treatment with nail nippers. She has also been seen by my colleagues for this issue.  She has never had a Phenol and alcohol procedure.    This is a recurrent problem. Patient denies history of trauma. Patient  denies purulent drainage.    12/30/19: Presents with painful left 2nd toenail. Reports noticing redness to this area after a shoe rubbed on the area. After she changed shoes the problem resolved.     Current shoe gear:  Casual shoes    Patient Active Problem List   Diagnosis    Goiter, nodular    HTN (hypertension)    Severe obesity (BMI 35.0-39.9) with comorbidity    Prediabetes    Hyperlipidemia    Hypothyroidism    Vitamin D deficiency disease    Benign meningioma of brain    Statin intolerance    Urinary incontinence    Fatty liver    Atrophic vaginitis    Urinary incontinence, mixed    Nocturia more than twice per night    Pelvic floor weakness    Cirrhosis of liver without ascites       Current Outpatient Medications on File Prior to Visit   Medication Sig Dispense Refill    amLODIPine (NORVASC) 10 MG tablet TAKE ONE Tablet BY MOUTH EVERY DAY 90 tablet 1    CALCIUM CARBONATE/VITAMIN D3 (CALCIUM 500 + D, D3, ORAL)       carvedilol (COREG) 25 MG tablet TAKE ONE TABLET BY MOUTH TWICE DAILY WITH MEALS 60 tablet 5    FOLIC ACID/MV,FE,OTHER MIN (CENTRUM ORAL) Take 1 tablet by mouth.      IMVEXXY MAINTENANCE PACK 10 mcg Inst   5    levothyroxine (SYNTHROID) 75 MCG tablet TAKE ONE TABLET BY MOUTH EVERY DAY 90 tablet 3    metFORMIN (GLUCOPHAGE-XR) 500 MG 24 hr tablet TAKE TWO TABLETS BY MOUTH EVERY DAY WITH EVENING MEAL 180 tablet 1     omega-3 fatty acids 1,000 mg Cap Take 1 capsule by mouth Daily.      triamterene-hydrochlorothiazide 37.5-25 mg (MAXZIDE-25) 37.5-25 mg per tablet Take 1 tablet by mouth once daily. 90 tablet 1    VITAMIN D2 50,000 unit capsule TAKE TWO CAPSULES BY MOUTH EVERY 2 WEEKS 12 capsule 1     No current facility-administered medications on file prior to visit.        Review of patient's allergies indicates:   Allergen Reactions    Zostavax [zoster vaccine live (pf)] Rash     - injection site red, hot, indurated    Amlodipine-olmesartan      Other reaction(s): pounding in ears      Atorvastatin        Other reaction(s): Unknown    Demerol [meperidine] Other (See Comments)     Other reaction(s): Nausea  Other reaction(s): Headache    Ezetimibe-simvastatin      Other reaction(s): Headache      Lotrel [amlodipine-benazepril]        Other reaction(s): constant cough    Nsaids (non-steroidal anti-inflammatory drug) Other (See Comments)       Other reaction(s): Difficulty breathing    Other reaction(s): Difficulty breathing    Phenytoin sodium extended Other (See Comments)       Past Surgical History:   Procedure Laterality Date    BLADDER SUSPENSION      thyroid biopsy  7/11/12    tonsillectomy      TONSILLECTOMY         Family History   Problem Relation Age of Onset    Coronary artery disease Father 55    Heart disease Father     Hypertension Father     Melanoma Father     Hypertension Mother     Stroke Mother         2015    Hypertension Sister         controlled by lifestyle    No Known Problems Brother     Pancreatic cancer Maternal Grandfather     Heart failure Maternal Grandmother     Thyroid nodules Sister         s/p thyroidectomy    Thyroid disease Sister     Thyroid nodules Sister     No Known Problems Brother     Diabetes Neg Hx     Psoriasis Neg Hx     Lupus Neg Hx     Eczema Neg Hx     Thyroid cancer Neg Hx     Breast cancer Neg Hx     Ovarian cancer Neg Hx     Cervical cancer  Neg Hx     Endometrial cancer Neg Hx     Vaginal cancer Neg Hx        Social History     Socioeconomic History    Marital status:      Spouse name: Not on file    Number of children: 1    Years of education: Not on file    Highest education level: Not on file   Occupational History    Occupation: human      Employer: [employer]   Social Needs    Financial resource strain: Not on file    Food insecurity:     Worry: Not on file     Inability: Not on file    Transportation needs:     Medical: Not on file     Non-medical: Not on file   Tobacco Use    Smoking status: Never Smoker    Smokeless tobacco: Never Used   Substance and Sexual Activity    Alcohol use: No    Drug use: No    Sexual activity: Yes     Partners: Male   Lifestyle    Physical activity:     Days per week: Not on file     Minutes per session: Not on file    Stress: Not on file   Relationships    Social connections:     Talks on phone: Not on file     Gets together: Not on file     Attends Latter-day service: Not on file     Active member of club or organization: Not on file     Attends meetings of clubs or organizations: Not on file     Relationship status: Not on file   Other Topics Concern    Are you pregnant or think you may be? No    Breast-feeding No   Social History Narrative    3 adopted children.       Review of Systems   Constitution: Negative for chills, diaphoresis and fever.   Cardiovascular: Negative for claudication, cyanosis, leg swelling and syncope.   Respiratory: Negative for cough and shortness of breath.    Skin: Positive for color change, dry skin, nail changes and suspicious lesions. Negative for itching and rash.   Musculoskeletal: Negative for falls, joint pain, joint swelling, muscle cramps and muscle weakness.   Gastrointestinal: Negative for diarrhea, nausea and vomiting.   Neurological: Negative for disturbances in coordination, numbness, paresthesias, sensory change, tremors and weakness.  "  Psychiatric/Behavioral: Negative for altered mental status and hallucinations.           Objective:      Vitals:    12/30/19 0958   BP: 124/74   Weight: 105.2 kg (231 lb 14.8 oz)   Height: 5' 9" (1.753 m)   PainSc: 0-No pain       Physical Exam   Constitutional:  Non-toxic appearance. She does not have a sickly appearance. No distress.   Cardiovascular:   Pulses:       Dorsalis pedis pulses are 2+ on the right side, and 2+ on the left side.        Posterior tibial pulses are 2+ on the right side, and 2+ on the left side.   dorsalis pedis and posterior tibial pulses are palpable bilaterally. Capillary refill time is within normal limits.   Pulmonary/Chest: No respiratory distress.   Musculoskeletal:        Right ankle: She exhibits normal range of motion and no swelling. No tenderness. No lateral malleolus, no medial malleolus, no AITFL, no CF ligament and no posterior TFL tenderness found. Achilles tendon exhibits no pain, no defect and normal Owusu's test results.        Left ankle: She exhibits normal range of motion and no swelling. No tenderness. No lateral malleolus, no medial malleolus, no AITFL, no CF ligament and no posterior TFL tenderness found. Achilles tendon exhibits no pain, no defect and normal Owusu's test results.        Right foot: There is tenderness (hallux nail). There is no bony tenderness.        Left foot: There is no tenderness and no bony tenderness.   Neurological: She has normal reflexes. She displays no atrophy and no tremor. No sensory deficit. She exhibits normal muscle tone.   Hye-Leana 5.07 monofilament is intact bilateral feet. Sharp/dull sensation is also intact Bilateral feet.     Skin: Skin is warm, dry and intact. No bruising, no burn, no laceration, no lesion and no rash noted. She is not diaphoretic. No cyanosis. No pallor. Nails show no clubbing.   Tenderness to lateral hallux nail margin of right foot with ingrown nail plate and HPK buildup. Surrounding " erythema and minimal edema is noted there is no granuloma formation noted. No malodor     Toenails 1-5 bilaterally are elongated by 2-3 mm, thickened by 2-3 mm, discolored/yellowed, dystrophic, brittle with subungual debris.    Psychiatric: Her mood appears not anxious. Her affect is not inappropriate. Her speech is not slurred. She is not combative. She is communicative. She is attentive.   Nursing note reviewed.            Assessment:       Encounter Diagnosis   Name Primary?    Onychomycosis due to dermatophyte Yes         Plan:       Claudia was seen today for nail problem and nail problem.    Diagnoses and all orders for this visit:    Onychomycosis due to dermatophyte      I counseled the patient on her conditions, their implications and medical management.    Instructed patient on the importance of keeping feet dry. Patient instructed to use absorbent cotton socks and change them if they become sweaty; or wear an open-toe shoe or sandal. Wash the feet at least once a day with soap and water. Patient instructed to use lysol or over-the-counter antifungal powders or sprays to shoes daily and allow them to air dry, switching shoes from every other day would be optimal. Patient is to avoid barefoot walking in high-risk environments (public showers, gyms and locker rooms) may prevent future infections.     Instructed on daily application of ketoconazole. Already has this at home.     RTC PRN        Procedures

## 2020-01-07 ENCOUNTER — PATIENT OUTREACH (OUTPATIENT)
Dept: OTHER | Facility: OTHER | Age: 65
End: 2020-01-07

## 2020-01-14 NOTE — PROGRESS NOTES
"Digital Medicine: Health  Follow-Up    Patient reports that she got out of making healthy lifestyle choices during the holidays which attributed to higher readings. She says that she is "back on track now".    The history is provided by the patient. No  was used.     Follow Up  Follow-up reason(s): reading review      Readings are trending up due to lifestyle change.        INTERVENTION(S)  encouragement/support, denied resources and denied questions    PLAN  continue monitoring      There are no preventive care reminders to display for this patient.    Last 5 Patient Entered Readings                                      Current 30 Day Average: 136/76     Recent Readings 1/9/2020 1/8/2020 12/28/2019 12/18/2019 12/17/2019    SBP (mmHg) 137 147 130 125 143    DBP (mmHg) 85 83 64 73 77    Pulse 72 65 64 63 69                      Diet Screening   Patient reports eating or drinking the following: fruit, water, fresh vegetables, desserts/sweets and restaurant foodShe has the following dietary restrictions: low sodium dietShe cooks for self.    Patient does the shopping for groceries.  She gets groceries from the grocery store.      Barriers to a Healthy Diet: holidays/special events    Patient says that she got off track with her diet over the holidays, but is now back to making healthier dietary choices.  She says she is back to cooking and meal planning.    Assigning the following patient goals: maintain low sodium diet    Physical Activity Screening   When asked if exercising, patient responded: yes    Patient participates in the following activities: walking    Patient says that she did not do any physical activity during the holidays, but is back to her walking routine.    Assigning the following patient goal(s): 150 minutes of exercise per week and participate in exercise weekly    Medication Adherence Screening   She misses doses: never    Patient is not selectively taking diuretics.  "   She does not wonder if medications are working.  She knows purpose of medications.        SDOH

## 2020-01-23 ENCOUNTER — TELEPHONE (OUTPATIENT)
Dept: FAMILY MEDICINE | Facility: CLINIC | Age: 65
End: 2020-01-23

## 2020-01-23 NOTE — TELEPHONE ENCOUNTER
----- Message from Kimberly Ware sent at 1/23/2020 11:00 AM CST -----  Contact: self  [phone]  .Type: Patient Call Back    Who called: self     What is the request in detail: Pt  is experience some hair loss and not sure where to start with regrowth    Can the clinic reply by [facility]? Call back     Would the patient rather a call back or a response via [facility]?  Call back     Best call back number: [phone]

## 2020-01-23 NOTE — TELEPHONE ENCOUNTER
Spoke with the patient and she is experiencing hair loss for the last 3 to 4 weeks.  Patient has no other symptoms. Pt is concerned  Please advise            ----- Message from Martha Henriquez sent at 1/23/2020 11:29 AM CST -----  Contact: Self  Type:  Patient Returning Call    Who Called: Self    Who Left Message for Patient: fern    Does the patient know what this is regarding? No     Would the patient rather a call back or a response via [facility]?  Call     Best Call Back Number: [phone]

## 2020-01-23 NOTE — TELEPHONE ENCOUNTER
Spoke with patient and she informed me that she has has not had any medication changes, no surgical procedures, she not under any stress. Hair loss is generalize.  Patient has scheduled follow up visit with provider to discuss hair loss.

## 2020-01-23 NOTE — TELEPHONE ENCOUNTER
Has she had any changes in medication or recent surgery/procedures?  This is often associated with stress - how is she doing?  Is it generalized or localized?  Would recommend office visit to discuss.

## 2020-01-23 NOTE — TELEPHONE ENCOUNTER
Left message for the patient to return staff call.          Left message for the patient to return staff call. Second call

## 2020-01-29 DIAGNOSIS — I10 ESSENTIAL HYPERTENSION: ICD-10-CM

## 2020-01-29 DIAGNOSIS — E03.9 ACQUIRED HYPOTHYROIDISM: ICD-10-CM

## 2020-01-29 RX ORDER — LEVOTHYROXINE SODIUM 75 UG/1
TABLET ORAL
Qty: 90 TABLET | Refills: 1 | Status: SHIPPED | OUTPATIENT
Start: 2020-01-29 | End: 2020-06-30 | Stop reason: SDUPTHER

## 2020-01-29 RX ORDER — CARVEDILOL 25 MG/1
TABLET ORAL
Qty: 180 TABLET | Refills: 1 | Status: SHIPPED | OUTPATIENT
Start: 2020-01-29 | End: 2020-07-30

## 2020-02-03 DIAGNOSIS — R73.03 PREDIABETES: ICD-10-CM

## 2020-02-03 RX ORDER — METFORMIN HYDROCHLORIDE 500 MG/1
TABLET, EXTENDED RELEASE ORAL
Qty: 180 TABLET | Refills: 1 | Status: SHIPPED | OUTPATIENT
Start: 2020-02-03 | End: 2020-09-02 | Stop reason: ALTCHOICE

## 2020-02-10 ENCOUNTER — PATIENT OUTREACH (OUTPATIENT)
Dept: ADMINISTRATIVE | Facility: HOSPITAL | Age: 65
End: 2020-02-10

## 2020-02-11 ENCOUNTER — PATIENT OUTREACH (OUTPATIENT)
Dept: ADMINISTRATIVE | Facility: HOSPITAL | Age: 65
End: 2020-02-11

## 2020-02-20 ENCOUNTER — PATIENT MESSAGE (OUTPATIENT)
Dept: ENDOCRINOLOGY | Facility: CLINIC | Age: 65
End: 2020-02-20

## 2020-02-24 ENCOUNTER — OFFICE VISIT (OUTPATIENT)
Dept: FAMILY MEDICINE | Facility: CLINIC | Age: 65
End: 2020-02-24
Payer: MEDICARE

## 2020-02-24 ENCOUNTER — LAB VISIT (OUTPATIENT)
Dept: LAB | Facility: HOSPITAL | Age: 65
End: 2020-02-24
Attending: INTERNAL MEDICINE
Payer: MEDICARE

## 2020-02-24 VITALS
BODY MASS INDEX: 34.84 KG/M2 | WEIGHT: 235.25 LBS | DIASTOLIC BLOOD PRESSURE: 78 MMHG | OXYGEN SATURATION: 97 % | TEMPERATURE: 98 F | HEART RATE: 61 BPM | HEIGHT: 69 IN | SYSTOLIC BLOOD PRESSURE: 126 MMHG

## 2020-02-24 DIAGNOSIS — R73.03 PREDIABETES: ICD-10-CM

## 2020-02-24 DIAGNOSIS — Z23 NEED FOR SHINGLES VACCINE: ICD-10-CM

## 2020-02-24 DIAGNOSIS — I10 ESSENTIAL HYPERTENSION: ICD-10-CM

## 2020-02-24 DIAGNOSIS — E03.9 ACQUIRED HYPOTHYROIDISM: ICD-10-CM

## 2020-02-24 DIAGNOSIS — Z11.4 SCREENING FOR HIV (HUMAN IMMUNODEFICIENCY VIRUS): ICD-10-CM

## 2020-02-24 DIAGNOSIS — E55.9 VITAMIN D DEFICIENCY DISEASE: ICD-10-CM

## 2020-02-24 DIAGNOSIS — Z71.89 ADVANCED DIRECTIVES, COUNSELING/DISCUSSION: ICD-10-CM

## 2020-02-24 DIAGNOSIS — D32.0 BENIGN MENINGIOMA OF BRAIN: ICD-10-CM

## 2020-02-24 DIAGNOSIS — E66.01 SEVERE OBESITY (BMI 35.0-39.9) WITH COMORBIDITY: ICD-10-CM

## 2020-02-24 DIAGNOSIS — Z12.11 ENCOUNTER FOR FIT (FECAL IMMUNOCHEMICAL TEST) SCREENING: ICD-10-CM

## 2020-02-24 DIAGNOSIS — K74.60 CIRRHOSIS, NON-ALCOHOLIC: ICD-10-CM

## 2020-02-24 DIAGNOSIS — L65.9 ALOPECIA: Primary | ICD-10-CM

## 2020-02-24 DIAGNOSIS — E04.9 GOITER, NODULAR: ICD-10-CM

## 2020-02-24 LAB — TSH SERPL DL<=0.005 MIU/L-ACNC: 0.98 UIU/ML (ref 0.4–4)

## 2020-02-24 PROCEDURE — 84443 ASSAY THYROID STIM HORMONE: CPT

## 2020-02-24 PROCEDURE — 99213 OFFICE O/P EST LOW 20 MIN: CPT | Mod: PBBFAC,PO,25 | Performed by: INTERNAL MEDICINE

## 2020-02-24 PROCEDURE — 99214 PR OFFICE/OUTPT VISIT, EST, LEVL IV, 30-39 MIN: ICD-10-PCS | Mod: S$PBB,,, | Performed by: INTERNAL MEDICINE

## 2020-02-24 PROCEDURE — 90750 HZV VACC RECOMBINANT IM: CPT | Mod: PBBFAC,PO

## 2020-02-24 PROCEDURE — 99999 PR PBB SHADOW E&M-EST. PATIENT-LVL III: CPT | Mod: PBBFAC,,, | Performed by: INTERNAL MEDICINE

## 2020-02-24 PROCEDURE — 36415 COLL VENOUS BLD VENIPUNCTURE: CPT | Mod: PO

## 2020-02-24 PROCEDURE — 86703 HIV-1/HIV-2 1 RESULT ANTBDY: CPT

## 2020-02-24 PROCEDURE — 99214 OFFICE O/P EST MOD 30 MIN: CPT | Mod: S$PBB,,, | Performed by: INTERNAL MEDICINE

## 2020-02-24 PROCEDURE — 90471 IMMUNIZATION ADMIN: CPT | Mod: PBBFAC,PO

## 2020-02-24 PROCEDURE — 99999 PR PBB SHADOW E&M-EST. PATIENT-LVL III: ICD-10-PCS | Mod: PBBFAC,,, | Performed by: INTERNAL MEDICINE

## 2020-02-24 NOTE — PROGRESS NOTES
HISTORY OF PRESENT ILLNESS:  Claudia Rodriguez is a 64 y.o. female who presents to the clinic today for Alopecia  .   The patient presents to clinic today over concern of generalized hair loss.  She retired about 5 months ago.  She states that she is enjoying her life.  Initially she did a lot of sleeping, but now she has a routine.  She has noticed generalized large amounts of hair that come out when she washes her hair in the shower.  She denies any stresses.  She has also improved her dietary habits and has lost about 15 lb.  She has hypertension, hyperlipidemia, hypothyroidism, and prediabetes.  Her endocrinologist recommended she get her thyroid checked.  She denies temperature intolerance. The patient denies any problems with cardiac chest pain, dizziness, palpitations, orthopnea, or lower extremity edema. The patient reports compliance with current medication- patient denies missing any  medication doses.        PAST MEDICAL HISTORY:  Past Medical History:   Diagnosis Date    Cholelithiasis     DDD (degenerative disc disease), lumbar     Fatty liver     - noted on U/S 11/2017    Goiter, nodular     HTN (hypertension)     Hyperglycemia     Hyperlipidemia     Hypothyroidism     Hypovitaminosis D     Meningioma     right frontal lobe ; followed by Dr. Enciso once a year    Obesity     Prediabetes     Urinary bladder disorder        PAST SURGICAL HISTORY:  Past Surgical History:   Procedure Laterality Date    BLADDER SUSPENSION      thyroid biopsy  7/11/12    tonsillectomy      TONSILLECTOMY         SOCIAL HISTORY:  Social History     Socioeconomic History    Marital status:      Spouse name: Not on file    Number of children: 1    Years of education: Not on file    Highest education level: Not on file   Occupational History    Occupation: human      Employer: [employer]   Social Needs    Financial resource strain: Not on file    Food insecurity:     Worry: Not on file      Inability: Not on file    Transportation needs:     Medical: Not on file     Non-medical: Not on file   Tobacco Use    Smoking status: Never Smoker    Smokeless tobacco: Never Used   Substance and Sexual Activity    Alcohol use: No    Drug use: No    Sexual activity: Yes     Partners: Male   Lifestyle    Physical activity:     Days per week: Not on file     Minutes per session: Not on file    Stress: Not on file   Relationships    Social connections:     Talks on phone: Not on file     Gets together: Not on file     Attends Taoism service: Not on file     Active member of club or organization: Not on file     Attends meetings of clubs or organizations: Not on file     Relationship status: Not on file   Other Topics Concern    Are you pregnant or think you may be? No    Breast-feeding No   Social History Narrative    3 adopted children.       FAMILY HISTORY:  Family History   Problem Relation Age of Onset    Coronary artery disease Father 55    Heart disease Father     Hypertension Father     Melanoma Father     Hypertension Mother     Stroke Mother         2015    Hypertension Sister         controlled by lifestyle    No Known Problems Brother     Pancreatic cancer Maternal Grandfather     Heart failure Maternal Grandmother     Thyroid nodules Sister         s/p thyroidectomy    Thyroid disease Sister     Thyroid nodules Sister     No Known Problems Brother     Diabetes Neg Hx     Psoriasis Neg Hx     Lupus Neg Hx     Eczema Neg Hx     Thyroid cancer Neg Hx     Breast cancer Neg Hx     Ovarian cancer Neg Hx     Cervical cancer Neg Hx     Endometrial cancer Neg Hx     Vaginal cancer Neg Hx        ALLERGIES AND MEDICATIONS: updated and reviewed.  Review of patient's allergies indicates:   Allergen Reactions    Zostavax [zoster vaccine live (pf)] Rash     - injection site red, hot, indurated    Amlodipine-olmesartan      Other reaction(s): pounding in ears      Atorvastatin         Other reaction(s): Unknown    Demerol [meperidine] Other (See Comments)     Other reaction(s): Nausea  Other reaction(s): Headache    Ezetimibe-simvastatin      Other reaction(s): Headache      Lotrel [amlodipine-benazepril]        Other reaction(s): constant cough    Nsaids (non-steroidal anti-inflammatory drug) Other (See Comments)       Other reaction(s): Difficulty breathing    Other reaction(s): Difficulty breathing    Phenytoin sodium extended Other (See Comments)     Medication List with Changes/Refills   Current Medications    AMLODIPINE (NORVASC) 10 MG TABLET    TAKE ONE Tablet BY MOUTH EVERY DAY    CALCIUM CARBONATE/VITAMIN D3 (CALCIUM 500 + D, D3, ORAL)        CARVEDILOL (COREG) 25 MG TABLET    TAKE ONE TABLET BY MOUTH TWICE DAILY WITH MEALS    FOLIC ACID/MV,FE,OTHER MIN (CENTRUM ORAL)    Take 1 tablet by mouth.    IMVEXXY MAINTENANCE PACK 10 MCG INST        LEVOTHYROXINE (SYNTHROID) 75 MCG TABLET    TAKE ONE TABLET BY MOUTH EVERY DAY    METFORMIN (GLUCOPHAGE-XR) 500 MG 24 HR TABLET    TAKE TWO TABLETS BY MOUTH EVERY DAY WITH EVENING MEAL    OMEGA-3 FATTY ACIDS 1,000 MG CAP    Take 1 capsule by mouth Daily.    TRIAMTERENE-HYDROCHLOROTHIAZIDE 37.5-25 MG (MAXZIDE-25) 37.5-25 MG PER TABLET    Take 1 tablet by mouth once daily.    VITAMIN D2 50,000 UNIT CAPSULE    TAKE TWO CAPSULES BY MOUTH EVERY 2 WEEKS         CARE TEAM:  Patient Care Team:  Jessica Marquez MD as PCP - General (Internal Medicine)  Bisi Madrigal MD as Consulting Physician (Endocrinology)  Fernie Hammond MD as Consulting Physician (Neurosurgery)  Jessica Marquez MD as Hypertension Digital Medicine Responsible Provider (Internal Medicine)  Stephanie Guadalupe, PharmD as Hypertension Digital Medicine Clinician (Pharmacist)  Anirudh Selby MD as Consulting Physician (Obstetrics and Gynecology)  June Sherman as Digital Medicine Health   Albertina Pickens LPN as Licensed Practical Nurse         REVIEW OF  "SYSTEMS:  Review of Systems   Constitutional: Negative for activity change, chills, fatigue, fever and unexpected weight change.   HENT: Negative for congestion, hearing loss, postnasal drip, rhinorrhea and trouble swallowing.    Eyes: Negative for pain, discharge and visual disturbance.   Respiratory: Negative for cough, chest tightness, shortness of breath and wheezing.    Cardiovascular: Negative for chest pain, palpitations and leg swelling.   Gastrointestinal: Negative for abdominal pain, blood in stool, constipation, diarrhea, nausea and vomiting.   Endocrine: Negative for polydipsia and polyuria.   Genitourinary: Negative for difficulty urinating, dysuria, hematuria and menstrual problem.   Musculoskeletal: Negative for arthralgias, back pain, joint swelling and neck pain.   Skin: Negative for rash.   Neurological: Negative for weakness and headaches.   Psychiatric/Behavioral: Negative for confusion, dysphoric mood and sleep disturbance. The patient is not nervous/anxious.          PHYSICAL EXAM:   Vitals:    02/24/20 0811   BP: 126/78   Pulse: 61   Temp: 97.9 °F (36.6 °C)     Weight: 106.7 kg (235 lb 3.7 oz)   Height: 5' 9" (175.3 cm)   Body mass index is 34.74 kg/m².      General appearance - alert, well appearing, and in no distress, obese  Psychiatric - alert, oriented to person, place, and time, normal mood, behavior, speech, dress, motor activity, and thought processes  Mouth - mucous membranes moist, pharynx normal without lesions  Chest - clear to auscultation, no wheezes, rales or rhonchi, symmetric air entry  Heart - normal rate and regular rhythm, no gallops noted  Neurological - alert, normal speech, no focal findings or movement disorder noted, cranial nerves II through XII intact  Musculoskeletal - patient noted to have Mild-Moderate osteoarthritic changes to both knee joints. No joint effusions noted., no muscular tenderness noted  Extremities - peripheral pulses normal, no pedal edema, no " clubbing or cyanosis  Skin - normal coloration and turgor, no rashes, no suspicious skin lesions noted      Labs:  Ordered/Scheduled      ASSESSMENT AND PLAN:  1. Alopecia  This may be related to general changes in the last 5 months in her lifestyle.  I will check a TSH as per endocrinology request.  If labs are normal, then we will give it some time.  She will see Dermatology if symptoms worsen or persist.    2. Essential hypertension  Discussed sodium restriction, maintaining ideal body weight and regular exercise program as physiologic means to achieve blood pressure control. The patient will strive towards this.   The current medical regimen is effective;  continue present plan and medications. Recommended patient to check home readings to monitor and see me for followup as scheduled or sooner as needed.   Patient was educated that both decongestant and anti-inflammatory medication may raise blood pressure.  The patient is active on the digital hypertension program.    3. Prediabetes  Stable. We discussed low sugar/low carbohydrate diet and regular exercise to prevent progression. No need for prescription medication at this time.    4. Acquired hypothyroidism  Patient is clinically euthyroid. Continue current regimen.    5. Benign meningioma of brain  Stable. Asymptomatic. Observe.    6. Cirrhosis, non-alcoholic  Asymptomatic. We discussed the need for weight loss to prevent progression to cirrhosis of the liver. LFTs normal. Patient is followed by hepatology.    7. Severe obesity (BMI 35.0-39.9) with comorbidity  The patient is asked to continue to make an attempt to improve diet and exercise patterns to aid in medical management of this problem.     8. Advanced directives, counseling/discussion  Patient previously given paperwork for completion. No questions today.    9. Encounter for FIT (fecal immunochemical test) screening  FitKit was given to patient on 2/24/2020 8:41 AM        - Fecal Immunochemical Test  (iFOBT); Future    10. Screening for HIV (human immunodeficiency virus)    - HIV 1/2 Ag/Ab (4th Gen); Future    11. Need for shingles vaccine  The patient had a local reaction to the Zostavax live vaccine.  She did not have any type of allergic reaction per se.  I think she is okay to get the shingles vaccine.  First shot given today.  - Zoster Recombinant Vaccine         Orders Placed This Encounter   Procedures    Zoster Recombinant Vaccine    Fecal Immunochemical Test (iFOBT)    HIV 1/2 Ag/Ab (4th Gen)      Follow up in about 6 months (around 8/24/2020), or if symptoms worsen or fail to improve, for annual exam. or sooner as needed.

## 2020-02-24 NOTE — PROGRESS NOTES
Patient tolerated Shingrix #1 IM injection. Patient waited 15min. Patient received VIS information. FitKit was given to patient on 2/24/2020 8:48 AM

## 2020-02-26 ENCOUNTER — PATIENT OUTREACH (OUTPATIENT)
Dept: OTHER | Facility: OTHER | Age: 65
End: 2020-02-26

## 2020-02-26 LAB — HIV 1+2 AB+HIV1 P24 AG SERPL QL IA: NEGATIVE

## 2020-03-05 NOTE — PROGRESS NOTES
Digital Medicine: Health  Follow-Up    Called patient for health  follow up.  Patient states that she is going through a very stressful time right now that will be continuing for at least a couple of more weeks.  We discussed stress management techniques and patient is agreeable to me sending resources to help with stress management.  We did not discuss diet or exercise during this encounter.    The history is provided by the patient. No  was used.     Follow Up  Follow-up reason(s): reading review      Readings are trending down       INTERVENTION(S)  encouragement/support, denied questions and stress management apps, Coping for acute stress, breathing techniques,     PLAN  patient verbalizes understanding and continue monitoring      There are no preventive care reminders to display for this patient.    Last 5 Patient Entered Readings                                      Current 30 Day Average: 135/77     Recent Readings 2/29/2020 2/19/2020 2/11/2020 2/3/2020 1/23/2020    SBP (mmHg) 131 124 151 134 125    DBP (mmHg) 79 73 80 77 72    Pulse 67 67 64 67 65                      Medication Adherence Screening   She did not miss a dose this month.  Patient knows purpose of medications.      Patient identified the following reasons for non-compliance: None      SDOH

## 2020-03-24 ENCOUNTER — PATIENT MESSAGE (OUTPATIENT)
Dept: FAMILY MEDICINE | Facility: CLINIC | Age: 65
End: 2020-03-24

## 2020-03-30 ENCOUNTER — TELEPHONE (OUTPATIENT)
Dept: FAMILY MEDICINE | Facility: CLINIC | Age: 65
End: 2020-03-30

## 2020-03-30 NOTE — TELEPHONE ENCOUNTER
----- Message from Ian Najera sent at 3/30/2020  1:09 PM CDT -----  Contact: LONG WHITE [[id number]]  Name of Who is Calling: LONG WHITE [[id number]]      What is the request in detail: Would like to speak with staff in regards to knowing if rescheduling her second shingles vacation until July. Would this be a problem. Please advise      Can the clinic reply by [facility]: yes      What Number to Call Back if not in [facility]: [phone]

## 2020-04-02 NOTE — PROGRESS NOTES
Health  has provided resources on stress-management  BP remains near goal, average 133/77 mmHg    Hypertension Medications             amLODIPine (NORVASC) 10 MG tablet TAKE ONE Tablet BY MOUTH EVERY DAY    carvedilol (COREG) 25 MG tablet TAKE ONE TABLET BY MOUTH TWICE DAILY WITH MEALS    triamterene-hydrochlorothiazide 37.5-25 mg (MAXZIDE-25) 37.5-25 mg per tablet Take 1 tablet by mouth once daily.

## 2020-04-09 ENCOUNTER — TELEPHONE (OUTPATIENT)
Dept: FAMILY MEDICINE | Facility: CLINIC | Age: 65
End: 2020-04-09

## 2020-04-09 NOTE — TELEPHONE ENCOUNTER
Spoke with the patient and she has had a cough since Monday morning.  Patient denies fever and she had a headache on Tuesday, for 2 hours and that was it.  She normally don't have headache.  Patient denies exposure to the Virus.  Patient wear a N95 when she goes out.  Please advise

## 2020-04-09 NOTE — TELEPHONE ENCOUNTER
Recommend routine care for symptoms with otc medication, as needed.  Should seek medical care if she develops shortness of breath.  Hope she feels better soon!

## 2020-04-09 NOTE — TELEPHONE ENCOUNTER
Recommend routine care for symptoms with otc medication, as needed.  Should seek medical care if she develops shortness of breath.  Hope she feels better soon!    Spoke with patient and informed of recommendations and patient verbalized understandings.

## 2020-04-09 NOTE — TELEPHONE ENCOUNTER
----- Message from Phoebe Guzman sent at 4/9/2020  8:48 AM CDT -----  Contact: pt  Type: Patient Call Back    Who called:pt    What is the request in detail:pt has persistent dry cough and just wants to discuss. She has no other symptoms. Wants to know if she should be tested. Call pt    Can the clinic reply by [facility]?    Would the patient rather a call back or a response via [facility]? call    Best call back number:[phone]    Additional Information:

## 2020-04-16 ENCOUNTER — PATIENT OUTREACH (OUTPATIENT)
Dept: OTHER | Facility: OTHER | Age: 65
End: 2020-04-16

## 2020-04-16 NOTE — PROGRESS NOTES
Digital Medicine: Health  Follow-Up    Called patient for health  follow up.  She any symptoms of hyper or hypotension. She says she is happy with her downward blood pressure trend. Patient says that she has had a scratchy throat and cough, but no fever or other symptoms.    The history is provided by the patient. No  was used.     Follow Up  Follow-up reason(s): reading review      Readings are trending down due to lifestyle change.        INTERVENTION(S)  encouragement/support, denied resources and denied questions    PLAN  continue monitoring      There are no preventive care reminders to display for this patient.    Last 5 Patient Entered Readings                                      Current 30 Day Average: 127/70     Recent Readings 4/16/2020 4/8/2020 3/29/2020 3/20/2020 3/9/2020    SBP (mmHg) 115 121 129 142 128    DBP (mmHg) 64 70 77 70 83    Pulse 70 71 62 71 62                      Diet Screening   No change to diet.  Patient reports eating or drinking the following: fruit, water, fresh vegetables and lean proteinsShe has the following dietary restrictions: weight-loss and low sodium dietShe cooks for self.    Patient does the shopping for groceries and lets family grocery shop.  She gets groceries from the grocery store.      Barriers to a Healthy Diet: no barriers to healthy eating    Assigning the following patient goals: maintain low sodium diet    Physical Activity Screening   When asked if exercising, patient responded: yes7 day(s) a week.      Patient participates in the following activities: walking    She identified the following barriers to physical activity: no barriers to being active    Patient says she is trying to get to 10,000 steps per day.    Medication Adherence Screening   She did not miss a dose this month.  Patient knows purpose of medications.      Patient identified the following reasons for non-compliance: None      SDOH

## 2020-04-21 ENCOUNTER — PATIENT MESSAGE (OUTPATIENT)
Dept: ENDOCRINOLOGY | Facility: CLINIC | Age: 65
End: 2020-04-21

## 2020-05-06 ENCOUNTER — PATIENT MESSAGE (OUTPATIENT)
Dept: ENDOCRINOLOGY | Facility: CLINIC | Age: 65
End: 2020-05-06

## 2020-05-12 ENCOUNTER — OFFICE VISIT (OUTPATIENT)
Dept: FAMILY MEDICINE | Facility: CLINIC | Age: 65
End: 2020-05-12
Payer: MEDICARE

## 2020-05-12 ENCOUNTER — TELEPHONE (OUTPATIENT)
Dept: FAMILY MEDICINE | Facility: CLINIC | Age: 65
End: 2020-05-12

## 2020-05-12 DIAGNOSIS — B96.89 ACUTE BACTERIAL SINUSITIS: Primary | ICD-10-CM

## 2020-05-12 DIAGNOSIS — J01.90 ACUTE BACTERIAL SINUSITIS: Primary | ICD-10-CM

## 2020-05-12 PROCEDURE — 99211 OFF/OP EST MAY X REQ PHY/QHP: CPT | Mod: PO

## 2020-05-12 PROCEDURE — G0463 HOSPITAL OUTPT CLINIC VISIT: HCPCS | Mod: PO

## 2020-05-12 PROCEDURE — 99214 PR OFFICE/OUTPT VISIT, EST, LEVL IV, 30-39 MIN: ICD-10-PCS | Mod: 95,,, | Performed by: PHYSICIAN ASSISTANT

## 2020-05-12 PROCEDURE — 99214 OFFICE O/P EST MOD 30 MIN: CPT | Mod: 95,,, | Performed by: PHYSICIAN ASSISTANT

## 2020-05-12 RX ORDER — AMOXICILLIN AND CLAVULANATE POTASSIUM 875; 125 MG/1; MG/1
1 TABLET, FILM COATED ORAL 2 TIMES DAILY
Qty: 20 TABLET | Refills: 0 | Status: SHIPPED | OUTPATIENT
Start: 2020-05-12 | End: 2020-05-22

## 2020-05-12 RX ORDER — PREDNISONE 20 MG/1
20 TABLET ORAL DAILY
Qty: 4 TABLET | Refills: 0 | Status: SHIPPED | OUTPATIENT
Start: 2020-05-12 | End: 2020-05-16

## 2020-05-12 NOTE — TELEPHONE ENCOUNTER
----- Message from Rhina Fraire sent at 5/11/2020  1:31 PM CDT -----  Contact:  Patient :[phone]  Type: Patient Call Back    Who called: Patient    What is the request in detail: Would like to speak to nurse in regards to a medical condition she's dealing with. Please call.     Would the patient rather a call back or a response via [facility]? Call back    Best call back number:[phone]

## 2020-05-12 NOTE — PROGRESS NOTES
Patient Name: Claudia Rodriguez    : 1955  MRN: [id number]    The patient location is: home  The chief complaint leading to consultation is: sinus pain  Visit type: Virtual visit with synchronous audio and video  Total time spent with patient: 8 minutes  Each patient to whom he or she provides medical services by telemedicine is:  (1) informed of the relationship between the physician and patient and the respective role of any other health care provider with respect to management of the patient; and (2) notified that he or she may decline to receive medical services by telemedicine and may withdraw from such care at any time.    Subjective:  Claudia is a 65 y.o. female who presents today via virtual visit for:    Chief Complaint   Patient presents with    Facial Pain       HPI  Patient has multiple medical diagnoses as listed below in the history. Patient is new to me, but known to the clinic.     Sinus Pain: Patient complains of facial pain. Symptoms include congestion, headache described as frontal pressure, purulent/bloody nasal discharge, sinus pressure and sneezing with no fever, chills, night sweats or weight loss. Onset of symptoms was 1 week ago, unchanged since that time. She is drinking plenty of fluids.  Past history is significant for no history of pneumonia or bronchitis. Patient is non-smoker. She denies sick contacts. No SOB or chest pain.           Past Medical History  Past Medical History:   Diagnosis Date    Cholelithiasis     DDD (degenerative disc disease), lumbar     Fatty liver     - noted on U/S 2017    Goiter, nodular     HTN (hypertension)     Hyperglycemia     Hyperlipidemia     Hypothyroidism     Hypovitaminosis D     Meningioma     right frontal lobe ; followed by Dr. Enciso once a year    Obesity     Prediabetes     Urinary bladder disorder        Past Surgical History  Past Surgical History:   Procedure Laterality Date    BLADDER SUSPENSION      thyroid biopsy   7/11/12    tonsillectomy      TONSILLECTOMY         Family History  Family History   Problem Relation Age of Onset    Coronary artery disease Father 55    Heart disease Father     Hypertension Father     Melanoma Father     Hypertension Mother     Stroke Mother         2015    Hypertension Sister         controlled by lifestyle    No Known Problems Brother     Pancreatic cancer Maternal Grandfather     Heart failure Maternal Grandmother     Thyroid nodules Sister         s/p thyroidectomy    Thyroid disease Sister     Thyroid nodules Sister     No Known Problems Brother     Diabetes Neg Hx     Psoriasis Neg Hx     Lupus Neg Hx     Eczema Neg Hx     Thyroid cancer Neg Hx     Breast cancer Neg Hx     Ovarian cancer Neg Hx     Cervical cancer Neg Hx     Endometrial cancer Neg Hx     Vaginal cancer Neg Hx        Social History  Social History     Socioeconomic History    Marital status:      Spouse name: Not on file    Number of children: 1    Years of education: Not on file    Highest education level: Not on file   Occupational History    Occupation: human      Employer: [employer]   Social Needs    Financial resource strain: Not on file    Food insecurity:     Worry: Not on file     Inability: Not on file    Transportation needs:     Medical: Not on file     Non-medical: Not on file   Tobacco Use    Smoking status: Never Smoker    Smokeless tobacco: Never Used   Substance and Sexual Activity    Alcohol use: No    Drug use: No    Sexual activity: Yes     Partners: Male   Lifestyle    Physical activity:     Days per week: Not on file     Minutes per session: Not on file    Stress: Not on file   Relationships    Social connections:     Talks on phone: Not on file     Gets together: Not on file     Attends Sikh service: Not on file     Active member of club or organization: Not on file     Attends meetings of clubs or organizations: Not on file      Relationship status: Not on file   Other Topics Concern    Are you pregnant or think you may be? No    Breast-feeding No   Social History Narrative    3 adopted children.       Current Medications  Current Outpatient Medications on File Prior to Visit   Medication Sig Dispense Refill    amLODIPine (NORVASC) 10 MG tablet TAKE ONE Tablet BY MOUTH EVERY DAY 90 tablet 1    CALCIUM CARBONATE/VITAMIN D3 (CALCIUM 500 + D, D3, ORAL)       carvedilol (COREG) 25 MG tablet TAKE ONE TABLET BY MOUTH TWICE DAILY WITH MEALS 180 tablet 1    FOLIC ACID/MV,FE,OTHER MIN (CENTRUM ORAL) Take 1 tablet by mouth.      IMVEXXY MAINTENANCE PACK 10 mcg Inst   5    levothyroxine (SYNTHROID) 75 MCG tablet TAKE ONE TABLET BY MOUTH EVERY DAY 90 tablet 1    metFORMIN (GLUCOPHAGE-XR) 500 MG 24 hr tablet TAKE TWO TABLETS BY MOUTH EVERY DAY WITH EVENING MEAL 180 tablet 1    omega-3 fatty acids 1,000 mg Cap Take 1 capsule by mouth Daily.      triamterene-hydrochlorothiazide 37.5-25 mg (MAXZIDE-25) 37.5-25 mg per tablet Take 1 tablet by mouth once daily. 90 tablet 1    VITAMIN D2 50,000 unit capsule TAKE TWO CAPSULES BY MOUTH EVERY 2 WEEKS 12 capsule 1     No current facility-administered medications on file prior to visit.        Allergies   Review of patient's allergies indicates:   Allergen Reactions    Zostavax [zoster vaccine live (pf)] Rash     - injection site red, hot, indurated    Amlodipine-olmesartan      Other reaction(s): pounding in ears      Atorvastatin        Other reaction(s): Unknown    Demerol [meperidine] Other (See Comments)     Other reaction(s): Nausea  Other reaction(s): Headache    Ezetimibe-simvastatin      Other reaction(s): Headache      Lotrel [amlodipine-benazepril]        Other reaction(s): constant cough    Nsaids (non-steroidal anti-inflammatory drug) Other (See Comments)       Other reaction(s): Difficulty breathing    Other reaction(s): Difficulty breathing    Phenytoin sodium extended Other (See  Comments)         ROS  Review of Systems   Constitutional: Negative for activity change, fatigue, fever and unexpected weight change.   HENT: Positive for congestion, facial swelling, nosebleeds, postnasal drip, rhinorrhea, sinus pressure, sinus pain, sneezing and sore throat. Negative for hearing loss and trouble swallowing.    Eyes: Negative for discharge and visual disturbance.   Respiratory: Negative for chest tightness and wheezing.    Cardiovascular: Negative for chest pain and palpitations.   Gastrointestinal: Negative for blood in stool, constipation, diarrhea and vomiting.   Endocrine: Negative for polydipsia and polyuria.   Genitourinary: Negative for difficulty urinating, dysuria, hematuria and menstrual problem.   Musculoskeletal: Negative for arthralgias, joint swelling and neck pain.   Neurological: Positive for headaches. Negative for weakness.   Psychiatric/Behavioral: Negative for confusion and dysphoric mood.         Objective:    There were no vitals taken for this visit.    Physical Exam   Pulmonary/Chest: Effort normal.   Neurological: She is alert.   Skin: No pallor.   Psychiatric: She has a normal mood and affect.       Assessment/Plan:  Claudia Rodriguez is a 65 y.o. female who presents today for :    Claudia was seen today for facial pain.    Diagnoses and all orders for this visit:    Acute bacterial sinusitis  -     amoxicillin-clavulanate 875-125mg (AUGMENTIN) 875-125 mg per tablet; Take 1 tablet by mouth 2 (two) times daily. for 10 days  -     predniSONE (DELTASONE) 20 MG tablet; Take 1 tablet (20 mg total) by mouth once daily. for 4 days  Discussed symptomatology of acute bacterial rhinosinusitis, including risk factors and proposed benefit, side effects/risks of antibiotic therapy   Advised to treat symptoms with tylenol/NSAID, nasal irrigation, and antihistamine as needed. Avoid fluticasone if having nasal bleeds.   · Drink plenty of water, hot tea, and other liquids. This may help thin  mucus. It also may promote sinus drainage.  · Heat may help soothe painful areas of the face. Use a towel soaked in hot water. Or,  the shower and direct the hot spray onto your face. Using a vaporizer along with a menthol rub at night may also help.   · An expectorant containing guaifenesin may help thin the mucus and promote drainage from the sinuses.  · Over-the-counter decongestants may be used unless a similar medicine was prescribed. Nasal sprays work the fastest. Use one that contains phenylephrine or oxymetazoline. First blow the nose gently. Then use the spray. Do not use these medicines more often than directed on the label or symptoms may get worse. You may also use tablets containing pseudoephedrine. Avoid products that combine ingredients, because side effects may be increased. Read labels. You can also ask the pharmacist for help. (NOTE: Persons with high blood pressure should not use decongestants. They can raise blood pressure.)  · Over-the-counter antihistamines may help if allergies contributed to your sinusitis.    · Use acetaminophen or ibuprofen to control pain, unless another pain medicine was prescribed. (If you have chronic liver or kidney disease or ever had a stomach ulcer, talk with your doctor before using these medicines. Aspirin should never be used in anyone under 18 years of age who is ill with a fever. It may cause severe liver damage.)  · Use nasal rinses or irrigation as instructed by your health care provider.  · Don't smoke. This can worsen symptoms.    Counseled patient on the clinical course of condition including symptomatology, treatment and prevention.  Counseled regarding risks, benefits, and limitations of medications.  Advised patient to seek urgent/emergent care if symptoms intensify.  Sent patient with informational material about diagnosis.  Patient gave verbal understanding and agreement of plan.     Encouraged to call/return to clinic if symptoms persist or  worsen    Yarely Marte PA-C  [facility]/ Internal Med

## 2020-06-08 ENCOUNTER — PATIENT OUTREACH (OUTPATIENT)
Dept: OTHER | Facility: OTHER | Age: 65
End: 2020-06-08

## 2020-06-08 NOTE — PROGRESS NOTES
Digital Medicine: Health  Follow-Up    The history is provided by the patient. No  was used.       INTERVENTION(S)  encouragement/support and denied resources    PLAN  continue monitoring      There are no preventive care reminders to display for this patient.    Last 5 Patient Entered Readings                                      Current 30 Day Average: 131/73     Recent Readings 5/30/2020 5/22/2020 5/16/2020 5/6/2020 4/25/2020    SBP (mmHg) 130 142 120 135 111    DBP (mmHg) 67 75 78 78 63    Pulse 69 73 62 66 70                      Diet Screening   Patient reports eating or drinking the following: fruit, water, fresh vegetables and proteins, grainsShe has the following dietary restrictions: weight-loss and low sodium dietShe cooks for self.    Patient does the shopping for groceries.  She gets groceries from the grocery store.      Barriers to a Healthy Diet: no barriers to healthy eating    Patient says she continues to follow a diet of 1200- 1400 calories per day.  She reports she has lost 25 lbs since she started.      Assigning the following patient goals: maintain low sodium diet    Physical Activity Screening   No change to exercise routine.    When asked if exercising, patient responded: yes    She exercises for 45 minutes per day 7 day(s) a week.      Patient participates in the following activities: walking    She identified the following barriers to physical activity: no barriers to being active    Assigning the following patient goal(s): participate in exercise weekly    Medication Adherence Screening   She did not miss a dose this month.  Patient knows purpose of medications.      Patient identified the following reasons for non-compliance: None    Patient says she does not need any refills today.      SDOH

## 2020-06-16 ENCOUNTER — HOSPITAL ENCOUNTER (OUTPATIENT)
Dept: RADIOLOGY | Facility: HOSPITAL | Age: 65
Discharge: HOME | End: 2020-06-16
Attending: PHYSICIAN ASSISTANT
Payer: MEDICARE

## 2020-06-16 ENCOUNTER — TELEPHONE (OUTPATIENT)
Dept: HEPATOLOGY | Facility: CLINIC | Age: 65
End: 2020-06-16

## 2020-06-16 DIAGNOSIS — K74.60 CIRRHOSIS OF LIVER WITHOUT ASCITES, UNSPECIFIED HEPATIC CIRRHOSIS TYPE: ICD-10-CM

## 2020-06-16 PROCEDURE — 76700 US EXAM ABDOM COMPLETE: CPT | Mod: TC

## 2020-06-16 PROCEDURE — 76700 US EXAM ABDOM COMPLETE: CPT | Mod: 26,,, | Performed by: RADIOLOGY

## 2020-06-16 PROCEDURE — 76700 US ABDOMEN COMPLETE: ICD-10-PCS | Mod: 26,,, | Performed by: RADIOLOGY

## 2020-06-19 ENCOUNTER — HOSPITAL ENCOUNTER (OUTPATIENT)
Dept: ENDOCRINOLOGY | Facility: CLINIC | Age: 65
Discharge: HOME | End: 2020-06-19
Attending: INTERNAL MEDICINE
Payer: MEDICARE

## 2020-06-19 DIAGNOSIS — R73.03 PREDIABETES: ICD-10-CM

## 2020-06-19 DIAGNOSIS — E66.01 SEVERE OBESITY (BMI 35.0-39.9) WITH COMORBIDITY: ICD-10-CM

## 2020-06-19 DIAGNOSIS — E03.9 ACQUIRED HYPOTHYROIDISM: ICD-10-CM

## 2020-06-19 DIAGNOSIS — E55.9 VITAMIN D DEFICIENCY DISEASE: ICD-10-CM

## 2020-06-19 DIAGNOSIS — E04.9 GOITER, NODULAR: ICD-10-CM

## 2020-06-19 PROCEDURE — 76536 US EXAM OF HEAD AND NECK: CPT | Mod: 26,,, | Performed by: INTERNAL MEDICINE

## 2020-06-19 PROCEDURE — 76536 US SOFT TISSUE HEAD NECK THYROID: ICD-10-PCS | Mod: 26,,, | Performed by: INTERNAL MEDICINE

## 2020-06-24 ENCOUNTER — PATIENT MESSAGE (OUTPATIENT)
Dept: FAMILY MEDICINE | Facility: CLINIC | Age: 65
End: 2020-06-24

## 2020-06-24 DIAGNOSIS — R39.89 SUSPECTED UTI: Primary | ICD-10-CM

## 2020-06-25 ENCOUNTER — OFFICE VISIT (OUTPATIENT)
Dept: FAMILY MEDICINE | Facility: CLINIC | Age: 65
End: 2020-06-25
Payer: MEDICARE

## 2020-06-25 DIAGNOSIS — R39.89 SUSPECTED UTI: Primary | ICD-10-CM

## 2020-06-25 PROCEDURE — 99214 OFFICE O/P EST MOD 30 MIN: CPT | Mod: 95,,, | Performed by: INTERNAL MEDICINE

## 2020-06-25 PROCEDURE — 99214 PR OFFICE/OUTPT VISIT, EST, LEVL IV, 30-39 MIN: ICD-10-PCS | Mod: 95,,, | Performed by: INTERNAL MEDICINE

## 2020-06-25 RX ORDER — NITROFURANTOIN 25; 75 MG/1; MG/1
100 CAPSULE ORAL 2 TIMES DAILY
Qty: 10 CAPSULE | Refills: 0 | Status: SHIPPED | OUTPATIENT
Start: 2020-06-25 | End: 2020-06-30 | Stop reason: SDUPTHER

## 2020-06-25 NOTE — TELEPHONE ENCOUNTER
Spoke with patient scheduled virtual visit today at 2:20 PM with Dr. Amaya. Patient will stop by to collect urine sample.

## 2020-06-25 NOTE — PROGRESS NOTES
This note was created by combination of typed  and M-Modal dictation.  Transcription errors may be present.  If there are any questions, please contact me.    Assessment:     1. Suspected UTI  nitrofurantoin, macrocrystal-monohydrate, (MACROBID) 100 MG capsule       Plan:   1. UCx pending. Empiric tx with macrobid.        There are no discontinued medications.    meds sent this encounter:  Medications Ordered This Encounter   Medications    nitrofurantoin, macrocrystal-monohydrate, (MACROBID) 100 MG capsule     Sig: Take 1 capsule (100 mg total) by mouth 2 (two) times daily. for 5 days     Dispense:  10 capsule     Refill:  0       Follow Up: No follow-ups on file.      Subjective:     Chief Complaint   Patient presents with    Urinary Tract Infection       KRISTYN Taylor is a 65 y.o. female, last appointment with this clinic was 5/12/2020.    No LMP recorded. Patient is postmenopausal.    The patient location is: [place]  The chief complaint leading to consultation is: UTI sx  Visit type: Virtual visit with synchronous audio and video  Total time spent with patient: 11 min  Each patient to whom he or she provides medical services by telemedicine is:  (1) informed of the relationship between the physician and patient and the respective role of any other health care provider with respect to management of the patient; and (2) notified that he or she may decline to receive medical services by telemedicine and may withdraw from such care at any time.    Notes:  Complaining of 3 days of urinary frequency, that sense of urgency is constant.  Wakes her up in the middle the night.  When she urinates she has no dysuria or burning.  The urine is clear without gross blood or discoloration or odor.  She is staying hydrated.  She thinks a few months ago during the pandemic she had similar symptoms and was prescribed a medication.  I do not see any record of any medication being sent in the last 3-4 months.  She took  the medicine and it got better.  She never did leave a urine sample.  She would reports a history of bladder prolapse with history of surgery and with history of bladder rupture and subsequent surgical repair.  So she has a smaller sized bladder.  But that was over 30 years ago she notes.  Her bowel movements are normal.  She is not having abdominal pain.  She is not having any fevers or chills.  She did leave a urine sample earlier today and that is processing.    Answers for HPI/ROS submitted by the patient on 6/25/2020   activity change: No  unexpected weight change: No  trouble swallowing: No  chest tightness: No  polyuria: Yes  difficulty urinating: No  dysphoric mood: No      Patient Care Team:  Jessica Marquez MD as PCP - General (Internal Medicine)  Bisi Madrigal MD as Consulting Physician (Endocrinology)  Fernie Hammond MD as Consulting Physician (Neurosurgery)  Jessica Marquez MD as Hypertension Digital Medicine Responsible Provider (Internal Medicine)  Neeraj MonsivaisD as Hypertension Digital Medicine Clinician (Pharmacist)  Anirudh Selby MD as Consulting Physician (Obstetrics and Gynecology)  June Sherman as Digital Medicine Health   Albertina Pickens LPN as Licensed Practical Nurse    Patient Active Problem List    Diagnosis Date Noted    Cirrhosis, non-alcoholic 02/21/2018    Pelvic floor weakness 12/18/2017    Atrophic vaginitis 12/13/2017    Urinary incontinence, mixed 12/13/2017    Nocturia more than twice per night 12/13/2017    Fatty liver      - noted on U/S 11/2017  - 2/2018 - fibroscan F4, NAFLD fibrosis score indeterminate      Urinary incontinence 11/20/2017    Statin intolerance 11/13/2017    Benign meningioma of brain 02/03/2017     - followed by neurosurgery, Dr. Hammond      Vitamin D deficiency disease 01/05/2015    Other hyperlipidemia      Patient could not tolerate atorvastatin.      Acquired hypothyroidism     Prediabetes 07/11/2012     Goiter, nodular      - followed by endocrinology      Essential hypertension     Severe obesity (BMI 35.0-39.9) with comorbidity        PAST MEDICAL HISTORY:  Past Medical History:   Diagnosis Date    Cholelithiasis     DDD (degenerative disc disease), lumbar     Fatty liver     - noted on U/S 11/2017    Goiter, nodular     HTN (hypertension)     Hyperglycemia     Hyperlipidemia     Hypothyroidism     Hypovitaminosis D     Meningioma     right frontal lobe ; followed by Dr. Enciso once a year    Obesity     Prediabetes     Urinary bladder disorder        PAST SURGICAL HISTORY:  Past Surgical History:   Procedure Laterality Date    BLADDER SUSPENSION      thyroid biopsy  7/11/12    tonsillectomy      TONSILLECTOMY         SOCIAL HISTORY:  Social History     Socioeconomic History    Marital status:      Spouse name: Not on file    Number of children: 1    Years of education: Not on file    Highest education level: Not on file   Occupational History    Occupation: human      Employer: [employer]   Social Needs    Financial resource strain: Not on file    Food insecurity     Worry: Not on file     Inability: Not on file    Transportation needs     Medical: Not on file     Non-medical: Not on file   Tobacco Use    Smoking status: Never Smoker    Smokeless tobacco: Never Used   Substance and Sexual Activity    Alcohol use: No    Drug use: No    Sexual activity: Yes     Partners: Male   Lifestyle    Physical activity     Days per week: Not on file     Minutes per session: Not on file    Stress: Not on file   Relationships    Social connections     Talks on phone: Not on file     Gets together: Not on file     Attends Nondenominational service: Not on file     Active member of club or organization: Not on file     Attends meetings of clubs or organizations: Not on file     Relationship status: Not on file   Other Topics Concern    Are you pregnant or think you may be? No     Breast-feeding No   Social History Narrative    3 adopted children.        ALLERGIES AND MEDICATIONS: updated and reviewed.  Review of patient's allergies indicates:   Allergen Reactions    Zostavax [zoster vaccine live (pf)] Rash     - injection site red, hot, indurated    Amlodipine-olmesartan      Other reaction(s): pounding in ears      Atorvastatin        Other reaction(s): Unknown    Demerol [meperidine] Other (See Comments)     Other reaction(s): Nausea  Other reaction(s): Headache    Ezetimibe-simvastatin      Other reaction(s): Headache      Lotrel [amlodipine-benazepril]        Other reaction(s): constant cough    Nsaids (non-steroidal anti-inflammatory drug) Other (See Comments)       Other reaction(s): Difficulty breathing    Other reaction(s): Difficulty breathing    Phenytoin sodium extended Other (See Comments)     Current Outpatient Medications   Medication Sig Dispense Refill    amLODIPine (NORVASC) 10 MG tablet TAKE ONE Tablet BY MOUTH EVERY DAY 90 tablet 1    CALCIUM CARBONATE/VITAMIN D3 (CALCIUM 500 + D, D3, ORAL)       carvedilol (COREG) 25 MG tablet TAKE ONE TABLET BY MOUTH TWICE DAILY WITH MEALS 180 tablet 1    FOLIC ACID/MV,FE,OTHER MIN (CENTRUM ORAL) Take 1 tablet by mouth.      IMVEXXY MAINTENANCE PACK 10 mcg Inst   5    levothyroxine (SYNTHROID) 75 MCG tablet TAKE ONE TABLET BY MOUTH EVERY DAY 90 tablet 1    metFORMIN (GLUCOPHAGE-XR) 500 MG 24 hr tablet TAKE TWO TABLETS BY MOUTH EVERY DAY WITH EVENING MEAL 180 tablet 1    omega-3 fatty acids 1,000 mg Cap Take 1 capsule by mouth Daily.      triamterene-hydrochlorothiazide 37.5-25 mg (MAXZIDE-25) 37.5-25 mg per tablet Take 1 tablet by mouth once daily. 90 tablet 1    VITAMIN D2 50,000 unit capsule TAKE TWO CAPSULES BY MOUTH EVERY 2 WEEKS 12 capsule 1     No current facility-administered medications for this visit.        Review of Systems   HENT: Negative for hearing loss.    Eyes: Negative for discharge.   Respiratory:  Negative for wheezing.    Cardiovascular: Negative for chest pain and palpitations.   Gastrointestinal: Negative for blood in stool, constipation, diarrhea and vomiting.   Genitourinary: Negative for hematuria.   Neurological: Negative for headaches.       Objective:   Physical Exam  There were no vitals filed for this visit. There is no height or weight on file to calculate BMI.            Physical Exam  Constitutional:       General: She is not in acute distress.     Appearance: Normal appearance. She is well-developed.   HENT:      Head: Normocephalic and atraumatic.   Pulmonary:      Effort: Pulmonary effort is normal.   Neurological:      Mental Status: She is alert and oriented to person, place, and time.   Psychiatric:         Behavior: Behavior normal.         Thought Content: Thought content normal.

## 2020-06-26 ENCOUNTER — LAB VISIT (OUTPATIENT)
Dept: LAB | Facility: HOSPITAL | Age: 65
End: 2020-06-26
Attending: INTERNAL MEDICINE
Payer: MEDICARE

## 2020-06-26 DIAGNOSIS — R73.03 PREDIABETES: ICD-10-CM

## 2020-06-26 DIAGNOSIS — E66.01 SEVERE OBESITY (BMI 35.0-39.9) WITH COMORBIDITY: ICD-10-CM

## 2020-06-26 DIAGNOSIS — E03.9 ACQUIRED HYPOTHYROIDISM: ICD-10-CM

## 2020-06-26 DIAGNOSIS — E04.9 GOITER, NODULAR: ICD-10-CM

## 2020-06-26 DIAGNOSIS — E55.9 VITAMIN D DEFICIENCY DISEASE: ICD-10-CM

## 2020-06-26 DIAGNOSIS — K74.60 CIRRHOSIS OF LIVER WITHOUT ASCITES, UNSPECIFIED HEPATIC CIRRHOSIS TYPE: ICD-10-CM

## 2020-06-26 LAB
25(OH)D3+25(OH)D2 SERPL-MCNC: 45 NG/ML (ref 30–96)
AFP SERPL-MCNC: 2.9 NG/ML (ref 0–8.4)
ALBUMIN SERPL BCP-MCNC: 4.1 G/DL (ref 3.5–5.2)
ALP SERPL-CCNC: 72 U/L (ref 55–135)
ALT SERPL W/O P-5'-P-CCNC: 20 U/L (ref 10–44)
ANION GAP SERPL CALC-SCNC: 9 MMOL/L (ref 8–16)
AST SERPL-CCNC: 19 U/L (ref 10–40)
BASOPHILS # BLD AUTO: 0.04 K/UL (ref 0–0.2)
BASOPHILS NFR BLD: 0.6 % (ref 0–1.9)
BILIRUB SERPL-MCNC: 0.8 MG/DL (ref 0.1–1)
BUN SERPL-MCNC: 18 MG/DL (ref 8–23)
CALCIUM SERPL-MCNC: 9.5 MG/DL (ref 8.7–10.5)
CHLORIDE SERPL-SCNC: 102 MMOL/L (ref 95–110)
CO2 SERPL-SCNC: 27 MMOL/L (ref 23–29)
CREAT SERPL-MCNC: 0.8 MG/DL (ref 0.5–1.4)
DIFFERENTIAL METHOD: ABNORMAL
EOSINOPHIL # BLD AUTO: 0.1 K/UL (ref 0–0.5)
EOSINOPHIL NFR BLD: 1.4 % (ref 0–8)
ERYTHROCYTE [DISTWIDTH] IN BLOOD BY AUTOMATED COUNT: 12.6 % (ref 11.5–14.5)
EST. GFR  (AFRICAN AMERICAN): >60 ML/MIN/1.73 M^2
EST. GFR  (NON AFRICAN AMERICAN): >60 ML/MIN/1.73 M^2
ESTIMATED AVG GLUCOSE: 108 MG/DL (ref 68–131)
GLUCOSE SERPL-MCNC: 119 MG/DL (ref 70–110)
HBA1C MFR BLD HPLC: 5.4 % (ref 4–5.6)
HCT VFR BLD AUTO: 42.6 % (ref 37–48.5)
HGB BLD-MCNC: 13.2 G/DL (ref 12–16)
IMM GRANULOCYTES # BLD AUTO: 0.03 K/UL (ref 0–0.04)
IMM GRANULOCYTES NFR BLD AUTO: 0.5 % (ref 0–0.5)
INR PPP: 1 (ref 0.8–1.2)
LYMPHOCYTES # BLD AUTO: 3 K/UL (ref 1–4.8)
LYMPHOCYTES NFR BLD: 45.8 % (ref 18–48)
MCH RBC QN AUTO: 30.8 PG (ref 27–31)
MCHC RBC AUTO-ENTMCNC: 31 G/DL (ref 32–36)
MCV RBC AUTO: 99 FL (ref 82–98)
MONOCYTES # BLD AUTO: 0.6 K/UL (ref 0.3–1)
MONOCYTES NFR BLD: 9.2 % (ref 4–15)
NEUTROPHILS # BLD AUTO: 2.7 K/UL (ref 1.8–7.7)
NEUTROPHILS NFR BLD: 42.5 % (ref 38–73)
NRBC BLD-RTO: 0 /100 WBC
PLATELET # BLD AUTO: 311 K/UL (ref 150–350)
PMV BLD AUTO: 10.4 FL (ref 9.2–12.9)
POTASSIUM SERPL-SCNC: 4.1 MMOL/L (ref 3.5–5.1)
PROT SERPL-MCNC: 6.9 G/DL (ref 6–8.4)
PROTHROMBIN TIME: 10.4 SEC (ref 9–12.5)
RBC # BLD AUTO: 4.29 M/UL (ref 4–5.4)
SODIUM SERPL-SCNC: 138 MMOL/L (ref 136–145)
WBC # BLD AUTO: 6.44 K/UL (ref 3.9–12.7)

## 2020-06-26 PROCEDURE — 85025 COMPLETE CBC W/AUTO DIFF WBC: CPT

## 2020-06-26 PROCEDURE — 82105 ALPHA-FETOPROTEIN SERUM: CPT

## 2020-06-26 PROCEDURE — 80053 COMPREHEN METABOLIC PANEL: CPT

## 2020-06-26 PROCEDURE — 36415 COLL VENOUS BLD VENIPUNCTURE: CPT | Mod: PO

## 2020-06-26 PROCEDURE — 85610 PROTHROMBIN TIME: CPT

## 2020-06-26 PROCEDURE — 83036 HEMOGLOBIN GLYCOSYLATED A1C: CPT

## 2020-06-26 PROCEDURE — 82306 VITAMIN D 25 HYDROXY: CPT

## 2020-06-29 ENCOUNTER — PATIENT OUTREACH (OUTPATIENT)
Dept: ADMINISTRATIVE | Facility: OTHER | Age: 65
End: 2020-06-29

## 2020-06-30 ENCOUNTER — OFFICE VISIT (OUTPATIENT)
Dept: ENDOCRINOLOGY | Facility: CLINIC | Age: 65
End: 2020-06-30
Payer: MEDICARE

## 2020-06-30 ENCOUNTER — PATIENT MESSAGE (OUTPATIENT)
Dept: FAMILY MEDICINE | Facility: CLINIC | Age: 65
End: 2020-06-30

## 2020-06-30 DIAGNOSIS — R79.9 ABNORMAL FINDING OF BLOOD CHEMISTRY, UNSPECIFIED: ICD-10-CM

## 2020-06-30 DIAGNOSIS — Z86.39 HISTORY OF VITAMIN D DEFICIENCY: ICD-10-CM

## 2020-06-30 DIAGNOSIS — R39.89 SUSPECTED UTI: ICD-10-CM

## 2020-06-30 DIAGNOSIS — E03.9 ACQUIRED HYPOTHYROIDISM: ICD-10-CM

## 2020-06-30 DIAGNOSIS — E04.9 GOITER, NODULAR: Primary | ICD-10-CM

## 2020-06-30 DIAGNOSIS — R73.03 PREDIABETES: ICD-10-CM

## 2020-06-30 DIAGNOSIS — E55.9 VITAMIN D DEFICIENCY DISEASE: ICD-10-CM

## 2020-06-30 DIAGNOSIS — E66.01 SEVERE OBESITY (BMI 35.0-39.9) WITH COMORBIDITY: ICD-10-CM

## 2020-06-30 PROCEDURE — 99214 PR OFFICE/OUTPT VISIT, EST, LEVL IV, 30-39 MIN: ICD-10-PCS | Mod: 95,,, | Performed by: INTERNAL MEDICINE

## 2020-06-30 PROCEDURE — 99214 OFFICE O/P EST MOD 30 MIN: CPT | Mod: 95,,, | Performed by: INTERNAL MEDICINE

## 2020-06-30 RX ORDER — ERGOCALCIFEROL 1.25 MG/1
50000 CAPSULE ORAL
Qty: 12 CAPSULE | Refills: 3 | Status: SHIPPED | OUTPATIENT
Start: 2020-06-30 | End: 2021-04-12

## 2020-06-30 RX ORDER — NITROFURANTOIN 25; 75 MG/1; MG/1
100 CAPSULE ORAL 2 TIMES DAILY
Qty: 10 CAPSULE | Refills: 0 | Status: SHIPPED | OUTPATIENT
Start: 2020-06-30 | End: 2020-07-05

## 2020-06-30 RX ORDER — LEVOTHYROXINE SODIUM 75 UG/1
75 TABLET ORAL DAILY
Qty: 90 TABLET | Refills: 3 | Status: SHIPPED | OUTPATIENT
Start: 2020-06-30 | End: 2021-06-29

## 2020-06-30 NOTE — PATIENT INSTRUCTIONS
Thank you for completing a virtual visit with me!     Per our conversation your medication changes are as follows:  Stop your metformin and we will recheck the hemoglobin A1c in 3 months    We will plan a telemedicine or an in-clinic visit in 12  months with labs and ultrasound prior to that appointment.    My staff will contact you to schedule the above.     Please let me know if you have any other questions.  Again, great job on the weight loss!    Thank you,  Bisi Madrigal MD

## 2020-06-30 NOTE — TELEPHONE ENCOUNTER
Continued urgency.  I will extend the Macrobid another 5 days if still symptomatic then repeat urinalysis and urine culture

## 2020-06-30 NOTE — PROGRESS NOTES
Subjective:      Patient ID: Claudia Rodriguez is a 65 y.o. female.    Chief Complaint:  mng    History of Present Illness     She is loving [facility].    lost 25 lbs -  Walking and eating right        With regards to the MNG  She initially presented with neck fullness  U/s confirmed the mng  2 FNAs done in 2012- both nondianostic  FNA L/isthmus 10/2013-benign     12/18/18  THYROID, LEFT MID (ASPIRATION):  [facility] Thyroid Cytology Category: Benign  Sulligent follicular cells and colloid     6/16/20  COMPARISON:  Neck ultrasound dated 10/31/2018.  Our records indicate a benign FNA of the isthmus/left lobe nodule in 2013 and the left mid lobe nodule in 12/2018.  When compared to the prior study the above nodules are stable in size and features.     Impression:     1.) Thyroid gland is normal in size with heterogeneous echotexture and normal vascularity     2.) 0.47 cm macrocalcification is seen in the right inferior pole with associated posterior acoustic shadowing     3.) 0.91 x 0.69 x 0.87 cm solid, hypoechoic nodule is seen in the right mid lobe     4.) 0.55 cm solid, hypoechoic nodule is seen in the right superior pole     5.) 0.95 x 0.55 x 0.62 cm solid, slightly hypoechoic nodule is seen in the left inferior pole     6.) 1.22 x 0.81 x 0.78 cm solid, hypoechoic nodule is seen in the left mid lobe     7.) 1.39 x 0.70 x 0.90 cm solid, heterogeneous predominately hypoechoic nodule is seen in the isthmus/left lobe nodule     RECOMMENDATIONS:  Recommend repeat thyroid ultrasound in 1-2 years.      2 Sister with nodules -1 had thyroidectomy - benign      Patient denies any neck enlargement.   No dysphagia, no dyspnea.   Energy level good.  sleeps well.   No skin/nail hair changes  Denies constipation      With regards to the Hypothyroidism   She is on lt4 75 mcg qd   Takes medication in the morning without food or medication.     2 children + 1 grandchild - she has custody, works as a        With regards to the Vit D deficiency   Taking ergo 100k q 2 weeks      With regards to the IGT and obesity   tolerating metformin 1000 mg with dinner         Down to 220 lbs at home      2019 nl bmd     Review of Systems   Constitutional: Negative for unexpected weight change.   Eyes: Negative for visual disturbance.   Respiratory: Negative for shortness of breath.    Cardiovascular: Negative for chest pain.   Gastrointestinal: Negative for abdominal pain.   Musculoskeletal: Negative for arthralgias.   Skin: Negative for wound.   Neurological: Negative for headaches.   Hematological: Does not bruise/bleed easily.   Psychiatric/Behavioral: Negative for sleep disturbance.       Objective:   Physical Exam    There is no height or weight on file to calculate BMI.    Lab Review:   Lab Results   Component Value Date    HGBA1C 5.4 06/26/2020     Lab Results   Component Value Date    CHOL 260 (H) 10/17/2019    HDL 47 10/17/2019    LDLCALC 173.2 (H) 10/17/2019    TRIG 199 (H) 10/17/2019    CHOLHDL 18.1 (L) 10/17/2019     Lab Results   Component Value Date     06/26/2020    K 4.1 06/26/2020     06/26/2020    CO2 27 06/26/2020     (H) 06/26/2020    BUN 18 06/26/2020    CREATININE 0.8 06/26/2020    CALCIUM 9.5 06/26/2020    PROT 6.9 06/26/2020    ALBUMIN 4.1 06/26/2020    BILITOT 0.8 06/26/2020    ALKPHOS 72 06/26/2020    AST 19 06/26/2020    ALT 20 06/26/2020    ANIONGAP 9 06/26/2020    ESTGFRAFRICA >60.0 06/26/2020    EGFRNONAA >60.0 06/26/2020    TSH 0.977 02/24/2020       Assessment and Plan     Goiter, nodular   I have reviewed management options       Discussed   surgical indications (abnormal FNA, compressive symptoms or interval change)     plan follow up in 1 year with TSH and thyroid u/s prior        Acquired hypothyroidism  Clinically and biochemically euthyroid  Goal is a normal TSH  Continue lt4 dose at this time  Avoid exogenous hyperthyroidism as this can accelerate bone loss and increase  risk of CV complications.       Prediabetes  Doing great  Reviewed options and she would like to stop metformin and recheck hemoglobin A1c in 3 months        Severe obesity (BMI 35.0-39.9) with comorbidity  As above     Vitamin D deficiency disease  continue ergo 100 k every 2 weeks       The patient location is: home  The chief complaint leading to consultation is: above    Visit type: audiovisual    Face to Face time with patient: 30 minutes of total time spent on the encounter, which includes face to face time and non-face to face time preparing to see the patient (eg, review of tests), Obtaining and/or reviewing separately obtained history, Documenting clinical information in the electronic or other health record, Independently interpreting results (not separately reported) and communicating results to the patient/family/caregiver, or Care coordination (not separately reported).         Each patient to whom he or she provides medical services by telemedicine is:  (1) informed of the relationship between the physician and patient and the respective role of any other health care provider with respect to management of the patient; and (2) notified that he or she may decline to receive medical services by telemedicine and may withdraw from such care at any time.

## 2020-06-30 NOTE — ASSESSMENT & PLAN NOTE
Doing great  Reviewed options and she would like to stop metformin and recheck hemoglobin A1c in 3 months

## 2020-06-30 NOTE — PROGRESS NOTES
Requested updates within Care Everywhere.  Patient's chart was reviewed for overdue NAYAN topics.  Immunizations reconciled.

## 2020-07-06 ENCOUNTER — PATIENT OUTREACH (OUTPATIENT)
Dept: OTHER | Facility: OTHER | Age: 65
End: 2020-07-06

## 2020-07-24 ENCOUNTER — CLINICAL SUPPORT (OUTPATIENT)
Dept: FAMILY MEDICINE | Facility: CLINIC | Age: 65
End: 2020-07-24
Payer: MEDICARE

## 2020-07-24 VITALS — TEMPERATURE: 98 F

## 2020-07-24 DIAGNOSIS — Z23 NEED FOR SHINGLES VACCINE: Primary | ICD-10-CM

## 2020-07-24 PROCEDURE — 90750 HZV VACC RECOMBINANT IM: CPT | Mod: PBBFAC,PO

## 2020-07-24 PROCEDURE — 99999 PR PBB SHADOW E&M-EST. PATIENT-LVL I: ICD-10-PCS | Mod: PBBFAC,,,

## 2020-07-24 PROCEDURE — 99999 PR PBB SHADOW E&M-EST. PATIENT-LVL I: CPT | Mod: PBBFAC,,,

## 2020-07-24 NOTE — PROGRESS NOTES
Patient tolerated Shingrix #2 IM with no complication. Patient received VIS information. Advise 15 min wait.

## 2020-07-30 DIAGNOSIS — I10 ESSENTIAL HYPERTENSION: ICD-10-CM

## 2020-07-30 RX ORDER — AMLODIPINE BESYLATE 10 MG/1
TABLET ORAL
Qty: 90 TABLET | Refills: 0 | Status: SHIPPED | OUTPATIENT
Start: 2020-07-30 | End: 2020-10-26

## 2020-07-30 RX ORDER — CARVEDILOL 25 MG/1
TABLET ORAL
Qty: 180 TABLET | Refills: 0 | Status: SHIPPED | OUTPATIENT
Start: 2020-07-30 | End: 2020-10-26

## 2020-08-03 ENCOUNTER — OFFICE VISIT (OUTPATIENT)
Dept: HEPATOLOGY | Facility: CLINIC | Age: 65
End: 2020-08-03
Payer: MEDICARE

## 2020-08-03 VITALS
BODY MASS INDEX: 32.98 KG/M2 | HEART RATE: 66 BPM | DIASTOLIC BLOOD PRESSURE: 69 MMHG | OXYGEN SATURATION: 98 % | HEIGHT: 69 IN | SYSTOLIC BLOOD PRESSURE: 142 MMHG | WEIGHT: 222.69 LBS | RESPIRATION RATE: 12 BRPM

## 2020-08-03 DIAGNOSIS — E66.9 OBESITY (BMI 30-39.9): ICD-10-CM

## 2020-08-03 DIAGNOSIS — R16.0 HEPATOMEGALY: ICD-10-CM

## 2020-08-03 DIAGNOSIS — E78.49 OTHER HYPERLIPIDEMIA: ICD-10-CM

## 2020-08-03 DIAGNOSIS — K74.60 CIRRHOSIS OF LIVER WITHOUT ASCITES, UNSPECIFIED HEPATIC CIRRHOSIS TYPE: Primary | ICD-10-CM

## 2020-08-03 DIAGNOSIS — K76.0 FATTY LIVER: ICD-10-CM

## 2020-08-03 DIAGNOSIS — I10 ESSENTIAL HYPERTENSION: ICD-10-CM

## 2020-08-03 PROCEDURE — 99999 PR PBB SHADOW E&M-EST. PATIENT-LVL V: CPT | Mod: PBBFAC,,, | Performed by: PHYSICIAN ASSISTANT

## 2020-08-03 PROCEDURE — 99215 OFFICE O/P EST HI 40 MIN: CPT | Mod: PBBFAC | Performed by: PHYSICIAN ASSISTANT

## 2020-08-03 PROCEDURE — 99214 OFFICE O/P EST MOD 30 MIN: CPT | Mod: S$PBB,,, | Performed by: PHYSICIAN ASSISTANT

## 2020-08-03 PROCEDURE — 99999 PR PBB SHADOW E&M-EST. PATIENT-LVL V: ICD-10-PCS | Mod: PBBFAC,,, | Performed by: PHYSICIAN ASSISTANT

## 2020-08-03 PROCEDURE — 99214 PR OFFICE/OUTPT VISIT, EST, LEVL IV, 30-39 MIN: ICD-10-PCS | Mod: S$PBB,,, | Performed by: PHYSICIAN ASSISTANT

## 2020-08-03 NOTE — PROGRESS NOTES
[facility] - Established Patient     Last Clinic Visit: 12/13/2019 with King Braga PA-C    CHIEF COMPLAINT: Cirrhosis likely secondary to fatty liver    HPI: This is a 65 y.o. White female referred for continued evaluation of well-compensated cirrhosis likely secondary to MOURA/NAFLD. The patient was previously followed by King Braga PA-C; is new to me today.     The patient was initially evaluated for fatty liver noted on US and intermittently elevated liver enzymes ALT>AST; on Fibroscan the patient was found to have F4 fibrosis, S3 steatosis on 12/2017.     Per chart review, she has declined further investigation into cirrhosis staging and continues with HCC screening.     Her RF for fatty liver include HTN, HLD, pre-diabetes, Body mass index is 32.88 kg/m²., hypothyroid    Her previous serologic workup was negative for genetic, autoimmune, and viral causes of liver disease.    U/S 6/16/20: Liver: 18.4 cm, mildly enlarged.  Diffusely increased parenchymal echogenicity consistent with steatosis. No focal lesions. No splenomegaly, ascites.    Her most recent labs look excellent:   Lab Results   Component Value Date    AFP 2.9 06/26/2020     Lab Results   Component Value Date    ALT 20 06/26/2020    AST 19 06/26/2020    ALKPHOS 72 06/26/2020    BILITOT 0.8 06/26/2020    ALBUMIN 4.1 06/26/2020    INR 1.0 06/26/2020     06/26/2020     Cirrhosis Health Maintenance:  -- HCC screening:  U/S w/ no lesions, next due Jan 2021   AFP WNL, next due Jan 2021   -- Variceal screening: early for EGD, no thrombocytopenia, splenomegaly, or signs of portal HTN  -- Hepatitis immunization status:    Hep A & B: administered on 3/11/2019; 7/13/2010      Interval history:   She is here today alone. She reports she has lost 25 lbs in the past 9 months. She is eating better and exercising. Has been taken off of metformin. Recently stressed as her  just had a stroke, but is recovering well.     Etoh  history:  Denies   Family hx liver disease or cancer: Denies     The patient feels well. Thorough ROS as below.   Current symptoms of hepatic decompensation:              Ascites: No              LE edema: No               Hepatic encephalopathy: Denies              Variceal/GI bleeding:  No              Jaundice: No    Occupation: retired traveling   Social: lives at home with      Review of patient's allergies indicates:   Allergen Reactions    Zostavax [zoster vaccine live (pf)] Rash     - injection site red, hot, indurated    Amlodipine-olmesartan      Other reaction(s): pounding in ears      Atorvastatin        Other reaction(s): Unknown    Demerol [meperidine] Other (See Comments)     Other reaction(s): Nausea  Other reaction(s): Headache    Ezetimibe-simvastatin      Other reaction(s): Headache      Lotrel [amlodipine-benazepril]        Other reaction(s): constant cough    Nsaids (non-steroidal anti-inflammatory drug) Other (See Comments)       Other reaction(s): Difficulty breathing    Other reaction(s): Difficulty breathing    Phenytoin sodium extended Other (See Comments)        Current Outpatient Medications on File Prior to Visit   Medication Sig Dispense Refill    amLODIPine (NORVASC) 10 MG tablet TAKE 1 TABLET BY MOUTH EVERY DAY 90 tablet 0    CALCIUM CARBONATE/VITAMIN D3 (CALCIUM 500 + D, D3, ORAL)       carvediloL (COREG) 25 MG tablet TAKE 1 TABLET BY MOUTH TWICE A DAY WITH MEALS 180 tablet 0    ergocalciferol (VITAMIN D2) 50,000 unit Cap Take 1 capsule (50,000 Units total) by mouth every 7 days. 12 capsule 3    FOLIC ACID/MV,FE,OTHER MIN (CENTRUM ORAL) Take 1 tablet by mouth.      IMVEXXY MAINTENANCE PACK 10 mcg Inst   5    levothyroxine (SYNTHROID) 75 MCG tablet Take 1 tablet (75 mcg total) by mouth once daily. 90 tablet 3    metFORMIN (GLUCOPHAGE-XR) 500 MG 24 hr tablet TAKE TWO TABLETS BY MOUTH EVERY DAY WITH EVENING MEAL 180 tablet 1    omega-3 fatty acids  1,000 mg Cap Take 1 capsule by mouth Daily.      triamterene-hydrochlorothiazide 37.5-25 mg (MAXZIDE-25) 37.5-25 mg per tablet Take 1 tablet by mouth once daily. 90 tablet 1     No current facility-administered medications on file prior to visit.        PMHX:  has a past medical history of Cholelithiasis, DDD (degenerative disc disease), lumbar, Fatty liver, Goiter, nodular, HTN (hypertension), Hyperglycemia, Hyperlipidemia, Hypothyroidism, Hypovitaminosis D, Meningioma, Obesity, Prediabetes, and Urinary bladder disorder.    PSHX:  has a past surgical history that includes Bladder suspension; thyroid biopsy (7/11/12); tonsillectomy; and Tonsillectomy.    FAMILY HISTORY:   Family History   Problem Relation Age of Onset    Coronary artery disease Father 55    Heart disease Father     Hypertension Father     Melanoma Father     Hypertension Mother     Stroke Mother         2015    Hypertension Sister         controlled by lifestyle    No Known Problems Brother     Pancreatic cancer Maternal Grandfather     Heart failure Maternal Grandmother     Thyroid nodules Sister         s/p thyroidectomy    Thyroid disease Sister     Thyroid nodules Sister     No Known Problems Brother     Diabetes Neg Hx     Psoriasis Neg Hx     Lupus Neg Hx     Eczema Neg Hx     Thyroid cancer Neg Hx     Breast cancer Neg Hx     Ovarian cancer Neg Hx     Cervical cancer Neg Hx     Endometrial cancer Neg Hx     Vaginal cancer Neg Hx        SOCIAL HISTORY:   Social History     Tobacco Use   Smoking Status Never Smoker   Smokeless Tobacco Never Used       Social History     Substance and Sexual Activity   Alcohol Use No       Social History     Substance and Sexual Activity   Drug Use No       Review of Systems   Constitutional: Negative for appetite change, chills, fatigue, fever and unexpected weight change.   Eyes: Negative for visual disturbance. Positive for itchy eyes.  Respiratory: Negative for cough and shortness of  "breath.    Cardiovascular: Negative for chest pain, palpitations and leg swelling.   Gastrointestinal: Negative for abdominal distention, abdominal pain, blood in stool, constipation, diarrhea, nausea and vomiting.   Musculoskeletal: Negative for gait problem, joint swelling and myalgias.   Skin: Negative for color change, rash and wound. Denies itching.   Hematological: Does not bruise/bleed easily.   Psychiatric/Behavioral: Negative for confusion, decreased concentration. Denies memory loss. Denies depression. The patient is not nervous/anxious.        DATA     Physical Exam   Constitutional: Friendly White female. Well-nourished. Mask in place. No distress. Alert and oriented to person, place, and time.  Eyes: No scleral icterus. Bilateral injected conjuctiva.   Cardiovascular: Normal rate, regular rhythm and normal heart sounds. No murmur heard.  Pulmonary/Chest: Respiratory effort and breath sounds normal. No respiratory distress.   Abdominal: Soft, non-tender. Bowel sounds are normal. No distension; no ascites appreciated. There is no palpable hepatosplenomegaly. No hernia or mass.   Musculoskeletal: No edema. R Wrist splint in place.  Neurological: No tremor. Coordination and gait normal. No asterixis.    Skin: Skin is warm and dry. No rash or erythema. No jaundice. No telangiectasias or palmar erythema noted.  Psychiatric: Normal mood and affect. Speech, behavior, and thought content normal. No depression or anxiety noted.     BP (!) 142/69 (BP Location: Right arm, Patient Position: Sitting, BP Method: Medium (Automatic))   Pulse 66   Resp 12   Ht 5' 9" (1.753 m)   Wt 101 kg (222 lb 10.6 oz)   SpO2 98%   BMI 32.88 kg/m²     LABS:  Lab Results   Component Value Date    WBC 6.44 06/26/2020    HGB 13.2 06/26/2020    HCT 42.6 06/26/2020     06/26/2020     06/26/2020    K 4.1 06/26/2020    CREATININE 0.8 06/26/2020    ALT 20 06/26/2020    AST 19 06/26/2020    ALKPHOS 72 06/26/2020    BILITOT " 0.8 06/26/2020    BILIDIR 0.2 12/26/2017    ALBUMIN 4.1 06/26/2020    INR 1.0 06/26/2020    AFP 2.9 06/26/2020    SMOOTHMUSCAB Negative 1:40 12/26/2017    AMAIFA Negative 1:40 12/26/2017    IGGSERUM 591 (L) 12/26/2017    ANASCREEN Negative <1:160 12/26/2017    FESATURATED 20 12/26/2017    PETH Negative 12/26/2017    CERULOPLSM 30.0 12/26/2017    CHOL 260 (H) 10/17/2019    TRIG 199 (H) 10/17/2019    LDLCALC 173.2 (H) 10/17/2019    HGBA1C 5.4 06/26/2020       Hepatitis A Antibody IgG   Date Value Ref Range Status   12/26/2017 Negative  Final       Hepatitis B Surface Ag   Date Value Ref Range Status   12/26/2017 Negative  Final     Hep B Core Total Ab   Date Value Ref Range Status   12/26/2017 Negative  Final     Hep B S Ab   Date Value Ref Range Status   12/26/2017 Negative  Final     Hepatitis C Ab   Date Value Ref Range Status   05/23/2014 Negative  Final     Immunization History   Administered Date(s) Administered    Hepatitis A, Adult 07/13/2018, 03/11/2019    Hepatitis B, Adult 07/13/2018, 03/11/2019    Influenza 10/09/2018    Influenza - Quadrivalent - PF (6 months and older) 10/12/2016    Influenza A (H1N1) 2009 Monovalent - IM 03/18/2010    Pneumococcal Conjugate - 13 Valent 10/21/2019    Tdap 09/28/2015    Zoster 06/22/2016    Zoster Recombinant 02/24/2020, 07/24/2020          DIAGNOSTIC STUDIES:  ABDOMINAL Ultrasound -   Results for orders placed during the hospital encounter of 06/16/20   US Abdomen Complete    Narrative EXAMINATION:  US ABDOMEN COMPLETE    CLINICAL HISTORY:  Unspecified cirrhosis of liver    TECHNIQUE:  Complete abdominal ultrasound (including pancreas, aorta, liver, gallbladder, common bile duct, IVC, kidneys, and spleen) was performed.    COMPARISON:  Ultrasound 11/07/2019, 05/24/2019    FINDINGS:  Pancreas: The visualized portions of pancreas appear normal.    Aorta: No aneurysm.    Liver: 18.4 cm, mildly enlarged.  Diffusely increased parenchymal echogenicity consistent with  steatosis. No focal lesions.    Gallbladder: Echogenic foci with posterior shadowing consistent with cholelithiasis.  There is no gallbladder wall thickening or pericholecystic fluid.  No sonographic Ascencio's sign.    Biliary system: 5 mm common bile duct.  No intrahepatic ductal dilatation.    Inferior vena cava: Normal in appearance.    Right kidney: 13.2 cm. No hydronephrosis.    Left kidney: 13.5 cm. No hydronephrosis.    Spleen: 10.4 x 4.3 cm.  Normal in size with homogeneous echotexture.    Miscellaneous: No ascites.      Impression Cholelithiasis without evidence of cholecystitis.    Hepatomegaly with hepatic steatosis.  No suspicious hepatic lesions.    Electronically signed by resident: Josue Vazquez  Date:    06/16/2020  Time:    09:12    Electronically signed by: Glen Velasquez MD  Date:    06/16/2020  Time:    09:21       Results for orders placed during the hospital encounter of 10/31/18   US Abdomen Limited    Narrative EXAMINATION:  US ABDOMEN LIMITED    CLINICAL HISTORY:  HCC screening;.    Abnormal levels of other serum enzymes    TECHNIQUE:  Limited ultrasound of the right upper quadrant of the abdomen including pancreas, liver, gallbladder, common bile duct was performed.    COMPARISON:  Abdominal ultrasound 12/04/2017    FINDINGS:  Liver: Enlarged in size, measuring 19.2 cm. Fatty liver infiltration. No focal hepatic lesions.    Gallbladder: Redemonstration of a large mobile gallstone which measures 2.1 cm on today's exam.  No associated abnormal gallbladder wall thickening or pericholecystic fluid.  No sonographic Ascencio sign.    Biliary system: The common duct is not dilated, measuring 4 mm.  No intrahepatic ductal dilatation.    Spleen: Normal in size with a homogeneous echotexture, measuring 10.1 x 3.4 cm.    Pancreas: The visualized portions of pancreas appear normal.    Miscellaneous: No ascites.      Impression Hepatomegaly with hepatic steatosis.    Cholelithiasis without evidence  to suggest acute cholecystitis.    Electronically signed by resident: Abel Maynard  Date:    10/31/2018  Time:    08:38    Electronically signed by: Reshma Angela MD  Date:    10/31/2018  Time:    09:03         Fibroscan:   Name: Claudia Rodriguez  Date of Procedure : 2017   :: King Braga PA-C  Diagnosis: NAFLD     Probe: XL     Fibroscan readin.7 KPa     Fibrosis:F4      CAP readin dB/m     Steatosis: :S3     EDUCATION / DISCUSSION:      There is no FDA approved therapy for non-alcoholic fatty liver disease. Therefore, these things are important:   1. Weight loss goal of 20 lbs  2. Low carb/sugar, high fiber and protein diet.Try to limit your carb intake to LESS than 30-45 grams of carbs with a meal or LESS than 5-10 grams with any snack (total of any snack foods eaten during that time). Use MyFitness Pal lisa to add up your carbs through the day. Do NOT drink any beverages with calories or carbs (this can lead to high blood sugar and weight gain). Also, some of our patients have been very successful with weight loss using the pre made/planned meal planning services that limit calories and portion size (one example is Sensible Meals but there are many out there)  3. Exercise, 5 days per week, 30 minutes per day, as tolerated  4. Recommend good cholesterol, blood pressure, blood sugar levels     *If statins are being considered for HLD, statins are safe in patients with liver disease. Statins can be used to treat dyslipidemia in patients with both NAFLD and MOURA.    In some people, fatty liver can progress to steatohepatitis (inflamatory fatty liver) and possibly to cirrhosis, putting one at increased risk for liver cancer, liver failure, and death. Therefore, the lifestyle changes are very important to decrease this risk.     Website with information about fatty liver and inflammation related to fatty liver (MOURA) = www.nashtruth.com  AND www.NASHactually.com    Tips for low/moderate  carbohydrate diet:  3 meals a day made up of the following:  -- Unlimited green vegetables, tomatoes, mushrooms, spaghetti squash, cauliflower, meat, poultry, seafood, eggs and hard cheeses.   -- Milk and plain yogurt  -- Dressings, seasonings, condiments, etc should have less than 2 g sugars.   -- Beans (1-1.5 cups) or nuts (1/4 cup): can have 1 x a day.   -- 1-2 servings of citrus fruits, berries, pineapple or melon a day (1/2 cup)  -- Avoid fried foods    *No grains, rice, pasta, potatoes, bread, corn, peas, oatmeal, grits, tortillas, crackers, chips    *No soda, sweet tea, juices or lemonade    Try www.dietdoctor.[employer] for recipes (moderate carb intake)      The disease process and manifestations of cirrhosis were discussed.    Because you have cirrhosis, it is important to attend clinic visits every 6 months with an Ultrasound and blood tests every 6 months to screen for liver cancer (you are at risk of developing liver cancer due to scar tissue in the liver)    Signs and symptoms of worsening liver disease include jaundice, fluid in the belly (ascites), and confusion/disorientation/slowed thought processes due to hepatic encephalopathy (toxins building up because of liver problems).   You should seek medical attention if any of these things occur.    Also, possible bleeding from esophageal varices (blood vessels in the stomach and foodpipe can burst and cause fatal bleeding).  Therefore, if you have symptoms of vomiting blood, blood in your stool, dark or black stools or vomiting coffee ground vomit, YOU SHOULD GO TO THE EMERGENCY ROOM IMMEDIATELY.     Cirrhosis can increase the risk of liver cancer, liver failure, and death. However, we will watch your liver function score (MELD score) closely with each clinic visit. . We will check this with every clinic visit. A MELD 15 or higher is when we start to consider transplant because MELD 15 or higher indicates that the liver is not functioning as well        Cirrhosis Counseling  - strict abstinence of alcohol use (includes beer, wine, and/or liquor)  - avoid non-steroidal anti-inflammatory drugs (NSAIDs) such as ibuprofen, Motrin, naprosyn, Aleve due to the risk of kidney damage  - can take acetaminophen (Tylenol), no more than 2000 mg per day  - low sodium (salt) 2 gram per day diet  - high protein diet to prevent muscle mass loss. Recommended at least 1 protein shake daily using Premier Protein shakes    - resistance exercises for muscle strength  - avoid raw seafoods due to the risk of fatal Vibrio vulnificus infection  - ultrasound of the liver every 6 months for liver cancer screening  - Upper endoscopy every 1-2 years to screen for varices in the stomach and esophagus      ASSESSMENT & PLAN     65 y.o. White female with:    1. Cirrhosis, well-compensated likely due to fatty liver   -- MELD-Na score: 6 at 6/26/2020  8:50 AM  MELD score: 6 at 6/26/2020  8:50 AM  Calculated from:  Serum Creatinine: 0.8 mg/dL (Rounded to 1 mg/dL) at 6/26/2020  8:50 AM  Serum Sodium: 138 mmol/L (Rounded to 137 mmol/L) at 6/26/2020  8:50 AM  Total Bilirubin: 0.8 mg/dL (Rounded to 1 mg/dL) at 6/26/2020  8:50 AM  INR(ratio): 1.0 at 6/26/2020  8:50 AM  Age: 65 years 3 months  -- no signs or symptoms of hepatic decompensation  -- liver enzymes and synthetic function wnl; have normalized with weight loss  -- I discussed re-staging the patient with Fibroscan vs. Liver bx vs. MRI. We discussed liver biopsy at length. The patient was interested in losing more weight and then re-staging.  -- We discussed implications of cirrhosis including liver dysfunction/failure, transplant, and death.  -- Fibroscan F4,S3 in 2017  -- counseling as per above  -- today is UTD with HCC screening, no concerns   -- continue with HCC screening q6mo, f/u yearly  -- will check status of Hep A & B immunity, did not finish last vaccination (?)    2. Hepatomegaly  -- likely 2/2 fatty liver  -- has actually  decreased in size since 2017  -- recommend continued weight loss    3. Metabolic syndrome with Body mass index is 32.88 kg/m²., HTN, HLD  -- continue good control of BP, HLD  -- recommend continued weight loss   -- education provided as per above       Orders Placed This Encounter   Procedures    US Abdomen Complete    AFP tumor marker    CBC auto differential    Comprehensive metabolic panel    Protime-INR    Hepatitis A antibody, IgG    Hepatitis B Surface Ab, Qualitative       Labs and ultrasound in 6 months  RTC in 1 year with labs and ultrasound; may fibroscan at that time     Thank you for allowing me to participate in the care of [facility]  Hepatology

## 2020-08-13 ENCOUNTER — PATIENT OUTREACH (OUTPATIENT)
Dept: OTHER | Facility: OTHER | Age: 65
End: 2020-08-13

## 2020-08-13 NOTE — PROGRESS NOTES
[facility]: Health  Follow-Up    Called patient for follow up.  She states she is feeling well.  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    The history is provided by the patient.             Reason for review: Blood pressure at goal        Topics Covered on Call: physical activity and Diet          Diet-no change to diet    No change to diet.  Patient reports eating or drinking the following: Patient states she does not have as much time to think out her meals, but is maintaining a low sodium diet. She says she continues to stay away from restaurant and fast food.      Physical Activity-Change      She identified the following barriers to physical activity: time        Additional physical activity details: Patient states she is getting just as many steps per day, but is not getting the cardiac benefit as before. She is not going on walks anymore because she is not able to leave her  at home alone. I suggested doing some at home workouts.       Medication Adherence-Medication Adherence not addressed.        Substance, Sleep, Stress-change  stress-assessed  Details:Patient states her stress level has increased since spouse had a stroke.   Intervention(s): stress management strategy    Sleep-  Details:  Intervention(s):    Alcohol -  Details:  Intervention(s):    Tobacco-  Details:  Intervention(s):          Continue current diet/physical activity routine.       Addressed any questions or concerns and patient has my contact information if needed prior to next outreach.   Explained the importance of self-monitoring and medication adherence. Encouraged the patient to communicate with their health  for lifestyle modifications to help improve or maintain a healthy lifestyle.            There are no preventive care reminders to display for this patient.    Last 5 Patient Entered Readings                                       Current 30 Day Average: 119/74     Recent Readings 8/13/2020 8/4/2020 7/25/2020 7/17/2020 7/9/2020    SBP (mmHg) 129 123 108 114 129    DBP (mmHg) 79 72 69 74 78    Pulse 61 62 63 65 73

## 2020-08-19 ENCOUNTER — PATIENT OUTREACH (OUTPATIENT)
Dept: ADMINISTRATIVE | Facility: HOSPITAL | Age: 65
End: 2020-08-19

## 2020-08-19 DIAGNOSIS — Z12.31 ENCOUNTER FOR SCREENING MAMMOGRAM FOR MALIGNANT NEOPLASM OF BREAST: Primary | ICD-10-CM

## 2020-09-02 ENCOUNTER — TELEPHONE (OUTPATIENT)
Dept: FAMILY MEDICINE | Facility: CLINIC | Age: 65
End: 2020-09-02

## 2020-09-02 ENCOUNTER — OFFICE VISIT (OUTPATIENT)
Dept: FAMILY MEDICINE | Facility: CLINIC | Age: 65
End: 2020-09-02
Payer: MEDICARE

## 2020-09-02 VITALS
TEMPERATURE: 98 F | OXYGEN SATURATION: 97 % | HEIGHT: 69 IN | BODY MASS INDEX: 32.58 KG/M2 | DIASTOLIC BLOOD PRESSURE: 74 MMHG | HEART RATE: 80 BPM | SYSTOLIC BLOOD PRESSURE: 128 MMHG | WEIGHT: 220 LBS

## 2020-09-02 DIAGNOSIS — E66.9 OBESITY (BMI 30-39.9): ICD-10-CM

## 2020-09-02 DIAGNOSIS — Z23 FLU VACCINE NEED: ICD-10-CM

## 2020-09-02 DIAGNOSIS — K74.60 CIRRHOSIS OF LIVER WITHOUT ASCITES, UNSPECIFIED HEPATIC CIRRHOSIS TYPE: ICD-10-CM

## 2020-09-02 DIAGNOSIS — Z71.89 ADVANCED DIRECTIVES, COUNSELING/DISCUSSION: ICD-10-CM

## 2020-09-02 DIAGNOSIS — Z78.9 STATIN INTOLERANCE: ICD-10-CM

## 2020-09-02 DIAGNOSIS — Z00.00 ROUTINE MEDICAL EXAM: Primary | ICD-10-CM

## 2020-09-02 DIAGNOSIS — Z23 NEED FOR HEPATITIS B VACCINATION: Primary | ICD-10-CM

## 2020-09-02 DIAGNOSIS — E03.9 ACQUIRED HYPOTHYROIDISM: ICD-10-CM

## 2020-09-02 DIAGNOSIS — I10 ESSENTIAL HYPERTENSION: ICD-10-CM

## 2020-09-02 DIAGNOSIS — E78.49 OTHER HYPERLIPIDEMIA: ICD-10-CM

## 2020-09-02 DIAGNOSIS — R30.0 DYSURIA: ICD-10-CM

## 2020-09-02 DIAGNOSIS — D32.0 BENIGN MENINGIOMA OF BRAIN: ICD-10-CM

## 2020-09-02 DIAGNOSIS — Z12.11 COLON CANCER SCREENING: ICD-10-CM

## 2020-09-02 DIAGNOSIS — E55.9 VITAMIN D DEFICIENCY DISEASE: ICD-10-CM

## 2020-09-02 PROCEDURE — 99397 PR PREVENTIVE VISIT,EST,65 & OVER: ICD-10-PCS | Mod: S$PBB,,, | Performed by: INTERNAL MEDICINE

## 2020-09-02 PROCEDURE — 99214 OFFICE O/P EST MOD 30 MIN: CPT | Mod: PBBFAC,PO | Performed by: INTERNAL MEDICINE

## 2020-09-02 PROCEDURE — 99999 PR PBB SHADOW E&M-EST. PATIENT-LVL IV: CPT | Mod: PBBFAC,,, | Performed by: INTERNAL MEDICINE

## 2020-09-02 PROCEDURE — 99397 PER PM REEVAL EST PAT 65+ YR: CPT | Mod: S$PBB,,, | Performed by: INTERNAL MEDICINE

## 2020-09-02 PROCEDURE — 99999 PR PBB SHADOW E&M-EST. PATIENT-LVL IV: ICD-10-PCS | Mod: PBBFAC,,, | Performed by: INTERNAL MEDICINE

## 2020-09-02 NOTE — PROGRESS NOTES
HISTORY OF PRESENT ILLNESS:  Claudia Rodriguez is a 65 y.o. female who presents to the clinic today for a routine physical exam. Her last physical exam was approximately 1 years(s) ago.        PAST MEDICAL HISTORY:  Past Medical History:   Diagnosis Date    Cholelithiasis     DDD (degenerative disc disease), lumbar     Fatty liver     - noted on U/S 11/2017    Goiter, nodular     HTN (hypertension)     Hyperglycemia     Hyperlipidemia     Hypothyroidism     Hypovitaminosis D     Meningioma     right frontal lobe ; followed by Dr. Enciso once a year    Obesity     Prediabetes     Urinary bladder disorder        PAST SURGICAL HISTORY:  Past Surgical History:   Procedure Laterality Date    BLADDER SUSPENSION      LIVER BIOPSY      thyroid biopsy  7/11/12    tonsillectomy      TONSILLECTOMY         SOCIAL HISTORY:  Social History     Socioeconomic History    Marital status:      Spouse name: Not on file    Number of children: 1    Years of education: Not on file    Highest education level: Not on file   Occupational History    Occupation: human      Employer: [employer]   Social Needs    Financial resource strain: Not on file    Food insecurity     Worry: Not on file     Inability: Not on file    Transportation needs     Medical: Not on file     Non-medical: Not on file   Tobacco Use    Smoking status: Never Smoker    Smokeless tobacco: Never Used   Substance and Sexual Activity    Alcohol use: No    Drug use: No    Sexual activity: Yes     Partners: Male   Lifestyle    Physical activity     Days per week: Not on file     Minutes per session: Not on file    Stress: Not on file   Relationships    Social connections     Talks on phone: Not on file     Gets together: Not on file     Attends L[employer] service: Not on file     Active member of club or organization: Not on file     Attends meetings of clubs or organizations: Not on file     Relationship status: Not on  file   Other Topics Concern    Are you pregnant or think you may be? No    Breast-feeding No   Social History Narrative    3 adopted children.       FAMILY HISTORY:  Family History   Problem Relation Age of Onset    Coronary artery disease Father 55    Heart disease Father     Hypertension Father     Melanoma Father     Hypertension Mother     Stroke Mother         2015    Hypertension Sister         controlled by lifestyle    No Known Problems Brother     Pancreatic cancer Maternal Grandfather     Heart failure Maternal Grandmother     Thyroid nodules Sister         s/p thyroidectomy    Thyroid disease Sister     Thyroid nodules Sister     No Known Problems Brother     Diabetes Neg Hx     Psoriasis Neg Hx     Lupus Neg Hx     Eczema Neg Hx     Thyroid cancer Neg Hx     Breast cancer Neg Hx     Ovarian cancer Neg Hx     Cervical cancer Neg Hx     Endometrial cancer Neg Hx     Vaginal cancer Neg Hx     Cirrhosis Neg Hx        ALLERGIES AND MEDICATIONS: updated and reviewed.  Review of patient's allergies indicates:   Allergen Reactions    Zostavax [zoster vaccine live (pf)] Rash     - injection site red, hot, indurated    Amlodipine-olmesartan      Other reaction(s): pounding in ears      Atorvastatin        Other reaction(s): Unknown    Demerol [meperidine] Other (See Comments)     Other reaction(s): Nausea  Other reaction(s): Headache    Ezetimibe-simvastatin      Other reaction(s): Headache      Lotrel [amlodipine-benazepril]        Other reaction(s): constant cough    Nsaids (non-steroidal anti-inflammatory drug) Other (See Comments)       Other reaction(s): Difficulty breathing    Other reaction(s): Difficulty breathing    Phenytoin sodium extended Other (See Comments)     Medication List with Changes/Refills   Current Medications    AMLODIPINE (NORVASC) 10 MG TABLET    TAKE 1 TABLET BY MOUTH EVERY DAY    CALCIUM CARBONATE/VITAMIN D3 (CALCIUM 500 + D, D3, ORAL)    once daily.      CARVEDILOL (COREG) 25 MG TABLET    TAKE 1 TABLET BY MOUTH TWICE A DAY WITH MEALS    ERGOCALCIFEROL (VITAMIN D2) 50,000 UNIT CAP    Take 1 capsule (50,000 Units total) by mouth every 7 days.    FOLIC ACID/MV,FE,OTHER MIN (CENTRUM ORAL)    Take 1 tablet by mouth.    IMVEXXY MAINTENANCE PACK 10 MCG INST        LEVOTHYROXINE (SYNTHROID) 75 MCG TABLET    Take 1 tablet (75 mcg total) by mouth once daily.    OMEGA-3 FATTY ACIDS 1,000 MG CAP    Take 1 capsule by mouth Daily.    TRIAMTERENE-HYDROCHLOROTHIAZIDE 37.5-25 MG (MAXZIDE-25) 37.5-25 MG PER TABLET    Take 1 tablet by mouth once daily.   Discontinued Medications    METFORMIN (GLUCOPHAGE-XR) 500 MG 24 HR TABLET    TAKE TWO TABLETS BY MOUTH EVERY DAY WITH EVENING MEAL          CARE TEAM:  Patient Care Team:  Jessica Marquez MD as PCP - General (Internal Medicine)  Bisi Madrigal MD as Consulting Physician (Endocrinology)  Fernie Hammond MD as Consulting Physician (Neurosurgery)  Jessica Marquez MD as Hypertension Digital Medicine Responsible Provider (Internal Medicine)  Stephanie Guadalupe, PharmD as Hypertension Digital Medicine Clinician (Pharmacist)  Anirudh Selby MD as Consulting Physician (Obstetrics and Gynecology)  June Sherman as Digital Medicine Health   Albertina Pickens LPN as Licensed Practical Nurse           SCREENING HISTORY:  Health Maintenance       Date Due Completion Date    Colorectal Cancer Screening 01/26/2020 1/26/2019    Override on 2/3/2017: Declined (patient will do yearly fecal occult blood testing)    Influenza Vaccine (1) 09/01/2020 10/21/2019    Lipid Panel 10/17/2020 10/17/2019    Pneumococcal Vaccine (65+ Low/Medium Risk) (2 of 2 - PPSV23) 10/21/2020 10/21/2019    Hemoglobin A1c 06/26/2021 6/26/2020    Mammogram 06/18/2022 6/18/2020    Override on 5/25/2016: Done    Override on 2/3/2014: Done (DIS - normal)    Override on 12/11/2012: Done    Override on 10/27/2010: Done    TETANUS VACCINE 09/28/2025 9/28/2015     "        REVIEW OF SYSTEMS:   The patient reports: fair dietary habits.  The patient reports : that they exercise occasionally. She hopes to get back to regular exercise in the near future (it has been on hold since her  had a stroke on 7/5/2020).  Review of Systems   Constitutional: Negative for chills, fatigue, fever and unexpected weight change.   HENT: Negative for congestion and postnasal drip.    Eyes: Negative for pain and visual disturbance.   Respiratory: Negative for cough, shortness of breath and wheezing.    Cardiovascular: Negative for chest pain, palpitations and leg swelling.   Gastrointestinal: Negative for abdominal pain, constipation, diarrhea, nausea and vomiting.   Genitourinary: Positive for dysuria (- she was treated for UTI in June of this year.  She states she is having some odd bladder spasms when she urinates.  She is not sure if she has another infection) and frequency. Negative for hematuria and urgency.   Musculoskeletal: Negative for arthralgias and back pain.   Skin: Negative for rash.   Neurological: Negative for weakness and headaches.   Psychiatric/Behavioral: Negative for dysphoric mood and sleep disturbance. The patient is not nervous/anxious.         Has been under some stress due to her  having a stroke on 07/05/2020. He is doing better and so is she.      ROS (Optional)-: no pelvic pain  Breast ROS (Optional)-: negative for breast lumps/discharge            Physical Examination:   Vitals:    09/02/20 0757   BP: 128/74   Pulse: 80   Temp: 97.7 °F (36.5 °C)     Weight: 99.8 kg (220 lb)   Height: 5' 9" (175.3 cm)   Body mass index is 32.49 kg/m².      Patient did not require to have a chaperone present during the exam today.    General appearance - alert, well appearing, and in no distress, obese  Psychiatric - alert, oriented to person, place, and time, normal mood, behavior, speech, dress, motor activity, and thought processes  Eyes - pupils equal and reactive, " extraocular eye movements intact, sclera anicteric  Mouth - not examined; patient wearing mask due to Covid 19 pandemic  Neck - supple, no significant adenopathy, carotids upstroke normal bilaterally, no bruits, thyroid exam: thyroid is normal in size without nodules or tenderness  Lymphatics - no palpable cervical lymphadenopathy  Chest - clear to auscultation, no wheezes, rales or rhonchi, symmetric air entry  Heart - normal rate and regular rhythm, no gallops noted  Abdomen - not examined  Breasts - not examined  Back exam - full range of motion, no tenderness, palpable spasm or pain on motion, in depth exam deferred  Neurological - alert, normal speech, no focal findings or movement disorder noted, cranial nerves II through XII intact  Musculoskeletal - no joint tenderness, deformity or swelling, no muscular tenderness noted  Extremities - peripheral pulses normal, no pedal edema, no clubbing or cyanosis  Skin - normal coloration and turgor, no rashes, no suspicious skin lesions noted      Labs:  No labs needed at this time      ASSESSMENT AND PLAN:  1. Routine medical exam  Counseled on age appropriate medical preventative services including age appropriate cancer screenings, age appropriate eye and dental exams, over all nutritional health, need for a consistent exercise regimen, and an over all push towards maintaining a vigorous and active lifestyle.  Counseled on age appropriate vaccines and discussed upcoming health care needs based on age/gender. Discussed good sleep hygiene and stress management.    2. Essential hypertension  Discussed sodium restriction, maintaining ideal body weight and regular exercise program as physiologic means to achieve blood pressure control. The patient will strive towards this.   The current medical regimen is effective;  continue present plan and medications. Recommended patient to check home readings to monitor and see me for followup as scheduled or sooner as needed.    Patient was educated that both decongestant and anti-inflammatory medication may raise blood pressure.  The patient is active on the digital hypertension program.    3. Other hyperlipidemia/4. Statin intolerance  We discussed low fat diet and regular exercise. No need for prescription medication at this time.    5. Acquired hypothyroidism  Patient is clinically euthyroid. Continue current regimen.    6. Vitamin D deficiency disease  The current medical regimen is effective;  continue present plan and medications.     7. Benign meningioma of brain  Stable. Asymptomatic. Observe.    8. Cirrhosis of liver without ascites, unspecified hepatic cirrhosis type  Asymptomatic. We discussed the need for weight loss to prevent progression to cirrhosis of the liver. LFTs normal. Patient is followed by hepatology.    9. Obesity (BMI 30-39.9)  The patient is asked to continue to make an attempt to improve diet and exercise patterns to aid in medical management of this problem.     10. Advanced directives, counseling/discussion  Advance Care Planning   I initiated the process and explained the importance of advance care planning today with the patient.  Advanced directives were discussed due to patient's age and/or chronic medical conditions. Prognosis based on current condition: good.   Paperwork was previously given to complete living will (at this point in time, the patient does have full decision-making capacity.  We discussed different extreme health states that she could experience, and reviewed what kind of medical care she would want in those situations) and medical POA (The patient received detailed information about the importance of designating a Health Care Power of . She was also instructed to communicate with this person about their wishes for future healthcare, should she become sick and lose decision-making capacity).   [place] was not discussed.   Approximately 2 minute(s) were spent on  counseling/discussion regarding end of life decision making.           11. Dysuria  I will check a urine culture and address results accordingly.  If culture is negative consider referral to urology for further evaluation.  - Urinalysis; Future  - Urine culture; Future    12. Flu vaccine need  Patient wishes to wait until little bit later into the season before completing her immunization.    13. Colon cancer screening    - Cologuard Screening (Multitarget Stool DNA); Future  - Cologuard Screening (Multitarget Stool DNA)          Follow up in about 1 year (around 9/2/2021), or if symptoms worsen or fail to improve, for annual exam. or sooner as needed.

## 2020-09-02 NOTE — TELEPHONE ENCOUNTER
----- Message from Jessica Marquez MD sent at 9/2/2020 12:17 PM CDT -----  Regarding: FW: Hep B Vaccine  Please advise patient of message below and set up nurse visit to complete. Thanks!  ----- Message -----  From: Albertina Pickens LPN  Sent: 9/2/2020   9:43 AM CDT  To: Jessica Marquez MD  Subject: Hep B Vaccine                                    I have reviewed the patient's chart, it appear to look like she received Engerix-B. This vaccine is a 3 dose series. The patient does require an additional dose of Engerix-B to complete the series.  ----- Message -----  From: Jessica Marquez MD  Sent: 9/2/2020   8:41 AM CDT  To: Albertina Pickens LPN    Can you please see if she needs another hepatitis-B immunization?  I think she has completed a 2 shot series, but her hepatologist was questioning this.  She is scheduled for lab work in the future to check her immunization status.

## 2020-09-04 ENCOUNTER — CLINICAL SUPPORT (OUTPATIENT)
Dept: FAMILY MEDICINE | Facility: CLINIC | Age: 65
End: 2020-09-04
Payer: MEDICARE

## 2020-09-04 DIAGNOSIS — Z23 NEED FOR HEPATITIS B VACCINATION: Primary | ICD-10-CM

## 2020-09-04 PROCEDURE — G0010 ADMIN HEPATITIS B VACCINE: HCPCS | Mod: PBBFAC,PO

## 2020-09-04 PROCEDURE — 99499 NO LOS: ICD-10-PCS | Mod: S$PBB,,, | Performed by: INTERNAL MEDICINE

## 2020-09-04 PROCEDURE — 99499 UNLISTED E&M SERVICE: CPT | Mod: S$PBB,,, | Performed by: INTERNAL MEDICINE

## 2020-09-11 LAB — HEMOCCULT STL QL IA: NEGATIVE

## 2020-09-17 ENCOUNTER — PATIENT OUTREACH (OUTPATIENT)
Dept: ADMINISTRATIVE | Facility: HOSPITAL | Age: 65
End: 2020-09-17

## 2020-09-21 ENCOUNTER — PATIENT OUTREACH (OUTPATIENT)
Dept: ADMINISTRATIVE | Facility: OTHER | Age: 65
End: 2020-09-21

## 2020-09-22 ENCOUNTER — OFFICE VISIT (OUTPATIENT)
Dept: UROGYNECOLOGY | Facility: CLINIC | Age: 65
End: 2020-09-22
Payer: MEDICARE

## 2020-09-22 VITALS — WEIGHT: 223 LBS | BODY MASS INDEX: 33.03 KG/M2 | HEIGHT: 69 IN

## 2020-09-22 DIAGNOSIS — N95.2 VAGINAL ATROPHY: ICD-10-CM

## 2020-09-22 DIAGNOSIS — R35.1 NOCTURIA: ICD-10-CM

## 2020-09-22 DIAGNOSIS — Z87.440 HISTORY OF UTI: Primary | ICD-10-CM

## 2020-09-22 DIAGNOSIS — N39.46 URINARY INCONTINENCE, MIXED: ICD-10-CM

## 2020-09-22 PROCEDURE — 51701 INSERT BLADDER CATHETER: CPT | Mod: S$PBB,,, | Performed by: OBSTETRICS & GYNECOLOGY

## 2020-09-22 PROCEDURE — 51701 PR INSERTION OF NON-INDWELLING BLADDER CATHETERIZATION FOR RESIDUAL UR: ICD-10-PCS | Mod: S$PBB,,, | Performed by: OBSTETRICS & GYNECOLOGY

## 2020-09-22 PROCEDURE — 99214 OFFICE O/P EST MOD 30 MIN: CPT | Mod: PBBFAC | Performed by: OBSTETRICS & GYNECOLOGY

## 2020-09-22 PROCEDURE — 99999 PR PBB SHADOW E&M-EST. PATIENT-LVL IV: ICD-10-PCS | Mod: PBBFAC,,, | Performed by: OBSTETRICS & GYNECOLOGY

## 2020-09-22 PROCEDURE — 99213 OFFICE O/P EST LOW 20 MIN: CPT | Mod: S$PBB,25,, | Performed by: OBSTETRICS & GYNECOLOGY

## 2020-09-22 PROCEDURE — 99999 PR PBB SHADOW E&M-EST. PATIENT-LVL IV: CPT | Mod: PBBFAC,,, | Performed by: OBSTETRICS & GYNECOLOGY

## 2020-09-22 PROCEDURE — 51701 INSERT BLADDER CATHETER: CPT | Mod: PBBFAC | Performed by: OBSTETRICS & GYNECOLOGY

## 2020-09-22 PROCEDURE — 99213 PR OFFICE/OUTPT VISIT, EST, LEVL III, 20-29 MIN: ICD-10-PCS | Mod: S$PBB,25,, | Performed by: OBSTETRICS & GYNECOLOGY

## 2020-09-22 PROCEDURE — 87086 URINE CULTURE/COLONY COUNT: CPT

## 2020-09-22 RX ORDER — ESTRADIOL 0.1 MG/G
CREAM VAGINAL DAILY
COMMUNITY
End: 2023-06-22

## 2020-09-22 NOTE — PATIENT INSTRUCTIONS
Bladder Irritants  Certain foods and drinks have been associated with worsening symptoms of urinary frequency, urgency, urge incontinence, or bladder pain. If you suffer from any of these conditions, you may wish to try eliminating one or more of these foods from your diet and see if your symptoms improve. If bladder symptoms are related to dietary factors, strict adherence to a diet thateliminates the food should bring marked relief in 10 days. Once you are feeling better, you can begin to add foods back into your diet, one at a time. If symptoms return, you will be able to identify the irritant. As you add foods back to your diet it is very important that you drink significant amounts of water.    -----------------------------------------------------------------------------------------------  List of Common Bladder Irritants*  Alcoholic beverages  Apples and apple juice  Cantaloupe  Carbonated beverages  Chili and spicy foods  Chocolate  Citrus fruit  Coffee (including decaffeinated)  Cranberries and cranberry juice  Grapes  Guava  Milk Products: milk, cheese, cottage cheese, yogurt, ice cream  Peaches  Pineapple  Plums  Strawberries  Sugar especially artificial sweeteners, saccharin, aspartame, corn sweeteners, honey, fructose, sucrose, lactose  Tea  Tomatoes and tomato juice  Vitamin B complex  Vinegar  *Most people are not sensitive to ALL of these products; your goal is to find the foods that make YOUR symptoms worse.  ---------------------------------------------------------------------------------------------------    Low-acid fruit substitutions include apricots, papaya, pears and watermelon. Coffee drinkers can drink Kava or other lowacid instant drinks. Tea drinkers can substitute non-citrus herbal and sun brewed teas. Calcium carbonate co-buffered with calcium ascorbate can be substituted for Vitamin C. Prelief is a dietary supplement that works as an acid blocker for the bladder.    Where to get more  information:        Overcoming Bladder Disorders by Janki Hercules and Vernon Bright, 1990        You Dont Have to Live with Cystitis! By Geri Otero, 1988  · http://www.urologymanagement.org/oab    ----------------------------------------------------------------------------------------    UTI PREVENTION: (from [employer] Continence)  Urinary Tract Infection (UTI)  More than 4 million doctor office visits per year in the [place] are for urinary tract infections (UTI). About 12% of men and 50% of women will have a UTI during his or her lifetime. Most UTIs arise from bacteria that normally live in the colon and rectum and are present in bowel movements. These bacteria cling to the opening of the urethra, begin to multiply, & travel up to the bladder. Urine flow from the bladder usually washes bacteria out of the body. However, because women have a shorter urethra than men, bacteria can reach the bladder more easily and settle into the bladder wall. This is why women are more likely to develop UTIs than men. This risk factor is exacerbated by the greater likelihood that women introduce bacteria from fecal matter, following a bowel movement, into the urinary tract. Less often, bacteria can spread to the kidney from the bloodstream. A recurrent UTI is classified as three or more a year.  Risk Factors for UTI  Several factors can contribute to the risk of UTI. Sexual intercourse, use of contraceptive spermicide, low levels of estrogen, catheterization, diabetes, pregnancy, and immune suppression increase susceptibility to UTI.  Naturally occurring estrogen helps prevent recurrent UTI in women. After menopause, estrogen levels drop along with the number of vaginal lactobacilli, the good bacteria which prevent growth of intestinal bacteria in the vagina. This makes postmenopausal women especially susceptible to UTI.  Catheters also present a risk of recurring  UTI. Catheters are associated with colonization of bacteria and increased risks of clinical infection. Using the techniques described previously can help keep catheters clean and prevent recurrent UTI. While single-use of sterile catheters reduces the risks, it does not prevent UTI from occurring. It is therefore important to maintain proper care and use of catheters at all times while remaining alert to symptoms of UTI.  Common Symptoms of UTI   Painful urination   Frequency   Urgency   Lower abdominal or pelvic pain or pressure   Blood in the urine   Milky, cloudy, or pink/red urine   Fever   Strong smelling urine  In the elderly populations, symptoms of a urinary tract infection, can be easily overlooked, causing a delay in diagnosis. Elderly people with a UTI are more likely than younger people not to be diagnosed until the complication of sepsis occurs. The elderly often do not experience or report obvious symptoms that younger people have, such as difficult urination, or frequent urination. Instead they may exhibit far more vague symptoms that are similiar to many disease or may be assumed to be due to the aging process. These symptoms include: confusion, feelings of general discomfort, disorientation, fatigue, weakness, behavior changes, falling, and/or a new, acute incontinence. In addition, because incontinence is common in the elderly, they may not be aware of the symptom of frequency. If you experience any of these symptoms, see your healthcare professional.  Types of UTIs   Cystitis - an inflammation of the bladder and the most common UTI. Almost always, it occurs in women. The infection typically affects only the surface of the bladder and is brief & acute.   Pyelonephritis - more commonly known as a kidney infection and usually occurs when a lower UTI spreads to the kidneys. Occasionally, bacteria will spread to the kidney from the bloodstresam. Kidney infections are more serious and less  common than bladder infections. Symptoms include a fever, pain in the back or side below the ribs, nausea, or vomiting.   Urethritis - is an inflammation of the urethra. Men commonly contract urethritis through sexual intercourse with partners infected with sexually transmitted infections. Urethritis may also result from trauma to the area or from the catheterization process.  Prevention of UTI  There are several ways to prevent bladder infections.  Bathroom Behavior:Periodically emptying the bladder by trying to urinate every two to three hours.  Diet:The use of cranberry products seems to decrease the ability of bacteria to adhere to the lining of the urethra and bladder. As cranberry juice can have a high amount of sugar, cranberry extract can be taken in capsule or pill form instead. Increasing water intake by one or two glasses per day may help limit the length of time that you have symptoms and reduce the infections.  Hygiene: Proper hygiene in caring for the urethral area is another way to limit the amount or type of bacteria that can be drawn into the urethra. This is especially true in people who have decreased sensation in the perineal region such as those with MS or who experience any amount of fecal incontinence. Using soap & water or commercially available cleansing wipes several times per day & frequent changing of incontinence pads as they become wet can minimize the amount of bacteria in the urethral area. Women should always wipe from the front of the vagina to the back of the anus after urination or a bowel movement and wear cotton underwear. It is also reported that wearing thong undergarments may increase one's risk of developing an infection.  Sexual Activity: Can be the source of UTIs in women. Insuring proper lubrication to the vagina and voiding before and after intercourse are tactics to help prevent infection. Using diaphragms and spermicidal jelly and/or foam may increase the risk of  infection, so it is important to evaluate what type of birth control you use. Some physicians encourage women who have a history of recurrent infections to take an prophylactic antibiotic after intercourse, as it reduces risk of recurrent UTI by about 85%. At a minimum, restrict your number of sexual partners and urinate immediately after sexual intimacy to lower the risk of recurrent UTIs.  Vaginal Estrogen: reduces risk of recurrent UTI by repopulating the normal vaginal lactobacilli that keep bacteria from the rectum from multiplying and causing a bladder infection. Forms of vaginal estrogen are available at very low dosages that have minimal systemic absorption.  Catheter Use: proper cleansing and storage of catheters is important in one who intermittently catheterizes, as the catheter can be a vehicle to introduce infection if the technique is incorrect or if the catheters are not properly cleaned between each use. A closed system catheter provides a reliable means of sterile IC because the introducer tip is surrounded by a urine collection bag and never exposed to bacteria typically found at the urethral opening. This greatly reduces the risk of infection.  NOTE: Vaginal estrogens and antibiotics are medications that need to be prescribed by your healthcare provider.  Treatment of UTI  Depending on the severity of infection, UTI can be treated with oral antibiotics. A three-day course of antibiotics can treat a simple UTI. However, some infections may need to be treated for several days or weeks. Length of antibiotic treatment also depends on the type of antibiotic prescribed.  Even if a few doses of medication relieve some symptoms, you should still complete the full course of medication prescribed by your doctor. Taking aspirin, Tylenol, or non-steroidal, anti-inflammatory medications may reduce symptoms during this episode, as they may be a result of increased body temperature.  For more information  regarding UTIs please visit: http://www.ncbi.nlm.nih.gov/pubmedhealth/OFU8730895/    -----------------------------------------------------    1)  Mixed urinary incontinence, urge/spontaneous > stress:    --urine C&S sent today  --continue to work on weight loss  --continue to control blood sugars  --Empty bladder every 3 hours.  Empty well: wait a minute, lean forward on toilet.    --Avoid dietary irritants (see sheet).  Keep diary x 3-5 days to determine your irritants.  --start pelvic floor PT.  Call to make appt:  [facility] (all take Medicaid):  1)  [facility] ([place] [facility]): (p) [phone]. (f) [phone]. Established patients call:  [phone].  2)  ALESSANDRA [facility]/[place]: (p) [phone]  . (f) [phone].    3)  ALESSANDRA Montiel ([facility]manda Stiles or [place]oida Obrien): (p) [phone].  Or [phone].   4)  ALESSANDRA [facility]. (p) [phone].    --URGE: consider medication in future--make sure urine C&S negative 1st. Takes 2-4 weeks to see if will have effect.  For dry mouth: get sour, sugar free lozenge or gum.    --STRESS:  Pessary vs. Sling.     2)  Vaginal atrophy (dryness):    --every night x 2 weeks: Use 0.5 gram of estrogen cream in vagina with applicator.  Also, Use small amount with finger around vaginal opening/inner lips at same frequency.   --after 2 weeks, reduce use to 2x/week thereafter  --use at night right before bed  --may also help reduce UTI frequency and bladder urgency/frequency symptoms    3)  Nocturia (nighttime urination): stop fluids 2 hours before bed/no water by bed.  If have leg swelling:  Elevate feet above chest x 1 hour before bed to get excess fluid off.  Can also use support hose (knee highs).    --work on sleep pattern (sleep hygiene sheet)  --continue to watch snoring--may need sleep apnea evaluation    4)  Recent UTI:   --had UTI a few months ago; finished ABX with neg NANIA, still feels U/F increased   --urine C&S 6/2020 + ecoli--took macrobid x  2 rounds   --urine C&S 9/2020 mixed orgs  --repeat cath urine C&S today   --if +, treat with other ABX + longer course    --If you feel like you have a UTI, please call our office so that we can place an order for you to drop off a urine specimen for urine culture at the closest [facility] lab (we will arrange).     --We can call in antibiotics for you to start right after you drop off specimen.     --In this way, we can determine:     1)  Do you have a UTI?      2) If you have a UTI, is it sensitive to the antibiotics we prescribed?   --follow UTI prevention tips (see attached)  --control bowel movements/fecal cross-contamination: continue daily fiber gummies   --can use stool softener as needed for flares  --treat vaginal atrophy (dryness)  --empty bladder before and after intercourse  --consider need for further evaluation (pelvic imaging and cystoscopy) if issue persists    5)  Will call you with urine C&S result.  If +, treat and see how symptoms change.  If NEG, will start on OAB med--will let you know which one.

## 2020-09-22 NOTE — PROGRESS NOTES
[facility]  [address]  [facility] [address]    Claudia Rodriguez  [id number]  1955  September 27, 2020    Consulting Physician: No ref. provider found (MD Kaitlin)  GYN: Anirudh Selby MD  Primary M.D.: Jessica Marquez MD    Chief Complaint   Patient presents with    Follow-up     incontinence       HPI: Last visit 2017.     1)  UI:  (+) GARY < (+) UUI but mostly just small, spontaneous leaks, alcira with movement.  Was having some U/F/UI, then had bladder lift in her 30s--better.  UI returned after some years, now having more U/F x 2-3 months.   (+) pads (liners):1/day, usually severe wetness and 1/night usually (liner) minimum-moderate wetness.  Daytime frequency: Q 1 hours after taking diuretic; once that wears off Q2h.  Nocturia: Yes: 4-7/night. Does have a little insomnia--started taking 1/4 benadryl.  Has decreased liquid consumption before bed--stops at 6PM, no water by bed.  Has improved nocturia some.  No LE swelling.  Pelvic floor PT:  Has scheduled for next week with [facility].  Is going to work on weight loss. Is emotional crutch for her.   (--) dysuria,  (--) hematuria,  (--) frequent UTIs.  (--) complete bladder emptying. Has to PV to help with moderate 2nd void.      2)  POP:  Absent.  (--) vaginal bleeding. (--) vaginal discharge. (+) sexually active.  (--) dyspareunia.  (+)  Vaginal dryness. Uses lube during intercourse.  (--) vaginal estrogen use.     3)  BM:  (--) constipation/straining.  (--) chronic diarrhea. Does have some IBS--controlled with avoiding dietary triggers.  Has diarrhea with triggers.  (--) hematochezia.  (--) fecal incontinence.  (--) fecal smearing/urgency.  (+) complete evacuation.     TODAY:  1)  Mixed urinary incontinence, urge/spontaneous > stress:    --had UTI a few months ago; finished ABX with neg ANNIA, still feels U/F increased   --urine C&S 6/2020 + ecoli--took macrobid x 2 rounds   --urine C&S 9/2020 mixed orgs  --baseline UI: (+) pads  (liners):1/day, usually severe wetness and 1/night usually (liner) minimum-moderate wetness.  Daytime frequency: Q 1 hours after taking diuretic; once that wears off Q2h.  Nocturia: Yes: 4-7/night  --currently: +liner/day--damp by end of day; +liner/night, very damp by AM; daytime is stable but nighttime worse; feels like very sensitive to urine being in urethra--makes her feel increased U/F: freq Qh; nocturia: 7x/night, alcira since UTI; ? Complete emptying--has PV urgency with small volume void  --weight loss +: has lost 20 lbs  --pre-DM: was taking metformin but was able to stop  Lab Results   Component Value Date    HGBA1C 5.4 06/26/2020   --did PT in past but had trouble with regular visits; used heating pad x 30 min--not sure she got as much as possible    2)  Vaginal atrophy (dryness):    --was taking estrace  --was changed to imvexxy--didn't like  --now restarted estrace x 2 weeks with applicator    3)  Nocturia (nighttime urination): stop fluids 2 hours before bed/no water by bed.  If have leg swelling:  Elevate feet above chest x 1 hour before bed to get excess fluid off.  Can also use support hose (knee highs).    --work on sleep pattern (sleep hygiene sheet)  --continue to watch snoring--may need sleep apnea evaluation    4)  Constipation:  --uncommon--mosly when diet off  --takes daily fiber  --takes stool softeners PRN    Past Medical History  Past Medical History:   Diagnosis Date    Cholelithiasis     DDD (degenerative disc disease), lumbar     Fatty liver     - noted on U/S 11/2017    Goiter, nodular     HTN (hypertension)     Hyperglycemia     Hyperlipidemia     Hypothyroidism     Hypovitaminosis D     Meningioma     right frontal lobe ; followed by Dr. Enciso once a year    Obesity     Prediabetes     Urinary bladder disorder        Past Surgical History  Past Surgical History:   Procedure Laterality Date    BLADDER SUSPENSION      LIVER BIOPSY      thyroid biopsy  7/11/12     tonsillectomy      TONSILLECTOMY     Bladder suspension in early 30s:  Vaginal procedure for leakage but was also told bladder had fallen.  Was told that bladder ruptured during surgery & had 150 stitches during surgery.     Hysterectomy: No    Past Ob History  .  Fetal demise PP.    x 1.  .    Largest infant weight: 5#12oz.   unknown FAVD. yes episiotomy.      Gynecologic History  LMP: No LMP recorded. Patient is postmenopausal.  Age of menarche: 8 y.o.  Age of menopause: 45 y.o.  Menstrual history: h/o normal  Pap test: 2017, normal per report.  History of abnormal paps: No.  History of STIs:  No  Mammogram: Date of last: .  Result: Normal per report.   Colonoscopy: none.  Worried since had bladder rupture.  Was told by some MDs at [facility] she should never have invasive procedure.  Does have stool CA screening with PCP.   DEXA:  Date of last: .  Result:  Elevated BMD.  Repeat due:  .     Family History  Family History   Problem Relation Age of Onset    Coronary artery disease Father 55    Heart disease Father     Hypertension Father     Melanoma Father     Hypertension Mother     Stroke Mother             Hypertension Sister         controlled by lifestyle    No Known Problems Brother     Pancreatic cancer Maternal Grandfather     Heart failure Maternal Grandmother     Thyroid nodules Sister         s/p thyroidectomy    Thyroid disease Sister     Thyroid nodules Sister     No Known Problems Brother     Diabetes Neg Hx     Psoriasis Neg Hx     Lupus Neg Hx     Eczema Neg Hx     Thyroid cancer Neg Hx     Breast cancer Neg Hx     Ovarian cancer Neg Hx     Cervical cancer Neg Hx     Endometrial cancer Neg Hx     Vaginal cancer Neg Hx     Cirrhosis Neg Hx       Colon CA: No  Breast CA: No  GYN CA: No   CA: No    Social History  Social History     Tobacco Use   Smoking Status Never Smoker   Smokeless Tobacco Never Used     Social History     Substance and Sexual  Activity   Alcohol Use No   .    Social History     Substance and Sexual Activity   Drug Use No     The patient is .  Resides in [place] [address].  Employment status: currently employed director of  for company.      Allergies  Review of patient's allergies indicates:   Allergen Reactions    Zostavax [zoster vaccine live (pf)] Rash     - injection site red, hot, indurated    Amlodipine-olmesartan      Other reaction(s): pounding in ears      Atorvastatin        Other reaction(s): Unknown    Demerol [meperidine] Other (See Comments)     Other reaction(s): Nausea  Other reaction(s): Headache    Ezetimibe-simvastatin      Other reaction(s): Headache      Lotrel [amlodipine-benazepril]        Other reaction(s): constant cough    Nsaids (non-steroidal anti-inflammatory drug) Other (See Comments)       Other reaction(s): Difficulty breathing    Other reaction(s): Difficulty breathing    Phenytoin sodium extended Other (See Comments)       Medications  Current Outpatient Medications on File Prior to Visit   Medication Sig Dispense Refill    amLODIPine (NORVASC) 10 MG tablet TAKE 1 TABLET BY MOUTH EVERY DAY 90 tablet 0    CALCIUM CARBONATE/VITAMIN D3 (CALCIUM 500 + D, D3, ORAL) once daily.       carvediloL (COREG) 25 MG tablet TAKE 1 TABLET BY MOUTH TWICE A DAY WITH MEALS 180 tablet 0    ergocalciferol (VITAMIN D2) 50,000 unit Cap Take 1 capsule (50,000 Units total) by mouth every 7 days. 12 capsule 3    estradioL (ESTRACE) 0.01 % (0.1 mg/gram) vaginal cream Place vaginally once daily.      FOLIC ACID/MV,FE,OTHER MIN (CENTRUM ORAL) Take 1 tablet by mouth.      levothyroxine (SYNTHROID) 75 MCG tablet Take 1 tablet (75 mcg total) by mouth once daily. 90 tablet 3    omega-3 fatty acids 1,000 mg Cap Take 1 capsule by mouth Daily.      triamterene-hydrochlorothiazide 37.5-25 mg (MAXZIDE-25) 37.5-25 mg per tablet Take 1 tablet by mouth once daily. 90 tablet 1    IMVEXXY MAINTENANCE PACK 10 mcg Inst   5  "    No current facility-administered medications on file prior to visit.        Review of Systems A 14 point ROS was reviewed with pertinent positives as noted above in the history of present illness.      Constitutional: negative  Eyes: negative  Endocrine: negative  Gastrointestinal: negative  Cardiovascular: negative  Respiratory: negative  Allergic/Immunologic: negative  Integumentary: negative  Psychiatric: negative  Musculoskeletal: negative   Ear/Nose/Throat: negative  Neurologic: negative  Genitourinary: SEE HPI  Hematologic/Lymphatic: negative   Breast: negative    Urogynecologic Exam  Ht 5' 9" (1.753 m)   Wt 101.2 kg (223 lb)   BMI 32.93 kg/m²     GENERAL APPEARANCE:  The patient is well-developed, well-nourished.  Neck:  Supple with no thyromegaly, no carotid bruits.  Heart:  Regular rate and rhythm, no murmurs, rubs or gallops.  Lungs:  Clear.  No CVA tenderness.  Abdomen:  Soft, nontender, nondistended, no hepatosplenomegaly. Inhibited by habitus.    Incisions:  none    PELVIC:    External genitalia:  Normal Bartholins, Skenes and labia bilaterally.    Urethra:  No caruncle, diverticulum or masses.  (+) hypermobility.    Vagina:  Atrophy (+) , no bladder masses or tender, no discharge.    Cervix:  normal appearance  Uterus: uterus absent  Adnexa: Not palpable.    POP-Q:    Deferred.  No obvious POP present with valsalva.     NEUROLOGIC:  Cranial nerves 2 through 12 intact.  Strength 5/5.  DTRs 2+ lower extremities.  S2 through 4 normal.  Sacral reflexes intact.    EXT: YADAV, 2+ pulses bilaterally, no C/C/E    COUGH STRESS TEST:  negative  KEGEL: 1 /5    RECTAL:    External:  Normal, (--) hemorrhoids, (--) dovetailing.   Internal:  Deferred    PVR: 10 mL    Impression    1. History of UTI    2. Nocturia    3. Urinary incontinence, mixed    4. Vaginal atrophy        Initial Plan  The patient was counseled regarding these issues. The patient was given a summary sheet containing each of these issues with " possible options for evaluation and management. When appropriate, we also reviewed computer-generated diagrams specific to their diagnoses..  All questions were addressed to the patient's satisfaction.    1)  Mixed urinary incontinence, urge/spontaneous > stress:    --urine C&S sent today  --continue to work on weight loss  --continue to control blood sugars  --Empty bladder every 3 hours.  Empty well: wait a minute, lean forward on toilet.    --Avoid dietary irritants (see sheet).  Keep diary x 3-5 days to determine your irritants.  --start pelvic floor PT.  Call to make appt:  [facility] (all take Medicaid):  1)  [facility] ([place]): (p) [phone]. (f) [phone]. Established patients call:  [phone].  2)  [facility]DRA [facility]: (p) [phone]  . (f) [phone].    3)  [facility]DRA [facility] (Amanda Stiles or Loida Obrien): (p) [phone].  Or [phone].   4)  [facility]. (p) [phone].    --URGE: consider medication in future--make sure urine C&S negative 1st. Takes 2-4 weeks to see if will have effect.  For dry mouth: get sour, sugar free lozenge or gum.    --STRESS:  Pessary vs. Sling.     2)  Vaginal atrophy (dryness):    --every night x 2 weeks: Use 0.5 gram of estrogen cream in vagina with applicator.  Also, Use small amount with finger around vaginal opening/inner lips at same frequency.   --after 2 weeks, reduce use to 2x/week thereafter  --use at night right before bed  --may also help reduce UTI frequency and bladder urgency/frequency symptoms    3)  Nocturia (nighttime urination): stop fluids 2 hours before bed/no water by bed.  If have leg swelling:  Elevate feet above chest x 1 hour before bed to get excess fluid off.  Can also use support hose (knee highs).    --work on sleep pattern (sleep hygiene sheet)  --continue to watch snoring--may need sleep apnea evaluation    4)  Recent UTI:   --had UTI a few months ago; finished ABX with neg ANNIA, still  feels U/F increased   --urine C&S 6/2020 + ecoli--took macrobid x 2 rounds   --urine C&S 9/2020 mixed orgs  --repeat cath urine C&S today   --if +, treat with other ABX + longer course    --If you feel like you have a UTI, please call our office so that we can place an order for you to drop off a urine specimen for urine culture at the closest [facility] lab (we will arrange).     --We can call in antibiotics for you to start right after you drop off specimen.     --In this way, we can determine:     1)  Do you have a UTI?      2) If you have a UTI, is it sensitive to the antibiotics we prescribed?   --follow UTI prevention tips (see attached)  --control bowel movements/fecal cross-contamination: continue daily fiber gummies   --can use stool softener as needed for flares  --treat vaginal atrophy (dryness)  --empty bladder before and after intercourse  --consider need for further evaluation (pelvic imaging and cystoscopy) if issue persists    5)  Will call you with urine C&S result.  If +, treat and see how symptoms change.  If NEG, will start on OAB med--will let you know which one.   **addendum: oxybutynin 10 xl sent to pharmacy since C&S neg   --needs appt to RTC 3-4 months with NP/PA/MD    Approximately 60 min were spent in consult, 90 % in discussion.     Thank you for requesting consultation of your patient.  I look forward to participating in their care.    Anu Coles  Female Pelvic Medicine and Reconstructive Surgery  [facility] [place], [place]

## 2020-09-23 LAB — BACTERIA UR CULT: NO GROWTH

## 2020-09-27 PROBLEM — Z87.440 HISTORY OF UTI: Status: ACTIVE | Noted: 2020-09-27

## 2020-09-27 RX ORDER — OXYBUTYNIN CHLORIDE 10 MG/1
10 TABLET, EXTENDED RELEASE ORAL DAILY
Qty: 30 TABLET | Refills: 12 | Status: SHIPPED | OUTPATIENT
Start: 2020-09-27 | End: 2021-10-04 | Stop reason: SDUPTHER

## 2020-09-28 ENCOUNTER — TELEPHONE (OUTPATIENT)
Dept: UROGYNECOLOGY | Facility: CLINIC | Age: 65
End: 2020-09-28

## 2020-09-28 NOTE — TELEPHONE ENCOUNTER
----- Message from Anu Coles MD sent at 9/27/2020  6:31 PM CDT -----  Regarding: please call patient and let her know about plan  Please call patient, let her know that because recent urine C&S was negative, I called in oxybutynin 10 xL.  She will take this once a day to see if helps bladder symptoms.     Additionally, she should restart PT and use vaginal estrogen as we discussed. Follow all other things we discussed.     Finally, can you please help her make appt to see Andrés (or me if no availability) in about 3-4 months to see how she's doing?  Thanks!

## 2020-09-28 NOTE — TELEPHONE ENCOUNTER
Patient called and read message according from Dr Coles. Also appointment set up for 1/29/21 with Kimberlyn Ledesma NP    Regarding: please call patient and let her know about plan  Please call patient, let her know that because recent urine C&S was negative, I called in oxybutynin 10 xL.  She will take this once a day to see if helps bladder symptoms.      Additionally, she should restart PT and use vaginal estrogen as we discussed. Follow all other things we discussed.

## 2020-09-29 ENCOUNTER — CLINICAL SUPPORT (OUTPATIENT)
Dept: REHABILITATION | Facility: HOSPITAL | Age: 65
End: 2020-09-29
Attending: OBSTETRICS & GYNECOLOGY
Payer: MEDICARE

## 2020-09-29 DIAGNOSIS — M62.81 MUSCLE WEAKNESS: ICD-10-CM

## 2020-09-29 DIAGNOSIS — R27.9 LACK OF COORDINATION: ICD-10-CM

## 2020-09-29 DIAGNOSIS — N39.46 URINARY INCONTINENCE, MIXED: ICD-10-CM

## 2020-09-29 PROBLEM — N81.89 PELVIC FLOOR WEAKNESS: Status: RESOLVED | Noted: 2017-12-18 | Resolved: 2020-09-29

## 2020-09-29 PROCEDURE — 97110 THERAPEUTIC EXERCISES: CPT | Mod: PN

## 2020-09-29 PROCEDURE — 97530 THERAPEUTIC ACTIVITIES: CPT | Mod: PN

## 2020-09-29 PROCEDURE — 97161 PT EVAL LOW COMPLEX 20 MIN: CPT | Mod: PN

## 2020-09-29 NOTE — PLAN OF CARE
[facility]  Pelvic Health Physical Therapy Initial Evaluation    Date: 2020   Name: Claudia Rodriguez  Clinic Number: [id number]  Therapy Diagnosis:   Encounter Diagnosis   Name Primary?    Urinary incontinence, mixed      Physician: Anu Coels MD    Physician Orders: PT Eval and Treat   Medical Diagnosis from Referral: urinary incontinence, mixed   Evaluation Date: 2020  Authorization Period Expiration: 2021  Plan of Care Expiration: 2020  Visit # / Visits authorized:     Time In: 7:58a  Time Out: 8:55a  Total Appointment Time (timed & untimed codes): 57 minutes  Total Billing Time: 57    Precautions: universal    Subjective     Date of onset: 35 years     History of current condition - Claudia reports: she has been dealing with urinary issues for over 30 years. She had one child vaginally who  a month later so she never had kids again. Because of her issues she had a bladder lift surgery 35 years ago, and her bladder ruptured during surgery and she was told it fell after surgery. For a while the incontinence was manageable; however, it has gotten worse recently. She just retired. She came to physical therapy with another therapist who worked here and all they did was use heating pads and meditate and she said it was very unsuccessful and useless. In , she had a UTI and took 20 days of antibiotics. After the antibiotics were done, it still never felt like the UTI went away. Then she states her  had a stroke in July so she couldn't focus on herself anymore but her symptoms did not improve. She was going to the bathroom every 30 minutes and waking up 8-10 times. She states it is still impacting her quality of life. She can't sleep at night and is sometimes waking up every 30-45 minutes. However, last night since she was given some type of medicine she only woke up 7 times which is good for her - she was able to sleep for 2.5 hours without waking up. She is  using vaginal estrogen cream and taking stool softener so she doesn't strain. She states she is also not drinking anything after 6 pm - just started with all this new stuff so not seeing huge improvements, but maybe a little. She is on a new medication (oxybutinin). She has 150 stitches in bladder - so her bladder is smaller. When she goes to the bathroom it isn't fully coming out, but there is something there. When really active she has to change her pad more frequently.     OB/GYN History:  childbirth vaginal delivery  Sexually active? Yes  Pain with vaginal exams, intercourse or tampon use? Yes - a little bit of pain - she uses lubrication     Bladder/Bowel History   Frequency of urination:   Daytime: 15-20x            Nighttime: 7-10x    Difficulty initiating urine stream: No   Urine stream: normal   Complete emptying: No   Bladder leakage: Yes- started with coughing, but now it is random    Frequency of incidents: all day long   Urinary Urgency: Yes     Frequency of bowel movements: once a day   Difficulty initiating BM: No - maybe a little    Quality/Shape of BM: [place] Stool Chart type 4   Does Patient Feel Empty after BM? Yes   Fiber Supplements or Laxative Use?  Yes - fiber gummies and stool softener    Colon leakage: No   Form of protection: panty liner; overnight pad    Number of pads required in 24 hours: 2x/day and one at night      Medical History: Claudia  has a past medical history of Cholelithiasis, DDD (degenerative disc disease), lumbar, Fatty liver, Goiter, nodular, HTN (hypertension), Hyperglycemia, Hyperlipidemia, Hypothyroidism, Hypovitaminosis D, Meningioma, Obesity, Prediabetes, and Urinary bladder disorder.     Surgical History:  Claudia Rodriguez  has a past surgical history that includes Bladder suspension; thyroid biopsy (7/11/12); tonsillectomy; Tonsillectomy; and Liver biopsy.    Medications: Claudia has a current medication list which includes the following prescription(s):  amlodipine, calcium carbonate/vitamin d3, carvedilol, ergocalciferol, estradiol, multivitamin/iron/folic acid, imvexxy maintenance pack, levothyroxine, omega-3 fatty acids, oxybutynin, and triamterene-hydrochlorothiazide 37.5-25 mg.    Allergies:   Review of patient's allergies indicates:   Allergen Reactions    Zostavax [zoster vaccine live (pf)] Rash     - injection site red, hot, indurated    Amlodipine-olmesartan      Other reaction(s): pounding in ears      Atorvastatin        Other reaction(s): Unknown    Demerol [meperidine] Other (See Comments)     Other reaction(s): Nausea  Other reaction(s): Headache    Ezetimibe-simvastatin      Other reaction(s): Headache      Lotrel [amlodipine-benazepril]        Other reaction(s): constant cough    Nsaids (non-steroidal anti-inflammatory drug) Other (See Comments)       Other reaction(s): Difficulty breathing    Other reaction(s): Difficulty breathing    Phenytoin sodium extended Other (See Comments)        Prior Therapy/Previous treatment included: she has tried pelvic floor physical therapy about 3 years ago   Current exercise: brisk walking for about 40 minutes  Occupation: Retired   Prior Level of Function: independent with UF and UI   Current Level of Function: limited by need to always have a bathroom nearby - so she doesn't go places where she can't get to the bathroom soon    Types of fluid intake: 1-3 cup water, decaf coffee and flavored banegas (jenna coconut water)  Diet: fruits and vegetables, not much meat   Habitus:overweight0    Pts goals: to be able to sleep without as much UF     OBJECTIVE     See EMR under MEDIA for written consent provided 9/29/2020  Chaperone: declined    ORTHO SCREEN  Posture in sitting: sacral sitting  Posture in standing: WNL  Pelvic alignment: no sign of deviations noted in supine     HIP STRENGTH:      Left      Right  Hip flexion: 4+/5 Hip flexion: 4+/5   Hip Internal Rotation:  4+/5    Hip Internal Rotation: 4+/5       Hip External Rotation: 4/5    Hip External Rotation: 4/5      Hip extension:  3+/5 Hip extension: 3+/5   Hip abduction: 4-/5 Hip abduction: 4-/5   Hip adduction: 3-/5 Hip adduction 3-/5       ABDOMINAL WALL ASSESSMENT  Palpation: boggy  Abdominal strength: Rectus abdominus: 3+/5     Transverse abdominus: 2/5  Pelvic Floor Muscle and Transverse Abdominus Synergy: present  Diastasis: present - doming above umbilicus (about 4 in in length and 2 in width      BREATHING MECHANICS ASSESSMENT   Thorax Assessment During Quiet Respiration: WNL excursion of ribcage  Thorax Assessment During Deep Respiration: WNL excursion of ribcage and WNL excursion of abdominal wall    VAGINAL PELVIC FLOOR EXAM    EXTERNAL ASSESSMENT  Introitus: gaping  Skin condition: redness noted  Scarring: none   Sensation: WNL   Pain:  none  Voluntary contraction: visible lift at anal opening and accessory muscle use  Voluntary relaxation: visible drop  Involuntary contraction: accessory muscle use  Bearing down: nil and accessory muscle use - more of a lift than a drop   Perineal descent: absent      INTERNAL ASSESSMENT  Pain: none   Sensation: able to localized pressure appropriately   Vaginal vault: roomy   Muscle Bulk: atrophy   Muscle Power: 2-/5  Muscle Endurance: 3 sec  # Reps To Fatigue: 10    Fast Contractions in 10 seconds: 6     Quality of contraction: decreased hold   Specificity: patient contracts: glutes, adductors, abdominals    Coordination: tends to hold breath during PFM contration   Prolapse check:Grade 1 cystocele and Grade 1 rectocele  Comments: after cueing, patient was able to perform a pelvic floor contraction with MMT of 2-/5; prior to cueing patient was not deloris pelvic floor, but solely glutes     Limitation/Restriction for FOTO Urinary Problem Survey    Therapist reviewed FOTO scores for Claudia Rodriguez on 9/29/2020.   FOTO documents entered into [employer] - see Media section.    Limitation Score: 57%       TREATMENT      Treatment Time In: 8:35a  Treatment Time Out: 8:55a  Total Treatment time (time-based codes) separate from Evaluation: 25 minutes      Therapeutic Exercise to develop  strength, endurance, ROM, flexibility, posture and core stabilization for 10 minutes including: Kegels Endurance Holds and TA activation    Neuromuscular Re-education to develop Coordination, Control, Posture, Proprioception and Sense for 5 minutes including: diaphragmatic breathing and pelvic floor layers    Therapeutic Activity Patient participated in dynamic functional therapeutic activities to improve functional performance for 10 minutes. Including: Education as described below.     Patient Education provided:   general anatomy/physiology of urinary/ bowel  system, benefits of treatment, risks of treatment and alternative methods of treatment were discussed with the pt. Additionally, bladder irritants, anatomy/physiology of pelvic floor, posture/body mechanices, hygiene of pelvic floor, bladder retraining, diaphragmatic breathing, isometric abdominal exercises, kegels, proper bearing down techniques, fluid intake/dietary modifications and Coordination of kegels with functional activities such as cough, laugh, sneeze, lift, etc.  and toilet posture were reviewed.     Home Exercises Provided:  Written Home Exercises Provided: yes.  Exercises were reviewed and Claudia was able to demonstrate them prior to the end of the session.    Claudia demonstrated good  understanding of the education provided.     See EMR under Patient Instructions for exercises provided 9/29/2020.    Assessment     Claudia is a 65 y.o. female referred to outpatient Physical Therapy with a medical diagnosis of urinary incontinence, mixed. Pt presents with complaints of urinary frequency, urinary urgency, and urinary incontinence. She has a history of a bladder lift that was unsuccessful and she has attended pelvic floor therapy before with minimal to no success. Today upon  examination, pt demonstrates pelvic floor weakness, decreased coordination of pelvic floor, endurance deficits, anterior and posterior vaginal wall descent, decreased trunk strength, and poor pressure management. Pt had a MANOLO that was minimally reduced with TA activation. With cueing, pt able to perform improved pelvic floor contraction with focus on isolating each layer. Pt had poor functional bracing, but was educated on improving breathing mechanics with movements. Pt has poor fluid intake and would benefit from decreasing bladder irritants and increasing water intake daily. Due to this, patient has to bathroom map and is unable to participate in social activities, exercise, and ADLs without incontinence and increased urination. Pt is appropriate for physical therapy at this time and would benefit from pelvic floor/trunk strengthening and patient education.     Pt prognosis is Good.   Pt will benefit from skilled outpatient Physical Therapy to address the deficits stated above and in the chart below, provide pt/family education, and to maximize pt's level of independence.     Plan of care discussed with patient: Yes  Pt's spiritual, cultural and educational needs considered and patient is agreeable to the plan of care and goals as stated below:       Anticipated Barriers for therapy: none    Medical Necessity is demonstrated by the following    History  Co-morbidities and personal factors that may impact the plan of care Co-morbidities:   cholelithiasis, DDD, goiter, HTN, hyperglycemia, hyperlipidemia, hypothyroidism, meningioma, obesity, prediabetes, urinary bladder disorder, prior pelvic surgery    Personal Factors:   no deficits     Moderate    Examination  Body Structures and Functions, activity limitations and participation restrictions that may impact the plan of care Body Regions/Systems/Functions:  poor trunk stability, perineal descent, decreased pelvic muscle strength, decreased endurance of the pelvic  "muscles, decreased phasic ability of the pelvic muscles, increased frequency of urination, increased nocturia, poor coordination of pelvic floor muscles during ADL's leading to urinary or fecal leakage, poor fluid intake, poor knowledge of vulvar irritants and incomplete urination     Activity Limitations:  urgency , delaying urge to urinate, sleep uninterrupted by excessive nocturia, incontinence with ADLs and bathroom mapping    Participation Restrictions:  all ADLs/iADLs uninterrupted by urinary incontinence/urgency/frequency, social activities with friends/family, relationship with spouse/partner, Sleep restrictions and exercise restrictions due to incontinence     Activity limitations:   Learning and applying knowledge  no deficits    General Tasks and Commands  no deficits    Communication  no deficits    Mobility  no deficits    Self care  no deficits    Domestic Life  doing house work (cleaning house, washing dishes, laundry)    Interactions/Relationships  no deficits    Life Areas  no deficits    Community and Social Life  community life  recreation and leisure       moderate   Clinical Presentation stable and uncomplicated low   Decision Making/ Complexity Score: low       Goals:  Short Term Goals: 6 weeks   Pt to perform "the knack" prior to coughing, laughing or sneezing to decrease risk of incontinence.  Pt to be able to perform a 10 second kegel x 10 reps to demonstrate improving strength and endurance needed for continence.  Pt to demonstrate being able to correctly and consistently perform a kegel which is needed  to increase pelvic floor muscle coordination and strength needed for continence.  Pt to be able to delay the urge to urinate at least 10 minutes with a strong urge to urinate in order to make it to the bathroom without leaking.  Pt to report a decrease in urinary frequency from 1-2x/hour  to no more than once every 2 hour(s) to improve ability to participate in social activities.  Pt to " voice understanding of the role that diet plays on urinary urgency.    Pt to report a decrease in nocturia to no more than 6x/night for improved ability to achieve restorative sleep.    Pt to demonstrate proper positioning on commode with breathing techniques to decrease strain with BM to enable pt to feel empty after BM.   Pt to be able to bulge pelvic floor which is needed for comfortable BM and complete evacuation.     Long Term Goals: 12 weeks   Pt to be discharged with home plan for carry over after discharge.    Pt to be able to perform a 15 second kegel x 10 reps to demonstrate improving strength and endurance needed for continence.  Pt to report a decrease in pad usage to 0 pads a day to demonstrate improving pelvic floor muscle controls as evidenced by decreased episodes of incontinence needed to improve confidence in social situations.  Pt to demonstrate an improved score in the FOTO Urinary Problem survey  to at least 47% to demonstrate improving UF, UI.    Pt to be able to delay the urge to urinate at least 20 minutes with a strong urge to urinate in order to make it to the bathroom without leaking.  Pt to report no longer feeling the need to urinate just in case when shopping or participating in social activities to demonstrate improving pelvic floor and bladder control.  Pt to report a decrease in urinary frequency from 1-2x/hour to no more than once every 2.5-3 hour(s) to improve ability to participate in social activities.  Pt to increase pelvic floor strength to at least 3/5 to demonstrate improved strength needed for continence with ADLs.   Pt to report a decrease in nocturia to no more than 2x/night for improved restorative sleep.      Plan     Plan of care Certification: 9/29/2020 to 12/29/2020.    Outpatient Physical Therapy 1 times weekly for 12 weeks to include the following interventions: therapeutic exercises, therapeutic activity, neuromuscular re-education, manual therapy, modalities PRN,  patient/family education and self care/home management    Plan for next visit: elevator analogy, bladder diaries, PFME    Abimbola Wheat, PT  09/29/2020        I have seen the patient, reviewed the therapist's plan of care, and I agree with the plan of care.      I certify the need for these services furnished under this plan of treatment and while under my care.     ___________________ ________ Physician/Referring Practitioner            ___________________________ Date of Signature

## 2020-09-29 NOTE — PATIENT INSTRUCTIONS
Home Exercise Program: 09/29/2020      ([address])    Initially it may be useful to practice squeezing at different layers of your pelvic floor muscles to help you find them, as each layer plays an important role in pelvic floor function. Eventually you want to be doing your kegel contractions to include squeezing at all 3 layers, and this should become one smooth movement.    To isolate layer 1: think about closing your openings (around vagina and anus) and nodding the clitoris    To isolate layer 2: imagine a drawstring in your urethra that you are pulling up inside or that you are telescoping the urethra in on itself.     To isolate layer 3: pull your tailbone up and forward towards your pubic bone    When putting it all together, it can be helpful to think about closing your openings and lifting up and in.    Kegels while laying down    1. Lay on your back comfortably with legs and buttocks relaxed.  2. Contract and LIFT the pelvic floor muscles as if you're trying to stop the stream of urine and passage of gas.  3. Hold LIFT for 4 seconds without holding breath.  4. Release and DROP the pelvic floor muscles for 10 seconds.  5. Repeat 10 times, 3 sets per day. Spread throughout the day.    Toilet Posture:   Leaning forward with elbows on knees, deep belly breathing, not straining     Complete your bladder diaries!

## 2020-09-30 ENCOUNTER — LAB VISIT (OUTPATIENT)
Dept: LAB | Facility: HOSPITAL | Age: 65
End: 2020-09-30
Attending: INTERNAL MEDICINE
Payer: MEDICARE

## 2020-09-30 ENCOUNTER — PATIENT OUTREACH (OUTPATIENT)
Dept: OTHER | Facility: OTHER | Age: 65
End: 2020-09-30

## 2020-09-30 DIAGNOSIS — E03.9 ACQUIRED HYPOTHYROIDISM: ICD-10-CM

## 2020-09-30 DIAGNOSIS — R79.9 ABNORMAL FINDING OF BLOOD CHEMISTRY, UNSPECIFIED: ICD-10-CM

## 2020-09-30 DIAGNOSIS — E55.9 VITAMIN D DEFICIENCY DISEASE: ICD-10-CM

## 2020-09-30 DIAGNOSIS — E04.9 GOITER, NODULAR: ICD-10-CM

## 2020-09-30 LAB
ESTIMATED AVG GLUCOSE: 105 MG/DL (ref 68–131)
HBA1C MFR BLD HPLC: 5.3 % (ref 4–5.6)

## 2020-09-30 PROCEDURE — 83036 HEMOGLOBIN GLYCOSYLATED A1C: CPT

## 2020-09-30 PROCEDURE — 36415 COLL VENOUS BLD VENIPUNCTURE: CPT | Mod: PO

## 2020-10-05 ENCOUNTER — PATIENT MESSAGE (OUTPATIENT)
Dept: REHABILITATION | Facility: HOSPITAL | Age: 65
End: 2020-10-05

## 2020-10-13 NOTE — PROGRESS NOTES
"  Pelvic Health Physical Therapy   Treatment Note     [facility]  [facility] Number: [id number]    Therapy Diagnosis:   Encounter Diagnoses   Name Primary?    Muscle weakness     Lack of coordination      Physician: Anu Coles MD    Visit Date: 10/14/2020    Physician Orders: PT Eval and Treat   Medical Diagnosis from Referral: urinary incontinence, mixed   Evaluation Date: 9/29/2020  Authorization Period Expiration: 12/31/2020  Plan of Care Expiration: 12/29/2020  Visit # / Visits authorized: 1/ 20 (+eval)   Cancelled Visits: 0  No Show Visits: 0    Time In: 3:16p  Time Out: 4:00p  Total Billable Time: 44 minutes    Precautions: Standard    Subjective     Pt reports: she has felt a dramatic improvement. She states that she isn't sure how many times she is going to the bathroom during the day, but it is great at night. Only going about 4 times at night. Tried to stop drinking anything around 6-7 pm. She doesn't have those bladder contractions that make it feel like she has a UTI anymore. She states that leaking is more noticeable when she is doing something physical like walking, cooking, playing with grandkids. She states that night times have been amazing. She is not noticing frequent incontinence episodes.   She was compliant with home exercise program.  Response to previous treatment: great  Functional change: "dramatic improvement"     Objective     Claudia received therapeutic exercises to develop  strength, endurance, ROM, flexibility, posture and core stabilization for 00 minutes including: none    Claudia participated in neuromuscular re-education activities to develop Coordination, Control, Proprioception and Sense for 30 minutes including: see below:   Pelvic floor layers -especially layer 2   kegels - LH and QF x20 ea   Kegels with side steps 2x10 ea   Sit to stands with kegels and breath - RTB around thighs 2x10  Abdominal sets with kegels x10    Claudia participated in dynamic functional " therapeutic activities to improve functional performance for 14 minutes, including:  Reviewed bladder diaries   How to fit her exercises into her daily routine  Not kegeling when on the toilet    Getting up to 60 oz of water over the next 2 weeks   Double voiding     Home Exercises Provided and Patient Education Provided     Education provided:   - diaphragmatic breathing, double voiding techniques, kegels and fluid intake/dietary modifications  Discussed progression of plan of care with patient; educated pt in activity modification; reviewed HEP with pt. Pt demonstrated and verbalized understanding of all instruction and was provided with a handout of HEP (see Patient Instructions).    Written Home Exercises Provided: yes.  Exercises were reviewed and Claudia was able to demonstrate them prior to the end of the session.  Claudia demonstrated good  understanding of the education provided.     See EMR under Patient Instructions for exercises provided 10/14/2020.    Assessment     Pt tolerated therapy session well. She has had major improvements since start of care. She has decreased her night time urinating, bladder contractions, and bladder irritant intake. She has improved her pelvic floor strength and increased her coordination mildly. She continues with decreased endurance and requires extensive cueing for deloris each layer of pelvic floor and ensuring relaxation after each contraction. Pt has decreased her straining and improved her toilet posture. Pt would benefit from continued therapy to decrease incontinence during higher level activities.   Claudia is progressing well towards her goals.   Pt prognosis is Good.     Pt will continue to benefit from skilled outpatient physical therapy to address the deficits listed in the problem list box on initial evaluation, provide pt/family education and to maximize pt's level of independence in the home and community environment.   Pt's spiritual, cultural and educational  "needs considered and pt agreeable to plan of care and goals.     Anticipated barriers to physical therapy: none    Goals:   Short Term Goals: 6 weeks   Pt to perform "the knack" prior to coughing, laughing or sneezing to decrease risk of incontinence.  Pt to be able to perform a 10 second kegel x 10 reps to demonstrate improving strength and endurance needed for continence.  Pt to demonstrate being able to correctly and consistently perform a kegel which is needed  to increase pelvic floor muscle coordination and strength needed for continence.  Pt to be able to delay the urge to urinate at least 10 minutes with a strong urge to urinate in order to make it to the bathroom without leaking.  Pt to report a decrease in urinary frequency from 1-2x/hour  to no more than once every 2 hour(s) to improve ability to participate in social activities.  Pt to voice understanding of the role that diet plays on urinary urgency.    Pt to report a decrease in nocturia to no more than 6x/night for improved ability to achieve restorative sleep.    Pt to demonstrate proper positioning on commode with breathing techniques to decrease strain with BM to enable pt to feel empty after BM.   Pt to be able to bulge pelvic floor which is needed for comfortable BM and complete evacuation.      Long Term Goals: 12 weeks   Pt to be discharged with home plan for carry over after discharge.    Pt to be able to perform a 15 second kegel x 10 reps to demonstrate improving strength and endurance needed for continence.  Pt to report a decrease in pad usage to 0 pads a day to demonstrate improving pelvic floor muscle controls as evidenced by decreased episodes of incontinence needed to improve confidence in social situations.  Pt to demonstrate an improved score in the FOTO Urinary Problem survey  to at least 47% to demonstrate improving UF, UI.    Pt to be able to delay the urge to urinate at least 20 minutes with a strong urge to urinate in order to " make it to the bathroom without leaking.  Pt to report no longer feeling the need to urinate just in case when shopping or participating in social activities to demonstrate improving pelvic floor and bladder control.  Pt to report a decrease in urinary frequency from 1-2x/hour to no more than once every 2.5-3 hour(s) to improve ability to participate in social activities.  Pt to increase pelvic floor strength to at least 3/5 to demonstrate improved strength needed for continence with ADLs.   Pt to report a decrease in nocturia to no more than 2x/night for improved restorative sleep.      Plan     Plan for next visit: PFME, standing exercises, core exercises     Abimbola Wheat, PT   10/14/2020

## 2020-10-14 ENCOUNTER — CLINICAL SUPPORT (OUTPATIENT)
Dept: REHABILITATION | Facility: HOSPITAL | Age: 65
End: 2020-10-14
Attending: OBSTETRICS & GYNECOLOGY
Payer: MEDICARE

## 2020-10-14 DIAGNOSIS — R27.9 LACK OF COORDINATION: ICD-10-CM

## 2020-10-14 DIAGNOSIS — M62.81 MUSCLE WEAKNESS: ICD-10-CM

## 2020-10-14 PROCEDURE — 97112 NEUROMUSCULAR REEDUCATION: CPT | Mod: PN

## 2020-10-14 PROCEDURE — 97530 THERAPEUTIC ACTIVITIES: CPT | Mod: PN

## 2020-10-14 NOTE — PATIENT INSTRUCTIONS
Home Exercise Program: 10/14/2020    Kegels while laying down    1. Lay on your back comfortably with legs and buttocks relaxed.  2. Contract and LIFT the pelvic floor muscles as if you're trying to stop the stream of urine and passage of gas.  3. Hold LIFT for 5 seconds without holding breath.  4. Release and DROP the pelvic floor muscles for 5-10 seconds.- relax pelvic floor fully   5. Repeat 10 times, 2 sets per day. Spread throughout the day.     No Kegels while urinating!     TA contraction with kegels:   1. Relax everything   2. Engage the deep core muscles by gently pulling your belly button towards your spine on your exhale   3. While doing this you can also try to engage the pelvic floor by lifting the pelvic floor muscles up and in   4. You can do this about x20 times per day     Quick flicks:   Quick kegels - contract for 1 second, relax for 2 seconds

## 2020-10-22 NOTE — PROGRESS NOTES
"  Pelvic Health Physical Therapy   Treatment Note     Name: Claudia Rodriguez  Clinic Number: [id number]    Therapy Diagnosis:   Encounter Diagnoses   Name Primary?    Muscle weakness     Lack of coordination      Physician: Anu Coles MD    Visit Date: 10/23/2020    Physician Orders: PT Eval and Treat   Medical Diagnosis from Referral: urinary incontinence, mixed   Evaluation Date: 2020   PN Due: 10/29/2020  Authorization Period Expiration: 2020  Plan of Care Expiration: 2020  Visit # / Visits authorized:  (+eval)   Cancelled Visits: 0  No Show Visits: 0    Time In: 12:26p  Time Out: 1:20p  Total Billable Time: 54 minutes    Precautions: Standard    Subjective     Pt reports: she is not noticing any more changes with anything. She tried to increase her fluid intake to about 64 oz - and that night she got up 12 times. She doesn't think she was going more during the day. She stops to go to the bathroom when she has access to the go.   She was compliant with home exercise program.  Response to previous treatment: great  Functional change: "dramatic improvement"     Objective     PFME, standing exercises, core exercises   Urge suppression techniques     Claudia received therapeutic exercises to develop  strength, endurance, ROM, flexibility, posture and core stabilization for 00 minutes including: none    Claudia participated in neuromuscular re-education activities to develop Coordination, Control, Proprioception and Sense for 54 minutes including: see below:   Supine/sitting long holds, quick flicks, pelvic floor drops with SEMG assist   Biofeedback:   Resting tone 9-10mV at start of session, 5-6 by end of session in supine; in sittin-3mV throughout   Max contract 16mV in sitting; 28mV in supine  Quality - Quick activation with fair speed of deactivation - cueing required for deactivation especially in supine   Endurance - fairly strong initial contract with slow deactivation - required cueing " to re-engage pelvic floor to ensure holding for full 5 seconds   Urge suppression techniques taught     Claudia participated in dynamic functional therapeutic activities to improve functional performance for 00 minutes, including:    Home Exercises Provided and Patient Education Provided     Education provided:   - diaphragmatic breathing, double voiding techniques, kegels and fluid intake/dietary modifications  Discussed progression of plan of care with patient; educated pt in activity modification; reviewed HEP with pt. Pt demonstrated and verbalized understanding of all instruction and was provided with a handout of HEP (see Patient Instructions).    Written Home Exercises Provided: yes.  Exercises were reviewed and Claudia was able to demonstrate them prior to the end of the session.  Claudia demonstrated good  understanding of the education provided.     See EMR under Patient Instructions for exercises provided 10/14/2020.    Assessment     Pt tolerated therapy session well. Pt attempted to increase water intake all in one day which increased her nocturia to 12 times/night. She was told to gradually increase her water intake by 3-6 oz every 5 days if there was no issues and patient understands. Pt instructed on urge suppression techniques and she had fair quick flick ability. Pt continues with occasional use of inner thighs/glutes to improve pelvic floor contraction - with cueing this decreased. In supine position, she had difficult time de-activating and releasing tension, but this improved in sitting position. Pt has poor bulging technique and occasional squeeze when performing muscle lengthening. Pt is appropriate to continue with therapy at this time.   Claudia is progressing well towards her goals.   Pt prognosis is Good.     Pt will continue to benefit from skilled outpatient physical therapy to address the deficits listed in the problem list box on initial evaluation, provide pt/family education and to maximize  "pt's level of independence in the home and community environment.   Pt's spiritual, cultural and educational needs considered and pt agreeable to plan of care and goals.     Anticipated barriers to physical therapy: none    Goals:   Short Term Goals: 6 weeks ongoing   Pt to perform "the knack" prior to coughing, laughing or sneezing to decrease risk of incontinence.  Pt to be able to perform a 10 second kegel x 10 reps to demonstrate improving strength and endurance needed for continence.  Pt to demonstrate being able to correctly and consistently perform a kegel which is needed  to increase pelvic floor muscle coordination and strength needed for continence.  Pt to be able to delay the urge to urinate at least 10 minutes with a strong urge to urinate in order to make it to the bathroom without leaking.  Pt to report a decrease in urinary frequency from 1-2x/hour  to no more than once every 2 hour(s) to improve ability to participate in social activities.  Pt to voice understanding of the role that diet plays on urinary urgency.    Pt to report a decrease in nocturia to no more than 6x/night for improved ability to achieve restorative sleep.    Pt to demonstrate proper positioning on commode with breathing techniques to decrease strain with BM to enable pt to feel empty after BM.   Pt to be able to bulge pelvic floor which is needed for comfortable BM and complete evacuation.      Long Term Goals: 12 weeks ongoing   Pt to be discharged with home plan for carry over after discharge.    Pt to be able to perform a 15 second kegel x 10 reps to demonstrate improving strength and endurance needed for continence.  Pt to report a decrease in pad usage to 0 pads a day to demonstrate improving pelvic floor muscle controls as evidenced by decreased episodes of incontinence needed to improve confidence in social situations.  Pt to demonstrate an improved score in the FOTO Urinary Problem survey  to at least 47% to demonstrate " improving UF, UI.    Pt to be able to delay the urge to urinate at least 20 minutes with a strong urge to urinate in order to make it to the bathroom without leaking.  Pt to report no longer feeling the need to urinate just in case when shopping or participating in social activities to demonstrate improving pelvic floor and bladder control.  Pt to report a decrease in urinary frequency from 1-2x/hour to no more than once every 2.5-3 hour(s) to improve ability to participate in social activities.  Pt to increase pelvic floor strength to at least 3/5 to demonstrate improved strength needed for continence with ADLs.   Pt to report a decrease in nocturia to no more than 2x/night for improved restorative sleep.      Plan     Plan for next visit: reassessment, standing exercises, core exercises     Abimbola Wheat, PT   10/23/2020

## 2020-10-23 ENCOUNTER — CLINICAL SUPPORT (OUTPATIENT)
Dept: REHABILITATION | Facility: HOSPITAL | Age: 65
End: 2020-10-23
Attending: OBSTETRICS & GYNECOLOGY
Payer: MEDICARE

## 2020-10-23 DIAGNOSIS — R27.9 LACK OF COORDINATION: ICD-10-CM

## 2020-10-23 DIAGNOSIS — M62.81 MUSCLE WEAKNESS: ICD-10-CM

## 2020-10-23 PROCEDURE — 97112 NEUROMUSCULAR REEDUCATION: CPT | Mod: PN

## 2020-10-23 NOTE — PROGRESS NOTES
[facility]: Health  Follow-Up    The history is provided by the patient.             Reason for review: Blood pressure at goal        Topics Covered on Call: meal planning and Diet    Additional Follow-up details: Patient says she is feeling well and happy with her BP readings.  She says she has been extremely busy helping her  and it has affected her diet and physical activity.            Diet-Change  No 24 hour dietary recall  Patient reports eating or drinking the following: Patient states she is still watching her salt intake, but is relying more on convenience foods. I sent patient a list of low sodium food options with her permission.    Intervention(s): meal planning and help with shopping      Physical Activity-Change      Additional physical activity details: Patient says she has not been as physically active since she has been taking care of and helping her .      Medication Adherence-Medication adherence was assessed.      Substance, Sleep, Stress-Not assessed      Continue current diet/physical activity routine.  Provided patient education. Diet       Addressed patient questions and patient has my contact information if needed prior to next outreach. Patient verbalizes understanding.      Explained the importance of self-monitoring and medication adherence. Encouraged the patient to communicate with their health  for lifestyle modifications to help improve or maintain a healthy lifestyle.        Sent link to [facility] webpages and my contact information via [employer] for future questions.                   Topic    Lipid (Cholesterol) Test          Last 5 Patient Entered Readings                                      Current 30 Day Average: 124/74     Recent Readings 10/15/2020 10/5/2020 9/26/2020 9/18/2020 9/9/2020    SBP (mmHg) 125 126 122 142 113    DBP (mmHg) 77 73 73 74 72    Pulse 56 62 64 65 67

## 2020-10-23 NOTE — PATIENT INSTRUCTIONS
Home Exercise Program: 10/23/2020      INSTRUCTIONS FOR CONTROLLING URINARY URGE      When you experience a strong urge to urinate and know that your bladder is not as full as it has the potential to be (for instance you used the restroom 30 minutes to 1 1/2 hours ago).     FIRST: Stop activity, stand quietly or sit down. Try to stay very still to maintain control. Avoid rushing to the toilet. In this position you can start performing heel raises or toe raises.     SECOND: Begin Quick Flicks (1 second LIFT and squeeze of pelvic floor muscles, 4 second DROP). Pelvic floor contractions send a message to the bladder to relax and hold urine.     THIRD: Relax. Do not rush to the toilet. Take a deep belly or diaphragmatic breath and let it out slowly. Let the urge to urinate pass by using distraction techniques, positive thoughts, visualization, and meditation. Try not to think about going to the bathroom.    FINALLY: If the urge returns, repeat the above steps to regain control. When you feel the urge subside, walk normally to the bathroom. Try to avoid starting to undress until you are in he bathroom and ready to urinate. You can urinate once the urge returns at a later and more appropriate time.     Try this at home first in case you do have an accident, but as you continue to practice, your bladder will increase the ability its capacity and hold more urine without such a strong urge.

## 2020-10-25 DIAGNOSIS — I10 ESSENTIAL HYPERTENSION: ICD-10-CM

## 2020-10-26 RX ORDER — CARVEDILOL 25 MG/1
TABLET ORAL
Qty: 180 TABLET | Refills: 1 | Status: SHIPPED | OUTPATIENT
Start: 2020-10-26 | End: 2021-04-20

## 2020-10-26 RX ORDER — AMLODIPINE BESYLATE 10 MG/1
TABLET ORAL
Qty: 90 TABLET | Refills: 1 | Status: SHIPPED | OUTPATIENT
Start: 2020-10-26 | End: 2021-04-21

## 2020-10-27 NOTE — PROGRESS NOTES
"  Pelvic Health Physical Therapy   Treatment Note     Name: Claudia Rodriguez  Clinic Number: [id number]    Therapy Diagnosis:   Encounter Diagnoses   Name Primary?    Muscle weakness     Lack of coordination      Physician: Anu Coles MD    Visit Date: 10/28/2020    Physician Orders: PT Eval and Treat   Medical Diagnosis from Referral: urinary incontinence, mixed   Evaluation Date: 9/29/2020   PN Due: 10/29/2020  Authorization Period Expiration: 12/31/2020  Plan of Care Expiration: 12/29/2020  Visit # / Visits authorized: 3/ 20 (+eval)   Cancelled Visits: 0  No Show Visits: 0    Time In: 8:31a  Time Out: 9:15a  Total Billable Time: 44 minutes    Precautions: Standard    Subjective     Pt reports: this is a somewhat difficult week - practicing not going to the bathroom was really hard for her. She kept track on Sat, Sun, Mon, Tues. She states she sometimes can sleep for 3 hours without waking up but then on Monday she had to get up 6 times and not sure why.   She was compliant with home exercise program.  Response to previous treatment: great  Functional change: "dramatic improvement"     Objective     Claudia received therapeutic exercises to develop  strength, endurance, ROM, flexibility, posture and core stabilization for 44 minutes including: none    Kegels with bridges 2x10   Sidelying hip abduction 2x10   Clamshells with YTB 2x10   TA set with kegel 2x10, 3 sec hold   Standing hip extension 2x10   Kegels in standing     Claudia participated in neuromuscular re-education activities to develop Coordination, Control, Proprioception and Sense for 00 minutes including: see below:       Claudia participated in dynamic functional therapeutic activities to improve functional performance for 00 minutes, including:    Home Exercises Provided and Patient Education Provided     Education provided:   - diaphragmatic breathing, double voiding techniques, kegels and fluid intake/dietary modifications - now transitioning to " "longer holds, decreasing quick flicks except for urge suppression  Discussed progression of plan of care with patient; educated pt in activity modification; reviewed HEP with pt. Pt demonstrated and verbalized understanding of all instruction and was provided with a handout of HEP (see Patient Instructions).    Written Home Exercises Provided: yes.  Exercises were reviewed and Claudia was able to demonstrate them prior to the end of the session.  Claudia demonstrated good  understanding of the education provided.     See EMR under Patient Instructions for exercises provided 10/14/2020.    Assessment     Pt tolerated therapy session well. Pt continues with nocturia, increased urinary frequency, and urinary urgency. Pt had to leave early; therefore, an assessment will be officially done next session. Pt given hip and core exercises this session with good response. She was educated on increasing her long hold kegels and decreasing quick flicks. Pt had fair TA activation, cued for decreasing oblique overactivity. Instructed on importance of downtraining as well as strengthening. Pt is appropriate to continue with therapy at this time. Continue with POC and progress as able.   Claudia is progressing well towards her goals.   Pt prognosis is Good.     Pt will continue to benefit from skilled outpatient physical therapy to address the deficits listed in the problem list box on initial evaluation, provide pt/family education and to maximize pt's level of independence in the home and community environment.   Pt's spiritual, cultural and educational needs considered and pt agreeable to plan of care and goals.     Anticipated barriers to physical therapy: none    Goals:   Short Term Goals: 6 weeks ongoing   Pt to perform "the knack" prior to coughing, laughing or sneezing to decrease risk of incontinence.  Pt to be able to perform a 10 second kegel x 10 reps to demonstrate improving strength and endurance needed for continence.  Pt " to demonstrate being able to correctly and consistently perform a kegel which is needed  to increase pelvic floor muscle coordination and strength needed for continence.  Pt to be able to delay the urge to urinate at least 10 minutes with a strong urge to urinate in order to make it to the bathroom without leaking.  Pt to report a decrease in urinary frequency from 1-2x/hour  to no more than once every 2 hour(s) to improve ability to participate in social activities.  Pt to voice understanding of the role that diet plays on urinary urgency.    Pt to report a decrease in nocturia to no more than 6x/night for improved ability to achieve restorative sleep.    Pt to demonstrate proper positioning on commode with breathing techniques to decrease strain with BM to enable pt to feel empty after BM.   Pt to be able to bulge pelvic floor which is needed for comfortable BM and complete evacuation.      Long Term Goals: 12 weeks ongoing   Pt to be discharged with home plan for carry over after discharge.    Pt to be able to perform a 15 second kegel x 10 reps to demonstrate improving strength and endurance needed for continence.  Pt to report a decrease in pad usage to 0 pads a day to demonstrate improving pelvic floor muscle controls as evidenced by decreased episodes of incontinence needed to improve confidence in social situations.  Pt to demonstrate an improved score in the FOTO Urinary Problem survey  to at least 47% to demonstrate improving UF, UI.    Pt to be able to delay the urge to urinate at least 20 minutes with a strong urge to urinate in order to make it to the bathroom without leaking.  Pt to report no longer feeling the need to urinate just in case when shopping or participating in social activities to demonstrate improving pelvic floor and bladder control.  Pt to report a decrease in urinary frequency from 1-2x/hour to no more than once every 2.5-3 hour(s) to improve ability to participate in social  activities.  Pt to increase pelvic floor strength to at least 3/5 to demonstrate improved strength needed for continence with ADLs.   Pt to report a decrease in nocturia to no more than 2x/night for improved restorative sleep.      Plan     Plan for next visit: reassessment - cathleen wilson, PT   10/28/2020

## 2020-10-28 ENCOUNTER — CLINICAL SUPPORT (OUTPATIENT)
Dept: REHABILITATION | Facility: HOSPITAL | Age: 65
End: 2020-10-28
Attending: OBSTETRICS & GYNECOLOGY
Payer: MEDICARE

## 2020-10-28 DIAGNOSIS — R27.9 LACK OF COORDINATION: ICD-10-CM

## 2020-10-28 DIAGNOSIS — M62.81 MUSCLE WEAKNESS: ICD-10-CM

## 2020-10-28 PROCEDURE — 97110 THERAPEUTIC EXERCISES: CPT | Mod: PN

## 2020-10-30 ENCOUNTER — TELEPHONE (OUTPATIENT)
Dept: FAMILY MEDICINE | Facility: CLINIC | Age: 65
End: 2020-10-30

## 2020-10-30 ENCOUNTER — CLINICAL SUPPORT (OUTPATIENT)
Dept: FAMILY MEDICINE | Facility: CLINIC | Age: 65
End: 2020-10-30
Payer: MEDICARE

## 2020-10-30 DIAGNOSIS — Z23 NEED FOR INFLUENZA VACCINATION: Primary | ICD-10-CM

## 2020-10-30 PROCEDURE — 99499 NO LOS: ICD-10-PCS | Mod: S$PBB,,, | Performed by: INTERNAL MEDICINE

## 2020-10-30 PROCEDURE — 99499 UNLISTED E&M SERVICE: CPT | Mod: S$PBB,,, | Performed by: INTERNAL MEDICINE

## 2020-10-30 PROCEDURE — G0008 ADMIN INFLUENZA VIRUS VAC: HCPCS | Mod: PBBFAC,PO | Performed by: INTERNAL MEDICINE

## 2020-10-30 PROCEDURE — 90694 VACC AIIV4 NO PRSRV 0.5ML IM: CPT | Mod: PBBFAC,PO | Performed by: INTERNAL MEDICINE

## 2020-10-30 NOTE — TELEPHONE ENCOUNTER
----- Message from Nilam Roberts sent at 10/30/2020  8:23 AM CDT -----  Contact: pt  Type:  Patient Returning Call    Who Called: pt  Who Left Message for Patient: unknown  Does the patient know what this is regarding?: Inj appt  Would the patient rather a call back or a response via [facility]? Call back  Best Call Back Number: [phone]  Additional Information: n/a

## 2020-11-10 NOTE — PROGRESS NOTES
"  Pelvic Health Physical Therapy   Treatment Note     Name: Claudia Rodriguez  [facility] Number: [id number]    Therapy Diagnosis:   Encounter Diagnoses   Name Primary?    Muscle weakness     Lack of coordination      Physician: Anu Coles MD    Visit Date: 11/11/2020    Physician Orders: PT Eval and Treat   Medical Diagnosis from Referral: urinary incontinence, mixed   Evaluation Date: 9/29/2020   PN Due: 10/29/2020  Authorization Period Expiration: 12/31/2020  Plan of Care Expiration: 12/29/2020  Visit # / Visits authorized: 4/ 20 (+eval)   Cancelled Visits: 0  No Show Visits: 0    Time In: 8:29a  Time Out: 9:22a  Total Billable Time: 53 minutes    Precautions: Standard    Subjective     Pt reports: leakage has been much better during the day, but she leaks more at night. This is happening when she is sleeping and she doesn't know when. She states that she walked about [id number] steps on Monday and did some exercises and so her left hip is hurting.   Bladder/Bowel History  · Frequency of urination:   Daytime: 8-10x                                           Nighttime: most of the nights it is down to 4x    · Difficulty initiating urine stream: No  · Urine stream: normal  · Complete emptying: No  · Bladder leakage: Yes - but not as frequent    · Frequency of incidents: rarely   · Urinary Urgency: Yes   · Frequency of bowel movements: once a day  · Difficulty initiating BM: No - maybe a little   · Quality/Shape of BM: [place] Stool Chart type 4  · Does Patient Feel Empty after BM? Yes  · Fiber Supplements or Laxative Use?  Yes - fiber gummies and stool softener   · Colon leakage: No  · Form of protection: panty liner; overnight pad   · Number of pads required in 24 hours: 1x/day and one at night   She was compliant with home exercise program.  Response to previous treatment: great  Functional change: "dramatic improvement"     Objective     HIP STRENGTH:                                       Left                            "                                       Right  Hip flexion: 4+/5 Hip flexion: 4+/5   Hip Internal Rotation:  4+/5   Painful  Hip Internal Rotation: 4+/5      Hip External Rotation: 4/5    Hip External Rotation: 4/5      Hip extension:  4-/5 Hip extension: 4-/5   Hip abduction: 4-/5 Hip abduction: 4-/5   Hip adduction: 3/5 Hip adduction 3/5      Palpation: ttp over L greater trochanter      ABDOMINAL WALL ASSESSMENT  Abdominal strength: Rectus abdominus: 4-/5                                      Transverse abdominus: 3-/5  Pelvic Floor Muscle and Transverse Abdominus Synergy: present  Diastasis: present - doming above umbilicus (about 4 in in length and 2 in width) - mildly improved with contraction of TA     VAGINAL PELVIC FLOOR EXAM                             INTERNAL ASSESSMENT  Pain: none   Sensation: able to localized pressure appropriately   Vaginal vault: roomy   Muscle Bulk: atrophy   Muscle Power: 2/5  Muscle Endurance: 10 sec  # Reps To Fatigue: 10              Fast Contractions in 10 seconds: 10                          Quality of contraction: decreased hold   Specificity: WNL - occasional glute squeeze  Coordination: WNL - smaller breaths   Prolapse check:Grade 1 cystocele and Grade 1 rectocele    Claudia received therapeutic exercises to develop  strength, endurance, ROM, flexibility, posture and core stabilization for 53 minutes including: none    Kegel long holds x10, 10 sec hold  Kegel quick flicks x20  TA activation with head lifts x10  Isometric clamshells x5, 10 sec hold   Isometric hip abduction x5, 10 sec hold   Kegel with bridges x10     Claudia participated in neuromuscular re-education activities to develop Coordination, Control, Proprioception and Sense for 00 minutes including: see below:     Claudia participated in dynamic functional therapeutic activities to improve functional performance for 00 minutes, including:    Home Exercises Provided and Patient Education Provided     Education provided:  "  - diaphragmatic breathing, double voiding techniques, kegels and fluid intake/dietary modifications - now transitioning to longer holds, decreasing quick flicks except for urge suppression  Discussed progression of plan of care with patient; educated pt in activity modification; reviewed HEP with pt. Pt demonstrated and verbalized understanding of all instruction and was provided with a handout of HEP (see Patient Instructions).    Written Home Exercises Provided: yes.  Exercises were reviewed and Claudia was able to demonstrate them prior to the end of the session.  Claudia demonstrated good  understanding of the education provided.     See EMR under Patient Instructions for exercises provided 10/14/2020.    Assessment     Pt tolerated therapy session well. A progress note was performed today. She has met 6 goals and is progressing well towards her other goals. She has improved her urinary frequency during the day and night. She is able to perform urge suppression techniques and found good success with this; however, due to her busy schedule she is not always compliant and prefers to listen to her urge to urinate. She has good toilet posture; however, still has difficulty with bulging pelvic floor without straining and occasional tensing of muscles. Pt has MANOLO, fair TA activation, but unable to contract TA with higher level activities - progressing with this. Pt has improved her pelvic floor endurance and quick flick ability. After 7-8 repetitions, noted that patient was fully relaxing pelvic floor during her 4 second rest break during testing. Due to symptoms, pt continues with bathroom mapping, waking up frequently during the night, and urinates "just in case". Pt is appropriate to continue with therapy. After her next two sessions, we are going to decrease frequency that she is returning to clinic to once every 2-3 weeks so she has more independence. Continue with POC and progress as able.   Claudia is progressing " "well towards her goals.   Pt prognosis is Good.     Pt will continue to benefit from skilled outpatient physical therapy to address the deficits listed in the problem list box on initial evaluation, provide pt/family education and to maximize pt's level of independence in the home and community environment.   Pt's spiritual, cultural and educational needs considered and pt agreeable to plan of care and goals.     Anticipated barriers to physical therapy: none    Goals:   Short Term Goals: 6 weeks ongoing   Pt to perform "the knack" prior to coughing, laughing or sneezing to decrease risk of incontinence. Goal not met   Pt to be able to perform a 10 second kegel x 10 reps to demonstrate improving strength and endurance needed for continence. Goal met   Pt to demonstrate being able to correctly and consistently perform a kegel which is needed  to increase pelvic floor muscle coordination and strength needed for continence. Goal met   Pt to be able to delay the urge to urinate at least 10 minutes with a strong urge to urinate in order to make it to the bathroom without leaking. Goal partially met   Pt to report a decrease in urinary frequency from 1-2x/hour  to no more than once every 2 hour(s) to improve ability to participate in social activities. Goal met   Pt to voice understanding of the role that diet plays on urinary urgency.  goal met   Pt to report a decrease in nocturia to no more than 6x/night for improved ability to achieve restorative sleep. Goal met   Pt to demonstrate proper positioning on commode with breathing techniques to decrease strain with BM to enable pt to feel empty after BM. Goal met   Pt to be able to bulge pelvic floor which is needed for comfortable BM and complete evacuation. Goal not met      Long Term Goals: 12 weeks ongoing   Pt to be discharged with home plan for carry over after discharge.    Pt to be able to perform a 15 second kegel x 10 reps to demonstrate improving strength and " endurance needed for continence.  Pt to report a decrease in pad usage to 0 pads a day to demonstrate improving pelvic floor muscle controls as evidenced by decreased episodes of incontinence needed to improve confidence in social situations.  Pt to demonstrate an improved score in the FOTO Urinary Problem survey  to at least 47% to demonstrate improving UF, UI.    Pt to be able to delay the urge to urinate at least 20 minutes with a strong urge to urinate in order to make it to the bathroom without leaking.  Pt to report no longer feeling the need to urinate just in case when shopping or participating in social activities to demonstrate improving pelvic floor and bladder control.  Pt to report a decrease in urinary frequency from 1-2x/hour to no more than once every 2.5-3 hour(s) to improve ability to participate in social activities.  Pt to increase pelvic floor strength to at least 3/5 to demonstrate improved strength needed for continence with ADLs.   Pt to report a decrease in nocturia to no more than 2x/night for improved restorative sleep.      Plan     Plan for next visit: assess bulging again; standing exercises; core exercises (TA activation with ASA zavala press)     Abimbola Wheat, PT   11/11/2020

## 2020-11-11 ENCOUNTER — CLINICAL SUPPORT (OUTPATIENT)
Dept: REHABILITATION | Facility: HOSPITAL | Age: 65
End: 2020-11-11
Attending: OBSTETRICS & GYNECOLOGY
Payer: MEDICARE

## 2020-11-11 DIAGNOSIS — M62.81 MUSCLE WEAKNESS: ICD-10-CM

## 2020-11-11 DIAGNOSIS — R27.9 LACK OF COORDINATION: ICD-10-CM

## 2020-11-11 PROCEDURE — 97110 THERAPEUTIC EXERCISES: CPT | Mod: PN

## 2020-11-16 ENCOUNTER — OFFICE VISIT (OUTPATIENT)
Dept: FAMILY MEDICINE | Facility: CLINIC | Age: 65
End: 2020-11-16
Payer: MEDICARE

## 2020-11-16 VITALS
HEIGHT: 69 IN | WEIGHT: 225.75 LBS | BODY MASS INDEX: 33.44 KG/M2 | OXYGEN SATURATION: 98 % | SYSTOLIC BLOOD PRESSURE: 120 MMHG | DIASTOLIC BLOOD PRESSURE: 80 MMHG | TEMPERATURE: 99 F | HEART RATE: 70 BPM

## 2020-11-16 DIAGNOSIS — E78.49 OTHER HYPERLIPIDEMIA: ICD-10-CM

## 2020-11-16 DIAGNOSIS — M25.552 ACUTE HIP PAIN, LEFT: Primary | ICD-10-CM

## 2020-11-16 DIAGNOSIS — I10 ESSENTIAL HYPERTENSION: ICD-10-CM

## 2020-11-16 DIAGNOSIS — M62.838 SPASM OF MUSCLE: ICD-10-CM

## 2020-11-16 DIAGNOSIS — Z23 NEED FOR PNEUMOCOCCAL VACCINATION: ICD-10-CM

## 2020-11-16 PROCEDURE — 99215 OFFICE O/P EST HI 40 MIN: CPT | Mod: PBBFAC,PO | Performed by: NURSE PRACTITIONER

## 2020-11-16 PROCEDURE — 99999 PR PBB SHADOW E&M-EST. PATIENT-LVL V: CPT | Mod: PBBFAC,,, | Performed by: NURSE PRACTITIONER

## 2020-11-16 PROCEDURE — G0009 ADMIN PNEUMOCOCCAL VACCINE: HCPCS | Mod: PBBFAC,PO

## 2020-11-16 PROCEDURE — 99214 PR OFFICE/OUTPT VISIT, EST, LEVL IV, 30-39 MIN: ICD-10-PCS | Mod: S$PBB,,, | Performed by: NURSE PRACTITIONER

## 2020-11-16 PROCEDURE — 99999 PR PBB SHADOW E&M-EST. PATIENT-LVL V: ICD-10-PCS | Mod: PBBFAC,,, | Performed by: NURSE PRACTITIONER

## 2020-11-16 PROCEDURE — 99214 OFFICE O/P EST MOD 30 MIN: CPT | Mod: S$PBB,,, | Performed by: NURSE PRACTITIONER

## 2020-11-16 RX ORDER — TRAMADOL HYDROCHLORIDE 50 MG/1
50 TABLET ORAL EVERY 6 HOURS PRN
Qty: 7 TABLET | Refills: 0 | Status: SHIPPED | OUTPATIENT
Start: 2020-11-16 | End: 2020-11-23

## 2020-11-16 RX ORDER — TIZANIDINE 4 MG/1
4 TABLET ORAL EVERY 6 HOURS PRN
Qty: 20 TABLET | Refills: 0 | Status: SHIPPED | OUTPATIENT
Start: 2020-11-16 | End: 2020-11-26

## 2020-11-16 NOTE — PATIENT INSTRUCTIONS
R.I.C.E.    R.I.C.E. stands for Rest, Ice, Compression, and Elevation. Doing these things helps limit pain and swelling after an injury. R.I.C.E. also helps injuries heal faster. Use R.I.C.E. for sprains, strains, and severe bruises or bumps. Follow the tips on this handout and begin R.I.C.E. as soon as possible after an injury.  ? Rest  Pain is your bodys way of telling you to rest an injured area. Whether you have hurt an elbow, hand, foot, or knee, limiting its use will prevent further injury and help you heal.  ? Ice  Applying ice right after an injury helps prevent swelling and reduce pain. Dont place ice directly on your skin.  · Wrap a cold pack or bag of ice in a thin cloth. Place it over the injured area.  · Ice for 10 minutes every 3 hours. Dont ice for more than 20 minutes at a time.  ? Compression  Putting pressure (compression) on an injury helps prevent swelling and provides support.  · Wrap the injured area firmly with an elastic bandage. If your hand or foot tingles, becomes discolored, or feels cold to the touch, the bandage may be too tight. Rewrap it more loosely.  · If your bandage becomes too loose, rewrap it.  · Do not wear an elastic bandage overnight.  ? Elevation  Keeping an injury elevated helps reduce swelling, pain, and throbbing. Elevation is most effective when the injury is kept elevated higher than the heart.     Call your healthcare provider if you notice any of the following:  · Fingers or toes feel numb, are cold to the touch, or change color  · Skin looks shiny or tight  · Pain, swelling, or bruising worsens and is not improved with elevation   Date Last Reviewed: 9/3/2015  © 1428-2647 [email or web address]. [address] [place] [address], [place] [address]. All rights reserved. This information is not intended as a substitute for professional medical care. Always follow your healthcare professional's instructions.        Tizanidine tablets or capsules  What is this  medicine?  TIZANIDINE (lorena oliva) helps to relieve muscle spasms. It may be used to help in the treatment of multiple sclerosis and spinal cord injury.  How should I use this medicine?  Take this medicine by mouth with a full glass of water. Take this medicine on an empty stomach, at least 30 minutes before or 2 hours after food. Do not take with food unless you talk with your doctor. Follow the directions on the prescription label. Take your medicine at regular intervals. Do not take your medicine more often than directed. Do not stop taking except on your doctor's advice. Suddenly stopping the medicine can be very dangerous.  Talk to your pediatrician regarding the use of this medicine in children.  Patients over 65 years old may have a stronger reaction and need a smaller dose.  What side effects may I notice from receiving this medicine?  Side effects that you should report to your doctor or health care professional as soon as possible:  · allergic reactions like skin rash, itching or hives, swelling of the face, lips, or tongue  · breathing problems  · hallucinations  · signs and symptoms of liver injury like dark yellow or brown urine; general ill feeling or flu-like symptoms; light-colored stools; loss of appetite; nausea; right upper quadrant belly pain; unusually weak or tired; yellowing of the eyes or skin  · signs and symptoms of low blood pressure like dizziness; feeling faint or lightheaded, falls; unusually weak or tired  · unusually slow heartbeat  · unusually weak or tired  Side effects that usually do not require medical attention (report to your doctor or health care professional if they continue or are bothersome):  · blurred vision  · constipation  · dizziness  · dry mouth  · tiredness  What may interact with this medicine?  Do not take this medicine with any of the following medications:  · ciprofloxacin  · cisapride  · dofetilide  · dronedarone  · fluvoxamine  · narcotic medicines for  cough  · pimozide  · thiabendazole  · thioridazine  · ziprasidone  This medicine may also interact with the following medications:  · acyclovir  · alcohol  · antihistamines for allergy, cough and cold  · baclofen  · certain antibiotics like levofloxacin, ofloxacin  · certain medicines for anxiety or sleep  · certain medicines for blood pressure, heart disease, irregular heart beat  · certain medicines for depression like amitriptyline, fluoxetine, sertraline  · certain medicines for seizures like phenobarbital, primidone  · certain medicines for stomach problems like cimetidine, famotidine  · female hormones, like estrogens or progestins and birth control pills, patches, rings, or injections  · general anesthetics like halothane, isoflurane, methoxyflurane, propofol  · local anesthetics like lidocaine, pramoxine, tetracaine  · medicines that relax muscles for surgery  · narcotic medicines for pain  · other medicines that prolong the QT interval (cause an abnormal heart rhythm)  · phenothiazines like chlorpromazine, mesoridazine, prochlorperazine  · ticlopidine  · zileuton  What if I miss a dose?  If you miss a dose, take it as soon as you can. If it is almost time for your next dose, take only that dose. Do not take double or extra doses.  Where should I keep my medicine?  Keep out of the reach of children.  Store at room temperature between 15 and 30 degrees C (59 and 86 degrees F). Throw away any unused medicine after the expiration date.  What should I tell my health care provider before I take this medicine?  They need to know if you have any of these conditions:  · kidney disease  · liver disease  · low blood pressure  · mental disorder  · an unusual or allergic reaction to tizanidine, other medicines, lactose (tablets only), foods, dyes, or preservatives  · pregnant or trying to get pregnant  · breast-feeding  What should I watch for while using this medicine?  Tell your doctor or health care professional if  your symptoms do not start to get better or if they get worse.  You may get drowsy or dizzy. Do not drive, use machinery, or do anything that needs mental alertness until you know how this medicine affects you. Do not stand or sit up quickly, especially if you are an older patient. This reduces the risk of dizzy or fainting spells. Alcohol may interfere with the effect of this medicine. Avoid alcoholic drinks.  If you are taking another medicine that also causes drowsiness, you may have more side effects. Give your health care provider a list of all medicines you use. Your doctor will tell you how much medicine to take. Do not take more medicine than directed. Call emergency for help if you have problems breathing or unusual sleepiness.  Your mouth may get dry. Chewing sugarless gum or sucking hard candy, and drinking plenty of water may help. Contact your doctor if the problem does not go away or is severe.  NOTE:This sheet is a summary. It may not cover all possible information. If you have questions about this medicine, talk to your doctor, pharmacist, or health care provider. Copyright© 2017 [employer]        Tramadol tablets  What is this medicine?  TRAMADOL (TRA ma dole) is a pain reliever. It is used to treat moderate to severe pain in adults.  How should I use this medicine?  Take this medicine by mouth with a full glass of water. Follow the directions on the prescription label. You can take it with or without food. If it upsets your stomach, take it with food. Do not take your medicine more often than directed.  A special MedGuide will be given to you by the pharmacist with each prescription and refill. Be sure to read this information carefully each time.  Talk to your pediatrician regarding the use of this medicine in children. Special care may be needed.  What side effects may I notice from receiving this medicine?  Side effects that you should report to your doctor or health care professional as soon  as possible:  · allergic reactions like skin rash, itching or hives, swelling of the face, lips, or tongue  · breathing problems  · confusion  · seizures  · signs and symptoms of low blood pressure like dizziness; feeling faint or lightheaded, falls; unusually weak or tired  · trouble passing urine or change in the amount of urine  Side effects that usually do not require medical attention (report to your doctor or health care professional if they continue or are bothersome):  · constipation  · dry mouth  · nausea, vomiting  · tiredness  What may interact with this medicine?  Do not take this medication with any of the following medicines:  · MAOIs like Carbex, Eldepryl, Marplan, Nardil, and Parnate  This medicine may also interact with the following medications:  · alcohol  · antihistamines for allergy, cough and cold  · certain medicines for anxiety or sleep  · certain medicines for depression like amitriptyline, fluoxetine, sertraline  · certain medicines for migraine headache like almotriptan, eletriptan, frovatriptan, naratriptan, rizatriptan, sumatriptan, zolmitriptan  · certain medicines for seizures like carbamazepine, oxcarbazepine, phenobarbital, primidone  · certain medicines that treat or prevent blood clots like warfarin  · digoxin  · furazolidone  · general anesthetics like halothane, isoflurane, methoxyflurane, propofol  · linezolid  · local anesthetics like lidocaine, pramoxine, tetracaine  · medicines that relax muscles for surgery  · other narcotic medicines for pain or cough  · phenothiazines like chlorpromazine, mesoridazine, prochlorperazine, thioridazine  · procarbazine  What if I miss a dose?  If you miss a dose, take it as soon as you can. If it is almost time for your next dose, take only that dose. Do not take double or extra doses.  Where should I keep my medicine?  Keep out of the reach of children.  This medicine may cause accidental overdose and death if it taken by other adults,  children, or pets. Mix any unused medicine with a substance like cat litter or coffee grounds. Then throw the medicine away in a sealed container like a sealed bag or a coffee can with a lid. Do not use the medicine after the expiration date.  Store at room temperature between 15 and 30 degrees C (59 and 86 degrees F).  What should I tell my health care provider before I take this medicine?  They need to know if you have any of these conditions:  · brain tumor  · depression  · drug abuse or addiction  · head injury  · if you frequently drink alcohol containing drinks  · kidney disease or trouble passing urine  · liver disease  · lung disease, asthma, or breathing problems  · seizures or epilepsy  · suicidal thoughts, plans, or attempt; a previous suicide attempt by you or a family member  · an unusual or allergic reaction to tramadol, codeine, other medicines, foods, dyes, or preservatives  · pregnant or trying to get pregnant  · breast-feeding  What should I watch for while using this medicine?  Tell your doctor or health care professional if your pain does not go away, if it gets worse, or if you have new or a different type of pain. You may develop tolerance to the medicine. Tolerance means that you will need a higher dose of the medicine for pain relief. Tolerance is normal and is expected if you take this medicine for a long time.  Do not suddenly stop taking your medicine because you may develop a severe reaction. Your body becomes used to the medicine. This does NOT mean you are addicted. Addiction is a behavior related to getting and using a drug for a non-medical reason. If you have pain, you have a medical reason to take pain medicine. Your doctor will tell you how much medicine to take. If your doctor wants you to stop the medicine, the dose will be slowly lowered over time to avoid any side effects.  There are different types of narcotic medicines (opiates). If you take more than one type at the same  time or if you are taking another medicine that also causes drowsiness, you may have more side effects. Give your health care provider a list of all medicines you use. Your doctor will tell you how much medicine to take. Do not take more medicine than directed. Call emergency for help if you have problems breathing or unusual sleepiness.  You may get drowsy or dizzy. Do not drive, use machinery, or do anything that needs mental alertness until you know how this medicine affects you. Do not stand or sit up quickly, especially if you are an older patient. This reduces the risk of dizzy or fainting spells. Alcohol can increase or decrease the effects of this medicine. Avoid alcoholic drinks.  You may have constipation. Try to have a bowel movement at least every 2 to 3 days. If you do not have a bowel movement for 3 days, call your doctor or health care professional.  Your mouth may get dry. Chewing sugarless gum or sucking hard candy, and drinking plenty of water may help. Contact your doctor if the problem does not go away or is severe.  NOTE:This sheet is a summary. It may not cover all possible information. If you have questions about this medicine, talk to your doctor, pharmacist, or health care provider. Copyright© 2017 [employer]

## 2020-11-16 NOTE — PROGRESS NOTES
______________________________________________________________________    Chief Complaint  Chief Complaint   Patient presents with    Hip Pain     left       HPI  Claudia Rodriguez is a 65 y.o. female with medical diagnoses as listed in the medical history and problem list that presents for left hip pain.  This patient is new to me. Last seen in primary care  10/30/2020.      Reports she  was on her feet all day the day before. She walked a total 17,000 steps. 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 She does not usually get that many steps in steps but had an extreme amount of errands. Denies any trauma or falls. No Hx of diabetes or malignant issues that would cause the pain. Pain is localized at the hip. Patient reports pain at night while moving into a different position during sleep. Denies fever, chills, night sweats, chest pain, SOB, headaches, drastic weight loss or gain.       PAST MEDICAL HISTORY:  Past Medical History:   Diagnosis Date    Cholelithiasis     DDD (degenerative disc disease), lumbar     Fatty liver     - noted on U/S 11/2017    Goiter, nodular     HTN (hypertension)     Hyperglycemia     Hyperlipidemia     Hypothyroidism     Hypovitaminosis D     Meningioma     right frontal lobe ; followed by Dr. Enciso once a year    Obesity     Prediabetes     Urinary bladder disorder        PAST SURGICAL HISTORY:  Past Surgical History:   Procedure Laterality Date    BLADDER SUSPENSION      LIVER BIOPSY      thyroid biopsy  7/11/12    tonsillectomy      TONSILLECTOMY         SOCIAL HISTORY:  Social History     Socioeconomic History    Marital status:      Spouse name: Not on file    Number of children: 1    Years of education: Not on file    Highest education level: Not on file   Occupational History    Occupation: human      Employer: [employer]   Social Needs    Financial resource strain: Not on file    Food insecurity     Worry: Not on file     Inability:  Not on file    Transportation needs     Medical: Not on file     Non-medical: Not on file   Tobacco Use    Smoking status: Never Smoker    Smokeless tobacco: Never Used   Substance and Sexual Activity    Alcohol use: No    Drug use: No    Sexual activity: Yes     Partners: Male   Lifestyle    Physical activity     Days per week: Not on file     Minutes per session: Not on file    Stress: Not on file   Relationships    Social connections     Talks on phone: Not on file     Gets together: Not on file     Attends Taoist service: Not on file     Active member of club or organization: Not on file     Attends meetings of clubs or organizations: Not on file     Relationship status: Not on file   Other Topics Concern    Are you pregnant or think you may be? No    Breast-feeding No   Social History Narrative    3 adopted children.       FAMILY HISTORY:  Family History   Problem Relation Age of Onset    Coronary artery disease Father 55    Heart disease Father     Hypertension Father     Melanoma Father     Hypertension Mother     Stroke Mother         2015    Hypertension Sister         controlled by lifestyle    No Known Problems Brother     Pancreatic cancer Maternal Grandfather     Heart failure Maternal Grandmother     Thyroid nodules Sister         s/p thyroidectomy    Thyroid disease Sister     Thyroid nodules Sister     No Known Problems Brother     Diabetes Neg Hx     Psoriasis Neg Hx     Lupus Neg Hx     Eczema Neg Hx     Thyroid cancer Neg Hx     Breast cancer Neg Hx     Ovarian cancer Neg Hx     Cervical cancer Neg Hx     Endometrial cancer Neg Hx     Vaginal cancer Neg Hx     Cirrhosis Neg Hx        ALLERGIES AND MEDICATIONS: updated and reviewed.  Review of patient's allergies indicates:   Allergen Reactions    Zostavax [zoster vaccine live (pf)] Rash     - injection site red, hot, indurated    Amlodipine-olmesartan      Other reaction(s): pounding in ears       Atorvastatin        Other reaction(s): Unknown    Demerol [meperidine] Other (See Comments)     Other reaction(s): Nausea  Other reaction(s): Headache    Ezetimibe-simvastatin      Other reaction(s): Headache      Lotrel [amlodipine-benazepril]        Other reaction(s): constant cough    Nsaids (non-steroidal anti-inflammatory drug) Other (See Comments)       Other reaction(s): Difficulty breathing    Other reaction(s): Difficulty breathing    Phenytoin sodium extended Other (See Comments)     Current Outpatient Medications   Medication Sig Dispense Refill    amLODIPine (NORVASC) 10 MG tablet TAKE 1 TABLET BY MOUTH EVERY DAY 90 tablet 1    CALCIUM CARBONATE/VITAMIN D3 (CALCIUM 500 + D, D3, ORAL) once daily.       carvediloL (COREG) 25 MG tablet TAKE 1 TABLET BY MOUTH TWICE A DAY WITH MEALS 180 tablet 1    ergocalciferol (VITAMIN D2) 50,000 unit Cap Take 1 capsule (50,000 Units total) by mouth every 7 days. 12 capsule 3    estradioL (ESTRACE) 0.01 % (0.1 mg/gram) vaginal cream Place vaginally once daily.      FOLIC ACID/MV,FE,OTHER MIN (CENTRUM ORAL) Take 1 tablet by mouth.      IMVEXXY MAINTENANCE PACK 10 mcg Inst   5    levothyroxine (SYNTHROID) 75 MCG tablet Take 1 tablet (75 mcg total) by mouth once daily. 90 tablet 3    omega-3 fatty acids 1,000 mg Cap Take 1 capsule by mouth Daily.      triamterene-hydrochlorothiazide 37.5-25 mg (MAXZIDE-25) 37.5-25 mg per tablet TAKE 1 TABLET BY MOUTH EVERY DAY 90 tablet 1    oxybutynin (DITROPAN-XL) 10 MG 24 hr tablet Take 1 tablet (10 mg total) by mouth once daily. 30 tablet 12    tiZANidine (ZANAFLEX) 4 MG tablet Take 1 tablet (4 mg total) by mouth every 6 (six) hours as needed. 20 tablet 0    traMADoL (ULTRAM) 50 mg tablet Take 1 tablet (50 mg total) by mouth every 6 (six) hours as needed for Pain. 7 tablet 0     No current facility-administered medications for this visit.          ROS  Review of Systems   Constitutional: Negative for appetite change,  "chills, fatigue and fever.   HENT: Negative for congestion, ear pain, postnasal drip, rhinorrhea, sinus pressure, sneezing and sore throat.    Respiratory: Negative for shortness of breath.    Cardiovascular: Negative for chest pain and palpitations.   Gastrointestinal: Negative for abdominal pain, constipation, diarrhea, nausea and vomiting.   Genitourinary: Negative for dysuria, flank pain, frequency and urgency.   Musculoskeletal: Positive for arthralgias.   Neurological: Negative for dizziness, facial asymmetry and headaches.           Physical Exam  Vitals:    11/16/20 1345   BP: 120/80   BP Location: Left arm   Patient Position: Sitting   BP Method: Large (Manual)   Pulse: 70   Temp: 98.5 °F (36.9 °C)   TempSrc: Oral   SpO2: 98%   Weight: 102.4 kg (225 lb 12 oz)   Height: 5' 9" (1.753 m)    Body mass index is 33.34 kg/m².  Weight: 102.4 kg (225 lb 12 oz)   Height: 5' 9" (175.3 cm)   Physical Exam  Constitutional:       General: She is not in acute distress.     Appearance: Normal appearance. She is obese.   HENT:      Head: Normocephalic and atraumatic.      Right Ear: External ear normal.      Left Ear: External ear normal.      Nose: Nose normal.      Mouth/Throat:      Mouth: Mucous membranes are moist.   Eyes:      Pupils: Pupils are equal, round, and reactive to light.   Neck:      Musculoskeletal: Neck supple.   Cardiovascular:      Rate and Rhythm: Normal rate and regular rhythm.      Pulses: Normal pulses.   Pulmonary:      Effort: Pulmonary effort is normal. No respiratory distress.      Breath sounds: Normal breath sounds. No wheezing.   Abdominal:      General: Bowel sounds are normal. There is no distension.      Palpations: Abdomen is soft. There is no mass.      Tenderness: There is no abdominal tenderness.      Hernia: No hernia is present.   Musculoskeletal:         General: Tenderness present. No swelling or deformity.   Lymphadenopathy:      Cervical: No cervical adenopathy.   Skin:     " General: Skin is warm and dry.      Capillary Refill: Capillary refill takes less than 2 seconds.      Coloration: Skin is not jaundiced or pale.      Findings: No bruising or erythema.   Neurological:      General: No focal deficit present.      Mental Status: She is alert and oriented to person, place, and time. Mental status is at baseline.   Psychiatric:         Mood and Affect: Mood normal.             Health Maintenance       Date Due Completion Date    Lipid Panel 10/17/2020 10/17/2019    Pneumococcal Vaccine (65+ Low/Medium Risk) (2 of 2 - PPSV23) 10/21/2020 10/21/2019    Hemoglobin A1c 09/30/2021 9/30/2020    Mammogram 06/18/2022 6/18/2020    Override on 5/25/2016: Done    Override on 2/3/2014: Done (DIS - normal)    Override on 12/11/2012: Done    Override on 10/27/2010: Done    Colorectal Cancer Screening 09/11/2023 9/11/2020    Override on 2/3/2017: Declined (patient will do yearly fecal occult blood testing)    TETANUS VACCINE 09/28/2025 9/28/2015            Assessment & Plan  Problem List Items Addressed This Visit        Cardiac/Vascular    Essential hypertension  -The current medical regimen is effective;  continue present plan and medications.       Other hyperlipidemia  -The current medical regimen is effective;  continue present plan and medications.       Overview     Patient could not tolerate atorvastatin.           Other Visit Diagnoses     Acute hip pain, left    -  Primary  -Patient educated on proper administration of Tramadol and Zanaflex, possible side effects and adverse effects with handout for reinforcement.  -Patient educated on ER precautions  -Patient advised to follow up with PT/OT for strethening exercises and education on proper body mechanics  -Patient may benefit from imaging of the left hip if patient persist beyond 3 weeks      Relevant Medications    traMADoL (ULTRAM) 50 mg tablet    tiZANidine (ZANAFLEX) 4 MG tablet    Spasm of muscle      -Patient educated on proper  administration of Zanaflex, possible side effects and adverse effects with handout for reinforcement.  -Patient educated on ER precautions      Relevant Medications    traMADoL (ULTRAM) 50 mg tablet    tiZANidine (ZANAFLEX) 4 MG tablet    Need for pneumococcal vaccination      -As ordered     Relevant Orders    (In Office Administered) Pneumococcal Conjugate Vaccine (13 Valent) (IM) (Completed)            Health Maintenance reviewed    Follow-up: Follow up if symptoms worsen or fail to improve.

## 2020-11-16 NOTE — PROGRESS NOTES
"  Pelvic Health Physical Therapy   Treatment Note     Name: Claudia GAITAN [facility]  Clinic Number: [id number]    Therapy Diagnosis:   Encounter Diagnoses   Name Primary?    Muscle weakness     Lack of coordination      Physician: Anu Coles MD    Visit Date: 11/17/2020    Physician Orders: PT Eval and Treat   Medical Diagnosis from Referral: urinary incontinence, mixed   Evaluation Date: 9/29/2020   PN Due: 12/12/2020  Authorization Period Expiration: 12/31/2020  Plan of Care Expiration: 12/29/2020  Visit # / Visits authorized: 5/ 20 (+eval)   Cancelled Visits: 0  No Show Visits: 0    Time In: 9:55a  Time Out: 10:48a  Total Billable Time: 53 minutes    Precautions: Standard    Subjective     Pt reports: She is still having hip pain. She was unable to perform her exercises other than kegels due to this. She is happy with her success thus far. She wants to continue with therapy to ensure she is able to perform all exercises without difficulty. She has greatly improved during the day but continues with some leakage at night, but this is something she feels like she can live with. She is coming next week to have an evaluation for her hip.   She was compliant with home exercise program.  Response to previous treatment: great  Functional change: "dramatic improvement"     Objective     Claudia received therapeutic exercises to develop  strength, endurance, ROM, flexibility, posture and core stabilization for 23 minutes including: none    TA activation sitting on EOM x15   TA activation with leaning back on EOM x15  PALOF press, RTB 3x10   TA activation with bilateral shoulder extension RTB 3x10  Glute sets x10, 5 sec hold     Claudia participated in neuromuscular re-education activities to develop Coordination, Control, Down training, Proprioception and Sense for 30 minutes including: see below:     Diaphragmatic breathing for pelvic relaxation and downtraining   Pelvic drops x10  Kegel with heel slides on mat x20 ea   Kegel " "with hip abduction slides on mat x20  Kegels in stride stance x15 ea, 5 sec hold     Claudia participated in dynamic functional therapeutic activities to improve functional performance for 00 minutes, including:    Home Exercises Provided and Patient Education Provided     Education provided:   - diaphragmatic breathing, double voiding techniques, kegels and fluid intake/dietary modifications - now transitioning to longer holds, decreasing quick flicks except for urge suppression  Discussed progression of plan of care with patient; educated pt in activity modification; reviewed HEP with pt. Pt demonstrated and verbalized understanding of all instruction and was provided with a handout of HEP (see Patient Instructions).    Written Home Exercises Provided: yes.  Exercises were reviewed and Claudia was able to demonstrate them prior to the end of the session.  Claudia demonstrated good  understanding of the education provided.     See EMR under Patient Instructions for exercises provided 10/14/2020.    Assessment     Pt tolerated therapy session well. Pt presented with increased L hip pain today that decreased her ability to perform in some exercises; however, we were able to progress with core strengthening as well as pelvic floor coordination tasks. Pt had good pelvic floor bulging with cueing for "gently laying an egg", and this trick was utilized throughout her exercises to ensure she was fully deactivated after each kegel since she tends to have mild overactivity. Pt had difficulty performing stride stance kegels due to activating TA at same time which initiated discomfort in low abdominal area. This muscle fatigues quickly and she had to be reminded to fully relax and rest to ensure appropriate and efficient contraction. Pt is appropriate to continue with therapy at this time.   Claudia is progressing well towards her goals.   Pt prognosis is Good.     Pt will continue to benefit from skilled outpatient physical therapy " "to address the deficits listed in the problem list box on initial evaluation, provide pt/family education and to maximize pt's level of independence in the home and community environment.   Pt's spiritual, cultural and educational needs considered and pt agreeable to plan of care and goals.     Anticipated barriers to physical therapy: none    Goals:   Short Term Goals: 6 weeks    Pt to perform "the knack" prior to coughing, laughing or sneezing to decrease risk of incontinence. Goal not met   Pt to be able to perform a 10 second kegel x 10 reps to demonstrate improving strength and endurance needed for continence. Goal met   Pt to demonstrate being able to correctly and consistently perform a kegel which is needed  to increase pelvic floor muscle coordination and strength needed for continence. Goal met   Pt to be able to delay the urge to urinate at least 10 minutes with a strong urge to urinate in order to make it to the bathroom without leaking. Goal partially met   Pt to report a decrease in urinary frequency from 1-2x/hour  to no more than once every 2 hour(s) to improve ability to participate in social activities. Goal met   Pt to voice understanding of the role that diet plays on urinary urgency.  goal met   Pt to report a decrease in nocturia to no more than 6x/night for improved ability to achieve restorative sleep. Goal met   Pt to demonstrate proper positioning on commode with breathing techniques to decrease strain with BM to enable pt to feel empty after BM. Goal met   Pt to be able to bulge pelvic floor which is needed for comfortable BM and complete evacuation. Goal not met      Long Term Goals: 12 weeks ongoing   Pt to be discharged with home plan for carry over after discharge.    Pt to be able to perform a 15 second kegel x 10 reps to demonstrate improving strength and endurance needed for continence.  Pt to report a decrease in pad usage to 0 pads a day to demonstrate improving pelvic floor " muscle controls as evidenced by decreased episodes of incontinence needed to improve confidence in social situations.  Pt to demonstrate an improved score in the FOTO Urinary Problem survey  to at least 47% to demonstrate improving UF, UI.    Pt to be able to delay the urge to urinate at least 20 minutes with a strong urge to urinate in order to make it to the bathroom without leaking.  Pt to report no longer feeling the need to urinate just in case when shopping or participating in social activities to demonstrate improving pelvic floor and bladder control.  Pt to report a decrease in urinary frequency from 1-2x/hour to no more than once every 2.5-3 hour(s) to improve ability to participate in social activities.  Pt to increase pelvic floor strength to at least 3/5 to demonstrate improved strength needed for continence with ADLs.   Pt to report a decrease in nocturia to no more than 2x/night for improved restorative sleep.      Plan     Plan for next visit: assess bulging again; standing exercises; core exercises (TA activation with marching, PALOF press)     Abimbola Wheat, PT   11/17/2020

## 2020-11-17 ENCOUNTER — CLINICAL SUPPORT (OUTPATIENT)
Dept: REHABILITATION | Facility: HOSPITAL | Age: 65
End: 2020-11-17
Attending: OBSTETRICS & GYNECOLOGY
Payer: MEDICARE

## 2020-11-17 DIAGNOSIS — M62.81 MUSCLE WEAKNESS: ICD-10-CM

## 2020-11-17 DIAGNOSIS — R27.9 LACK OF COORDINATION: ICD-10-CM

## 2020-11-17 PROCEDURE — 97112 NEUROMUSCULAR REEDUCATION: CPT | Mod: PN

## 2020-11-17 PROCEDURE — 97110 THERAPEUTIC EXERCISES: CPT | Mod: PN

## 2020-11-17 NOTE — PATIENT INSTRUCTIONS
1.   Start by breathing in and relaxing everything, breath out, pull your belly button towards your spine and kegel and lean backwards on the chair and come back up (all during exhale) - you can perform this up to 10-20 times   2. Glute sets: squeeze your glutes and hold for 5 seconds; relax fully and perform 30x a day   3. Kegels in stride stance: one leg in front of the other and squeeze your pelvic floor with your exhale if needed - perform x10 in each position   4. See other sheet of exercises!!       Copyright © 3444-9085 [employer]

## 2020-11-24 ENCOUNTER — CLINICAL SUPPORT (OUTPATIENT)
Dept: REHABILITATION | Facility: HOSPITAL | Age: 65
End: 2020-11-24
Attending: OBSTETRICS & GYNECOLOGY
Payer: MEDICARE

## 2020-11-24 DIAGNOSIS — M62.838 SPASM OF MUSCLE: ICD-10-CM

## 2020-11-24 DIAGNOSIS — R29.898 DECREASED STRENGTH OF LOWER EXTREMITY: ICD-10-CM

## 2020-11-24 DIAGNOSIS — R68.89 DECREASED FUNCTIONAL ACTIVITY TOLERANCE: ICD-10-CM

## 2020-11-24 DIAGNOSIS — M25.662 DECREASED RANGE OF MOTION OF LEFT LOWER EXTREMITY: ICD-10-CM

## 2020-11-24 DIAGNOSIS — M25.552 ACUTE HIP PAIN, LEFT: ICD-10-CM

## 2020-11-24 PROCEDURE — 97110 THERAPEUTIC EXERCISES: CPT | Mod: PN

## 2020-11-24 PROCEDURE — 97162 PT EVAL MOD COMPLEX 30 MIN: CPT | Mod: PN

## 2020-11-24 NOTE — PLAN OF CARE
[facility]  Physical Therapy Initial Evaluation    Date: 11/24/2020   Name: Claudia Rodriguez  Clinic Number: [id number]    Therapy Diagnosis:   Encounter Diagnoses   Name Primary?    Acute hip pain, left     Spasm of muscle     Decreased functional activity tolerance     Decreased strength of left lower extremity     Decreased range of motion of left hip       Physician: Ha Salamanca, KIRSTY    Physician Orders: PT Eval and Treat   Medical Diagnosis from Referral:   M25.552 (ICD-10-CM) - Acute hip pain, left   M62.838 (ICD-10-CM) - Spasm of muscle   Evaluation Date: 11/24/2020  Authorization Period Expiration: 11/16/21  Plan of Care Expiration: 2/24/21  Visit # / Visits authorized: 1/ 1    Time In: 1032  Time Out: 1116  Total Appointment Time (timed & untimed codes): 44 minutes    Precautions: Standard, HTN    Subjective   Date of onset: about 3 weeks ago  History of current condition -   (11/24/2020): Claudia  is a 65 y.o. female who presents to clinic with c/o L lateral hip pain that started about 3 weeks ago when she was on her feet all day walking 17,000 steps. She reports normally walking between 7,000-10,000 during the day. She cares for her elderly parents. Denies trauma or fall. Most pain with walking and laying on L side.      Duration of symptoms: about 3 weeks  Trauma or new activity: Yes    Pain is Constant with intermittent flare ups during walking and laying on L side  Current 3/10, worst 4/10, best 3/10   Location: left lateral hip   Description: Aching, Dull, Throbbing and Tight  Night pain: present     Disruptive to sleep: Yes - when sleeping on L side  Radicular symptoms: No     Aggravating factors: walking, laying on L side  Relieving factors: heating pad, pain medication      Prior treatment/physical therapy: currently in PT for pelvic floor  Medication taken for current condition: not currently taking pain meds or muscle relaxers - used to PRN for pain    This is the  extent of the patient's complaints at this time.     Social History:  lives with their family  Occupation:  - retired now  Prior Level of Function: no limitations  Current Level of Function: pain with walking and laying on L side    Pts goals: reduce pain, improve walking tolerance    Medical History:   Past Medical History:   Diagnosis Date    Cholelithiasis     DDD (degenerative disc disease), lumbar     Fatty liver     - noted on U/S 11/2017    Goiter, nodular     HTN (hypertension)     Hyperglycemia     Hyperlipidemia     Hypothyroidism     Hypovitaminosis D     Meningioma     right frontal lobe ; followed by Dr. Enciso once a year    Obesity     Prediabetes     Urinary bladder disorder        Surgical History:   Claudia Rodriguez  has a past surgical history that includes Bladder suspension; thyroid biopsy (7/11/12); tonsillectomy; Tonsillectomy; and Liver biopsy.    Medications:   Claudia has a current medication list which includes the following prescription(s): amlodipine, calcium carbonate/vitamin d3, carvedilol, ergocalciferol, estradiol, multivitamin/iron/folic acid, imvexxy maintenance pack, levothyroxine, omega-3 fatty acids, oxybutynin, tizanidine, and triamterene-hydrochlorothiazide 37.5-25 mg.    Allergies:   Review of patient's allergies indicates:   Allergen Reactions    Zostavax [zoster vaccine live (pf)] Rash     - injection site red, hot, indurated    Amlodipine-olmesartan      Other reaction(s): pounding in ears      Atorvastatin        Other reaction(s): Unknown    Demerol [meperidine] Other (See Comments)     Other reaction(s): Nausea  Other reaction(s): Headache    Ezetimibe-simvastatin      Other reaction(s): Headache      Lotrel [amlodipine-benazepril]        Other reaction(s): constant cough    Nsaids (non-steroidal anti-inflammatory drug) Other (See Comments)       Other reaction(s): Difficulty breathing    Other reaction(s): Difficulty breathing     "Phenytoin sodium extended Other (See Comments)        Objective     Observation: pt ambulates with slight L antalgic gait and R lateral trunk lean    Hip Range of Motion:   Left active Right active    Flexion 110 120    Abduction 40  55    Extension 5  15    Ext. Rotation 35* 50    Int. Rotation 5* 15    *painful      Lower Extremity Strength  Right LE  Left LE    Knee extension: 5/5  Knee extension: 5/5    Knee flexion: 5/5  Knee flexion: 5/5    Hip flexion: 5/5  Hip flexion: 4+/5    Hip Internal Rotation:  5/5     Hip Internal Rotation: 4/5 *      Hip External Rotation: 5/5     Hip External Rotation: 4/5 *      Hip extension:  5/5  Hip extension: 4+/5    Hip abduction: 5/5  Hip abduction: 4/5 *   Hip adduction: 5/5  Hip adduction 4+/5    Ankle dorsiflexion: 5/5  Ankle dorsiflexion: 5/5    Ankle plantarflexion: 5/5  Ankle plantarflexion: 5/5    *painful      Special Tests:  Bridge Test: negative bilaterally  SAKINA: Negative bilateral  FADIR: Positive right  Passive hip flexion: Negative bilateral    Flexibility: reduced L hip   Ely's test: L = 96 degrees ; R = 108 degrees   Popliteal Angle: L = 67 degrees ; R = 73 degrees   Amanda's test: L = 5 ; R = 9    Palpation: moderate tenderness L Greater trochanter     Sensation: light touch intact BLE    Edema: none present      Limitation/Restriction for FOTO Hip Survey    Therapist reviewed FOTO scores for Claudia Rodriguez on 11/24/2020.   FOTO documents entered into [employer] - see Media section.    Limitation Score: 53%         TREATMENT   Treatment Time In: 1051  Treatment Time Out: 1116  Total Treatment time (time-based codes) separate from Evaluation: 25 minutes    Claudia received therapeutic exercises to develop strength, ROM and flexibility for 25 minutes including:  Supine hamstring stretch 2x30" L  Prone quad stretch 2x30" L  Standing TFL and QL stretch 2x30" L  Supine clamshells RTB x15 c/ 5" holds  Bridge c/ ball squeeze x15 c/ 5" holds      Home Exercises and Patient " Education Provided    Education provided:   - role of PT and POC  - involved anatomy and pathology  - rationale for treatment and HEP      Written Home Exercises Provided: yes.  Exercises were reviewed and Claudia was able to demonstrate them prior to the end of the session.  Claudia demonstrated good  understanding of the education provided.     See EMR under Patient Instructions for exercises provided 11/24/2020.    Assessment   Claudia is a 65 y.o. female referred to outpatient Physical Therapy with a medical diagnosis of acute L hip pain, spasm of muscle. Pt presents with signs and symptoms consistent with medical diagnosis, including: L hip pain, decreased ROM and strength of L hip, decreased flexibility of L hip musculature, increased L greater trochanter tenderness. Tenderness does not spread laterally down IT band. These deficits are hindering pt's ability to walk, help care for her elderly parents, perform household chores without limitations.     Pt prognosis is Good.   Pt will benefit from skilled outpatient Physical Therapy to address the deficits stated above and in the chart below, provide pt/family education, and to maximize pt's level of independence.     Plan of care discussed with patient: Yes  Pt's spiritual, cultural and educational needs considered and patient is agreeable to the plan of care and goals as stated below:     Anticipated Barriers for therapy: acuity of current injury, fear of re-injury and objective abilities vs. self-perceived abilities    Medical Necessity is demonstrated by the following  History  Co-morbidities and personal factors that may impact the plan of care Co-morbidities:   high BMI and HTN    Personal Factors:   no deficits     moderate   Examination  Body Structures and Functions, activity limitations and participation restrictions that may impact the plan of care Body Regions:   lower extremities    Body Systems:    gross symmetry  ROM  strength  gross coordinated  movement  gait    Participation Restrictions:   Walking, lifting, caring for parents    Activity limitations:   Learning and applying knowledge  no deficits    General Tasks and Commands  no deficits    Communication  no deficits    Mobility  lifting and carrying objects  walking    Self care  no deficits    Domestic Life  shopping  cooking  doing house work (cleaning house, washing dishes, laundry)  assisting others    Interactions/Relationships  no deficits    Life Areas  no deficits    Community and Social Life  community life  recreation and leisure         moderate   Clinical Presentation evolving clinical presentation with changing clinical characteristics moderate   Decision Making/ Complexity Score: moderate     Goals:  Short Term Goals: 2 weeks   - Pt to score 58% on FOTO.   - Pt to report at-worst pain 3/10.   - Reduce tenderness to L greater trochanter to minimal to improve L side lying tolerance for sleep.   - Improve popliteal angle of LLE to 75* for improved hamstring flexilibity.   - Improve Ely's test for LLE to 105* for improved quad flexibility.   - Pt to be compliant with HEP at least 5x/wk to demonstrate understanding of condition and willingness to self-manage symptoms.       Long Term Goals: 4 weeks   - Pt to score 70% on FOTO.   - Pt to report at-worst pain 1/10.   - Reduce tenderness to L greater trochanter to slight/none to improve L side lying tolerance for sleep.   - Improve popliteal angle of BLE to 80* for improved hamstring flexilibity.   - Improve Ely's test for BLE to 110* for improved quad flexibility.   - Pt to be compliant with HEP at least 5x/wk to demonstrate competency and independence with management of symptoms.       Plan   Plan of care Certification: 11/24/2020 to 2/24/21.    Outpatient Physical Therapy 1 times weekly for 4 weeks to include the following interventions: Manual Therapy, Moist Heat/ Ice, Neuromuscular Re-ed, Patient Education, Self Care, Therapeutic Activites  and Therapeutic Exercise.     Karan Little, PT, DPT  11/24/2020

## 2020-11-24 NOTE — PROGRESS NOTES
Pelvic Health Physical Therapy   Treatment Note     Name: Claudia Rodriguez  Clinic Number: [id number]    Therapy Diagnosis:   Encounter Diagnoses   Name Primary?    Muscle weakness     Lack of coordination      Physician: Anu Coles MD    Visit Date: 11/25/2020    Physician Orders: PT Eval and Treat   Medical Diagnosis from Referral: urinary incontinence, mixed   Evaluation Date: 9/29/2020   PN Due: 12/12/2020  Authorization Period Expiration: 12/31/2020  Plan of Care Expiration: 12/29/2020  Visit # / Visits authorized: 6/ 20 (+eval)   Cancelled Visits: 0  No Show Visits: 0    Time In: 1:07p  Time Out: 1:47p  Total Billable Time: 40 minutes    Precautions: Standard    Subjective     Pt reports: She had a setback on the weekend and she doesn't know why. She started feeling urgency - felt how it feels when she gets a UTI - that lasted into Sunday and then it just disappeared and she hasn't experienced it since. No burning when she urinated. It now is gone and back to normal. She has been doing exercises, she has difficulty performing the bulging of pelvic floor.  She was compliant with home exercise program.  Response to previous treatment: great  Functional change: 75% improved since start of care     Objective     ABDOMINAL WALL ASSESSMENT  Abdominal strength: Rectus abdominus: 4-/5                                      Transverse abdominus: 3-/5  Pelvic Floor Muscle and Transverse Abdominus Synergy: present  Diastasis: present - doming above umbilicus (about 4 in in length and 2 in width) - mildly improved with contraction of TA      VAGINAL PELVIC FLOOR EXAM                             INTERNAL ASSESSMENT  Pain: none   Sensation: able to localized pressure appropriately   Vaginal vault: roomy   Muscle Bulk: atrophy   Muscle Power: 2+/5  Muscle Endurance: 10 sec  # Reps To Fatigue: 30             Fast Contractions in 10 seconds: 10                          Quality of contraction: slow relaxation    Specificity:  WNL   Coordination: WNL     Claudia received therapeutic exercises to develop  strength, endurance, ROM, flexibility, posture and core stabilization for 25 minutes including: none    Kegel long holds x10, 10 sec hold   Kegel quick flicks 2x10    TA activation sitting on EOM x15   TA activation with leaning back on EOM x15  PALOF press, RTB 3x10     Claudia participated in neuromuscular re-education activities to develop Coordination, Control, Down training, Proprioception and Sense for 15 minutes including: see below:     Kegel layers - focusing on closing openings, urethra, and lifting up and in    Pelvic drops x10    Claudia participated in dynamic functional therapeutic activities to improve functional performance for 00 minutes, including:    Home Exercises Provided and Patient Education Provided     Education provided:   - diaphragmatic breathing, double voiding techniques, kegels and fluid intake/dietary modifications - now transitioning to longer holds, decreasing quick flicks except for urge suppression  Discussed progression of plan of care with patient; educated pt in activity modification; reviewed HEP with pt. Pt demonstrated and verbalized understanding of all instruction and was provided with a handout of HEP (see Patient Instructions).    Written Home Exercises Provided: yes.  Exercises were reviewed and Claudia was able to demonstrate them prior to the end of the session.  Claudia demonstrated good  understanding of the education provided.     See EMR under Patient Instructions for exercises provided 11/18/2020.    Assessment     Pt tolerated therapy session well. Pt demonstrates difficulty with pelvic floor bulging; however, this improved with cueing on softening tissues, widening of SITS bones, and then gently laying an egg. Pt had improved kegels with focusing on layers and educated to continue to perform her contractions in this manner until it becomes more natural for her. Pt had good relaxation this  "session and was better able to coordinate and control this. She continues with weakness of pelvic floor around layer 2 and especially layer 3. PT is spacing her appointments out to improve self-management and see if patient is able to be discharged from therapy.   Claudia is progressing well towards her goals.   Pt prognosis is Good.     Pt will continue to benefit from skilled outpatient physical therapy to address the deficits listed in the problem list box on initial evaluation, provide pt/family education and to maximize pt's level of independence in the home and community environment.   Pt's spiritual, cultural and educational needs considered and pt agreeable to plan of care and goals.     Anticipated barriers to physical therapy: none    Goals:   Short Term Goals: 6 weeks    Pt to perform "the knack" prior to coughing, laughing or sneezing to decrease risk of incontinence. Goal not met   Pt to be able to perform a 10 second kegel x 10 reps to demonstrate improving strength and endurance needed for continence. Goal met   Pt to demonstrate being able to correctly and consistently perform a kegel which is needed  to increase pelvic floor muscle coordination and strength needed for continence. Goal met   Pt to be able to delay the urge to urinate at least 10 minutes with a strong urge to urinate in order to make it to the bathroom without leaking. Goal partially met   Pt to report a decrease in urinary frequency from 1-2x/hour  to no more than once every 2 hour(s) to improve ability to participate in social activities. Goal met   Pt to voice understanding of the role that diet plays on urinary urgency.  goal met   Pt to report a decrease in nocturia to no more than 6x/night for improved ability to achieve restorative sleep. Goal met   Pt to demonstrate proper positioning on commode with breathing techniques to decrease strain with BM to enable pt to feel empty after BM. Goal met   Pt to be able to bulge pelvic " floor which is needed for comfortable BM and complete evacuation. Goal not met      Long Term Goals: 12 weeks ongoing   Pt to be discharged with home plan for carry over after discharge.    Pt to be able to perform a 15 second kegel x 10 reps to demonstrate improving strength and endurance needed for continence.  Pt to report a decrease in pad usage to 0 pads a day to demonstrate improving pelvic floor muscle controls as evidenced by decreased episodes of incontinence needed to improve confidence in social situations.  Pt to demonstrate an improved score in the FOTO Urinary Problem survey  to at least 47% to demonstrate improving UF, UI.    Pt to be able to delay the urge to urinate at least 20 minutes with a strong urge to urinate in order to make it to the bathroom without leaking.  Pt to report no longer feeling the need to urinate just in case when shopping or participating in social activities to demonstrate improving pelvic floor and bladder control.  Pt to report a decrease in urinary frequency from 1-2x/hour to no more than once every 2.5-3 hour(s) to improve ability to participate in social activities.  Pt to increase pelvic floor strength to at least 3/5 to demonstrate improved strength needed for continence with ADLs.   Pt to report a decrease in nocturia to no more than 2x/night for improved restorative sleep.      Plan     Plan for next visit: assess bulging again; standing exercises; core exercises    Abimbola Wheat, PT   11/25/2020

## 2020-11-25 ENCOUNTER — CLINICAL SUPPORT (OUTPATIENT)
Dept: REHABILITATION | Facility: HOSPITAL | Age: 65
End: 2020-11-25
Attending: OBSTETRICS & GYNECOLOGY
Payer: MEDICARE

## 2020-11-25 DIAGNOSIS — M62.81 MUSCLE WEAKNESS: ICD-10-CM

## 2020-11-25 DIAGNOSIS — R27.9 LACK OF COORDINATION: ICD-10-CM

## 2020-11-25 PROCEDURE — 97110 THERAPEUTIC EXERCISES: CPT | Mod: PN

## 2020-11-25 PROCEDURE — 97112 NEUROMUSCULAR REEDUCATION: CPT | Mod: PN

## 2020-12-14 NOTE — PROGRESS NOTES
Pelvic Health Physical Therapy   Treatment Note     Name: Claudia Rodriguez  Clinic Number: [id number]    Therapy Diagnosis:   Encounter Diagnoses   Name Primary?    Muscle weakness     Lack of coordination      Physician: Anu Coles MD    Visit Date: 12/15/2020    Physician Orders: PT Eval and Treat   Medical Diagnosis from Referral: urinary incontinence, mixed   Evaluation Date: 9/29/2020     Authorization Period Expiration: 12/31/2020  Plan of Care Expiration: 12/29/2020  Visit # / Visits authorized: 7/ 20 (+eval)     Time In: 2:30p  Time Out: 3:05p  Total time: 35  Total Billable Time: 30 minutes    Precautions: Standard    Subjective     Pt reports: she states she feels so great. She thinks this is going to be her last session. She would like to go over her exercises to make sure she is doing everything right. She states that sometimes she wakes up at night and has mild to no dampness on her pad and that is a huge change for her.   She was compliant with home exercise program.  Response to previous treatment: great  Functional change: 80-90% improvement since start of care.    Objective      CMS Impairment/Limitation/Restriction for Urinary Problem Survey  Status Limitation G-Code CMS Severity Modifier  Intake 43% 57%  Predicted 53% 47% Goal Status+ CK - At least 40 percent but less than 60 percent  11/11/2020 50% 50%  12/15/2020 54% 46% Current Status CK - At least 40 percent but less than 60 percent    CMS Impairment/Limitation/Restriction for PFDI Prolapse Survey  Status Limitation G-Code CMS Severity Modifier  Intake 46% 46%  11/11/2020 21% 21%  12/15/2020 4% 4% Current Status CI - At least 1 percent but less than 20 percent     CMS Impairment/Limitation/Restriction for PFDI Urinary Survey  Status Limitation G-Code CMS Severity Modifier  Intake 29% 29%  11/11/2020 29% 29%  12/15/2020 8% 8% Current Status CI - At least 1 percent but less than 20 percent   VAGINAL PELVIC FLOOR  EXAM                             INTERNAL ASSESSMENT  Pain: none   Sensation: able to localized pressure appropriately   Vaginal vault: roomy   Muscle Bulk: atrophy   Muscle Power: 3/5  Muscle Endurance: 10 sec  # Reps To Fatigue: 30             Fast Contractions in 10 seconds: 10                          Quality of contraction: slow relaxation    Specificity: WNL   Coordination: WNL     Claudia received therapeutic exercises to develop  strength, endurance, ROM, flexibility, posture and core stabilization for 15 minutes including: none    Bridges with kegels   Standing hip abduction   Standing hip extension   TA activation with PALOF press     Claudia participated in neuromuscular re-education activities to develop Coordination, Control, Down training, Proprioception and Sense for 15 minutes including: see below:     Kegel layers - focusing on closing openings, urethra, and lifting up and in    Sit to stands with band and breathing   Side steps with kegels for coordination   Stride stance with kegels for coordination     Claudia participated in dynamic functional therapeutic activities to improve functional performance for 00 minutes, including:    Home Exercises Provided and Patient Education Provided     Education provided:   - diaphragmatic breathing, double voiding techniques, kegels and fluid intake/dietary modifications - now transitioning to longer holds, decreasing quick flicks except for urge suppression  Discussed progression of plan of care with patient; educated pt in activity modification; reviewed HEP with pt. Pt demonstrated and verbalized understanding of all instruction and was provided with a handout of HEP (see Patient Instructions).    Written Home Exercises Provided: yes.  Exercises were reviewed and Claudia was able to demonstrate them prior to the end of the session.  Claudia demonstrated good  understanding of the education provided.     See EMR under Patient Instructions for exercises provided  "12/15/2020.    Assessment     Pt tolerated therapy session well. A progress note was performed today and patient is being discharged. She has improved her pelvic floor strength, pelvic floor contraction quality, ability to relax and deactivate muscles, and pelvic floor endurance. She has improved her incontinence, urinary frequency, urinary urgency, nocturia, and enuresis. She has improved her functional limitation score in all realms of pelvic floor health. She is appropriate for discharge at this time and has high motivation to continue with exercises on her own because she doesn't want to revert to how she used to live.     Goals:   Short Term Goals: 6 weeks    Pt to perform "the knack" prior to coughing, laughing or sneezing to decrease risk of incontinence. Goal not met   Pt to be able to perform a 10 second kegel x 10 reps to demonstrate improving strength and endurance needed for continence. Goal met   Pt to demonstrate being able to correctly and consistently perform a kegel which is needed  to increase pelvic floor muscle coordination and strength needed for continence. Goal met   Pt to be able to delay the urge to urinate at least 10 minutes with a strong urge to urinate in order to make it to the bathroom without leaking. Goal met   Pt to report a decrease in urinary frequency from 1-2x/hour  to no more than once every 2 hour(s) to improve ability to participate in social activities. Goal met   Pt to voice understanding of the role that diet plays on urinary urgency.  goal met   Pt to report a decrease in nocturia to no more than 6x/night for improved ability to achieve restorative sleep. Goal met   Pt to demonstrate proper positioning on commode with breathing techniques to decrease strain with BM to enable pt to feel empty after BM. Goal met   Pt to be able to bulge pelvic floor which is needed for comfortable BM and complete evacuation. Goal not met      Long Term Goals: 12 weeks   Pt to be discharged " with home plan for carry over after discharge.  goal met   Pt to be able to perform a 15 second kegel x 10 reps to demonstrate improving strength and endurance needed for continence. Goal not met   Pt to report a decrease in pad usage to 0 pads a day to demonstrate improving pelvic floor muscle controls as evidenced by decreased episodes of incontinence needed to improve confidence in social situations. Goal not met   Pt to demonstrate an improved score in the FOTO Urinary Problem survey  to at least 47% to demonstrate improving UF, UI.  goal met   Pt to be able to delay the urge to urinate at least 20 minutes with a strong urge to urinate in order to make it to the bathroom without leaking. Goal nearly met - sometimes and sometimes not   Pt to report no longer feeling the need to urinate just in case when shopping or participating in social activities to demonstrate improving pelvic floor and bladder control. Goal met   Pt to report a decrease in urinary frequency from 1-2x/hour to no more than once every 2.5-3 hour(s) to improve ability to participate in social activities. Goal met   Pt to increase pelvic floor strength to at least 3/5 to demonstrate improved strength needed for continence with ADLs.  Goal met   Pt to report a decrease in nocturia to no more than 2x/night for improved restorative sleep.  goal met     Plan     Pt is discharged from physical therapy     Abimbola Wheat, PT   12/15/2020

## 2020-12-15 ENCOUNTER — CLINICAL SUPPORT (OUTPATIENT)
Dept: REHABILITATION | Facility: HOSPITAL | Age: 65
End: 2020-12-15
Attending: OBSTETRICS & GYNECOLOGY
Payer: MEDICARE

## 2020-12-15 DIAGNOSIS — M62.81 MUSCLE WEAKNESS: ICD-10-CM

## 2020-12-15 DIAGNOSIS — R27.9 LACK OF COORDINATION: ICD-10-CM

## 2020-12-15 PROCEDURE — 97112 NEUROMUSCULAR REEDUCATION: CPT | Mod: PN

## 2020-12-15 PROCEDURE — 97110 THERAPEUTIC EXERCISES: CPT | Mod: PN

## 2020-12-17 ENCOUNTER — LAB VISIT (OUTPATIENT)
Dept: LAB | Facility: HOSPITAL | Age: 65
End: 2020-12-17
Attending: PHYSICIAN ASSISTANT
Payer: MEDICARE

## 2020-12-17 DIAGNOSIS — K74.60 CIRRHOSIS OF LIVER WITHOUT ASCITES, UNSPECIFIED HEPATIC CIRRHOSIS TYPE: ICD-10-CM

## 2020-12-17 PROBLEM — M62.81 MUSCLE WEAKNESS: Status: RESOLVED | Noted: 2020-09-29 | Resolved: 2020-12-17

## 2020-12-17 PROBLEM — R27.9 LACK OF COORDINATION: Status: RESOLVED | Noted: 2020-09-29 | Resolved: 2020-12-17

## 2020-12-17 LAB
AFP SERPL-MCNC: 3.5 NG/ML (ref 0–8.4)
ALBUMIN SERPL BCP-MCNC: 3.9 G/DL (ref 3.5–5.2)
ALP SERPL-CCNC: 79 U/L (ref 55–135)
ALT SERPL W/O P-5'-P-CCNC: 15 U/L (ref 10–44)
ANION GAP SERPL CALC-SCNC: 10 MMOL/L (ref 8–16)
AST SERPL-CCNC: 17 U/L (ref 10–40)
BASOPHILS # BLD AUTO: 0.04 K/UL (ref 0–0.2)
BASOPHILS NFR BLD: 0.6 % (ref 0–1.9)
BILIRUB SERPL-MCNC: 0.7 MG/DL (ref 0.1–1)
BUN SERPL-MCNC: 18 MG/DL (ref 8–23)
CALCIUM SERPL-MCNC: 9.5 MG/DL (ref 8.7–10.5)
CHLORIDE SERPL-SCNC: 106 MMOL/L (ref 95–110)
CO2 SERPL-SCNC: 27 MMOL/L (ref 23–29)
CREAT SERPL-MCNC: 0.8 MG/DL (ref 0.5–1.4)
DIFFERENTIAL METHOD: ABNORMAL
EOSINOPHIL # BLD AUTO: 0.1 K/UL (ref 0–0.5)
EOSINOPHIL NFR BLD: 1.5 % (ref 0–8)
ERYTHROCYTE [DISTWIDTH] IN BLOOD BY AUTOMATED COUNT: 12.7 % (ref 11.5–14.5)
EST. GFR  (AFRICAN AMERICAN): >60 ML/MIN/1.73 M^2
EST. GFR  (NON AFRICAN AMERICAN): >60 ML/MIN/1.73 M^2
GLUCOSE SERPL-MCNC: 150 MG/DL (ref 70–110)
HBV SURFACE AB SER-ACNC: NEGATIVE M[IU]/ML
HCT VFR BLD AUTO: 40.9 % (ref 37–48.5)
HEPATITIS A ANTIBODY, IGG: POSITIVE
HGB BLD-MCNC: 13.1 G/DL (ref 12–16)
IMM GRANULOCYTES # BLD AUTO: 0.03 K/UL (ref 0–0.04)
IMM GRANULOCYTES NFR BLD AUTO: 0.5 % (ref 0–0.5)
INR PPP: 0.9 (ref 0.8–1.2)
LYMPHOCYTES # BLD AUTO: 2.5 K/UL (ref 1–4.8)
LYMPHOCYTES NFR BLD: 38.3 % (ref 18–48)
MCH RBC QN AUTO: 31.4 PG (ref 27–31)
MCHC RBC AUTO-ENTMCNC: 32 G/DL (ref 32–36)
MCV RBC AUTO: 98 FL (ref 82–98)
MONOCYTES # BLD AUTO: 0.6 K/UL (ref 0.3–1)
MONOCYTES NFR BLD: 8.8 % (ref 4–15)
NEUTROPHILS # BLD AUTO: 3.3 K/UL (ref 1.8–7.7)
NEUTROPHILS NFR BLD: 50.3 % (ref 38–73)
NRBC BLD-RTO: 0 /100 WBC
PLATELET # BLD AUTO: 342 K/UL (ref 150–350)
PMV BLD AUTO: 10.5 FL (ref 9.2–12.9)
POTASSIUM SERPL-SCNC: 4.1 MMOL/L (ref 3.5–5.1)
PROT SERPL-MCNC: 7.1 G/DL (ref 6–8.4)
PROTHROMBIN TIME: 10.4 SEC (ref 9–12.5)
RBC # BLD AUTO: 4.17 M/UL (ref 4–5.4)
SODIUM SERPL-SCNC: 143 MMOL/L (ref 136–145)
WBC # BLD AUTO: 6.58 K/UL (ref 3.9–12.7)

## 2020-12-17 PROCEDURE — 86706 HEP B SURFACE ANTIBODY: CPT

## 2020-12-17 PROCEDURE — 82105 ALPHA-FETOPROTEIN SERUM: CPT

## 2020-12-17 PROCEDURE — 80053 COMPREHEN METABOLIC PANEL: CPT

## 2020-12-17 PROCEDURE — 86790 VIRUS ANTIBODY NOS: CPT

## 2020-12-17 PROCEDURE — 36415 COLL VENOUS BLD VENIPUNCTURE: CPT | Mod: PO

## 2020-12-17 PROCEDURE — 85610 PROTHROMBIN TIME: CPT

## 2020-12-17 PROCEDURE — 85025 COMPLETE CBC W/AUTO DIFF WBC: CPT

## 2020-12-18 ENCOUNTER — HOSPITAL ENCOUNTER (OUTPATIENT)
Dept: RADIOLOGY | Facility: HOSPITAL | Age: 65
Discharge: HOME | End: 2020-12-18
Attending: PHYSICIAN ASSISTANT
Payer: MEDICARE

## 2020-12-18 ENCOUNTER — TELEPHONE (OUTPATIENT)
Dept: HEPATOLOGY | Facility: CLINIC | Age: 65
End: 2020-12-18

## 2020-12-18 DIAGNOSIS — K74.60 CIRRHOSIS OF LIVER WITHOUT ASCITES, UNSPECIFIED HEPATIC CIRRHOSIS TYPE: ICD-10-CM

## 2020-12-18 PROCEDURE — 76700 US EXAM ABDOM COMPLETE: CPT | Mod: TC

## 2020-12-18 PROCEDURE — 76700 US ABDOMEN COMPLETE: ICD-10-PCS | Mod: 26,,, | Performed by: INTERNAL MEDICINE

## 2020-12-18 PROCEDURE — 76700 US EXAM ABDOM COMPLETE: CPT | Mod: 26,,, | Performed by: INTERNAL MEDICINE

## 2020-12-21 ENCOUNTER — PATIENT OUTREACH (OUTPATIENT)
Dept: OTHER | Facility: OTHER | Age: 65
End: 2020-12-21

## 2021-01-01 NOTE — LETTER
[facility]  [address], [address]  [place] [address]  Phone: [phone]  Fax: [phone]    Pt Name: Claudia Rodriguez  Injury Date: 02/08/2018   Employee ID: [id number] Date: 03/07/2018   Company: [employer]            Appointment Time: 09:30 AM Arrived:  8:59 AM CST   Physician: Amanda Blanco NP Time out: 10:28 AM       Office Treatment: Claudia was seen today for back pain.    Diagnoses and all orders for this visit:    Lumbar strain, subsequent encounter    Acute left-sided low back pain without sciatica       Patient Instructions: Attention not to aggravate affected area, Daily home exercises/warm soaks    Restrictions: NONE  Regular Duty       Return Appointment: 3/21/2018 at 10:30 AM        [Alert] : alert [Normocephalic] : normocephalic [Flat Open Anterior [place]] : flat open anterior fontanelle [PERRL] : PERRL [Red Reflex Bilateral] : red reflex bilateral [Normally Placed Ears] : normally placed ears [Auricles Well Formed] : auricles well formed [Clear Tympanic membranes] : clear tympanic membranes [Light reflex present] : light reflex present [Bony landmarks visible] : bony landmarks visible [Nares Patent] : nares patent [Palate Intact] : palate intact [Uvula Midline] : uvula midline [Supple, full passive range of motion] : supple, full passive range of motion [Symmetric Chest Rise] : symmetric chest rise [Clear to Auscultation Bilaterally] : clear to auscultation bilaterally [Regular Rate and Rhythm] : regular rate and rhythm [S1, S2 present] : S1, S2 present [+2 Femoral Pulses] : +2 femoral pulses [Soft] : soft [Bowel Sounds] : bowel sounds present [Normal external genitailia] : normal external genitalia [Central Urethral Opening] : central urethral opening [Testicles Descended Bilaterally] : testicles descended bilaterally [Normally Placed] : normally placed [No Abnormal Lymph Nodes Palpated] : no abnormal lymph nodes palpated [Symmetric Flexed Extremities] : symmetric flexed extremities [Startle Reflex] : startle reflex present [Suck Reflex] : suck reflex present [Rooting] : rooting reflex present [Palmar Grasp] : palmar grasp reflex present [Plantar Grasp] : plantar grasp reflex present [Symmetric Radha] : symmetric [place] [Acute Distress] : no acute distress [Discharge] : no discharge [Palpable Masses] : no palpable masses [Murmurs] : no murmurs [Tender] : nontender [Distended] : not distended [Hepatomegaly] : no hepatomegaly [Splenomegaly] : no splenomegaly [Connor-Ortolani] : negative Connor-Ortolani [Spinal Dimple] : no spinal dimple [Tuft of Hair] : no tuft of hair [Jaundice] : no jaundice [Rash and/or lesion present] : no rash/lesion [de-identified] :  acne

## 2021-01-06 ENCOUNTER — TELEPHONE (OUTPATIENT)
Dept: FAMILY MEDICINE | Facility: CLINIC | Age: 66
End: 2021-01-06

## 2021-01-06 ENCOUNTER — PATIENT MESSAGE (OUTPATIENT)
Dept: HEPATOLOGY | Facility: CLINIC | Age: 66
End: 2021-01-06

## 2021-01-13 ENCOUNTER — TELEPHONE (OUTPATIENT)
Dept: FAMILY MEDICINE | Facility: CLINIC | Age: 66
End: 2021-01-13

## 2021-01-13 ENCOUNTER — CLINICAL SUPPORT (OUTPATIENT)
Dept: FAMILY MEDICINE | Facility: CLINIC | Age: 66
End: 2021-01-13
Payer: MEDICARE

## 2021-01-13 DIAGNOSIS — Z23 NEED FOR HEPATITIS B VACCINATION: Primary | ICD-10-CM

## 2021-01-13 PROCEDURE — 99499 NO LOS: ICD-10-PCS | Mod: S$PBB,,, | Performed by: PHYSICIAN ASSISTANT

## 2021-01-13 PROCEDURE — 90739 HEPB VACC 2/4 DOSE ADULT IM: CPT | Mod: PBBFAC,PO | Performed by: PHYSICIAN ASSISTANT

## 2021-01-13 PROCEDURE — 99499 UNLISTED E&M SERVICE: CPT | Mod: S$PBB,,, | Performed by: PHYSICIAN ASSISTANT

## 2021-01-29 ENCOUNTER — OFFICE VISIT (OUTPATIENT)
Dept: UROGYNECOLOGY | Facility: CLINIC | Age: 66
End: 2021-01-29
Payer: MEDICARE

## 2021-01-29 VITALS
WEIGHT: 227.94 LBS | HEIGHT: 69 IN | DIASTOLIC BLOOD PRESSURE: 80 MMHG | BODY MASS INDEX: 33.76 KG/M2 | SYSTOLIC BLOOD PRESSURE: 122 MMHG

## 2021-01-29 DIAGNOSIS — R35.1 NOCTURIA: ICD-10-CM

## 2021-01-29 DIAGNOSIS — Z87.440 HISTORY OF UTI: Primary | ICD-10-CM

## 2021-01-29 DIAGNOSIS — N95.2 VAGINAL ATROPHY: ICD-10-CM

## 2021-01-29 DIAGNOSIS — N39.46 URINARY INCONTINENCE, MIXED: ICD-10-CM

## 2021-01-29 PROCEDURE — 99999 PR PBB SHADOW E&M-EST. PATIENT-LVL IV: CPT | Mod: PBBFAC,,, | Performed by: NURSE PRACTITIONER

## 2021-01-29 PROCEDURE — 99214 OFFICE O/P EST MOD 30 MIN: CPT | Mod: PBBFAC | Performed by: NURSE PRACTITIONER

## 2021-01-29 PROCEDURE — 99999 PR PBB SHADOW E&M-EST. PATIENT-LVL IV: ICD-10-PCS | Mod: PBBFAC,,, | Performed by: NURSE PRACTITIONER

## 2021-01-29 PROCEDURE — 99213 PR OFFICE/OUTPT VISIT, EST, LEVL III, 20-29 MIN: ICD-10-PCS | Mod: S$PBB,,, | Performed by: NURSE PRACTITIONER

## 2021-01-29 PROCEDURE — 99213 OFFICE O/P EST LOW 20 MIN: CPT | Mod: S$PBB,,, | Performed by: NURSE PRACTITIONER

## 2021-02-08 ENCOUNTER — PATIENT MESSAGE (OUTPATIENT)
Dept: FAMILY MEDICINE | Facility: CLINIC | Age: 66
End: 2021-02-08

## 2021-02-09 ENCOUNTER — PATIENT MESSAGE (OUTPATIENT)
Dept: FAMILY MEDICINE | Facility: CLINIC | Age: 66
End: 2021-02-09

## 2021-02-24 ENCOUNTER — CLINICAL SUPPORT (OUTPATIENT)
Dept: FAMILY MEDICINE | Facility: CLINIC | Age: 66
End: 2021-02-24
Payer: MEDICARE

## 2021-02-24 DIAGNOSIS — Z23 NEED FOR HEPATITIS B VACCINATION: Primary | ICD-10-CM

## 2021-02-24 PROCEDURE — 90739 HEPB VACC 2/4 DOSE ADULT IM: CPT | Mod: PBBFAC,PO | Performed by: INTERNAL MEDICINE

## 2021-02-24 PROCEDURE — 99499 NO LOS: ICD-10-PCS | Mod: S$PBB,,, | Performed by: INTERNAL MEDICINE

## 2021-02-24 PROCEDURE — 99499 UNLISTED E&M SERVICE: CPT | Mod: S$PBB,,, | Performed by: INTERNAL MEDICINE

## 2021-03-22 ENCOUNTER — PATIENT MESSAGE (OUTPATIENT)
Dept: ADMINISTRATIVE | Facility: OTHER | Age: 66
End: 2021-03-22

## 2021-03-24 ENCOUNTER — DOCUMENTATION ONLY (OUTPATIENT)
Dept: REHABILITATION | Facility: HOSPITAL | Age: 66
End: 2021-03-24

## 2021-03-24 ENCOUNTER — PATIENT MESSAGE (OUTPATIENT)
Dept: FAMILY MEDICINE | Facility: CLINIC | Age: 66
End: 2021-03-24

## 2021-04-20 DIAGNOSIS — I10 ESSENTIAL HYPERTENSION: ICD-10-CM

## 2021-04-20 RX ORDER — CARVEDILOL 25 MG/1
TABLET ORAL
Qty: 180 TABLET | Refills: 0 | Status: SHIPPED | OUTPATIENT
Start: 2021-04-20 | End: 2021-06-25

## 2021-04-21 DIAGNOSIS — I10 ESSENTIAL HYPERTENSION: ICD-10-CM

## 2021-04-21 RX ORDER — AMLODIPINE BESYLATE 10 MG/1
TABLET ORAL
Qty: 90 TABLET | Refills: 0 | Status: SHIPPED | OUTPATIENT
Start: 2021-04-21 | End: 2021-06-25

## 2021-04-21 RX ORDER — TRIAMTERENE/HYDROCHLOROTHIAZID 37.5-25 MG
TABLET ORAL
Qty: 90 TABLET | Refills: 0 | Status: SHIPPED | OUTPATIENT
Start: 2021-04-21 | End: 2021-06-25

## 2021-04-30 ENCOUNTER — LAB VISIT (OUTPATIENT)
Dept: LAB | Facility: HOSPITAL | Age: 66
End: 2021-04-30
Attending: INTERNAL MEDICINE
Payer: MEDICARE

## 2021-04-30 ENCOUNTER — OFFICE VISIT (OUTPATIENT)
Dept: FAMILY MEDICINE | Facility: CLINIC | Age: 66
End: 2021-04-30
Payer: MEDICARE

## 2021-04-30 VITALS
WEIGHT: 225.75 LBS | SYSTOLIC BLOOD PRESSURE: 122 MMHG | BODY MASS INDEX: 33.44 KG/M2 | DIASTOLIC BLOOD PRESSURE: 80 MMHG | HEIGHT: 69 IN | TEMPERATURE: 98 F | OXYGEN SATURATION: 98 % | HEART RATE: 60 BPM

## 2021-04-30 DIAGNOSIS — E55.9 VITAMIN D DEFICIENCY DISEASE: ICD-10-CM

## 2021-04-30 DIAGNOSIS — I10 ESSENTIAL HYPERTENSION: ICD-10-CM

## 2021-04-30 DIAGNOSIS — D32.0 BENIGN MENINGIOMA OF BRAIN: ICD-10-CM

## 2021-04-30 DIAGNOSIS — E03.9 ACQUIRED HYPOTHYROIDISM: ICD-10-CM

## 2021-04-30 DIAGNOSIS — K74.60 CIRRHOSIS OF LIVER WITHOUT ASCITES, UNSPECIFIED HEPATIC CIRRHOSIS TYPE: ICD-10-CM

## 2021-04-30 DIAGNOSIS — Z78.9 STATIN INTOLERANCE: ICD-10-CM

## 2021-04-30 DIAGNOSIS — E78.49 OTHER HYPERLIPIDEMIA: ICD-10-CM

## 2021-04-30 DIAGNOSIS — E66.9 OBESITY (BMI 30-39.9): ICD-10-CM

## 2021-04-30 DIAGNOSIS — M65.4 DE QUERVAIN'S DISEASE (TENOSYNOVITIS): ICD-10-CM

## 2021-04-30 DIAGNOSIS — R60.0 BILATERAL LEG EDEMA: Primary | ICD-10-CM

## 2021-04-30 PROBLEM — R29.898 DECREASED STRENGTH OF LOWER EXTREMITY: Status: RESOLVED | Noted: 2020-11-24 | Resolved: 2021-04-30

## 2021-04-30 PROBLEM — M25.662 DECREASED RANGE OF MOTION OF LEFT LOWER EXTREMITY: Status: RESOLVED | Noted: 2020-11-24 | Resolved: 2021-04-30

## 2021-04-30 PROBLEM — R68.89 DECREASED FUNCTIONAL ACTIVITY TOLERANCE: Status: RESOLVED | Noted: 2020-11-24 | Resolved: 2021-04-30

## 2021-04-30 PROBLEM — M25.552 ACUTE HIP PAIN, LEFT: Status: RESOLVED | Noted: 2020-11-24 | Resolved: 2021-04-30

## 2021-04-30 LAB
CHOLEST SERPL-MCNC: 253 MG/DL (ref 120–199)
CHOLEST/HDLC SERPL: 5.5 {RATIO} (ref 2–5)
HDLC SERPL-MCNC: 46 MG/DL (ref 40–75)
HDLC SERPL: 18.2 % (ref 20–50)
LDLC SERPL CALC-MCNC: 176.2 MG/DL (ref 63–159)
NONHDLC SERPL-MCNC: 207 MG/DL
TRIGL SERPL-MCNC: 154 MG/DL (ref 30–150)

## 2021-04-30 PROCEDURE — 99999 PR PBB SHADOW E&M-EST. PATIENT-LVL III: ICD-10-PCS | Mod: PBBFAC,,, | Performed by: INTERNAL MEDICINE

## 2021-04-30 PROCEDURE — 36415 COLL VENOUS BLD VENIPUNCTURE: CPT | Mod: PO | Performed by: INTERNAL MEDICINE

## 2021-04-30 PROCEDURE — 99999 PR PBB SHADOW E&M-EST. PATIENT-LVL III: CPT | Mod: PBBFAC,,, | Performed by: INTERNAL MEDICINE

## 2021-04-30 PROCEDURE — 99213 OFFICE O/P EST LOW 20 MIN: CPT | Mod: PBBFAC,PO | Performed by: INTERNAL MEDICINE

## 2021-04-30 PROCEDURE — 80061 LIPID PANEL: CPT | Performed by: INTERNAL MEDICINE

## 2021-04-30 PROCEDURE — 99214 PR OFFICE/OUTPT VISIT, EST, LEVL IV, 30-39 MIN: ICD-10-PCS | Mod: S$PBB,,, | Performed by: INTERNAL MEDICINE

## 2021-04-30 PROCEDURE — 99214 OFFICE O/P EST MOD 30 MIN: CPT | Mod: S$PBB,,, | Performed by: INTERNAL MEDICINE

## 2021-05-28 ENCOUNTER — PATIENT MESSAGE (OUTPATIENT)
Dept: FAMILY MEDICINE | Facility: CLINIC | Age: 66
End: 2021-05-28

## 2021-06-25 DIAGNOSIS — I10 ESSENTIAL HYPERTENSION: ICD-10-CM

## 2021-06-25 DIAGNOSIS — E03.9 ACQUIRED HYPOTHYROIDISM: ICD-10-CM

## 2021-06-25 RX ORDER — AMLODIPINE BESYLATE 10 MG/1
TABLET ORAL
Qty: 90 TABLET | Refills: 0 | Status: SHIPPED | OUTPATIENT
Start: 2021-06-25 | End: 2021-09-20

## 2021-06-25 RX ORDER — TRIAMTERENE/HYDROCHLOROTHIAZID 37.5-25 MG
TABLET ORAL
Qty: 90 TABLET | Refills: 0 | Status: SHIPPED | OUTPATIENT
Start: 2021-06-25 | End: 2021-09-20

## 2021-06-25 RX ORDER — CARVEDILOL 25 MG/1
TABLET ORAL
Qty: 180 TABLET | Refills: 0 | Status: SHIPPED | OUTPATIENT
Start: 2021-06-25 | End: 2021-09-20

## 2021-06-29 RX ORDER — LEVOTHYROXINE SODIUM 75 UG/1
TABLET ORAL
Qty: 90 TABLET | Refills: 0 | Status: SHIPPED | OUTPATIENT
Start: 2021-06-29 | End: 2021-08-26 | Stop reason: SDUPTHER

## 2021-07-09 ENCOUNTER — PATIENT MESSAGE (OUTPATIENT)
Dept: ENDOCRINOLOGY | Facility: CLINIC | Age: 66
End: 2021-07-09

## 2021-07-09 DIAGNOSIS — E03.9 ACQUIRED HYPOTHYROIDISM: ICD-10-CM

## 2021-07-09 DIAGNOSIS — R79.9 ABNORMAL FINDING OF BLOOD CHEMISTRY, UNSPECIFIED: ICD-10-CM

## 2021-07-09 DIAGNOSIS — E04.9 GOITER, NODULAR: Primary | ICD-10-CM

## 2021-07-12 ENCOUNTER — PATIENT MESSAGE (OUTPATIENT)
Dept: ENDOCRINOLOGY | Facility: CLINIC | Age: 66
End: 2021-07-12

## 2021-07-13 ENCOUNTER — PATIENT MESSAGE (OUTPATIENT)
Dept: ENDOCRINOLOGY | Facility: CLINIC | Age: 66
End: 2021-07-13

## 2021-07-22 ENCOUNTER — LAB VISIT (OUTPATIENT)
Dept: LAB | Facility: HOSPITAL | Age: 66
End: 2021-07-22
Attending: INTERNAL MEDICINE
Payer: MEDICARE

## 2021-07-22 DIAGNOSIS — E03.9 ACQUIRED HYPOTHYROIDISM: ICD-10-CM

## 2021-07-22 DIAGNOSIS — E04.9 GOITER, NODULAR: ICD-10-CM

## 2021-07-22 DIAGNOSIS — R79.9 ABNORMAL FINDING OF BLOOD CHEMISTRY, UNSPECIFIED: ICD-10-CM

## 2021-07-22 LAB
ALBUMIN SERPL BCP-MCNC: 3.9 G/DL (ref 3.5–5.2)
ALP SERPL-CCNC: 63 U/L (ref 55–135)
ALT SERPL W/O P-5'-P-CCNC: 18 U/L (ref 10–44)
ANION GAP SERPL CALC-SCNC: 7 MMOL/L (ref 8–16)
AST SERPL-CCNC: 18 U/L (ref 10–40)
BILIRUB SERPL-MCNC: 0.8 MG/DL (ref 0.1–1)
BUN SERPL-MCNC: 16 MG/DL (ref 8–23)
CALCIUM SERPL-MCNC: 9.4 MG/DL (ref 8.7–10.5)
CHLORIDE SERPL-SCNC: 106 MMOL/L (ref 95–110)
CO2 SERPL-SCNC: 27 MMOL/L (ref 23–29)
CREAT SERPL-MCNC: 0.7 MG/DL (ref 0.5–1.4)
EST. GFR  (AFRICAN AMERICAN): >60 ML/MIN/1.73 M^2
EST. GFR  (NON AFRICAN AMERICAN): >60 ML/MIN/1.73 M^2
ESTIMATED AVG GLUCOSE: 108 MG/DL (ref 68–131)
GLUCOSE SERPL-MCNC: 116 MG/DL (ref 70–110)
HBA1C MFR BLD: 5.4 % (ref 4–5.6)
POTASSIUM SERPL-SCNC: 4.2 MMOL/L (ref 3.5–5.1)
PROT SERPL-MCNC: 6.9 G/DL (ref 6–8.4)
SODIUM SERPL-SCNC: 140 MMOL/L (ref 136–145)
TSH SERPL DL<=0.005 MIU/L-ACNC: 2.14 UIU/ML (ref 0.4–4)

## 2021-07-22 PROCEDURE — 36415 COLL VENOUS BLD VENIPUNCTURE: CPT | Mod: PO | Performed by: INTERNAL MEDICINE

## 2021-07-22 PROCEDURE — 80053 COMPREHEN METABOLIC PANEL: CPT | Performed by: INTERNAL MEDICINE

## 2021-07-22 PROCEDURE — 84443 ASSAY THYROID STIM HORMONE: CPT | Performed by: INTERNAL MEDICINE

## 2021-07-22 PROCEDURE — 83036 HEMOGLOBIN GLYCOSYLATED A1C: CPT | Performed by: INTERNAL MEDICINE

## 2021-08-10 ENCOUNTER — PATIENT MESSAGE (OUTPATIENT)
Dept: ENDOCRINOLOGY | Facility: CLINIC | Age: 66
End: 2021-08-10

## 2021-08-23 ENCOUNTER — PATIENT OUTREACH (OUTPATIENT)
Dept: ADMINISTRATIVE | Facility: OTHER | Age: 66
End: 2021-08-23

## 2021-08-25 ENCOUNTER — HOSPITAL ENCOUNTER (OUTPATIENT)
Dept: ENDOCRINOLOGY | Facility: CLINIC | Age: 66
Discharge: HOME | End: 2021-08-25
Attending: INTERNAL MEDICINE
Payer: MEDICARE

## 2021-08-25 DIAGNOSIS — E03.9 ACQUIRED HYPOTHYROIDISM: ICD-10-CM

## 2021-08-25 DIAGNOSIS — E04.9 GOITER, NODULAR: ICD-10-CM

## 2021-08-25 PROCEDURE — 76536 US EXAM OF HEAD AND NECK: CPT | Mod: 26,,, | Performed by: INTERNAL MEDICINE

## 2021-08-25 PROCEDURE — 76536 US SOFT TISSUE HEAD NECK THYROID: ICD-10-PCS | Mod: 26,,, | Performed by: INTERNAL MEDICINE

## 2021-08-26 ENCOUNTER — OFFICE VISIT (OUTPATIENT)
Dept: ENDOCRINOLOGY | Facility: CLINIC | Age: 66
End: 2021-08-26
Payer: MEDICARE

## 2021-08-26 DIAGNOSIS — E03.9 ACQUIRED HYPOTHYROIDISM: ICD-10-CM

## 2021-08-26 DIAGNOSIS — Z86.39 HISTORY OF VITAMIN D DEFICIENCY: ICD-10-CM

## 2021-08-26 DIAGNOSIS — E55.9 VITAMIN D DEFICIENCY DISEASE: ICD-10-CM

## 2021-08-26 DIAGNOSIS — R79.9 ABNORMAL FINDING OF BLOOD CHEMISTRY, UNSPECIFIED: ICD-10-CM

## 2021-08-26 DIAGNOSIS — Z87.898 HISTORY OF IMPAIRED GLUCOSE TOLERANCE: ICD-10-CM

## 2021-08-26 DIAGNOSIS — E78.49 OTHER HYPERLIPIDEMIA: ICD-10-CM

## 2021-08-26 DIAGNOSIS — E04.9 GOITER, NODULAR: Primary | ICD-10-CM

## 2021-08-26 PROCEDURE — 99214 OFFICE O/P EST MOD 30 MIN: CPT | Mod: 95,,, | Performed by: INTERNAL MEDICINE

## 2021-08-26 PROCEDURE — 99214 PR OFFICE/OUTPT VISIT, EST, LEVL IV, 30-39 MIN: ICD-10-PCS | Mod: 95,,, | Performed by: INTERNAL MEDICINE

## 2021-08-26 RX ORDER — LEVOTHYROXINE SODIUM 75 UG/1
75 TABLET ORAL DAILY
Qty: 90 TABLET | Refills: 3 | Status: SHIPPED | OUTPATIENT
Start: 2021-08-26 | End: 2021-11-30

## 2021-08-26 RX ORDER — ERGOCALCIFEROL 1.25 MG/1
CAPSULE ORAL
Qty: 12 CAPSULE | Refills: 3 | Status: SHIPPED | OUTPATIENT
Start: 2021-08-26 | End: 2022-08-17

## 2021-09-13 ENCOUNTER — PATIENT MESSAGE (OUTPATIENT)
Dept: FAMILY MEDICINE | Facility: CLINIC | Age: 66
End: 2021-09-13

## 2021-09-13 DIAGNOSIS — I25.10 ATHEROSCLEROSIS OF NATIVE CORONARY ARTERY OF NATIVE HEART WITHOUT ANGINA PECTORIS: ICD-10-CM

## 2021-09-14 ENCOUNTER — PATIENT MESSAGE (OUTPATIENT)
Dept: FAMILY MEDICINE | Facility: CLINIC | Age: 66
End: 2021-09-14

## 2021-09-19 DIAGNOSIS — I10 ESSENTIAL HYPERTENSION: ICD-10-CM

## 2021-09-20 RX ORDER — TRIAMTERENE/HYDROCHLOROTHIAZID 37.5-25 MG
TABLET ORAL
Qty: 90 TABLET | Refills: 0 | Status: SHIPPED | OUTPATIENT
Start: 2021-09-20 | End: 2021-12-12

## 2021-09-20 RX ORDER — CARVEDILOL 25 MG/1
TABLET ORAL
Qty: 180 TABLET | Refills: 0 | Status: SHIPPED | OUTPATIENT
Start: 2021-09-20 | End: 2021-11-18

## 2021-09-20 RX ORDER — AMLODIPINE BESYLATE 10 MG/1
TABLET ORAL
Qty: 90 TABLET | Refills: 0 | Status: SHIPPED | OUTPATIENT
Start: 2021-09-20 | End: 2021-12-12

## 2021-10-03 ENCOUNTER — PATIENT MESSAGE (OUTPATIENT)
Dept: UROGYNECOLOGY | Facility: CLINIC | Age: 66
End: 2021-10-03

## 2021-10-03 DIAGNOSIS — N39.46 URINARY INCONTINENCE, MIXED: ICD-10-CM

## 2021-10-04 ENCOUNTER — PATIENT MESSAGE (OUTPATIENT)
Dept: UROGYNECOLOGY | Facility: CLINIC | Age: 66
End: 2021-10-04

## 2021-10-04 ENCOUNTER — PATIENT MESSAGE (OUTPATIENT)
Dept: FAMILY MEDICINE | Facility: CLINIC | Age: 66
End: 2021-10-04

## 2021-10-04 RX ORDER — OXYBUTYNIN CHLORIDE 10 MG/1
10 TABLET, EXTENDED RELEASE ORAL DAILY
Qty: 30 TABLET | Refills: 6 | Status: SHIPPED | OUTPATIENT
Start: 2021-10-04 | End: 2021-11-27

## 2021-10-21 ENCOUNTER — PATIENT MESSAGE (OUTPATIENT)
Dept: HEPATOLOGY | Facility: CLINIC | Age: 66
End: 2021-10-21
Payer: MEDICARE

## 2021-10-21 ENCOUNTER — HOSPITAL ENCOUNTER (OUTPATIENT)
Dept: RADIOLOGY | Facility: HOSPITAL | Age: 66
Discharge: HOME | End: 2021-10-21
Attending: INTERNAL MEDICINE
Payer: MEDICARE

## 2021-10-21 DIAGNOSIS — I25.10 ATHEROSCLEROSIS OF NATIVE CORONARY ARTERY OF NATIVE HEART WITHOUT ANGINA PECTORIS: ICD-10-CM

## 2021-10-21 PROCEDURE — 75571 CT HRT W/O DYE W/CA TEST: CPT | Mod: 26,,, | Performed by: STUDENT IN AN ORGANIZED HEALTH CARE EDUCATION/TRAINING PROGRAM

## 2021-10-21 PROCEDURE — 75571 CT HRT W/O DYE W/CA TEST: CPT | Mod: TC

## 2021-10-21 PROCEDURE — 75571 CT CALCIUM SCORING CARDIAC: ICD-10-PCS | Mod: 26,,, | Performed by: STUDENT IN AN ORGANIZED HEALTH CARE EDUCATION/TRAINING PROGRAM

## 2021-10-22 ENCOUNTER — TELEPHONE (OUTPATIENT)
Dept: HEPATOLOGY | Facility: CLINIC | Age: 66
End: 2021-10-22

## 2021-10-22 DIAGNOSIS — K74.60 CIRRHOSIS OF LIVER WITHOUT ASCITES, UNSPECIFIED HEPATIC CIRRHOSIS TYPE: Primary | ICD-10-CM

## 2021-11-18 DIAGNOSIS — I10 ESSENTIAL HYPERTENSION: ICD-10-CM

## 2021-11-18 RX ORDER — CARVEDILOL 25 MG/1
TABLET ORAL
Qty: 180 TABLET | Refills: 0 | Status: SHIPPED | OUTPATIENT
Start: 2021-11-18 | End: 2022-02-21

## 2021-11-30 DIAGNOSIS — E03.9 ACQUIRED HYPOTHYROIDISM: ICD-10-CM

## 2021-11-30 RX ORDER — LEVOTHYROXINE SODIUM 75 UG/1
TABLET ORAL
Qty: 90 TABLET | Refills: 3 | Status: SHIPPED | OUTPATIENT
Start: 2021-11-30 | End: 2023-02-13

## 2021-12-12 DIAGNOSIS — I10 ESSENTIAL HYPERTENSION: ICD-10-CM

## 2021-12-12 RX ORDER — AMLODIPINE BESYLATE 10 MG/1
TABLET ORAL
Qty: 90 TABLET | Refills: 0 | Status: SHIPPED | OUTPATIENT
Start: 2021-12-12 | End: 2022-03-10 | Stop reason: SDUPTHER

## 2021-12-12 RX ORDER — TRIAMTERENE/HYDROCHLOROTHIAZID 37.5-25 MG
TABLET ORAL
Qty: 90 TABLET | Refills: 0 | Status: SHIPPED | OUTPATIENT
Start: 2021-12-12 | End: 2022-03-10 | Stop reason: SDUPTHER

## 2021-12-14 ENCOUNTER — HOSPITAL ENCOUNTER (OUTPATIENT)
Dept: RADIOLOGY | Facility: HOSPITAL | Age: 66
Discharge: HOME | End: 2021-12-14
Attending: PHYSICIAN ASSISTANT
Payer: MEDICARE

## 2021-12-14 DIAGNOSIS — K74.60 CIRRHOSIS OF LIVER WITHOUT ASCITES, UNSPECIFIED HEPATIC CIRRHOSIS TYPE: ICD-10-CM

## 2021-12-14 PROCEDURE — 76705 ECHO EXAM OF ABDOMEN: CPT | Mod: TC

## 2021-12-14 PROCEDURE — 76705 ECHO EXAM OF ABDOMEN: CPT | Mod: 26,,, | Performed by: STUDENT IN AN ORGANIZED HEALTH CARE EDUCATION/TRAINING PROGRAM

## 2021-12-14 PROCEDURE — 76705 US ABDOMEN LIMITED: ICD-10-PCS | Mod: 26,,, | Performed by: STUDENT IN AN ORGANIZED HEALTH CARE EDUCATION/TRAINING PROGRAM

## 2021-12-20 ENCOUNTER — PATIENT OUTREACH (OUTPATIENT)
Dept: ADMINISTRATIVE | Facility: OTHER | Age: 66
End: 2021-12-20
Payer: MEDICARE

## 2021-12-21 ENCOUNTER — OFFICE VISIT (OUTPATIENT)
Dept: HEPATOLOGY | Facility: CLINIC | Age: 66
End: 2021-12-21
Payer: MEDICARE

## 2021-12-21 ENCOUNTER — PROCEDURE VISIT (OUTPATIENT)
Dept: HEPATOLOGY | Facility: CLINIC | Age: 66
End: 2021-12-21
Payer: MEDICARE

## 2021-12-21 VITALS
BODY MASS INDEX: 32.85 KG/M2 | DIASTOLIC BLOOD PRESSURE: 60 MMHG | HEART RATE: 57 BPM | HEIGHT: 69 IN | WEIGHT: 221.81 LBS | RESPIRATION RATE: 18 BRPM | OXYGEN SATURATION: 100 % | SYSTOLIC BLOOD PRESSURE: 134 MMHG | TEMPERATURE: 97 F

## 2021-12-21 DIAGNOSIS — K74.01 HEPATIC FIBROSIS, EARLY FIBROSIS: ICD-10-CM

## 2021-12-21 DIAGNOSIS — K74.60 CIRRHOSIS OF LIVER WITHOUT ASCITES, UNSPECIFIED HEPATIC CIRRHOSIS TYPE: ICD-10-CM

## 2021-12-21 DIAGNOSIS — E78.49 OTHER HYPERLIPIDEMIA: ICD-10-CM

## 2021-12-21 DIAGNOSIS — I10 ESSENTIAL HYPERTENSION: ICD-10-CM

## 2021-12-21 DIAGNOSIS — E66.9 OBESITY (BMI 30-39.9): ICD-10-CM

## 2021-12-21 DIAGNOSIS — K76.0 FATTY LIVER: Primary | ICD-10-CM

## 2021-12-21 DIAGNOSIS — R16.0 HEPATOMEGALY: ICD-10-CM

## 2021-12-21 PROCEDURE — 99214 PR OFFICE/OUTPT VISIT, EST, LEVL IV, 30-39 MIN: ICD-10-PCS | Mod: S$PBB,,, | Performed by: PHYSICIAN ASSISTANT

## 2021-12-21 PROCEDURE — 91200 LIVER ELASTOGRAPHY: CPT | Mod: PBBFAC | Performed by: PHYSICIAN ASSISTANT

## 2021-12-21 PROCEDURE — 99214 OFFICE O/P EST MOD 30 MIN: CPT | Mod: S$PBB,,, | Performed by: PHYSICIAN ASSISTANT

## 2021-12-21 PROCEDURE — 99215 OFFICE O/P EST HI 40 MIN: CPT | Mod: PBBFAC | Performed by: PHYSICIAN ASSISTANT

## 2021-12-21 PROCEDURE — 99999 PR PBB SHADOW E&M-EST. PATIENT-LVL V: ICD-10-PCS | Mod: PBBFAC,,, | Performed by: PHYSICIAN ASSISTANT

## 2021-12-21 PROCEDURE — 91200 FIBROSCAN (VIBRATION CONTROLLED TRANSIENT ELASTOGRAPHY): ICD-10-PCS | Mod: 26,S$PBB,, | Performed by: PHYSICIAN ASSISTANT

## 2021-12-21 PROCEDURE — 91200 LIVER ELASTOGRAPHY: CPT | Mod: 26,S$PBB,, | Performed by: PHYSICIAN ASSISTANT

## 2021-12-21 PROCEDURE — 99999 PR PBB SHADOW E&M-EST. PATIENT-LVL V: CPT | Mod: PBBFAC,,, | Performed by: PHYSICIAN ASSISTANT

## 2022-01-19 ENCOUNTER — OFFICE VISIT (OUTPATIENT)
Dept: UROGYNECOLOGY | Facility: CLINIC | Age: 67
End: 2022-01-19
Payer: MEDICARE

## 2022-01-19 DIAGNOSIS — R35.1 NOCTURIA: ICD-10-CM

## 2022-01-19 DIAGNOSIS — N95.2 VAGINAL ATROPHY: ICD-10-CM

## 2022-01-19 DIAGNOSIS — N39.46 URINARY INCONTINENCE, MIXED: Primary | ICD-10-CM

## 2022-01-19 PROCEDURE — 99213 PR OFFICE/OUTPT VISIT, EST, LEVL III, 20-29 MIN: ICD-10-PCS | Mod: 95,,, | Performed by: NURSE PRACTITIONER

## 2022-01-19 PROCEDURE — 99213 OFFICE O/P EST LOW 20 MIN: CPT | Mod: 95,,, | Performed by: NURSE PRACTITIONER

## 2022-01-19 RX ORDER — OXYBUTYNIN CHLORIDE 10 MG/1
10 TABLET, EXTENDED RELEASE ORAL DAILY
Qty: 90 TABLET | Refills: 3 | Status: SHIPPED | OUTPATIENT
Start: 2022-01-19 | End: 2023-03-03 | Stop reason: SDUPTHER

## 2022-01-19 NOTE — PROGRESS NOTES
Urogyn follow up  01/18/2021  .  [facility] - UROGYNECOLOGY  [address] [address]    Claudia Rodriguez  [id number]  1955      Claudia Rodriguez is a 66 y.o. here for a urogyn follow up of mixed urinary incontinence and urinary urgency    The patient location is: [place]  The chief complaint leading to consultation is: medication refill for mixed urinary incontinence and urinary urgency    Visit type: audiovisual    Each patient to whom he or she provides medical services by telemedicine is:  (1) informed of the relationship between the physician and patient and the respective role of any other health care provider with respect to management of the patient; and (2) notified that he or she may decline to receive medical services by telemedicine and may withdraw from such care at any time.    Notes:       HPI: 2017.      1)  UI:  (+) GARY < (+) UUI but mostly just small, spontaneous leaks, alcira with movement.  Was having some U/F/UI, then had bladder lift in her 30s--better.  UI returned after some years, now having more U/F x 2-3 months.   (+) pads (liners):1/day, usually severe wetness and 1/night usually (liner) minimum-moderate wetness.  Daytime frequency: Q 1 hours after taking diuretic; once that wears off Q2h.  Nocturia: Yes: 4-7/night. Does have a little insomnia--started taking 1/4 benadryl.  Has decreased liquid consumption before bed--stops at 6PM, no water by bed.  Has improved nocturia some.  No LE swelling.  Pelvic floor PT:  Has scheduled for next week with [facility].  Is going to work on weight loss. Is emotional crutch for her.   (--) dysuria,  (--) hematuria,  (--) frequent UTIs.  (--) complete bladder emptying. Has to PV to help with moderate 2nd void.       2)  POP:  Absent.  (--) vaginal bleeding. (--) vaginal discharge. (+) sexually active.  (--) dyspareunia.  (+)  Vaginal dryness. Uses lube during intercourse.  (--) vaginal estrogen use.      3)  BM:  (--)  constipation/straining.  (--) chronic diarrhea. Does have some IBS--controlled with avoiding dietary triggers.  Has diarrhea with triggers.  (--) hematochezia.  (--) fecal incontinence.  (--) fecal smearing/urgency.  (+) complete evacuation.      09/22/2020  1)  Mixed urinary incontinence, urge/spontaneous > stress:    --had UTI a few months ago; finished ABX with neg ANNIA, still feels U/F increased              --urine C&S 6/2020 + ecoli--took macrobid x 2 rounds              --urine C&S 9/2020 mixed orgs  --baseline UI: (+) pads (liners):1/day, usually severe wetness and 1/night usually (liner) minimum-moderate wetness.  Daytime frequency: Q 1 hours after taking diuretic; once that wears off Q2h.  Nocturia: Yes: 4-7/night  --currently: +liner/day--damp by end of day; +liner/night, very damp by AM; daytime is stable but nighttime worse; feels like very sensitive to urine being in urethra--makes her feel increased U/F: freq Qh; nocturia: 7x/night, alcira since UTI; ? Complete emptying--has PV urgency with small volume void  --weight loss +: has lost 20 lbs  --pre-DM: was taking metformin but was able to stop        Lab Results   Component Value Date     HGBA1C 5.4 06/26/2020   --did PT in past but had trouble with regular visits; used heating pad x 30 min--not sure she got as much as possible     2)  Vaginal atrophy (dryness):    --was taking estrace  --was changed to imvexxy--didn't like  --now restarted estrace x 2 weeks with applicator     3)  Nocturia (nighttime urination): stop fluids 2 hours before bed/no water by bed.  If have leg swelling:  Elevate feet above chest x 1 hour before bed to get excess fluid off.  Can also use support hose (knee highs).    --work on sleep pattern (sleep hygiene sheet)  --continue to watch snoring--may need sleep apnea evaluation     4)  Constipation:  --uncommon--mosly when diet off  --takes daily fiber  --takes stool softeners PRN    Changes since last visit:  1)  Mixed urinary  incontinence, urge/spontaneous > stress:    --completed pelvic floor with Abimbola Wheat.   --using oxybutynin xl 10 mg  --voiding every 1 1/2- 2 hours  --nocturia 3/ night (limits fluids prior to bedtime most night)  --rare GARY, UUI at night mostly--very scant-- reports 75 % improvement.  --feels like she emptying her bladder      2)  Vaginal atrophy (dryness):    --using estrogen cream twice weekly  --did not like imvexxy     3)  Nocturia (nighttime urination):   --improved to 3/ night  --has lost 25 pounds  --she does snore     4)  Recent UTI:  --denies symptoms at this time  --symptoms improved with oxybutynin    Changes since last visit:  1)  Mixed urinary incontinence, urge/spontaneous > stress:    --completed pelvic floor with Abimbola Wheat--doing HEP  --using oxybutynin xl 10 mg  --voiding every 1 1/2- 2 hours  --nocturia 3/ night (limits fluids prior to bedtime most night)  --using mini pads with scant UUI  --minimal GARY     2)  Vaginal atrophy (dryness):    --using estrogen cream twice weekly  --estrace was > $200  --did not like imvexxy     3)  Nocturia (nighttime urination):   --improved to 3/ night       4)  Recent UTI:  --denies symptoms at this time  --symptoms improved with oxybutynin         Past Medical History:   Diagnosis Date    Cholelithiasis     DDD (degenerative disc disease), lumbar     Fatty liver     - noted on U/S 11/2017    Goiter, nodular     HTN (hypertension)     Hyperglycemia     Hyperlipidemia     Hypothyroidism     Hypovitaminosis D     Meningioma     right frontal lobe ; followed by Dr. Enciso once a year    Obesity     Prediabetes     Urinary bladder disorder        Past Surgical History:   Procedure Laterality Date    BLADDER SUSPENSION      LIVER BIOPSY      thyroid biopsy  7/11/12    tonsillectomy      TONSILLECTOMY         Family History   Problem Relation Age of Onset    Coronary artery disease Father 55    Heart disease Father     Hypertension Father      Melanoma Father     Hypertension Mother     Stroke Mother         2015    Hypertension Sister         controlled by lifestyle    No Known Problems Brother     Pancreatic cancer Maternal Grandfather     Heart failure Maternal Grandmother     Thyroid nodules Sister         s/p thyroidectomy    Thyroid disease Sister     Thyroid nodules Sister     No Known Problems Brother     Diabetes Neg Hx     Psoriasis Neg Hx     Lupus Neg Hx     Eczema Neg Hx     Thyroid cancer Neg Hx     Breast cancer Neg Hx     Ovarian cancer Neg Hx     Cervical cancer Neg Hx     Endometrial cancer Neg Hx     Vaginal cancer Neg Hx     Cirrhosis Neg Hx        Social History     Socioeconomic History    Marital status:     Number of children: 1   Occupational History    Occupation: human      Employer: [employer]   Tobacco Use    Smoking status: Never Smoker    Smokeless tobacco: Never Used   Substance and Sexual Activity    Alcohol use: No    Drug use: No    Sexual activity: Yes     Partners: Male   Other Topics Concern    Are you pregnant or think you may be? No    Breast-feeding No   Social History Narrative    3 adopted children.       Current Outpatient Medications   Medication Sig Dispense Refill    amLODIPine (NORVASC) 10 MG tablet TAKE 1 TABLET BY MOUTH EVERY DAY 90 tablet 0    CALCIUM CARBONATE/VITAMIN D3 (CALCIUM 500 + D, D3, ORAL) once daily.       carvediloL (COREG) 25 MG tablet TAKE 1 TABLET BY MOUTH TWICE A DAY WITH MEALS 180 tablet 0    [START ON 1/20/2022] conjugated estrogens (PREMARIN) vaginal cream Place 0.5 g vaginally twice a week. 30 g 1    ergocalciferol (ERGOCALCIFEROL) 50,000 unit Cap TAKE 1 CAPSULE BY MOUTH ONE TIME PER WEEK 12 capsule 3    estradioL (ESTRACE) 0.01 % (0.1 mg/gram) vaginal cream Place vaginally once daily.      FOLIC ACID/MV,FE,OTHER MIN (CENTRUM ORAL) Take 1 tablet by mouth.      levothyroxine (SYNTHROID) 75 MCG tablet TAKE 1 TABLET BY MOUTH  EVERY DAY 90 tablet 3    omega-3 fatty acids 1,000 mg Cap Take 1 capsule by mouth Daily.      oxybutynin (DITROPAN-XL) 10 MG 24 hr tablet Take 1 tablet (10 mg total) by mouth once daily. 90 tablet 3    triamterene-hydrochlorothiazide 37.5-25 mg (MAXZIDE-25) 37.5-25 mg per tablet TAKE 1 TABLET BY MOUTH EVERY DAY 90 tablet 0     No current facility-administered medications for this visit.       Review of patient's allergies indicates:   Allergen Reactions    Zostavax [zoster vaccine live (pf)] Rash     - injection site red, hot, indurated    Amlodipine-olmesartan      Other reaction(s): pounding in ears      Atorvastatin        Other reaction(s): Unknown    Demerol [meperidine] Other (See Comments)     Other reaction(s): Nausea  Other reaction(s): Headache    Ezetimibe-simvastatin      Other reaction(s): Headache      Lotrel [amlodipine-benazepril]        Other reaction(s): constant cough    Nsaids (non-steroidal anti-inflammatory drug) Other (See Comments)       Other reaction(s): Difficulty breathing    Other reaction(s): Difficulty breathing    Phenytoin sodium extended Other (See Comments)       Well woman:  Pap test: 2017, normal per report.  History of abnormal paps: No.  History of STIs:  No  Mammogram: Date of last:06/2021--normal per patient report at [facility]  Colonoscopy: none.  Worried since had bladder rupture.  Was told by some MDs at [facility] she should never have invasive procedure.  Does have stool CA screening with PCP.   DEXA:  Date of last:04/2019    Elevated BMD at the total hip and lumbar spine.  There is a decline in BMD at the total hip (-5.2%) when compared to previous study done four years ago.  No change at the lumbar spine.     ROS:  As per HPI.      Exam  There were no vitals taken for this visit.  General: alert and oriented, no acute distress  Respiratory: normal respiratory effort  Abd: soft, non-tender, non-distended    Pelvic--deferred      Impression  1. Urinary incontinence,  mixed  oxybutynin (DITROPAN-XL) 10 MG 24 hr tablet   2. Vaginal atrophy  conjugated estrogens (PREMARIN) vaginal cream   3. Nocturia       We reviewed the above issues and discussed options for short-term versus long-term management of her problems.   Plan:      1)  Mixed urinary incontinence, urge/spontaneous > stress:    --continue pelvic floor home exercise program  --URGE: continue oxybutynin xl 10 mg daily  --STRESS:  Pessary vs. Sling.      2)  Vaginal atrophy (dryness):    --Continue 0.5 gram of estrogen cream in vagina with applicator twice a week.  Also, Use small amount with finger around vaginal opening/inner lips at same frequency. ( I sent in premarin-- see if it is cheaper-- you only need to do 0.5 g instead of 1 gm of it.  You can put a dime size amount on your finger-- insert it to the 2nd knuckle and rub around inside and along the vaginal opening as well.  You do not need to use the applicator.  Let me know if it is less expensive for you).  --use at night right before bed  --may also help reduce UTI frequency and bladder urgency/frequency symptoms     3)  Nocturia (nighttime urination): stop fluids 2 hours before bed/no water by bed.  If have leg swelling:  Elevate feet above chest x 1 hour before bed to get excess fluid off.  Can also use support hose (knee highs).    --work on sleep pattern (sleep hygiene sheet)       4) history of  UTI:  --If you feel like you have a UTI, please call our office so that we can place an order for you to drop off a urine specimen for urine culture at the closest [facility] lab (we will arrange).                --We can call in antibiotics for you to start right after you drop off specimen.                --In this way, we can determine:                           1)  Do you have a UTI?                            2) If you have a UTI, is it sensitive to the antibiotics we prescribed?   --follow UTI prevention tips (see attached)  --control bowel movements/fecal  cross-contamination: continue daily fiber gummies              --can use stool softener as needed for flares  --treat vaginal atrophy (dryness)  --empty bladder before and after intercourse  --consider need for further evaluation (pelvic imaging and cystoscopy) if issue persists     5)  RTC 1 year for medication refill-- can be virtural    Face to Face time with patient: 15  20 minutes of total time spent on the encounter, which includes face to face time and non-face to face time preparing to see the patient (eg, review of tests), Obtaining and/or reviewing separately obtained history, Documenting clinical information in the electronic or other health record, Independently interpreting results (not separately reported) and communicating results to the patient/family/caregiver, or Care coordination (not separately reported).       Kimberlyn Ledesma, JEANNE-BC  [facility]  Division of Female Pelvic Medicine and Reconstructive Surgery  Department of Obstetrics & Gynecology

## 2022-01-19 NOTE — PATIENT INSTRUCTIONS
1)  Mixed urinary incontinence, urge/spontaneous > stress:    --continue pelvic floor home exercise program  --URGE: continue oxybutynin xl 10 mg daily  --STRESS:  Pessary vs. Sling.      2)  Vaginal atrophy (dryness):    --Continue 0.5 gram of estrogen cream in vagina with applicator twice a week.  Also, Use small amount with finger around vaginal opening/inner lips at same frequency. ( I sent in premarin-- see if it is cheaper-- you only need to do 0.5 g instead of 1 gm of it.  You can put a dime size amount on your finger-- insert it to the 2nd knuckle and rub around inside and along the vaginal opening as well.  You do not need to use the applicator.  Let me know if it is less expensive for you).  --use at night right before bed  --may also help reduce UTI frequency and bladder urgency/frequency symptoms     3)  Nocturia (nighttime urination): stop fluids 2 hours before bed/no water by bed.  If have leg swelling:  Elevate feet above chest x 1 hour before bed to get excess fluid off.  Can also use support hose (knee highs).    --work on sleep pattern (sleep hygiene sheet)       4) history of  UTI:  --If you feel like you have a UTI, please call our office so that we can place an order for you to drop off a urine specimen for urine culture at the closest [facility] lab (we will arrange).                --We can call in antibiotics for you to start right after you drop off specimen.                --In this way, we can determine:                           1)  Do you have a UTI?                            2) If you have a UTI, is it sensitive to the antibiotics we prescribed?   --follow UTI prevention tips (see attached)  --control bowel movements/fecal cross-contamination: continue daily fiber gummies              --can use stool softener as needed for flares  --treat vaginal atrophy (dryness)  --empty bladder before and after intercourse  --consider need for further evaluation (pelvic imaging and cystoscopy) if  issue persists     5)  RTC 1 year for medication refill-- can be virtural

## 2022-01-20 ENCOUNTER — PATIENT MESSAGE (OUTPATIENT)
Dept: UROGYNECOLOGY | Facility: CLINIC | Age: 67
End: 2022-01-20
Payer: MEDICARE

## 2022-01-20 DIAGNOSIS — N95.2 VAGINAL ATROPHY: Primary | ICD-10-CM

## 2022-01-31 ENCOUNTER — PATIENT MESSAGE (OUTPATIENT)
Dept: FAMILY MEDICINE | Facility: CLINIC | Age: 67
End: 2022-01-31
Payer: MEDICARE

## 2022-02-07 ENCOUNTER — OFFICE VISIT (OUTPATIENT)
Dept: FAMILY MEDICINE | Facility: CLINIC | Age: 67
End: 2022-02-07
Payer: MEDICARE

## 2022-02-07 VITALS
HEART RATE: 53 BPM | SYSTOLIC BLOOD PRESSURE: 122 MMHG | BODY MASS INDEX: 33.03 KG/M2 | WEIGHT: 223 LBS | HEIGHT: 69 IN | TEMPERATURE: 98 F | OXYGEN SATURATION: 98 % | DIASTOLIC BLOOD PRESSURE: 78 MMHG

## 2022-02-07 DIAGNOSIS — K76.0 FATTY LIVER: ICD-10-CM

## 2022-02-07 DIAGNOSIS — E03.9 ACQUIRED HYPOTHYROIDISM: ICD-10-CM

## 2022-02-07 DIAGNOSIS — I10 ESSENTIAL HYPERTENSION: ICD-10-CM

## 2022-02-07 DIAGNOSIS — Z00.00 ROUTINE MEDICAL EXAM: Primary | ICD-10-CM

## 2022-02-07 DIAGNOSIS — E66.9 OBESITY (BMI 30-39.9): ICD-10-CM

## 2022-02-07 DIAGNOSIS — Z23 NEED FOR STREPTOCOCCUS PNEUMONIAE VACCINATION: ICD-10-CM

## 2022-02-07 DIAGNOSIS — E55.9 VITAMIN D DEFICIENCY DISEASE: ICD-10-CM

## 2022-02-07 DIAGNOSIS — R16.0 HEPATOMEGALY: ICD-10-CM

## 2022-02-07 DIAGNOSIS — E78.49 OTHER HYPERLIPIDEMIA: ICD-10-CM

## 2022-02-07 DIAGNOSIS — Z78.9 STATIN INTOLERANCE: ICD-10-CM

## 2022-02-07 DIAGNOSIS — Z86.39 HISTORY OF ELEVATED GLUCOSE: ICD-10-CM

## 2022-02-07 DIAGNOSIS — D32.0 BENIGN MENINGIOMA OF BRAIN: ICD-10-CM

## 2022-02-07 PROBLEM — R35.1 NOCTURIA: Status: RESOLVED | Noted: 2017-12-13 | Resolved: 2022-02-07

## 2022-02-07 PROCEDURE — 99214 OFFICE O/P EST MOD 30 MIN: CPT | Mod: PBBFAC,PO,25 | Performed by: INTERNAL MEDICINE

## 2022-02-07 PROCEDURE — 99397 PR PREVENTIVE VISIT,EST,65 & OVER: ICD-10-PCS | Mod: S$PBB,,, | Performed by: INTERNAL MEDICINE

## 2022-02-07 PROCEDURE — G0009 ADMIN PNEUMOCOCCAL VACCINE: HCPCS | Mod: PBBFAC,PO | Performed by: INTERNAL MEDICINE

## 2022-02-07 PROCEDURE — 99397 PER PM REEVAL EST PAT 65+ YR: CPT | Mod: S$PBB,,, | Performed by: INTERNAL MEDICINE

## 2022-02-07 PROCEDURE — 99999 PR PBB SHADOW E&M-EST. PATIENT-LVL IV: CPT | Mod: PBBFAC,,, | Performed by: INTERNAL MEDICINE

## 2022-02-07 PROCEDURE — 99999 PR PBB SHADOW E&M-EST. PATIENT-LVL IV: ICD-10-PCS | Mod: PBBFAC,,, | Performed by: INTERNAL MEDICINE

## 2022-02-07 NOTE — PROGRESS NOTES
Patient given pneumonia injection. Tolerated well. VIS given & advised to wait in lobby 15 mins. Verbalized understanding.

## 2022-02-07 NOTE — PROGRESS NOTES
HISTORY OF PRESENT ILLNESS:  Claudia Rodriguez is a 66 y.o. female who presents to the clinic today for a routine physical exam. Her last physical exam was approximately 1 years(s) ago.        PAST MEDICAL HISTORY:  Past Medical History:   Diagnosis Date    Cholelithiasis     DDD (degenerative disc disease), lumbar     Fatty liver     - noted on U/S 11/2017    Goiter, nodular     HTN (hypertension)     Hyperglycemia     Hyperlipidemia     Hypothyroidism     Hypovitaminosis D     Meningioma     right frontal lobe ; followed by Dr. Enciso once a year    Obesity     Prediabetes     Urinary bladder disorder        PAST SURGICAL HISTORY:  Past Surgical History:   Procedure Laterality Date    BLADDER SUSPENSION      LIVER BIOPSY      thyroid biopsy  7/11/12    tonsillectomy      TONSILLECTOMY         SOCIAL HISTORY:  Social History     Socioeconomic History    Marital status:     Number of children: 1   Occupational History    Occupation: human      Employer: [employer]   Tobacco Use    Smoking status: Never Smoker    Smokeless tobacco: Never Used   Substance and Sexual Activity    Alcohol use: No    Drug use: No    Sexual activity: Yes     Partners: Male   Other Topics Concern    Are you pregnant or think you may be? No    Breast-feeding No   Social History Narrative    3 adopted children.       FAMILY HISTORY:  Family History   Problem Relation Age of Onset    Coronary artery disease Father 55    Heart disease Father     Hypertension Father     Melanoma Father     Hypertension Mother     Stroke Mother         2015    Hypertension Sister         controlled by lifestyle    No Known Problems Brother     Pancreatic cancer Maternal Grandfather     Heart failure Maternal Grandmother     Thyroid nodules Sister         s/p thyroidectomy    Thyroid disease Sister     Thyroid nodules Sister     No Known Problems Brother     Diabetes Neg Hx     Psoriasis Neg Hx      Lupus Neg Hx     Eczema Neg Hx     Thyroid cancer Neg Hx     Breast cancer Neg Hx     Ovarian cancer Neg Hx     Cervical cancer Neg Hx     Endometrial cancer Neg Hx     Vaginal cancer Neg Hx     Cirrhosis Neg Hx        ALLERGIES AND MEDICATIONS: updated and reviewed.  Review of patient's allergies indicates:   Allergen Reactions    Zostavax [zoster vaccine live (pf)] Rash     - injection site red, hot, indurated    Amlodipine-olmesartan      Other reaction(s): pounding in ears      Atorvastatin        Other reaction(s): Unknown    Demerol [meperidine] Other (See Comments)     Other reaction(s): Nausea  Other reaction(s): Headache    Ezetimibe-simvastatin      Other reaction(s): Headache      Lotrel [amlodipine-benazepril]        Other reaction(s): constant cough    Nsaids (non-steroidal anti-inflammatory drug) Other (See Comments)       Other reaction(s): Difficulty breathing    Other reaction(s): Difficulty breathing    Phenytoin sodium extended Other (See Comments)     Medication List with Changes/Refills   Current Medications    AMLODIPINE (NORVASC) 10 MG TABLET    TAKE 1 TABLET BY MOUTH EVERY DAY    CALCIUM CARBONATE/VITAMIN D3 (CALCIUM 500 + D, D3, ORAL)    once daily.     CARVEDILOL (COREG) 25 MG TABLET    TAKE 1 TABLET BY MOUTH TWICE A DAY WITH MEALS    CONJUGATED ESTROGENS (PREMARIN) VAGINAL CREAM    Place 0.5 g vaginally twice a week.    ERGOCALCIFEROL (ERGOCALCIFEROL) 50,000 UNIT CAP    TAKE 1 CAPSULE BY MOUTH ONE TIME PER WEEK    ESTRADIOL (ESTRACE) 0.01 % (0.1 MG/GRAM) VAGINAL CREAM    Place vaginally once daily.    FOLIC ACID/MV,FE,OTHER MIN (CENTRUM ORAL)    Take 1 tablet by mouth.    LEVOTHYROXINE (SYNTHROID) 75 MCG TABLET    TAKE 1 TABLET BY MOUTH EVERY DAY    OMEGA-3 FATTY ACIDS 1,000 MG CAP    Take 1 capsule by mouth Daily.    OXYBUTYNIN (DITROPAN-XL) 10 MG 24 HR TABLET    Take 1 tablet (10 mg total) by mouth once daily.    PRASTERONE, DHEA, (INTRAROSA) 6.5 MG INST    Place 1  suppository vaginally once daily.    TRIAMTERENE-HYDROCHLOROTHIAZIDE 37.5-25 MG (MAXZIDE-25) 37.5-25 MG PER TABLET    TAKE 1 TABLET BY MOUTH EVERY DAY          CARE TEAM:  Patient Care Team:  Jessica Marquez MD as PCP - General (Internal Medicine)  Bisi Madrigal MD as Consulting Physician (Endocrinology)  Fernie Hammond MD as Consulting Physician (Neurosurgery)  Jessica Marquez MD as Hypertension Digital Medicine Responsible Provider (Internal Medicine)  Neeraj MonsivaisD as Hypertension Digital Medicine Clinician (Pharmacist)  Anirudh Selby MD as Consulting Physician (Obstetrics and Gynecology)  June Sherman as Digital Medicine Health   Albertina Pickens LPN as Licensed Practical Nurse  Medicare Shared [employer] Program as Hypertension Digital Medicine Contract           SCREENING HISTORY:  Health Maintenance       Date Due Completion Date    Lipid Panel 04/30/2022 4/30/2021    Hemoglobin A1c 07/22/2022 7/22/2021    Mammogram 06/28/2023 6/28/2021    Override on 5/25/2016: Done    Override on 2/3/2014: Done (DIS - normal)    Override on 12/11/2012: Done    Override on 10/27/2010: Done    Colorectal Cancer Screening 09/11/2023 9/11/2020    Override on 2/3/2017: Declined (patient will do yearly fecal occult blood testing)    TETANUS VACCINE 05/31/2031 5/31/2021            REVIEW OF SYSTEMS:   The patient reports: fair dietary habits.  The patient reports : that they do not exercise regularly  Review of Systems   Constitutional: Negative for chills, fatigue, fever and unexpected weight change.   HENT: Negative for congestion and postnasal drip.    Eyes: Negative for pain and visual disturbance.   Respiratory: Negative for cough, shortness of breath and wheezing.    Cardiovascular: Negative for chest pain, palpitations and leg swelling.   Gastrointestinal: Negative for abdominal pain, constipation, diarrhea, nausea and vomiting.   Genitourinary: Negative for dysuria.   Musculoskeletal: Positive  "for arthralgias.        Had a torn ligament in her right heel for which she saw a specialist and is slowly recovering from. Has left deQuervain's tenosynovitis for which she gets an injection every 4 months.   Skin: Negative for rash.   Neurological: Negative for weakness and headaches.   Psychiatric/Behavioral: Negative for dysphoric mood and sleep disturbance. The patient is not nervous/anxious.         She has some stressors in her life regarding the health of family members.      ROS (Optional)-: no pelvic pain  Breast ROS (Optional)-: negative for breast lumps/discharge            Physical Examination:   Vitals:    02/07/22 0823   BP: 122/78   Pulse: (!) 53   Temp: 98 °F (36.7 °C)     Weight: 101.1 kg (222 lb 15.9 oz)   Height: 5' 9" (175.3 cm)   Body mass index is 32.93 kg/m².      Patient did not require to have a chaperone present during the exam today.    General appearance - alert, well appearing, and in no distress, obese  Psychiatric - alert, oriented to person, place, and time, normal behavior, speech, dress, motor activity and thought process  Eyes - pupils equal and reactive, extraocular eye movements intact, sclera anicteric  Mouth - not examined; patient wearing mask due to Covid 19 pandemic  Neck - supple, no significant adenopathy, carotids upstroke normal bilaterally, no bruits  Lymphatics - no palpable cervical lymphadenopathy  Chest - clear to auscultation, no wheezes, rales or rhonchi, symmetric air entry  Heart - normal rate and regular rhythm, no gallops noted  Neurological - alert, normal speech, no focal findings or movement disorder noted, cranial nerves II through XII intact  Musculoskeletal - not examined  Extremities - no pedal edema noted  Skin - normal coloration, no suspicious skin lesions      Labs:  Ordered/Scheduled      ASSESSMENT AND PLAN:  1. Routine medical exam  Counseled on age appropriate medical preventative services including age appropriate cancer screenings, age " appropriate eye and dental exams, over all nutritional health, need for a consistent exercise regimen, and an over all push towards maintaining a vigorous and active lifestyle.  Counseled on age appropriate vaccines and discussed upcoming health care needs based on age/gender. Discussed good sleep hygiene and stress management.    2. Essential hypertension  The current medical regimen is effective;  continue present plan and medications. Recommended patient to check home readings to monitor and see me for followup as scheduled or sooner as needed.   Discussed sodium restriction, maintaining ideal body weight and regular exercise program as physiologic means to continue to achieve blood pressure control in addition to medication compliance.  Patient was educated that both decongestant and anti-inflammatory medication may raise blood pressure.  The patient is not active on the digital hypertension program.    3. Other hyperlipidemia/4. Statin intolerance  We discussed low fat diet and regular exercise. Patient is statin intolerant.  Lab Results   Component Value Date    CHOL 253 (H) 04/30/2021    CHOL 260 (H) 10/17/2019    CHOL 254 (H) 07/13/2018     Lab Results   Component Value Date    HDL 46 04/30/2021    HDL 47 10/17/2019    HDL 55 07/13/2018     Lab Results   Component Value Date    LDLCALC 176.2 (H) 04/30/2021    LDLCALC 173.2 (H) 10/17/2019    LDLCALC 167.6 (H) 07/13/2018     Lab Results   Component Value Date    TRIG 154 (H) 04/30/2021    TRIG 199 (H) 10/17/2019    TRIG 157 (H) 07/13/2018     Lab Results   Component Value Date    CHOLHDL 18.2 (L) 04/30/2021    CHOLHDL 18.1 (L) 10/17/2019    CHOLHDL 21.7 07/13/2018     Her cardiac calcium score test in October 2021 was 311. Her cholesterol numbers are quite elevated.  She will work on dietary changes.  She may be a candidate for Repatha or Praluent.  I will refer her to Cardiology for further evaluation and treatment discussion.  - Lipid Panel; Future    5.  Acquired hypothyroidism  The current medical regimen is effective;  continue present plan and medications.   Followed by: Endocrinology.     6. History of elevated glucose  Stable. We discussed low sugar/low carbohydrate diet and regular exercise to prevent progression. No need for prescription medication at this time.  - Hemoglobin A1C; Future    7. Vitamin D deficiency disease  The current medical regimen is effective;  continue present plan and medications.     8. Fatty liver/9. Hepatomegaly  She saw hepatology in December 2021. At that time for evaluation revealed steatosis and no fibrosis or cirrhosis as was noted the 1st time she saw hepatology.  She will continue to work on weight loss.  She will follow up with hepatology at the end of this year with MRI for further evaluation.      10. Benign meningioma of brain  Stable. Observe. Followed by neurosurgery.    11. Obesity (BMI 30-39.9)  The patient is asked to continue to make an attempt to improve diet and exercise patterns to aid in medical management of this problem.     12. Need for Streptococcus pneumoniae vaccination    - (In Office Administered) Pneumococcal Polysaccharide Vaccine (23 Valent) (SQ/IM)          Orders Placed This Encounter   Procedures    (In Office Administered) Pneumococcal Polysaccharide Vaccine (23 Valent) (SQ/IM)    Lipid Panel    Hemoglobin A1C      Follow up in about 1 year (around 2/7/2023), or if symptoms worsen or fail to improve, for annual exam. or sooner as needed.

## 2022-02-19 DIAGNOSIS — I10 ESSENTIAL HYPERTENSION: ICD-10-CM

## 2022-02-19 NOTE — TELEPHONE ENCOUNTER
No new care gaps identified.  Powered by [employer]. Reference number: [id number].   2/19/2022 8:20:40 AM CST

## 2022-02-21 RX ORDER — CARVEDILOL 25 MG/1
TABLET ORAL
Qty: 180 TABLET | Refills: 3 | Status: SHIPPED | OUTPATIENT
Start: 2022-02-21 | End: 2023-02-09

## 2022-02-21 NOTE — TELEPHONE ENCOUNTER
Refill Authorization Note   Claudia Rodriguez  is requesting a refill authorization.  Brief Assessment and Rationale for Refill:  Approve     Medication Therapy Plan:           Comments:   --->Care Gap information included below if applicable.       Requested Prescriptions   Pending Prescriptions Disp Refills    carvediloL (COREG) 25 MG tablet [Pharmacy Med Name: CARVEDILOL 25 MG TABLET] 180 tablet 3     Sig: TAKE 1 TABLET BY MOUTH TWICE A DAY WITH MEALS       Cardiovascular:  Beta Blockers Passed - 2/19/2022  8:20 AM        Passed - Patient is at least 18 years old        Passed - Last BP in normal range within 360 days     BP Readings from Last 1 Encounters:   02/07/22 122/78               Passed - Last Heart Rate in normal range within 360 days     Pulse Readings from Last 1 Encounters:   02/07/22 (!) 53              Passed - Valid encounter within last 15 months     Recent Visits  Date Type Provider Dept   02/07/22 Office Visit Jessica Marquez MD [facility] Family Med/ Internal Med/ Peds   04/30/21 Office Visit Jessica Marquez MD [facility] Family [facility]/ Internal Med/ Peds   09/02/20 Office Visit Jessica Marquez MD [facility] Family Med/ Internal Med/ Peds   Showing recent visits within past 720 days and meeting all other requirements  Future Appointments  No visits were found meeting these conditions.  Showing future appointments within next 150 days and meeting all other requirements      Future Appointments              In 4 months [facility]    [facility]    In 4 months [facility]    In 9 months [facility], Imaging Ctr    In 9 months [facility]    In 9 months [facility]                    Appointments  past 12m or future  3m with PCP    Date Provider   Last Visit   2/7/2022 Jessica Marquez MD   Next Visit   Visit date not found Jessica Marquez MD   ED visits in past 90 days: 0     Note composed:1:34 PM 02/21/2022

## 2022-03-07 ENCOUNTER — PATIENT MESSAGE (OUTPATIENT)
Dept: ADMINISTRATIVE | Facility: OTHER | Age: 67
End: 2022-03-07
Payer: MEDICARE

## 2022-03-07 DIAGNOSIS — I10 ESSENTIAL HYPERTENSION: ICD-10-CM

## 2022-03-07 NOTE — TELEPHONE ENCOUNTER
No new care gaps identified.  Powered by [employer] by [employer]. Reference number: [id number].   3/07/2022 12:38:10 AM CST

## 2022-03-10 ENCOUNTER — PATIENT MESSAGE (OUTPATIENT)
Dept: FAMILY MEDICINE | Facility: CLINIC | Age: 67
End: 2022-03-10
Payer: MEDICARE

## 2022-03-10 DIAGNOSIS — I10 ESSENTIAL HYPERTENSION: ICD-10-CM

## 2022-03-10 RX ORDER — AMLODIPINE BESYLATE 10 MG/1
10 TABLET ORAL DAILY
Qty: 90 TABLET | Refills: 2 | Status: SHIPPED | OUTPATIENT
Start: 2022-03-10 | End: 2023-04-07

## 2022-03-10 RX ORDER — TRIAMTERENE/HYDROCHLOROTHIAZID 37.5-25 MG
1 TABLET ORAL DAILY
Qty: 90 TABLET | Refills: 2 | Status: SHIPPED | OUTPATIENT
Start: 2022-03-10 | End: 2023-02-13

## 2022-03-10 NOTE — TELEPHONE ENCOUNTER
No new care gaps identified.  Powered by [facility] by [employer]. Reference number: [id number].   3/10/2022 9:48:49 AM CST

## 2022-03-12 RX ORDER — TRIAMTERENE/HYDROCHLOROTHIAZID 37.5-25 MG
TABLET ORAL
Qty: 90 TABLET | Refills: 0 | OUTPATIENT
Start: 2022-03-12

## 2022-03-12 RX ORDER — AMLODIPINE BESYLATE 10 MG/1
TABLET ORAL
Qty: 90 TABLET | Refills: 0 | OUTPATIENT
Start: 2022-03-12

## 2022-03-13 NOTE — TELEPHONE ENCOUNTER
This medication has been handled/ordered by PCP already. Will remove duplicate and close out encounter.

## 2022-03-18 ENCOUNTER — PATIENT MESSAGE (OUTPATIENT)
Dept: UROGYNECOLOGY | Facility: CLINIC | Age: 67
End: 2022-03-18
Payer: MEDICARE

## 2022-03-23 ENCOUNTER — PATIENT MESSAGE (OUTPATIENT)
Dept: ADMINISTRATIVE | Facility: OTHER | Age: 67
End: 2022-03-23
Payer: MEDICARE

## 2022-03-23 ENCOUNTER — PATIENT MESSAGE (OUTPATIENT)
Dept: FAMILY MEDICINE | Facility: CLINIC | Age: 67
End: 2022-03-23
Payer: MEDICARE

## 2022-04-06 ENCOUNTER — PATIENT MESSAGE (OUTPATIENT)
Dept: ENDOCRINOLOGY | Facility: CLINIC | Age: 67
End: 2022-04-06
Payer: MEDICARE

## 2022-04-07 ENCOUNTER — OFFICE VISIT (OUTPATIENT)
Dept: CARDIOLOGY | Facility: CLINIC | Age: 67
End: 2022-04-07
Payer: MEDICARE

## 2022-04-07 VITALS
RESPIRATION RATE: 15 BRPM | HEIGHT: 69 IN | SYSTOLIC BLOOD PRESSURE: 130 MMHG | DIASTOLIC BLOOD PRESSURE: 74 MMHG | BODY MASS INDEX: 33.01 KG/M2 | HEART RATE: 98 BPM | WEIGHT: 222.88 LBS | OXYGEN SATURATION: 99 %

## 2022-04-07 DIAGNOSIS — E66.9 NON MORBID OBESITY, UNSPECIFIED OBESITY TYPE: ICD-10-CM

## 2022-04-07 DIAGNOSIS — Z82.49 FH: CAD (CORONARY ARTERY DISEASE): ICD-10-CM

## 2022-04-07 DIAGNOSIS — I10 ESSENTIAL HYPERTENSION: ICD-10-CM

## 2022-04-07 DIAGNOSIS — Z78.9 STATIN INTOLERANCE: ICD-10-CM

## 2022-04-07 DIAGNOSIS — R93.1 AGATSTON CORONARY ARTERY CALCIUM SCORE BETWEEN 200 AND 399: Primary | ICD-10-CM

## 2022-04-07 DIAGNOSIS — E78.49 OTHER HYPERLIPIDEMIA: ICD-10-CM

## 2022-04-07 PROCEDURE — 99999 PR PBB SHADOW E&M-EST. PATIENT-LVL IV: ICD-10-PCS | Mod: PBBFAC,,, | Performed by: INTERNAL MEDICINE

## 2022-04-07 PROCEDURE — 99204 PR OFFICE/OUTPT VISIT, NEW, LEVL IV, 45-59 MIN: ICD-10-PCS | Mod: S$PBB,,, | Performed by: INTERNAL MEDICINE

## 2022-04-07 PROCEDURE — 93010 ELECTROCARDIOGRAM REPORT: CPT | Mod: S$PBB,,, | Performed by: INTERNAL MEDICINE

## 2022-04-07 PROCEDURE — 99999 PR PBB SHADOW E&M-EST. PATIENT-LVL IV: CPT | Mod: PBBFAC,,, | Performed by: INTERNAL MEDICINE

## 2022-04-07 PROCEDURE — 93010 EKG 12-LEAD: ICD-10-PCS | Mod: S$PBB,,, | Performed by: INTERNAL MEDICINE

## 2022-04-07 PROCEDURE — 99214 OFFICE O/P EST MOD 30 MIN: CPT | Mod: PBBFAC,PO | Performed by: INTERNAL MEDICINE

## 2022-04-07 PROCEDURE — 93005 ELECTROCARDIOGRAM TRACING: CPT | Mod: PBBFAC,PO | Performed by: INTERNAL MEDICINE

## 2022-04-07 PROCEDURE — 99204 OFFICE O/P NEW MOD 45 MIN: CPT | Mod: S$PBB,,, | Performed by: INTERNAL MEDICINE

## 2022-04-07 RX ORDER — ATORVASTATIN CALCIUM 20 MG/1
20 TABLET, FILM COATED ORAL NIGHTLY
Qty: 90 TABLET | Refills: 3 | Status: SHIPPED | OUTPATIENT
Start: 2022-04-07 | End: 2022-05-18 | Stop reason: SDUPTHER

## 2022-04-07 NOTE — PROGRESS NOTES
CARDIOVASCULAR CONSULTATION    REASON FOR CONSULT:   Claudia Rodriguez is a 67 y.o. female who presents for HLP/statin intol.    Req/PCP: Jimmy  HISTORY OF PRESENT ILLNESS:   The patient is a very pleasant 67-year-old woman with a history of presumed CAD based on a high coronary calcium score.  She also has high cholesterol, but a apparently has a history of statin intolerance.  The patient cannot tell me exactly what the intolerance was.  She otherwise has had no angina, dyspnea, palpitations, or syncope.  There has been no PND, orthopnea, or lower extremity edema.  She denies melena, hematuria, or claudicant symptoms.    Family history is notable for father with coronary disease in his 50s status post CABG.    The patient denies tobacco use, alcohol excess, or illicit drug use.    I discussed rechallenging her with a statin, the patient is agreeable to this.  We also discussed PCSK9 inhibitors, but the patient thinks her insurance company will not cover it.    CARDIOVASCULAR HISTORY:   CAD, cor Ca++ 311 on CT 10/2021    PAST MEDICAL HISTORY:     Past Medical History:   Diagnosis Date    Cholelithiasis     DDD (degenerative disc disease), lumbar     Fatty liver     - noted on U/S 11/2017    Goiter, nodular     HTN (hypertension)     Hyperglycemia     Hyperlipidemia     Hypothyroidism     Hypovitaminosis D     Meningioma     right frontal lobe ; followed by Dr. Enciso once a year    Obesity     Prediabetes     Urinary bladder disorder        PAST SURGICAL HISTORY:     Past Surgical History:   Procedure Laterality Date    BLADDER SUSPENSION      LIVER BIOPSY      thyroid biopsy  7/11/12    tonsillectomy      TONSILLECTOMY         ALLERGIES AND MEDICATION:     Review of patient's allergies indicates:   Allergen Reactions    Zostavax [zoster vaccine live (pf)] Rash     - injection site red, hot, indurated    Amlodipine-olmesartan      Other reaction(s): pounding in ears      Atorvastatin         Other reaction(s): Unknown    Demerol [meperidine] Other (See Comments)     Other reaction(s): Nausea  Other reaction(s): Headache    Ezetimibe-simvastatin      Other reaction(s): Headache      Lotrel [amlodipine-benazepril]        Other reaction(s): constant cough    Nsaids (non-steroidal anti-inflammatory drug) Other (See Comments)       Other reaction(s): Difficulty breathing    Other reaction(s): Difficulty breathing    Phenytoin sodium extended Other (See Comments)        Medication List          Accurate as of April 7, 2022 10:47 AM. If you have any questions, ask your nurse or doctor.            CONTINUE taking these medications    amLODIPine 10 MG tablet  Commonly known as: NORVASC  Take 1 tablet (10 mg total) by mouth once daily.     CALCIUM 500 + D (D3) ORAL     carvediloL 25 MG tablet  Commonly known as: COREG  TAKE 1 TABLET BY MOUTH TWICE A DAY WITH MEALS     CENTRUM ORAL     conjugated estrogens vaginal cream  Commonly known as: PREMARIN  Place 0.5 g vaginally twice a week.     ergocalciferol 50,000 unit Cap  Commonly known as: ERGOCALCIFEROL  TAKE 1 CAPSULE BY MOUTH ONE TIME PER WEEK     estradioL 0.01 % (0.1 mg/gram) vaginal cream  Commonly known as: ESTRACE     levothyroxine 75 MCG tablet  Commonly known as: SYNTHROID  TAKE 1 TABLET BY MOUTH EVERY DAY     omega-3 fatty acids 1,000 mg Cap     oxybutynin 10 MG 24 hr tablet  Commonly known as: DITROPAN-XL  Take 1 tablet (10 mg total) by mouth once daily.     prasterone (dhea) 6.5 mg Inst  Commonly known as: INTRAROSA  Place 1 suppository vaginally once daily.     triamterene-hydrochlorothiazide 37.5-25 mg 37.5-25 mg per tablet  Commonly known as: MAXZIDE-25  Take 1 tablet by mouth once daily.            SOCIAL HISTORY:     Social History     Socioeconomic History    Marital status:     Number of children: 1   Occupational History    Occupation: human      Employer: [employer]   Tobacco Use    Smoking status: Never Smoker     Smokeless tobacco: Never Used   Substance and Sexual Activity    Alcohol use: No    Drug use: No    Sexual activity: Yes     Partners: Male   Other Topics Concern    Are you pregnant or think you may be? No    Breast-feeding No   Social History Narrative    3 adopted children.       FAMILY HISTORY:     Family History   Problem Relation Age of Onset    Coronary artery disease Father 55    Heart disease Father     Hypertension Father     Melanoma Father     Hypertension Mother     Stroke Mother         2015    Hypertension Sister         controlled by lifestyle    No Known Problems Brother     Pancreatic cancer Maternal Grandfather     Heart failure Maternal Grandmother     Thyroid nodules Sister         s/p thyroidectomy    Thyroid disease Sister     Thyroid nodules Sister     No Known Problems Brother     Diabetes Neg Hx     Psoriasis Neg Hx     Lupus Neg Hx     Eczema Neg Hx     Thyroid cancer Neg Hx     Breast cancer Neg Hx     Ovarian cancer Neg Hx     Cervical cancer Neg Hx     Endometrial cancer Neg Hx     Vaginal cancer Neg Hx     Cirrhosis Neg Hx        REVIEW OF SYSTEMS:   Review of Systems   Constitutional: Negative for chills, diaphoresis and fever.   HENT: Negative for nosebleeds.    Eyes: Negative for blurred vision, double vision and photophobia.   Respiratory: Negative for hemoptysis, shortness of breath and wheezing.    Cardiovascular: Negative for chest pain, palpitations, orthopnea, claudication, leg swelling and PND.   Gastrointestinal: Negative for abdominal pain, blood in stool, heartburn, melena, nausea and vomiting.   Genitourinary: Negative for flank pain and hematuria.   Musculoskeletal: Negative for falls, myalgias and neck pain.   Skin: Negative for rash.   Neurological: Negative for dizziness, seizures, loss of consciousness, weakness and headaches.   Endo/Heme/Allergies: Negative for polydipsia. Does not bruise/bleed easily.   Psychiatric/Behavioral:  "Negative for depression and memory loss. The patient is not nervous/anxious.        PHYSICAL EXAM:     Vitals:    04/07/22 1034   BP: 130/74   Pulse: 98   Resp: 15    Body mass index is 32.91 kg/m².  Weight: 101.1 kg (222 lb 14.2 oz)   Height: 5' 9" (175.3 cm)     Physical Exam  Vitals reviewed.   Constitutional:       General: She is not in acute distress.     Appearance: She is well-developed. She is obese. She is not ill-appearing, toxic-appearing or diaphoretic.   HENT:      Head: Normocephalic and atraumatic.   Eyes:      General: No scleral icterus.     Extraocular Movements: Extraocular movements intact.      Conjunctiva/sclera: Conjunctivae normal.      Pupils: Pupils are equal, round, and reactive to light.   Neck:      Thyroid: No thyromegaly.      Vascular: Normal carotid pulses. No JVD.      Trachea: Trachea normal.   Cardiovascular:      Rate and Rhythm: Regular rhythm. Bradycardia present.      Pulses:           Carotid pulses are 2+ on the right side and 2+ on the left side.     Heart sounds: S1 normal and S2 normal. No murmur heard.    No friction rub. No gallop.   Pulmonary:      Effort: Pulmonary effort is normal. No respiratory distress.      Breath sounds: Normal breath sounds. No stridor. No wheezing, rhonchi or rales.   Chest:      Chest wall: No tenderness.   Abdominal:      General: There is no distension.      Palpations: Abdomen is soft.   Musculoskeletal:         General: No swelling or tenderness. Normal range of motion.      Cervical back: Normal range of motion and neck supple. No edema or rigidity.      Right lower leg: No edema.      Left lower leg: No edema.   Feet:      Right foot:      Skin integrity: No ulcer.      Left foot:      Skin integrity: No ulcer.   Skin:     General: Skin is warm and dry.      Coloration: Skin is not jaundiced.   Neurological:      General: No focal deficit present.      Mental Status: She is alert and oriented to person, place, and time.      Cranial " Nerves: No cranial nerve deficit.   Psychiatric:         Mood and Affect: Mood normal.         Speech: Speech normal.         Behavior: Behavior normal. Behavior is cooperative.         DATA:   EKG: (personally reviewed tracing)  4/6/22 SR 57    Laboratory:  CBC:  Recent Labs   Lab 06/26/20  0850 12/17/20  0829 12/14/21  1250   WBC 6.44 6.58 7.87   Hemoglobin 13.2 13.1 12.9   Hematocrit 42.6 40.9 40.6   Platelets 311 342 394       CHEMISTRIES:  Recent Labs   Lab 12/17/20  0829 07/22/21  0933 12/14/21  1250   Glucose 150 H 116 H 96   Sodium 143 140 137   Potassium 4.1 4.2 3.8   BUN 18 16 14   Creatinine 0.8 0.7 0.7   eGFR if African American >60.0 >60.0 >60.0   eGFR if non African American >60.0 >60.0 >60.0   Calcium 9.5 9.4 10.1       CARDIAC BIOMARKERS:        COAGS:  Recent Labs   Lab 06/26/20  0850 12/17/20  0829 12/14/21  1256   INR 1.0 0.9 1.0       LIPIDS/LFTS:  Recent Labs   Lab 10/17/19  0755 06/26/20  0850 12/17/20  0829 04/30/21  1515 07/22/21  0933 12/14/21  1250   Cholesterol 260 H  --   --  253 H  --   --    Triglycerides 199 H  --   --  154 H  --   --    HDL 47  --   --  46  --   --    LDL Cholesterol 173.2 H  --   --  176.2 H  --   --    Non-HDL Cholesterol 213  --   --  207  --   --    AST 18   < > 17  --  18 20   ALT 23   < > 15  --  18 18    < > = values in this interval not displayed.     The 10-year ASCVD risk score (Suellencarly YI Jr., et al., 2013) is: 12.1%    Values used to calculate the score:      Age: 67 years      Sex: Female      Is Non- : No      Diabetic: No      Tobacco smoker: No      Systolic Blood Pressure: 130 mmHg      Is BP treated: Yes      HDL Cholesterol: 46 mg/dL      Total Cholesterol: 253 mg/dL      Cardiovascular Testing:  CT Cor Ca+ 10/21/21  Agatston calcium score equals 311, which translates to the 85-90th percentile for coronary calcium load based on age and sex.  Additional findings and recommendations above.    Ex stress echo 8/13/18  The patient  exercised for 4.37 minutes on a High Ramp protocol, corresponding to a functional capacity of 6 estimated METS, achieving a peak heart rate of 112 bpm, which is 75% of the age predicted maximum heart rate. The patient discontinued exercise   secondary to knee pain.   There were no significant electrocardiographic changes throughout the protocol suggesting ischemia.   EKG Conclusions:   1. The EKG portion of this study is abnormal but non diagnostic for ischemia at a high workload, and peak heart rate of 112 bpm (75% of predicted).   2. Sensitivity is impaired due to failure to reach target heart rate. Specificity is reduced secondary to resting ST segment changes.   3. Exercise capacity is moderately impaired.   4. Blood pressure response to exercise was normal (Presenting BP: 134/82 Peak BP: 168/88).   5. No significant arrhythmias were present.   6. There were no symptoms of chest discomfort or significant dyspnea throughout the protocol.   7. The Duke treadmill score was 4 suggesting an intermediate probability for future cardiovascular events.   Post Exercise Imaging:   Immediate post exercise images demonstrate left ventricular function augmenting. Left ventricular end systolic volume decreases.   No exercise induced wall motion abnormalities were identified.   CONCLUSIONS     1 - Normal left ventricular systolic function (EF 60-65%).     2 - Normal right ventricular systolic function .     3 - Normal left ventricular diastolic function.     4 - Moderate left atrial enlargement.   No evidence of stress induced myocardial ischemia. Sensitivity is impaired due to failure to reach target heart rate.  Exercise limited by knee pain.       ASSESSMENT:   # CAD, Agatston score 311  # HLP, ?statin intolerance  # BMI 33  # HTN, controlled  # FH CAD    PLAN:   Cont med rx  Rechallenge with atorva 20mg qhs  Ex MPI  Echo  Diet/exercise/weight loss  RTC 1 month for atorvastatin titration or alternative statin.  PCSK9  inhibitor as contingency.    Kev Almodovar MD, FACC

## 2022-04-08 ENCOUNTER — PATIENT MESSAGE (OUTPATIENT)
Dept: CARDIOLOGY | Facility: CLINIC | Age: 67
End: 2022-04-08
Payer: MEDICARE

## 2022-04-21 ENCOUNTER — RESEARCH ENCOUNTER (OUTPATIENT)
Dept: RESEARCH | Facility: HOSPITAL | Age: 67
End: 2022-04-21
Payer: MEDICARE

## 2022-04-21 ENCOUNTER — PATIENT MESSAGE (OUTPATIENT)
Dept: INFECTIOUS DISEASES | Facility: CLINIC | Age: 67
End: 2022-04-21
Payer: MEDICARE

## 2022-04-21 NOTE — PROGRESS NOTES
RESEARCH STUDY CONSENT ENCOUNTER  ORGAN TRANSPLANT  [facility] [place]    Study Title: Role of Tumor-Induced Immune Tolerance in the Patient Response to Locoregional Therapy: Implications in Assessment Risk of Hepatocellular Carcinoma Recurrence Following Liver Transplantation    IRB #: [id number]    IRB Approval Date: 6/8/2016    : Daniel Thurman MD  Sub-investigator: Stan Hannah, PhD    Patient Number: to be assigned    Patient is scheduled for labs on June 23, 2022.     This study is an observational study to evaluate patients receiving transarterial chemoembolization (TACE) or transarterial radioembolization (TARE) therapy to define the link between tumor-elicited peripheral cell populations, and the risk of hepatocellular carcinoma (HCC) recurrence before orthotopic liver transplantation (OLT). [IRB 2016.131.B] Patient is being consented as a control for this study and participation in this study will end after specimen collection. This patient is NOT undergoing TACE or TARE, and DOES NOT have HCC.     Present for discussion: patient Claudia Rodriguez, consenter Ashley Salazar, witness Valorie Enciso  Is LAR Consenting for Subject: NO    Prior to the Informed Consent (IC) being signed, or any protocol required testing, procedure, or intervention being performed, the following was done or discussed with Claudia Rodriguez:    Purpose of the Study, Qualifications to Participate: YES  Study Design, Schedule and Procedures: YES  Risks, Benefits, Alternative Treatments, Compensation and Costs: YES  Confidentiality and HIPAA Authorization for Release of Medical Records for the research trial/subject's right/study related injury: YES  Study related contact information: YES  Voluntary Participation and Withdrawal from the research trial at any time: YES  Patient has been offered the opportunity to ask questions regarding the study and all questions were answered satisfactorily: YES  Patient verbalizes  understanding of the study/procedures and agrees to participate: YES  CRC and PI contact information given to patient:YES  Verification the ICF was appropriately completed: YES  Signed copy given to patient: YES  Copy in patient's chart and original uploaded to [facility]: YES    Research staff present during consent: Valorie Martinez      No study procedures (I.e. specimen collection) were performed before the informed consent was signed.     In accordance with the study protocol, Research Lab orders will be placed and specimens will be collected on 6/23/2022 in:  [facility] VNP: YES  [facility] LABTX: NO  [facility] IM: NO    Ashley Salazar  Admin Research- Liver Transplant

## 2022-05-09 ENCOUNTER — PATIENT MESSAGE (OUTPATIENT)
Dept: ENDOCRINOLOGY | Facility: CLINIC | Age: 67
End: 2022-05-09
Payer: MEDICARE

## 2022-05-09 ENCOUNTER — PATIENT MESSAGE (OUTPATIENT)
Dept: HEPATOLOGY | Facility: CLINIC | Age: 67
End: 2022-05-09
Payer: MEDICARE

## 2022-05-12 ENCOUNTER — HOSPITAL ENCOUNTER (OUTPATIENT)
Dept: CARDIOLOGY | Facility: HOSPITAL | Age: 67
Discharge: HOME | End: 2022-05-12
Attending: INTERNAL MEDICINE
Payer: MEDICARE

## 2022-05-12 ENCOUNTER — HOSPITAL ENCOUNTER (OUTPATIENT)
Dept: RADIOLOGY | Facility: HOSPITAL | Age: 67
Discharge: HOME | End: 2022-05-12
Attending: INTERNAL MEDICINE
Payer: MEDICARE

## 2022-05-12 VITALS — HEIGHT: 69 IN | BODY MASS INDEX: 32.88 KG/M2 | WEIGHT: 222 LBS

## 2022-05-12 DIAGNOSIS — R93.1 AGATSTON CORONARY ARTERY CALCIUM SCORE BETWEEN 200 AND 399: ICD-10-CM

## 2022-05-12 LAB
AORTIC ROOT ANNULUS: 2.81 CM
AORTIC VALVE CUSP SEPERATION: 1.92 CM
ASCENDING AORTA: 2.91 CM
AV INDEX (PROSTH): 0.84
AV MEAN GRADIENT: 5 MMHG
AV PEAK GRADIENT: 11 MMHG
AV VALVE AREA: 2.49 CM2
AV VELOCITY RATIO: 0.87
BSA FOR ECHO PROCEDURE: 2.21 M2
CV ECHO LV RWT: 0.42 CM
CV STRESS BASE HR: 57 BPM
DIASTOLIC BLOOD PRESSURE: 74 MMHG
DOP CALC AO PEAK VEL: 1.64 M/S
DOP CALC AO VTI: 40.82 CM
DOP CALC LVOT AREA: 3 CM2
DOP CALC LVOT DIAMETER: 1.94 CM
DOP CALC LVOT PEAK VEL: 1.42 M/S
DOP CALC LVOT STROKE VOLUME: 101.51 CM3
DOP CALCLVOT PEAK VEL VTI: 34.36 CM
E WAVE DECELERATION TIME: 220.79 MSEC
E/A RATIO: 1.07
E/E' RATIO: 11.88 M/S
ECHO LV POSTERIOR WALL: 1.13 CM (ref 0.6–1.1)
EJECTION FRACTION: 60 %
FRACTIONAL SHORTENING: 38 % (ref 28–44)
INTERVENTRICULAR SEPTUM: 1.15 CM (ref 0.6–1.1)
IVRT: 148.43 MSEC
LA MAJOR: 5.95 CM
LA MINOR: 6.13 CM
LA WIDTH: 4.6 CM
LEFT ATRIUM SIZE: 4.68 CM
LEFT ATRIUM VOLUME INDEX: 51.2 ML/M2
LEFT ATRIUM VOLUME: 110.5 CM3
LEFT INTERNAL DIMENSION IN SYSTOLE: 3.34 CM (ref 2.1–4)
LEFT VENTRICLE DIASTOLIC VOLUME INDEX: 65.95 ML/M2
LEFT VENTRICLE DIASTOLIC VOLUME: 142.46 ML
LEFT VENTRICLE MASS INDEX: 115 G/M2
LEFT VENTRICLE SYSTOLIC VOLUME INDEX: 21.1 ML/M2
LEFT VENTRICLE SYSTOLIC VOLUME: 45.51 ML
LEFT VENTRICULAR INTERNAL DIMENSION IN DIASTOLE: 5.42 CM (ref 3.5–6)
LEFT VENTRICULAR MASS: 247.96 G
LV LATERAL E/E' RATIO: 10.56 M/S
LV SEPTAL E/E' RATIO: 13.57 M/S
MV PEAK A VEL: 0.89 M/S
MV PEAK E VEL: 0.95 M/S
MV STENOSIS PRESSURE HALF TIME: 64.03 MS
MV VALVE AREA P 1/2 METHOD: 3.44 CM2
NUC STRESS DIASTOLIC VOLUME INDEX: 91
NUC STRESS EJECTION FRACTION: 64 %
NUC STRESS SYSTOLIC VOLUME INDEX: 33
OHS CV CPX 1 MINUTE RECOVERY HEART RATE: 95 BPM
OHS CV CPX 85 PERCENT MAX PREDICTED HEART RATE MALE: 124
OHS CV CPX ESTIMATED METS: 7
OHS CV CPX MAX PREDICTED HEART RATE: 146
OHS CV CPX PATIENT IS FEMALE: 1
OHS CV CPX PATIENT IS MALE: 0
OHS CV CPX PEAK DIASTOLIC BLOOD PRESSURE: 82 MMHG
OHS CV CPX PEAK HEAR RATE: 130 BPM
OHS CV CPX PEAK RATE PRESSURE PRODUCT: NORMAL
OHS CV CPX PEAK SYSTOLIC BLOOD PRESSURE: 176 MMHG
OHS CV CPX PERCENT MAX PREDICTED HEART RATE ACHIEVED: 89
OHS CV CPX RATE PRESSURE PRODUCT PRESENTING: 7353
PISA TR MAX VEL: 2.84 M/S
PULM VEIN S/D RATIO: 1.43
PV PEAK D VEL: 0.53 M/S
PV PEAK S VEL: 0.76 M/S
PV PEAK VELOCITY: 1.37 CM/S
RA MAJOR: 5.47 CM
RA PRESSURE: 3 MMHG
RA WIDTH: 4.51 CM
RIGHT VENTRICULAR END-DIASTOLIC DIMENSION: 4.35 CM
RV TISSUE DOPPLER FREE WALL SYSTOLIC VELOCITY 1 (APICAL 4 CHAMBER VIEW): 15.64 CM/S
SINUS: 2.9 CM
STJ: 2.47 CM
STRESS ECHO POST EXERCISE DUR MIN: 5 MINUTES
STRESS ECHO POST EXERCISE DUR SEC: 44 SECONDS
SYSTOLIC BLOOD PRESSURE: 129 MMHG
TDI LATERAL: 0.09 M/S
TDI SEPTAL: 0.07 M/S
TDI: 0.08 M/S
TR MAX PG: 32 MMHG
TRICUSPID ANNULAR PLANE SYSTOLIC EXCURSION: 2.85 CM
TV REST PULMONARY ARTERY PRESSURE: 35 MMHG

## 2022-05-12 PROCEDURE — 93016 STRESS TEST WITH MYOCARDIAL PERFUSION (CUPID ONLY): ICD-10-PCS | Mod: ,,, | Performed by: INTERNAL MEDICINE

## 2022-05-12 PROCEDURE — 93018 STRESS TEST WITH MYOCARDIAL PERFUSION (CUPID ONLY): ICD-10-PCS | Mod: ,,, | Performed by: INTERNAL MEDICINE

## 2022-05-12 PROCEDURE — 78452 HT MUSCLE IMAGE SPECT MULT: CPT | Mod: 26,,, | Performed by: INTERNAL MEDICINE

## 2022-05-12 PROCEDURE — A9502 TC99M TETROFOSMIN: HCPCS

## 2022-05-12 PROCEDURE — 93017 CV STRESS TEST TRACING ONLY: CPT

## 2022-05-12 PROCEDURE — 78452 STRESS TEST WITH MYOCARDIAL PERFUSION (CUPID ONLY): ICD-10-PCS | Mod: 26,,, | Performed by: INTERNAL MEDICINE

## 2022-05-12 PROCEDURE — 93306 ECHO (CUPID ONLY): ICD-10-PCS | Mod: 26,,, | Performed by: INTERNAL MEDICINE

## 2022-05-12 PROCEDURE — 93306 TTE W/DOPPLER COMPLETE: CPT

## 2022-05-12 PROCEDURE — 93018 CV STRESS TEST I&R ONLY: CPT | Mod: ,,, | Performed by: INTERNAL MEDICINE

## 2022-05-12 PROCEDURE — 93306 TTE W/DOPPLER COMPLETE: CPT | Mod: 26,,, | Performed by: INTERNAL MEDICINE

## 2022-05-12 PROCEDURE — 93016 CV STRESS TEST SUPVJ ONLY: CPT | Mod: ,,, | Performed by: INTERNAL MEDICINE

## 2022-05-18 ENCOUNTER — OFFICE VISIT (OUTPATIENT)
Dept: CARDIOLOGY | Facility: CLINIC | Age: 67
End: 2022-05-18
Payer: MEDICARE

## 2022-05-18 VITALS
HEART RATE: 64 BPM | DIASTOLIC BLOOD PRESSURE: 82 MMHG | SYSTOLIC BLOOD PRESSURE: 120 MMHG | RESPIRATION RATE: 18 BRPM | HEIGHT: 69 IN | WEIGHT: 226.5 LBS | OXYGEN SATURATION: 98 % | BODY MASS INDEX: 33.55 KG/M2

## 2022-05-18 DIAGNOSIS — I10 ESSENTIAL HYPERTENSION: ICD-10-CM

## 2022-05-18 DIAGNOSIS — R94.39 ABNORMAL NUCLEAR STRESS TEST: Primary | ICD-10-CM

## 2022-05-18 DIAGNOSIS — E78.49 OTHER HYPERLIPIDEMIA: ICD-10-CM

## 2022-05-18 DIAGNOSIS — Z78.9 STATIN INTOLERANCE: ICD-10-CM

## 2022-05-18 DIAGNOSIS — R93.1 AGATSTON CORONARY ARTERY CALCIUM SCORE BETWEEN 200 AND 399: ICD-10-CM

## 2022-05-18 DIAGNOSIS — Z82.49 FH: CAD (CORONARY ARTERY DISEASE): ICD-10-CM

## 2022-05-18 DIAGNOSIS — E66.9 NON MORBID OBESITY, UNSPECIFIED OBESITY TYPE: ICD-10-CM

## 2022-05-18 PROCEDURE — 99214 OFFICE O/P EST MOD 30 MIN: CPT | Mod: S$PBB,,, | Performed by: INTERNAL MEDICINE

## 2022-05-18 PROCEDURE — 99999 PR PBB SHADOW E&M-EST. PATIENT-LVL IV: CPT | Mod: PBBFAC,,, | Performed by: INTERNAL MEDICINE

## 2022-05-18 PROCEDURE — 99999 PR PBB SHADOW E&M-EST. PATIENT-LVL IV: ICD-10-PCS | Mod: PBBFAC,,, | Performed by: INTERNAL MEDICINE

## 2022-05-18 PROCEDURE — 99214 OFFICE O/P EST MOD 30 MIN: CPT | Mod: PBBFAC,PO | Performed by: INTERNAL MEDICINE

## 2022-05-18 PROCEDURE — 99214 PR OFFICE/OUTPT VISIT, EST, LEVL IV, 30-39 MIN: ICD-10-PCS | Mod: S$PBB,,, | Performed by: INTERNAL MEDICINE

## 2022-05-18 RX ORDER — ATORVASTATIN CALCIUM 40 MG/1
40 TABLET, FILM COATED ORAL NIGHTLY
Qty: 90 TABLET | Refills: 3 | Status: SHIPPED | OUTPATIENT
Start: 2022-05-18 | End: 2022-06-15 | Stop reason: SDUPTHER

## 2022-05-18 RX ORDER — ASPIRIN 81 MG/1
81 TABLET ORAL DAILY
Qty: 90 TABLET | Refills: 3 | COMMUNITY
Start: 2022-05-18

## 2022-05-18 NOTE — PROGRESS NOTES
CARDIOVASCULAR PROGRESS NOTE    REASON FOR CONSULT:   Claudia Rodriguez is a 67 y.o. female who presents for f/u presumed CAD, HLP/statin intol.    PCP: Jimmy  HISTORY OF PRESENT ILLNESS:   The patient returns for follow-up of her testing.  She denies intercurrent angina or dyspnea and does not describe any chest discomfort during her treadmill stress test for her nuclear scan.  She has had no palpitations or syncope.  There has been no PND, orthopnea, melena, hematuria, or claudicant symptoms.  She appears to be tolerating her atorvastatin 20 mg dose and I plan to titrate this.    The nuclear stress test was read as equivocal.  On my personal review of the images, it appears to have an anterior perfusion stress defect which normalizes at rest consistent with anterior ischemia.  Again, the patient did not describe anginal symptoms during the stress test portion of the examination.    I suggested we titrate her atorvastatin and check a cardiac PET scan.  I have also suggested she start taking aspirin 81 mg daily.  The patient tells me that she does have an allergy to NSAIDs, but has taken aspirin previously without any issues.    CARDIOVASCULAR HISTORY:   CAD, cor Ca++ 311 on CT 10/2021    PAST MEDICAL HISTORY:     Past Medical History:   Diagnosis Date    Cholelithiasis     DDD (degenerative disc disease), lumbar     Fatty liver     - noted on U/S 11/2017    Goiter, nodular     HTN (hypertension)     Hyperglycemia     Hyperlipidemia     Hypothyroidism     Hypovitaminosis D     Meningioma     right frontal lobe ; followed by Dr. Enciso once a year    Obesity     Prediabetes     Urinary bladder disorder        PAST SURGICAL HISTORY:     Past Surgical History:   Procedure Laterality Date    BLADDER SUSPENSION      LIVER BIOPSY      thyroid biopsy  7/11/12    tonsillectomy      TONSILLECTOMY         ALLERGIES AND MEDICATION:     Review of patient's allergies indicates:   Allergen Reactions     Zostavax [zoster vaccine live (pf)] Rash     - injection site red, hot, indurated    Amlodipine-olmesartan      Pt reports she is no longer allergic  Other reaction(s): pounding in ears      Atorvastatin      Pt reports she is no longer allergic  Other reaction(s): Unknown    Demerol [meperidine] Other (See Comments)     Other reaction(s): Nausea  Other reaction(s): Headache    Ezetimibe-simvastatin      Other reaction(s): Headache      Lotrel [amlodipine-benazepril]      Pt reports she is no longer allergic.  Other reaction(s): constant cough    Nsaids (non-steroidal anti-inflammatory drug) Other (See Comments)       Other reaction(s): Difficulty breathing    Other reaction(s): Difficulty breathing    Phenytoin sodium extended Other (See Comments)        Medication List          Accurate as of May 18, 2022 10:51 AM. If you have any questions, ask your nurse or doctor.            CONTINUE taking these medications    amLODIPine 10 MG tablet  Commonly known as: NORVASC  Take 1 tablet (10 mg total) by mouth once daily.     atorvastatin 20 MG tablet  Commonly known as: LIPITOR  Take 1 tablet (20 mg total) by mouth every evening.     CALCIUM 500 + D (D3) ORAL     carvediloL 25 MG tablet  Commonly known as: COREG  TAKE 1 TABLET BY MOUTH TWICE A DAY WITH MEALS     CENTRUM ORAL     ergocalciferol 50,000 unit Cap  Commonly known as: ERGOCALCIFEROL  TAKE 1 CAPSULE BY MOUTH ONE TIME PER WEEK     estradioL 0.01 % (0.1 mg/gram) vaginal cream  Commonly known as: ESTRACE     levothyroxine 75 MCG tablet  Commonly known as: SYNTHROID  TAKE 1 TABLET BY MOUTH EVERY DAY     omega-3 fatty acids 1,000 mg Cap     oxybutynin 10 MG 24 hr tablet  Commonly known as: DITROPAN-XL  Take 1 tablet (10 mg total) by mouth once daily.     triamterene-hydrochlorothiazide 37.5-25 mg 37.5-25 mg per tablet  Commonly known as: MAXZIDE-25  Take 1 tablet by mouth once daily.            SOCIAL HISTORY:     Social History     Socioeconomic History     Marital status:     Number of children: 1   Occupational History    Occupation: human      Employer: [employer]   Tobacco Use    Smoking status: Never Smoker    Smokeless tobacco: Never Used   Substance and Sexual Activity    Alcohol use: No    Drug use: No    Sexual activity: Yes     Partners: Male   Other Topics Concern    Are you pregnant or think you may be? No    Breast-feeding No   Social History Narrative    3 adopted children.       FAMILY HISTORY:     Family History   Problem Relation Age of Onset    Coronary artery disease Father 55    Heart disease Father     Hypertension Father     Melanoma Father     Hypertension Mother     Stroke Mother         2015    Hypertension Sister         controlled by lifestyle    No Known Problems Brother     Pancreatic cancer Maternal Grandfather     Heart failure Maternal Grandmother     Thyroid nodules Sister         s/p thyroidectomy    Thyroid disease Sister     Thyroid nodules Sister     No Known Problems Brother     Diabetes Neg Hx     Psoriasis Neg Hx     Lupus Neg Hx     Eczema Neg Hx     Thyroid cancer Neg Hx     Breast cancer Neg Hx     Ovarian cancer Neg Hx     Cervical cancer Neg Hx     Endometrial cancer Neg Hx     Vaginal cancer Neg Hx     Cirrhosis Neg Hx        REVIEW OF SYSTEMS:   Review of Systems   Constitutional: Negative for chills, diaphoresis and fever.   HENT: Negative for nosebleeds.    Eyes: Negative for blurred vision, double vision and photophobia.   Respiratory: Negative for hemoptysis, shortness of breath and wheezing.    Cardiovascular: Negative for chest pain, palpitations, orthopnea, claudication, leg swelling and PND.   Gastrointestinal: Negative for abdominal pain, blood in stool, heartburn, melena, nausea and vomiting.   Genitourinary: Negative for flank pain and hematuria.   Musculoskeletal: Negative for falls, myalgias and neck pain.   Skin: Negative for rash.   Neurological: Negative  "for dizziness, seizures, loss of consciousness, weakness and headaches.   Endo/Heme/Allergies: Negative for polydipsia. Does not bruise/bleed easily.   Psychiatric/Behavioral: Negative for depression and memory loss. The patient is not nervous/anxious.        PHYSICAL EXAM:     Vitals:    05/18/22 1033   BP: 120/82   Pulse: 64   Resp: 18    Body mass index is 33.45 kg/m².  Weight: 102.7 kg (226 lb 8.4 oz)   Height: 5' 9" (175.3 cm)     Physical Exam  Vitals reviewed.   Constitutional:       General: She is not in acute distress.     Appearance: She is well-developed. She is obese. She is not ill-appearing, toxic-appearing or diaphoretic.   HENT:      Head: Normocephalic and atraumatic.   Eyes:      General: No scleral icterus.     Extraocular Movements: Extraocular movements intact.      Conjunctiva/sclera: Conjunctivae normal.      Pupils: Pupils are equal, round, and reactive to light.   Neck:      Thyroid: No thyromegaly.      Vascular: Normal carotid pulses. No JVD.      Trachea: Trachea normal.   Cardiovascular:      Rate and Rhythm: Regular rhythm. Bradycardia present.      Pulses:           Carotid pulses are 2+ on the right side and 2+ on the left side.     Heart sounds: S1 normal and S2 normal. No murmur heard.    No friction rub. No gallop.   Pulmonary:      Effort: Pulmonary effort is normal. No respiratory distress.      Breath sounds: Normal breath sounds. No stridor. No wheezing, rhonchi or rales.   Chest:      Chest wall: No tenderness.   Abdominal:      General: There is no distension.      Palpations: Abdomen is soft.   Musculoskeletal:         General: No swelling or tenderness. Normal range of motion.      Cervical back: Normal range of motion and neck supple. No edema or rigidity.      Right lower leg: No edema.      Left lower leg: No edema.   Feet:      Right foot:      Skin integrity: No ulcer.      Left foot:      Skin integrity: No ulcer.   Skin:     General: Skin is warm and dry.      " Coloration: Skin is not jaundiced.   Neurological:      General: No focal deficit present.      Mental Status: She is alert and oriented to person, place, and time.      Cranial Nerves: No cranial nerve deficit.   Psychiatric:         Mood and Affect: Mood normal.         Speech: Speech normal.         Behavior: Behavior normal. Behavior is cooperative.         DATA:   EKG: (personally reviewed tracing)  4/6/22 SR 57    Laboratory:  CBC:  Recent Labs   Lab 06/26/20  0850 12/17/20  0829 12/14/21  1250   WBC 6.44 6.58 7.87   Hemoglobin 13.2 13.1 12.9   Hematocrit 42.6 40.9 40.6   Platelets 311 342 394       CHEMISTRIES:  Recent Labs   Lab 12/17/20  0829 07/22/21  0933 12/14/21  1250   Glucose 150 H 116 H 96   Sodium 143 140 137   Potassium 4.1 4.2 3.8   BUN 18 16 14   Creatinine 0.8 0.7 0.7   eGFR if African American >60.0 >60.0 >60.0   eGFR if non African American >60.0 >60.0 >60.0   Calcium 9.5 9.4 10.1       CARDIAC BIOMARKERS:        COAGS:  Recent Labs   Lab 06/26/20  0850 12/17/20  0829 12/14/21  1256   INR 1.0 0.9 1.0       LIPIDS/LFTS:  Recent Labs   Lab 10/17/19  0755 06/26/20  0850 12/17/20  0829 04/30/21  1515 07/22/21  0933 12/14/21  1250   Cholesterol 260 H  --   --  253 H  --   --    Triglycerides 199 H  --   --  154 H  --   --    HDL 47  --   --  46  --   --    LDL Cholesterol 173.2 H  --   --  176.2 H  --   --    Non-HDL Cholesterol 213  --   --  207  --   --    AST 18   < > 17  --  18 20   ALT 23   < > 15  --  18 18    < > = values in this interval not displayed.     The 10-year ASCVD risk score (Suellen YI Jr., et al., 2013) is: 10.4%    Values used to calculate the score:      Age: 67 years      Sex: Female      Is Non- : No      Diabetic: No      Tobacco smoker: No      Systolic Blood Pressure: 120 mmHg      Is BP treated: Yes      HDL Cholesterol: 46 mg/dL      Total Cholesterol: 253 mg/dL      Cardiovascular Testing:  Ex MPI 5/12/22 ((images personally reviewed and  interpreted, ?anterior ischemia)    Equivocal myocardial perfusion scan.    There is a mild to moderate intensity, moderate to large sized, equivocal perfusion abnormality that is mostly fixed with some reversible areas in the anterior wall(s). This finding is equivocal due to a breast shadow overlying the myocardium.    There are no other significant perfusion abnormalities.    There is a mild to moderate intensity perfusion abnormality in the anterior wall of the left ventricle, secondary to breast attenuation.    The gated perfusion images showed an ejection fraction of 64% post stress.    There is normal wall motion post stress.    The EKG portion of this study is negative for ischemia. (Juventino 5:44, 7 METS, 89% MPHR)    The patient reported no chest pain during the stress test.    During stress, rare PACs are noted.    The exercise capacity was average.    A PET stress exam is recommended if clinically indicated.    Echo 5/12/22  · The left ventricle is normal in size with mild concentric hypertrophy and normal systolic function.  · The estimated ejection fraction is 60%.  · Grade II left ventricular diastolic dysfunction.  · Normal right ventricular size with normal right ventricular systolic function.  · Severe left atrial enlargement.  · Mild right atrial enlargement.  · Moderate mitral regurgitation.  · Normal central venous pressure (3 mmHg).  · The estimated PA systolic pressure is 35 mmHg.  · There is mild pulmonary hypertension.    CT Cor Ca+ 10/21/21  Agatston calcium score equals 311, which translates to the 85-90th percentile for coronary calcium load based on age and sex.  Additional findings and recommendations above.      ASSESSMENT:   # CAD, Agatston score 311, MPI 5/2022 with ?anterior ischemia.  Asymptomatic.  # HLP, tolerating atorva 20mg (hx statin intolerance)  # BMI 33, stable vs alst OV  # HTN, controlled  # FH CAD    PLAN:   Cont med rx  Start ASA 81mg qd  Inc atorva 40mg  [facility]  Cardiac PET  Diet/exercise/weight loss  RTC 1 month for atorvastatin titration and review of cardiac PET.  If high risk findings (LM/prox LAD) will consider cath.  Check lipids/LFT 3 months after max statin dose.    Kev Almodovar MD, [facility]    Addendum 6/15/22:    Cardiac PET 6/13/22    The myocardial perfusion images are normal without evidence of ischemia or scar.    The whole heart absolute myocardial perfusion values averaged 0.55 cc/min/g at rest, which is reduced; 1.24 cc/min/g at stress, which is mildly reduced; and CFR is 2.27 , which is mildly reduced.    CT attenuation images demonstrate mild diffuse coronary calcifications in the LAD, LCX and RCA territory.    The gated perfusion images showed an ejection fraction of 59% at rest and 66% during stress. A normal ejection fraction is greater than 53%.    The wall motion is normal at rest and during stress.    The LV cavity size is normal at rest and stress.    The EKG portion of this study is negative for ischemia.    The patient reported no chest pain during the stress test.      She reports no side effects with atorva 40mg.  Will increase to 80mg  RTC with lipids/LFT 3 months (sept 2022)

## 2022-06-09 ENCOUNTER — TELEPHONE (OUTPATIENT)
Dept: CARDIOLOGY | Facility: HOSPITAL | Age: 67
End: 2022-06-09
Payer: MEDICARE

## 2022-06-13 ENCOUNTER — HOSPITAL ENCOUNTER (OUTPATIENT)
Dept: CARDIOLOGY | Facility: HOSPITAL | Age: 67
Discharge: HOME | End: 2022-06-13
Attending: INTERNAL MEDICINE
Payer: MEDICARE

## 2022-06-13 VITALS
HEART RATE: 67 BPM | SYSTOLIC BLOOD PRESSURE: 143 MMHG | BODY MASS INDEX: 33.47 KG/M2 | DIASTOLIC BLOOD PRESSURE: 76 MMHG | WEIGHT: 226 LBS | HEIGHT: 69 IN

## 2022-06-13 DIAGNOSIS — R94.39 ABNORMAL NUCLEAR STRESS TEST: ICD-10-CM

## 2022-06-13 DIAGNOSIS — R93.1 AGATSTON CORONARY ARTERY CALCIUM SCORE BETWEEN 200 AND 399: ICD-10-CM

## 2022-06-13 LAB
CFR FLOW - ANTERIOR: 1.99
CFR FLOW - INFERIOR: 2.38
CFR FLOW - LATERAL: 2.41
CFR FLOW - MAX: 3.4
CFR FLOW - MIN: 1.74
CFR FLOW - SEPTAL: 2.28
CFR FLOW - WHOLE HEART: 2.27
CV PHARM DOSE: 0.4 MG
CV STRESS BASE HR: 67 BPM
DIASTOLIC BLOOD PRESSURE: 76 MMHG
EJECTION FRACTION- HIGH: 65 %
END DIASTOLIC INDEX-HIGH: 153 ML/M2
END DIASTOLIC INDEX-LOW: 93 ML/M2
END SYSTOLIC INDEX-HIGH: 71 ML/M2
END SYSTOLIC INDEX-LOW: 31 ML/M2
NUC REST DIASTOLIC VOLUME INDEX: 119
NUC REST EJECTION FRACTION: 59
NUC REST SYSTOLIC VOLUME INDEX: 49
NUC STRESS DIASTOLIC VOLUME INDEX: 132
NUC STRESS EJECTION FRACTION: 66 %
NUC STRESS SYSTOLIC VOLUME INDEX: 45
OHS CV CPX 85 PERCENT MAX PREDICTED HEART RATE MALE: 124
OHS CV CPX MAX PREDICTED HEART RATE: 146
OHS CV CPX PATIENT IS FEMALE: 1
OHS CV CPX PATIENT IS MALE: 0
OHS CV CPX PEAK DIASTOLIC BLOOD PRESSURE: 46 MMHG
OHS CV CPX PEAK HEAR RATE: 74 BPM
OHS CV CPX PEAK RATE PRESSURE PRODUCT: 9546
OHS CV CPX PEAK SYSTOLIC BLOOD PRESSURE: 129 MMHG
OHS CV CPX PERCENT MAX PREDICTED HEART RATE ACHIEVED: 51
OHS CV CPX RATE PRESSURE PRODUCT PRESENTING: 9581
REST FLOW - ANTERIOR: 0.55 CC/MIN/G
REST FLOW - INFERIOR: 0.57 CC/MIN/G
REST FLOW - LATERAL: 0.56 CC/MIN/G
REST FLOW - MAX: 0.72 CC/MIN/G
REST FLOW - MIN: 0.31 CC/MIN/G
REST FLOW - SEPTAL: 0.51 CC/MIN/G
REST FLOW - WHOLE HEART: 0.55 CC/MIN/G
RETIRED EF AND QEF - SEE NOTES: 53 %
STRESS FLOW - ANTERIOR: 1.1 CC/MIN/G
STRESS FLOW - INFERIOR: 1.34 CC/MIN/G
STRESS FLOW - LATERAL: 1.35 CC/MIN/G
STRESS FLOW - MAX: 1.63 CC/MIN/G
STRESS FLOW - MIN: 0.63 CC/MIN/G
STRESS FLOW - SEPTAL: 1.16 CC/MIN/G
STRESS FLOW - WHOLE HEART: 1.24 CC/MIN/G
SYSTOLIC BLOOD PRESSURE: 143 MMHG

## 2022-06-13 PROCEDURE — 78434 AQMBF PET REST & RX STRESS: CPT

## 2022-06-13 PROCEDURE — 78431 MYOCRD IMG PET RST&STRS CT: CPT | Mod: 26,,, | Performed by: INTERNAL MEDICINE

## 2022-06-13 PROCEDURE — 78431 MYOCRD IMG PET RST&STRS CT: CPT

## 2022-06-13 PROCEDURE — 78434 AQMBF PET REST & RX STRESS: CPT | Mod: 26,,, | Performed by: INTERNAL MEDICINE

## 2022-06-13 PROCEDURE — 78434 CARDIAC PET SCAN STRESS (CUPID ONLY): ICD-10-PCS | Mod: 26,,, | Performed by: INTERNAL MEDICINE

## 2022-06-13 PROCEDURE — 93018 CARDIAC PET SCAN STRESS (CUPID ONLY): ICD-10-PCS | Mod: ,,, | Performed by: INTERNAL MEDICINE

## 2022-06-13 PROCEDURE — 93018 CV STRESS TEST I&R ONLY: CPT | Mod: ,,, | Performed by: INTERNAL MEDICINE

## 2022-06-13 PROCEDURE — 78431 CARDIAC PET SCAN STRESS (CUPID ONLY): ICD-10-PCS | Mod: 26,,, | Performed by: INTERNAL MEDICINE

## 2022-06-13 PROCEDURE — 63600175 PHARM REV CODE 636 W HCPCS: Performed by: INTERNAL MEDICINE

## 2022-06-13 PROCEDURE — 93016 CARDIAC PET SCAN STRESS (CUPID ONLY): ICD-10-PCS | Mod: ,,, | Performed by: INTERNAL MEDICINE

## 2022-06-13 PROCEDURE — 93016 CV STRESS TEST SUPVJ ONLY: CPT | Mod: ,,, | Performed by: INTERNAL MEDICINE

## 2022-06-13 RX ORDER — AMINOPHYLLINE 25 MG/ML
75 INJECTION, SOLUTION INTRAVENOUS ONCE
Status: COMPLETED | OUTPATIENT
Start: 2022-06-13 | End: 2022-06-13

## 2022-06-13 RX ORDER — REGADENOSON 0.08 MG/ML
0.4 INJECTION, SOLUTION INTRAVENOUS ONCE
Status: COMPLETED | OUTPATIENT
Start: 2022-06-13 | End: 2022-06-13

## 2022-06-13 RX ADMIN — AMINOPHYLLINE 75 MG: 25 INJECTION, SOLUTION INTRAVENOUS at 12:06

## 2022-06-13 RX ADMIN — REGADENOSON 0.4 MG: 0.08 INJECTION, SOLUTION INTRAVENOUS at 12:06

## 2022-06-15 ENCOUNTER — PATIENT MESSAGE (OUTPATIENT)
Dept: CARDIOLOGY | Facility: CLINIC | Age: 67
End: 2022-06-15
Payer: MEDICARE

## 2022-06-15 ENCOUNTER — PATIENT MESSAGE (OUTPATIENT)
Dept: HEPATOLOGY | Facility: CLINIC | Age: 67
End: 2022-06-15
Payer: MEDICARE

## 2022-06-15 ENCOUNTER — PATIENT MESSAGE (OUTPATIENT)
Dept: FAMILY MEDICINE | Facility: CLINIC | Age: 67
End: 2022-06-15
Payer: MEDICARE

## 2022-06-15 ENCOUNTER — PATIENT MESSAGE (OUTPATIENT)
Dept: ENDOCRINOLOGY | Facility: CLINIC | Age: 67
End: 2022-06-15
Payer: MEDICARE

## 2022-06-15 ENCOUNTER — TELEPHONE (OUTPATIENT)
Dept: CARDIOLOGY | Facility: CLINIC | Age: 67
End: 2022-06-15
Payer: MEDICARE

## 2022-06-15 RX ORDER — ATORVASTATIN CALCIUM 80 MG/1
80 TABLET, FILM COATED ORAL NIGHTLY
Qty: 90 TABLET | Refills: 3 | Status: SHIPPED | OUTPATIENT
Start: 2022-06-15 | End: 2023-04-04

## 2022-06-15 NOTE — TELEPHONE ENCOUNTER
I scheduled pt for her appt    ----- Message from Kev Almodovar MD sent at 6/15/2022  9:02 AM CDT -----  Please call pt and arrange the following:    Cancel her appt with me on 6/30.  Schedule labs (lipids/LFT) in 3 months (Sept 2022) with OV 1 week later (labs ordered).  Cancel her lipid panel planned for 7/11/22.  Do not cancel any other lab orders.

## 2022-06-20 DIAGNOSIS — C22.0 HCC (HEPATOCELLULAR CARCINOMA): ICD-10-CM

## 2022-06-20 DIAGNOSIS — Z00.6 RESEARCH STUDY PATIENT: Primary | ICD-10-CM

## 2022-07-08 ENCOUNTER — PATIENT MESSAGE (OUTPATIENT)
Dept: HEPATOLOGY | Facility: CLINIC | Age: 67
End: 2022-07-08
Payer: MEDICARE

## 2022-07-08 DIAGNOSIS — Z00.6 RESEARCH STUDY PATIENT: Primary | ICD-10-CM

## 2022-07-11 ENCOUNTER — HOSPITAL ENCOUNTER (OUTPATIENT)
Dept: RADIOLOGY | Facility: HOSPITAL | Age: 67
Discharge: HOME | End: 2022-07-11
Attending: PHYSICIAN ASSISTANT
Payer: MEDICARE

## 2022-07-11 ENCOUNTER — RESEARCH ENCOUNTER (OUTPATIENT)
Dept: RESEARCH | Facility: HOSPITAL | Age: 67
End: 2022-07-11
Payer: MEDICARE

## 2022-07-11 DIAGNOSIS — K76.0 FATTY LIVER: ICD-10-CM

## 2022-07-11 PROCEDURE — 76705 ECHO EXAM OF ABDOMEN: CPT | Mod: TC

## 2022-07-11 PROCEDURE — 76705 ECHO EXAM OF ABDOMEN: CPT | Mod: 26,,, | Performed by: RADIOLOGY

## 2022-07-11 PROCEDURE — 76705 US ABDOMEN LIMITED: ICD-10-PCS | Mod: 26,,, | Performed by: RADIOLOGY

## 2022-07-11 NOTE — PROGRESS NOTES
RESEARCH STUDYSPECIMEN COLLECTION ENCOUNTER  ORGAN TRANSPLANT  [facility] [place]    Study Title: Role of Tumor-Induced Immune Tolerance in the Patient Response to Locoregional Therapy: Implications in Assessment Risk of Hepatocellular Carcinoma Recurrence Following Liver Transplantation    IRB #: [id number]    IRB Approval Date: 6/8/2016    : Daniel Thurman MD  Sub-investigator: Stan Hannah, PhD    Patient Number: [id number]    In accordance with the study protocol, Research Lab orders were placed and follow-up specimens were collected on (date: 07/11/2022) in:  [facility] LAB VNP: YES/NO: yes  [facility] LABTX: YES/NO: no  [facility] LAB IM: YES/NO: no    Tracie Crook  Admin Research- Liver Transplant

## 2022-07-12 ENCOUNTER — HOSPITAL ENCOUNTER (OUTPATIENT)
Dept: ENDOCRINOLOGY | Facility: CLINIC | Age: 67
Discharge: HOME | End: 2022-07-12
Attending: INTERNAL MEDICINE
Payer: MEDICARE

## 2022-07-12 DIAGNOSIS — E04.9 GOITER, NODULAR: ICD-10-CM

## 2022-07-12 PROCEDURE — 76536 US EXAM OF HEAD AND NECK: CPT | Mod: 26,,, | Performed by: GENERAL ACUTE CARE HOSPITAL

## 2022-07-12 PROCEDURE — 76536 US SOFT TISSUE HEAD NECK THYROID: ICD-10-PCS | Mod: 26,,, | Performed by: GENERAL ACUTE CARE HOSPITAL

## 2022-07-16 ENCOUNTER — TELEPHONE (OUTPATIENT)
Dept: FAMILY MEDICINE | Facility: CLINIC | Age: 67
End: 2022-07-16
Payer: MEDICARE

## 2022-07-19 ENCOUNTER — PATIENT MESSAGE (OUTPATIENT)
Dept: RESEARCH | Facility: CLINIC | Age: 67
End: 2022-07-19
Payer: MEDICARE

## 2022-07-26 ENCOUNTER — OFFICE VISIT (OUTPATIENT)
Dept: HEPATOLOGY | Facility: CLINIC | Age: 67
End: 2022-07-26
Payer: MEDICARE

## 2022-07-26 VITALS
HEART RATE: 63 BPM | BODY MASS INDEX: 33.26 KG/M2 | SYSTOLIC BLOOD PRESSURE: 127 MMHG | HEIGHT: 69 IN | WEIGHT: 224.56 LBS | DIASTOLIC BLOOD PRESSURE: 78 MMHG

## 2022-07-26 DIAGNOSIS — K74.01 HEPATIC FIBROSIS, EARLY FIBROSIS: ICD-10-CM

## 2022-07-26 DIAGNOSIS — K76.0 FATTY LIVER: Primary | ICD-10-CM

## 2022-07-26 DIAGNOSIS — E66.9 OBESITY (BMI 30-39.9): ICD-10-CM

## 2022-07-26 DIAGNOSIS — E78.49 OTHER HYPERLIPIDEMIA: ICD-10-CM

## 2022-07-26 DIAGNOSIS — I10 ESSENTIAL HYPERTENSION: ICD-10-CM

## 2022-07-26 DIAGNOSIS — R16.0 HEPATOMEGALY: ICD-10-CM

## 2022-07-26 PROCEDURE — 99999 PR PBB SHADOW E&M-EST. PATIENT-LVL IV: CPT | Mod: PBBFAC,,, | Performed by: PHYSICIAN ASSISTANT

## 2022-07-26 PROCEDURE — 99214 PR OFFICE/OUTPT VISIT, EST, LEVL IV, 30-39 MIN: ICD-10-PCS | Mod: S$PBB,,, | Performed by: PHYSICIAN ASSISTANT

## 2022-07-26 PROCEDURE — 99999 PR PBB SHADOW E&M-EST. PATIENT-LVL IV: ICD-10-PCS | Mod: PBBFAC,,, | Performed by: PHYSICIAN ASSISTANT

## 2022-07-26 PROCEDURE — 99214 OFFICE O/P EST MOD 30 MIN: CPT | Mod: S$PBB,,, | Performed by: PHYSICIAN ASSISTANT

## 2022-07-26 PROCEDURE — 99214 OFFICE O/P EST MOD 30 MIN: CPT | Mod: PBBFAC | Performed by: PHYSICIAN ASSISTANT

## 2022-07-26 NOTE — PROGRESS NOTES
Hepatology Consultation: [facility]    ESTABLISHED PATIENT   PCP: Jessica Marquez MD    Subjective      Ms. Rodriguez is a 67 y.o. 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 The patient was previously followed by King Braga PA-C    The patient was initially evaluated for fatty liver noted on US and intermittently elevated liver enzymes ALT>AST; on Fibroscan the patient was found to have F4 fibrosis, S3 steatosis on 12/2017.   Per chart review, she has declined further investigation into cirrhosis staging and continues with HCC screening.   Her RF for fatty liver include HTN, HLD, pre-diabetes, Body mass index is 32.75 kg/m²., hypothyroid  Her previous serologic workup was negative for genetic, autoimmune, and viral causes of liver disease.    Interval history 07/26/2022:  She is here today alone. She reports she has maintained her weight loss since last visit. She is eating better and exercising.     doing okay   She is cooking at home and conscious of her meals.     Etoh history:  Denies   Family hx liver disease or cancer: Denies     The patient feels well. Thorough ROS as below.   Current symptoms of hepatic decompensation:              Ascites: No              LE edema: No               Hepatic encephalopathy: Denies              Variceal/GI bleeding:  No              Jaundice: No    Occupation: retired traveling   Social: lives at home with            PMCORKY Taylor has a past medical history of Cholelithiasis, DDD (degenerative disc disease), lumbar, Fatty liver, Goiter, nodular, HTN (hypertension), Hyperglycemia, Hyperlipidemia, Hypothyroidism, Hypovitaminosis D, Meningioma, Obesity, Prediabetes, and Urinary bladder disorder.   PSXH Claudia has a past surgical history that includes Bladder suspension; thyroid biopsy (7/11/12); tonsillectomy; Tonsillectomy; and Liver biopsy.  "   Claudia's family history includes Coronary artery disease (age of onset: 55) in her father; Heart disease in her father; Heart failure in her maternal grandmother; Hypertension in her father, mother, and sister; Melanoma in her father; No Known Problems in her brother and brother; Pancreatic cancer in her maternal grandfather; Stroke in her mother; Thyroid disease in her sister; Thyroid nodules in her sister and sister.   KAILEY Taylor reports that she has never smoked. She has never used smokeless tobacco. She reports that she does not drink alcohol and does not use drugs.   ELDER Taylor is allergic to zostavax [zoster vaccine live (pf)], demerol [meperidine], nsaids (non-steroidal anti-inflammatory drug), and phenytoin sodium extended.   ADELE Taylor has a current medication list which includes the following prescription(s): amlodipine, aspirin, atorvastatin, calcium carbonate/vitamin d3, carvedilol, ergocalciferol, estradiol, multivitamin/iron/folic acid, levothyroxine, omega-3 fatty acids, oxybutynin, and triamterene-hydrochlorothiazide 37.5-25 mg.           ROS:   GENERAL: Denies fatigue  HEENT: Denies yellowing of eyes   CARDIOVASCULAR: Denies edema  GI: Denies abdominal pain  SKIN: Denies rash, itching   NEURO: Denies confusion, memory loss, or mood changes  HEME/LYMPH: Denies easy bruising or bleeding      Objective     /78   Pulse 63   Ht 5' 9" (1.753 m)   Wt 101.9 kg (224 lb 8.6 oz)   BMI 33.16 kg/m²     PHYSICAL EXAM:   Friendly woman, in no acute distress; alert and oriented to person, place and time  EYES: Sclerae anicteric  GI: Obese abdomen. Soft, non-tender, non-distended. No ascites.  EXTREMITIES:  No edema.  SKIN: Warm and dry. No jaundice. No telangectasias noted. No palmar erythema.  NEURO:  Normal gait. No asterixis.  PSYCH:  Memory intact. Thought and speech pattern appropriate. Behavior normal      DIAGNOSTICS  Lab Results   Component Value Date    ALT 22 07/11/2022    AST 21 07/11/2022    " ALKPHOS 89 07/11/2022    BILITOT 0.9 07/11/2022    ALBUMIN 4.0 07/11/2022    INR 1.0 07/11/2022     07/11/2022     Lab Results   Component Value Date    AFP 3.7 07/11/2022       In relation to metabolic risk factors:   Lab Results   Component Value Date    HGBA1C 5.7 (H) 07/11/2022    CHOL 135 07/11/2022    TSH 1.272 07/11/2022     Body mass index is 33.16 kg/m².      Prior serologic workup:   Lab Results   Component Value Date    SMOOTHMUSCAB Negative 1:40 12/26/2017    AMAIFA Negative 1:40 12/26/2017    IGGSERUM 591 (L) 12/26/2017    ANASCREEN Negative <1:160 12/26/2017    FESATURATED 20 12/26/2017    PETH Negative 12/26/2017    CERULOPLSM 30.0 12/26/2017    HEPBSAG Negative 12/26/2017    HEPCAB Negative 05/23/2014       Imaging:    Ultrasound 7/22   Liver: Enlarged, measuring 19.3 cm. Homogeneous echotexture with diffusely increased echogenicity.  HRI measures 1.42. no focal hepatic lesions.     Gallbladder: There is a 2.4 cm mobile gallstone.  No wall thickening or pericholecystic fluid.  No sonographic Ascencio's sign.     Biliary system: The common duct is not dilated, measuring 3 mm.  No intrahepatic ductal dilatation.     Spleen: Normal in size with a homogeneous echotexture, measuring 9.4 x 4.9 cm.     Pancreas: The visualized portions of pancreas appear normal.     IVC: The visualized IVC appears unremarkable.     Miscellaneous: No ascites.     Impression:     Hepatomegaly with hepatic steatosis.     Cholelithiasis without evidence for cholecystitis.     Electronically signed by resident: Latanya Stapleton  Date:                                            07/11/2022  Time:                                           09:13     Electronically signed by: Landon Sin MD  Date:                                            07/11/2022  Time:                                           09:23      Fibroscan 12/21/2021  Median liver stiffness score:  6.1  CAP Reading: dB/m:  373  IQR/med %:   13  Interpretation  Fibrosis interpretation is based on medial liver stiffness - Kilopascal (kPa).  Fibrosis Stage:  F 0-1  Steatosis interpretation is based on controlled attenuation parameter - (dB/m).  Steatosis Grade:  S3      Fibroscan 2017    Diagnosis: NAFLD  Probe: XL  Fibroscan readin.7 KPa  Fibrosis:F4   CAP readin dB/m  Steatosis: :S3     Assessment/Plan     67 y.o. female with:    1. ?Fibrosis/cirrhosis  - conflicting fibrosis staging   MELD-Na score: 6 at 2022  9:24 AM  MELD score: 6 at 2022  9:24 AM  Calculated from:  Serum Creatinine: 0.7 mg/dL (Using min of 1 mg/dL) at 2022  9:24 AM  Serum Sodium: 141 mmol/L (Using max of 137 mmol/L) at 2022  9:24 AM  Total Bilirubin: 0.9 mg/dL (Using min of 1 mg/dL) at 2022  9:24 AM  INR(ratio): 1.0 at 2022  9:24 AM  Age: 67 years  -- no signs or symptoms of hepatic decompensation  -- liver enzymes and synthetic function wnl; have normalized with weight loss  -- I discussed re-staging the patient with Fibroscan vs. Liver bx vs. MRI. We discussed liver biopsy at length. The patient was interested in losing more weight and then re-staging.  -- We discussed implications of cirrhosis including liver dysfunction/failure, transplant, and death.  -- Fibroscan F4,S3 in 2017  -- Fibroscan  suggested F0-1, S3   -- counseling as per above  -- today is UTD with HCC screening, no concerns   -- continue with HCC screening q6mo, f/u yearly  -- will check status of Hep A & B immunity, did not finish last vaccination (?)    Lab Results   Component Value Date    ALT 22 2022    AST 21 2022    ALKPHOS 89 2022    BILITOT 0.9 2022    ALBUMIN 4.0 2022    INR 1.0 2022     2022           2. Hepatomegaly  -- likely 2/2 fatty liver  -- has actually decreased in size since 2017  -- recommend continued weight loss    3. Metabolic syndrome with Body mass index is 32.75 kg/m²., HTN, HLD  --  continue good control of BP, HLD  -- recommend continued weight loss   -- education provided as per above       Orders Placed This Encounter   Procedures    MR Elastography    US Abdomen Limited    US Abdomen Limited    AFP Tumor Marker    CBC Auto Differential    Comprehensive Metabolic Panel    Protime-INR         · Most recent labs continue to look excellent. AFP wnl.  · Ultrasound without liver lesions  · Completed hepatitis vaccinations.  · Conservative monitoring per discussion today, as patient declines biopsy  · With that being said recommend MRE next December for continued staging investigation  · Recommend continued lifestyle changes and weight loss.       Follow up in about 6 months (around 1/26/2023).  I spent 30 minutes on the day of this encounter preparing for, evaluating, treating, and managing this patient.     Thank you for allowing me to participate in the care of DYLAN EscobarC  Hepatology Advanced Practice Provider  [facility]

## 2022-07-26 NOTE — PATIENT INSTRUCTIONS
Ultrasound looks good and liver labs are looking good.   Follow-up in 6 months       FATTY LIVER RECOMMENDATIONS:    There is no FDA approved therapy for non-alcoholic fatty liver disease (NAFLD); therefore, lifestyle changes are important:  1. Weight loss goal of 22 lbs  2. Mediterranean diet is recommended that focuses on low-carb, low-sugar, and low-processed foods.  3. Exercise, 5 days per week, 30 minutes per day, as tolerated  4. Recommend good control of cholesterol, blood pressure, blood sugar levels (as these are risk factors for fatty liver). Cholesterol lowering medications including statins are typically safe and beneficial (even if liver enzymes are elevated).    5. Limit alcohol consumption, no more than 1 serving(s) of alcohol in any day (1 serving is 5 ounces of wine, 12 ounces of beer, or 1.5 ounces of liquor). Do not recommend daily alcohol use.        In some people, fatty liver can progress to steatohepatitis (inflammatory fatty liver) and possibly to cirrhosis, putting one at increased risk for liver cancer and liver failure in the future. Lifestyle changes can help to decrease this risk.     If interested in seeing a dietician to create a weight loss plan, contact the dietician team at [facility]: [email or web address].  You can also call to schedule a consult with a dietician: [phone]. *Virtual visits are available with one of our dieticians.     If you have diabetes or high blood pressure, you can meet with a Health  through [facility] program. Let us know if you are interested in a referral.     Ask about our fatty liver/MOURA clinical trials if you have fibrosis / scar tissue related to fatty liver.    *If statins are being considered for HLD, statins are safe in patients with liver disease. Statins can be used to treat dyslipidemia in patients with both NAFLD and MOURA.      FATTY LIVER DIET TIPS:    ADD OLIVE OIL. I recommend olive oil daily (in salads  or when cooking)  ADD COFFEE. Black coffee has also been found to be potentially beneficial (skip the sweets and creamer). Add 1-2 cups per day,  if you are able to tolerate. Decaf is fine.  BE MINDFUL OF CARBS AND ADDED SUGARS. Try to limit your carb intake to LESS than 30-45 grams of carbs with a meal or LESS than 5-10 grams with any snack (total of any snack foods eaten during that time).   KEEP A FOOD DIARY. Use MyFitness Pal  OR LOSE IT lisa to add up your carbs through the day - the most successful folks on weight loss journey TRACK their progress and food.  LIMIT WHAT YOU ARE DRINKING. Do NOT drink any beverages with calories or carbs (this can lead to high blood sugar and weight gain). Also, some of our patients have been very successful with weight loss using the pre made/planned meal planning services that limit calories and portion size (one example is Sensible Meals but there are many out there). Unsweetened black or green tea is fine! Hibiscus tea is also great and refreshing, especially iced.     Tips for low/moderate carbohydrate diet:  3 meals a day made up of the following:  -- Green vegetables, tomatoes, mushrooms, spaghetti squash, cauliflower, meat, poultry, seafood, eggs   -- Beans (1-1.5 cups) or nuts (1/4 cup): can have 1 x a day.  -- Greek yogurt and fermented foods like kefir, kimchi, saurkraut (be careful if you have swelling issues as lots of salt can worsen swelling or blood pressure)  -- Dressings, seasonings, condiments, etc should have less than 2 g sugars.   -- 1-2 servings of citrus fruits, berries, pineapple or melon a day (1/2 cup)  -- Avoid fried foods  -- Avoid grains, rice, pasta, potatoes, bread, corn, peas, oatmeal, grits, tortillas, crackers, chips  -- No soda, sweet tea, juices or lemonade  -- Use olive/avocado oil rather than vegetable oils or butter.        ADDITIONAL RESOURCES:  Websites with information about fatty liver and inflammation related to fatty liver (MOURA):  www.nashtruth.com and www.nashactually.com   [facility]: Non-Alcoholic Fatty Liver Disease (NAFLD)    Facebook support group with tips and recipes:   Non-Alcoholic Fatty Liver Disease (NAFLD) Diet & Nutrition Support     Try www.dietdoctor.Ascade for recipes.

## 2022-08-11 ENCOUNTER — OFFICE VISIT (OUTPATIENT)
Dept: ENDOCRINOLOGY | Facility: CLINIC | Age: 67
End: 2022-08-11
Payer: MEDICARE

## 2022-08-11 VITALS
OXYGEN SATURATION: 99 % | HEART RATE: 65 BPM | BODY MASS INDEX: 33.34 KG/M2 | RESPIRATION RATE: 16 BRPM | SYSTOLIC BLOOD PRESSURE: 130 MMHG | WEIGHT: 225.75 LBS | DIASTOLIC BLOOD PRESSURE: 74 MMHG

## 2022-08-11 DIAGNOSIS — R73.03 PREDIABETES: ICD-10-CM

## 2022-08-11 DIAGNOSIS — E66.9 OBESITY (BMI 30-39.9): ICD-10-CM

## 2022-08-11 DIAGNOSIS — E55.9 VITAMIN D DEFICIENCY DISEASE: ICD-10-CM

## 2022-08-11 DIAGNOSIS — E03.9 ACQUIRED HYPOTHYROIDISM: ICD-10-CM

## 2022-08-11 DIAGNOSIS — E04.9 GOITER, NODULAR: Primary | ICD-10-CM

## 2022-08-11 PROCEDURE — 99214 OFFICE O/P EST MOD 30 MIN: CPT | Mod: S$PBB,,, | Performed by: INTERNAL MEDICINE

## 2022-08-11 PROCEDURE — 99999 PR PBB SHADOW E&M-EST. PATIENT-LVL IV: CPT | Mod: PBBFAC,,, | Performed by: INTERNAL MEDICINE

## 2022-08-11 PROCEDURE — 99999 PR PBB SHADOW E&M-EST. PATIENT-LVL IV: ICD-10-PCS | Mod: PBBFAC,,, | Performed by: INTERNAL MEDICINE

## 2022-08-11 PROCEDURE — 99214 OFFICE O/P EST MOD 30 MIN: CPT | Mod: PBBFAC | Performed by: INTERNAL MEDICINE

## 2022-08-11 PROCEDURE — 99214 PR OFFICE/OUTPT VISIT, EST, LEVL IV, 30-39 MIN: ICD-10-PCS | Mod: S$PBB,,, | Performed by: INTERNAL MEDICINE

## 2022-08-11 RX ORDER — PEN NEEDLE, DIABETIC 30 GX3/16"
NEEDLE, DISPOSABLE MISCELLANEOUS
Qty: 30 EACH | Refills: 3 | Status: SHIPPED | OUTPATIENT
Start: 2022-08-11

## 2022-08-11 RX ORDER — SEMAGLUTIDE 1.34 MG/ML
0.5 INJECTION, SOLUTION SUBCUTANEOUS
Qty: 1 PEN | Refills: 3 | Status: SHIPPED | OUTPATIENT
Start: 2022-08-11 | End: 2022-12-28

## 2022-08-11 NOTE — ASSESSMENT & PLAN NOTE
I have reviewed management options       Discussed indications to repeat thyroid nodule biopsy as well as  surgical indications (abnormal FNA, compressive symptoms or interval change)     plan follow up in 1 year with TSH and thyroid u/s prior

## 2022-08-11 NOTE — PATIENT INSTRUCTIONS
Week 1 through week 4 : 0.25 mg once weekly.  Week 5 through week 8: 0.5 mg once weekly.  Week 9 through week 12: 1 mg once weekly.  Week 13 through week 16: 1.7 mg once weekly.  Week 17 and thereafter (maintenance dosage): 2.4 mg once weekly; if not tolerated, may temporarily decrease dosage to 1.7 mg once weekly for up to 4 additional weeks, then increase to 2.4 mg once weekly.

## 2022-08-11 NOTE — ASSESSMENT & PLAN NOTE
Discussed Ozempic and she would like to discuss with Dr. Marquez.  I did write the initial prescription but the uptitration should be done with PCP.  I also put in a referral to bariatric medicine

## 2022-09-08 ENCOUNTER — PATIENT MESSAGE (OUTPATIENT)
Dept: NEUROSURGERY | Facility: CLINIC | Age: 67
End: 2022-09-08
Payer: MEDICARE

## 2022-09-15 ENCOUNTER — OFFICE VISIT (OUTPATIENT)
Dept: CARDIOLOGY | Facility: CLINIC | Age: 67
End: 2022-09-15
Payer: MEDICARE

## 2022-09-15 VITALS
WEIGHT: 226.44 LBS | BODY MASS INDEX: 33.54 KG/M2 | SYSTOLIC BLOOD PRESSURE: 125 MMHG | DIASTOLIC BLOOD PRESSURE: 75 MMHG | RESPIRATION RATE: 18 BRPM | HEIGHT: 69 IN | OXYGEN SATURATION: 98 % | HEART RATE: 68 BPM

## 2022-09-15 DIAGNOSIS — I10 ESSENTIAL HYPERTENSION: ICD-10-CM

## 2022-09-15 DIAGNOSIS — I25.10 CORONARY ARTERY DISEASE INVOLVING NATIVE CORONARY ARTERY OF NATIVE HEART WITHOUT ANGINA PECTORIS: Primary | ICD-10-CM

## 2022-09-15 DIAGNOSIS — R93.1 AGATSTON CORONARY ARTERY CALCIUM SCORE BETWEEN 200 AND 399: ICD-10-CM

## 2022-09-15 DIAGNOSIS — Z82.49 FH: CAD (CORONARY ARTERY DISEASE): ICD-10-CM

## 2022-09-15 DIAGNOSIS — E78.49 OTHER HYPERLIPIDEMIA: ICD-10-CM

## 2022-09-15 DIAGNOSIS — E66.9 NON MORBID OBESITY, UNSPECIFIED OBESITY TYPE: ICD-10-CM

## 2022-09-15 PROCEDURE — 99214 OFFICE O/P EST MOD 30 MIN: CPT | Mod: PBBFAC | Performed by: INTERNAL MEDICINE

## 2022-09-15 PROCEDURE — 99999 PR PBB SHADOW E&M-EST. PATIENT-LVL IV: CPT | Mod: PBBFAC,,, | Performed by: INTERNAL MEDICINE

## 2022-09-15 PROCEDURE — 99214 OFFICE O/P EST MOD 30 MIN: CPT | Mod: S$PBB,,, | Performed by: INTERNAL MEDICINE

## 2022-09-15 PROCEDURE — 99999 PR PBB SHADOW E&M-EST. PATIENT-LVL IV: ICD-10-PCS | Mod: PBBFAC,,, | Performed by: INTERNAL MEDICINE

## 2022-09-15 PROCEDURE — 93005 ELECTROCARDIOGRAM TRACING: CPT | Mod: PBBFAC | Performed by: INTERNAL MEDICINE

## 2022-09-15 PROCEDURE — 99214 PR OFFICE/OUTPT VISIT, EST, LEVL IV, 30-39 MIN: ICD-10-PCS | Mod: S$PBB,,, | Performed by: INTERNAL MEDICINE

## 2022-09-15 PROCEDURE — 93010 ELECTROCARDIOGRAM REPORT: CPT | Mod: S$PBB,,, | Performed by: INTERNAL MEDICINE

## 2022-09-15 PROCEDURE — 93010 EKG 12-LEAD: ICD-10-PCS | Mod: S$PBB,,, | Performed by: INTERNAL MEDICINE

## 2022-09-15 NOTE — PROGRESS NOTES
CARDIOVASCULAR PROGRESS NOTE    REASON FOR CONSULT:   Claudia Rodriguez is a 67 y.o. female who presents for f/u presumed CAD, HLP/statin intol.    PCP: Jimmy  HISTORY OF PRESENT ILLNESS:   The patient returns for follow-up.  She denies intercurrent angina, dyspnea, palpitations, or syncope.  There has been no PND, orthopnea, or lower extremity edema.  She denies melena, hematuria, or claudicant symptoms.  She is tolerating her high-dose atorvastatin as well as baby aspirin.  She does note frequent nuisance bruising.  There has been no evidence of significant bleeding however.      Reviewed the results of her cardiac PET scan which was normal.    CARDIOVASCULAR HISTORY:   CAD, cor Ca++ 311 on CT 10/2021, cardiac PET 6/2022 neg.    PAST MEDICAL HISTORY:     Past Medical History:   Diagnosis Date    Cholelithiasis     DDD (degenerative disc disease), lumbar     Fatty liver     - noted on U/S 11/2017    Goiter, nodular     HTN (hypertension)     Hyperglycemia     Hyperlipidemia     Hypothyroidism     Hypovitaminosis D     Meningioma     right frontal lobe ; followed by Dr. Enciso once a year    Obesity     Prediabetes     Urinary bladder disorder        PAST SURGICAL HISTORY:     Past Surgical History:   Procedure Laterality Date    BLADDER SUSPENSION      LIVER BIOPSY      thyroid biopsy  7/11/12    tonsillectomy      TONSILLECTOMY         ALLERGIES AND MEDICATION:     Review of patient's allergies indicates:   Allergen Reactions    Zostavax [zoster vaccine live (pf)] Rash     - injection site red, hot, indurated    Demerol [meperidine] Other (See Comments)     Other reaction(s): Nausea  Other reaction(s): Headache    Nsaids (non-steroidal anti-inflammatory drug) Other (See Comments)       Other reaction(s): Difficulty breathing    Other reaction(s): Difficulty breathing    Phenytoin sodium extended Other (See Comments)        Medication List            Accurate as of September 15, 2022  8:57 AM. If you have any  "questions, ask your nurse or doctor.                CONTINUE taking these medications      amLODIPine 10 MG tablet  Commonly known as: NORVASC  Take 1 tablet (10 mg total) by mouth once daily.     aspirin 81 MG EC tablet  Commonly known as: ECOTRIN  Take 1 tablet (81 mg total) by mouth once daily.     atorvastatin 80 MG tablet  Commonly known as: LIPITOR  Take 1 tablet (80 mg total) by mouth every evening.     CALCIUM 500 + D (D3) ORAL     carvediloL 25 MG tablet  Commonly known as: COREG  TAKE 1 TABLET BY MOUTH TWICE A DAY WITH MEALS     CENTRUM ORAL     ergocalciferol 50,000 unit Cap  Commonly known as: ERGOCALCIFEROL  TAKE 1 CAPSULE BY MOUTH ONE TIME PER WEEK     estradioL 0.01 % (0.1 mg/gram) vaginal cream  Commonly known as: ESTRACE     levothyroxine 75 MCG tablet  Commonly known as: SYNTHROID  TAKE 1 TABLET BY MOUTH EVERY DAY     omega-3 fatty acids 1,000 mg Cap     oxybutynin 10 MG 24 hr tablet  Commonly known as: DITROPAN-XL  Take 1 tablet (10 mg total) by mouth once daily.     OZEMPIC 0.25 mg or 0.5 mg(2 mg/1.5 mL) pen injector  Generic drug: semaglutide  Inject 0.5 mg into the skin every 7 days. Start 0.25 mg SC once weekly for 4 weeks, then  Increase to 0.5 mg SC once weekly     pen needle, diabetic 32 gauge x 5/32" Ndle  Commonly known as: BD ULTRA-FINE AMELIA PEN NEEDLE  Use with ozempic     triamterene-hydrochlorothiazide 37.5-25 mg 37.5-25 mg per tablet  Commonly known as: MAXZIDE-25  Take 1 tablet by mouth once daily.              SOCIAL HISTORY:     Social History     Socioeconomic History    Marital status:     Number of children: 1   Occupational History    Occupation: human      Employer: [employer]   Tobacco Use    Smoking status: Never    Smokeless tobacco: Never   Substance and Sexual Activity    Alcohol use: No    Drug use: No    Sexual activity: Yes     Partners: Male   Other Topics Concern    Are you pregnant or think you may be? No    Breast-feeding No   Social History " "Narrative    3 adopted children.       FAMILY HISTORY:     Family History   Problem Relation Age of Onset    Coronary artery disease Father 55    Heart disease Father     Hypertension Father     Melanoma Father     Hypertension Mother     Stroke Mother         2015    Hypertension Sister         controlled by lifestyle    No Known Problems Brother     Pancreatic cancer Maternal Grandfather     Heart failure Maternal Grandmother     Thyroid nodules Sister         s/p thyroidectomy    Thyroid disease Sister     Thyroid nodules Sister     No Known Problems Brother     Diabetes Neg Hx     Psoriasis Neg Hx     Lupus Neg Hx     Eczema Neg Hx     Thyroid cancer Neg Hx     Breast cancer Neg Hx     Ovarian cancer Neg Hx     Cervical cancer Neg Hx     Endometrial cancer Neg Hx     Vaginal cancer Neg Hx     Cirrhosis Neg Hx        REVIEW OF SYSTEMS:   Review of Systems   Constitutional:  Negative for chills, diaphoresis and fever.   HENT:  Negative for nosebleeds.    Eyes:  Negative for blurred vision, double vision and photophobia.   Respiratory:  Negative for hemoptysis, shortness of breath and wheezing.    Cardiovascular:  Negative for chest pain, palpitations, orthopnea, claudication, leg swelling and PND.   Gastrointestinal:  Negative for abdominal pain, blood in stool, heartburn, melena, nausea and vomiting.   Genitourinary:  Negative for flank pain and hematuria.   Musculoskeletal:  Negative for falls, myalgias and neck pain.   Skin:  Negative for rash.   Neurological:  Negative for dizziness, seizures, loss of consciousness, weakness and headaches.   Endo/Heme/Allergies:  Negative for polydipsia. Bruises/bleeds easily.   Psychiatric/Behavioral:  Negative for depression and memory loss. The patient is not nervous/anxious.      PHYSICAL EXAM:     Vitals:    09/15/22 0852   BP: (!) 140/77   Pulse: 68   Resp: 18      Body mass index is 33.44 kg/m².  Weight: 102.7 kg (226 lb 6.6 oz)   Height: 5' 9" (175.3 cm)     Physical " Exam  Vitals reviewed.   Constitutional:       General: She is not in acute distress.     Appearance: She is well-developed. She is obese. She is not ill-appearing, toxic-appearing or diaphoretic.   HENT:      Head: Normocephalic and atraumatic.   Eyes:      General: No scleral icterus.     Extraocular Movements: Extraocular movements intact.      Conjunctiva/sclera: Conjunctivae normal.      Pupils: Pupils are equal, round, and reactive to light.   Neck:      Thyroid: No thyromegaly.      Vascular: Normal carotid pulses. No JVD.      Trachea: Trachea normal.   Cardiovascular:      Rate and Rhythm: Normal rate and regular rhythm.      Pulses:           Carotid pulses are 2+ on the right side and 2+ on the left side.     Heart sounds: S1 normal and S2 normal. No murmur heard.    No friction rub. No gallop.   Pulmonary:      Effort: Pulmonary effort is normal. No respiratory distress.      Breath sounds: Normal breath sounds. No stridor. No wheezing, rhonchi or rales.   Chest:      Chest wall: No tenderness.   Abdominal:      General: There is no distension.      Palpations: Abdomen is soft.   Musculoskeletal:         General: No swelling or tenderness. Normal range of motion.      Cervical back: Normal range of motion and neck supple. No edema or rigidity.      Right lower leg: No edema.      Left lower leg: No edema.   Feet:      Right foot:      Skin integrity: No ulcer.      Left foot:      Skin integrity: No ulcer.   Skin:     General: Skin is warm and dry.      Coloration: Skin is not jaundiced.   Neurological:      General: No focal deficit present.      Mental Status: She is alert and oriented to person, place, and time.      Cranial Nerves: No cranial nerve deficit.   Psychiatric:         Mood and Affect: Mood normal.         Speech: Speech normal.         Behavior: Behavior normal. Behavior is cooperative.       DATA:   EKG: (personally reviewed tracing)  9/15/22 SR 63    Laboratory:  CBC:  Recent Labs   Lab  12/17/20  0829 12/14/21  1250 07/11/22  0924   WBC 6.58 7.87 7.34   Hemoglobin 13.1 12.9 12.9   Hematocrit 40.9 40.6 40.5   Platelets 342 394 327       CHEMISTRIES:  Recent Labs   Lab 07/22/21  0933 12/14/21  1250 07/11/22  0924   Glucose 116 H 96 106   Sodium 140 137 141   Potassium 4.2 3.8 4.1   BUN 16 14 16   Creatinine 0.7 0.7 0.7   eGFR if African American >60.0 >60.0 >60.0   eGFR if non African American >60.0 >60.0 >60.0   Calcium 9.4 10.1 9.4       CARDIAC BIOMARKERS:        COAGS:  Recent Labs   Lab 12/17/20  0829 12/14/21  1256 07/11/22  0924   INR 0.9 1.0 1.0       LIPIDS/LFTS:  Recent Labs   Lab 10/17/19  0755 06/26/20  0850 04/30/21  1515 07/22/21  0933 12/14/21  1250 07/11/22  0924   Cholesterol 260 H  --  253 H  --   --  135   Triglycerides 199 H  --  154 H  --   --  80   HDL 47  --  46  --   --  50   LDL Cholesterol 173.2 H  --  176.2 H  --   --  69.0   Non-HDL Cholesterol 213  --  207  --   --  85   AST 18   < >  --  18 20 21   ALT 23   < >  --  18 18 22    < > = values in this interval not displayed.     The 10-year ASCVD risk score (Nancy DK, et al., 2019) is: 10.7%    Values used to calculate the score:      Age: 67 years      Sex: Female      Is Non- : No      Diabetic: No      Tobacco smoker: No      Systolic Blood Pressure: 140 mmHg      Is BP treated: Yes      HDL Cholesterol: 50 mg/dL      Total Cholesterol: 135 mg/dL      Cardiovascular Testing:  Cardiac PET 6/13/22    The myocardial perfusion images are normal without evidence of ischemia or scar.    The whole heart absolute myocardial perfusion values averaged 0.55 cc/min/g at rest, which is reduced; 1.24 cc/min/g at stress, which is mildly reduced; and CFR is 2.27 , which is mildly reduced.    CT attenuation images demonstrate mild diffuse coronary calcifications in the LAD, LCX and RCA territory.    The gated perfusion images showed an ejection fraction of 59% at rest and 66% during stress. A normal ejection  fraction is greater than 53%.    The wall motion is normal at rest and during stress.    The LV cavity size is normal at rest and stress.    The EKG portion of this study is negative for ischemia.    The patient reported no chest pain during the stress test.    Ex MPI 5/12/22 ((images prev personally reviewed and interpreted, ?anterior ischemia)    Equivocal myocardial perfusion scan.    There is a mild to moderate intensity, moderate to large sized, equivocal perfusion abnormality that is mostly fixed with some reversible areas in the anterior wall(s). This finding is equivocal due to a breast shadow overlying the myocardium.    There are no other significant perfusion abnormalities.    There is a mild to moderate intensity perfusion abnormality in the anterior wall of the left ventricle, secondary to breast attenuation.    The gated perfusion images showed an ejection fraction of 64% post stress.    There is normal wall motion post stress.    The EKG portion of this study is negative for ischemia. (Juventino 5:44, 7 METS, 89% MPHR)    The patient reported no chest pain during the stress test.    During stress, rare PACs are noted.    The exercise capacity was average.    A PET stress exam is recommended if clinically indicated.    Echo 5/12/22  The left ventricle is normal in size with mild concentric hypertrophy and normal systolic function.  The estimated ejection fraction is 60%.  Grade II left ventricular diastolic dysfunction.  Normal right ventricular size with normal right ventricular systolic function.  Severe left atrial enlargement.  Mild right atrial enlargement.  Moderate mitral regurgitation.  Normal central venous pressure (3 mmHg).  The estimated PA systolic pressure is 35 mmHg.  There is mild pulmonary hypertension.    CT Cor Ca+ 10/21/21  Agatston calcium score equals 311, which translates to the 85-90th percentile for coronary calcium load based on age and sex.  Additional findings and  recommendations above.      ASSESSMENT:   # CAD, Agatston score 311, MPI 5/2022 with ?anterior ischemia.  Cardiac PET 6/2022 neg.  Asymptomatic.  # HLP, tolerating atorva 80mg (hx statin intolerance)  # BMI 33, stable vs alst OV  # HTN, controlled  # FH CAD    PLAN:   Cont med rx  Start ASA 81mg qd, nuisance bruising noted  Cont atorva 80mg qhs  Diet/exercise/weight loss  RTC 1 year    Kev Almodovar MD, FACC

## 2022-09-19 ENCOUNTER — PATIENT MESSAGE (OUTPATIENT)
Dept: NEUROSURGERY | Facility: CLINIC | Age: 67
End: 2022-09-19
Payer: MEDICARE

## 2022-09-26 ENCOUNTER — TELEPHONE (OUTPATIENT)
Dept: NEUROSURGERY | Facility: CLINIC | Age: 67
End: 2022-09-26
Payer: MEDICARE

## 2022-09-26 NOTE — TELEPHONE ENCOUNTER
Spoke with pt, appt with Dr. Victor scheduled for the next available. I notified that she will need to bring a copy of her last MRI on a disc to her appt, pt stated understanding

## 2022-09-26 NOTE — TELEPHONE ENCOUNTER
----- Message from Jackelin Cohn MA sent at 9/26/2022 12:03 PM CDT -----  Regarding: f/u appt  Contact: Claudia Healy is requesting a call back in regards to being scheduled for a follow up visit. Please call pt to assist her with getting scheduled. Pt stated this is her third time calling.          Confirmed Contact below:  Contact Name:Claudia Michael  Phone Number: [phone]

## 2022-09-29 ENCOUNTER — PATIENT MESSAGE (OUTPATIENT)
Dept: CARDIOLOGY | Facility: CLINIC | Age: 67
End: 2022-09-29
Payer: MEDICARE

## 2022-09-29 ENCOUNTER — PATIENT MESSAGE (OUTPATIENT)
Dept: FAMILY MEDICINE | Facility: CLINIC | Age: 67
End: 2022-09-29
Payer: MEDICARE

## 2022-09-30 ENCOUNTER — PATIENT MESSAGE (OUTPATIENT)
Dept: FAMILY MEDICINE | Facility: CLINIC | Age: 67
End: 2022-09-30
Payer: MEDICARE

## 2022-10-06 ENCOUNTER — OFFICE VISIT (OUTPATIENT)
Dept: NEUROSURGERY | Facility: CLINIC | Age: 67
End: 2022-10-06
Payer: MEDICARE

## 2022-10-06 VITALS
TEMPERATURE: 98 F | DIASTOLIC BLOOD PRESSURE: 76 MMHG | HEART RATE: 63 BPM | SYSTOLIC BLOOD PRESSURE: 140 MMHG | BODY MASS INDEX: 33.54 KG/M2 | HEIGHT: 69 IN | WEIGHT: 226.44 LBS

## 2022-10-06 DIAGNOSIS — D32.0 BENIGN MENINGIOMA OF BRAIN: Primary | ICD-10-CM

## 2022-10-06 PROCEDURE — 99999 PR PBB SHADOW E&M-EST. PATIENT-LVL IV: ICD-10-PCS | Mod: PBBFAC,,, | Performed by: NEUROLOGICAL SURGERY

## 2022-10-06 PROCEDURE — 99999 PR PBB SHADOW E&M-EST. PATIENT-LVL IV: CPT | Mod: PBBFAC,,, | Performed by: NEUROLOGICAL SURGERY

## 2022-10-06 PROCEDURE — 99204 PR OFFICE/OUTPT VISIT, NEW, LEVL IV, 45-59 MIN: ICD-10-PCS | Mod: S$PBB,,, | Performed by: NEUROLOGICAL SURGERY

## 2022-10-06 PROCEDURE — 99204 OFFICE O/P NEW MOD 45 MIN: CPT | Mod: S$PBB,,, | Performed by: NEUROLOGICAL SURGERY

## 2022-10-06 PROCEDURE — 99214 OFFICE O/P EST MOD 30 MIN: CPT | Mod: PBBFAC | Performed by: NEUROLOGICAL SURGERY

## 2022-10-07 NOTE — PROGRESS NOTES
Neurosurgery  History & Physical    SUBJECTIVE:     Chief Complaint:  Meningioma.    History of Present Illness:  Ms. Rodriguez is a 67-year-old female who was referred to me by Dr. Jessica Marquez.  Her past medical history is significant for cholelithiasis, degenerative disc disease of the lumbar spine, fatty liver, goiter, hypertension, hyperglycemia, hyperlipidemia, hypothyroidism, obesity, prediabetes, and urinary bladder disorder.  She initially underwent an MRI of the brain more than 10 years ago for nocturnal headaches that woke her up.  Imaging studies at that time showed a small right frontal meningioma.  Since that time, her headaches have resolved.  She is been followed yearly for the meningioma and subsequently every other year for the meningioma.  This has been stable with only a very slight increase in size over the past 10-12 years.  The patient is now transferring her care to [facility] and is here today to see me in consultation.  Currently, she denies headaches, nausea, vomiting, dizziness, weakness, paresthesias, or seizure-like activity.  She feels well overall.    Review of patient's allergies indicates:   Allergen Reactions    Zostavax [zoster vaccine live (pf)] Rash     - injection site red, hot, indurated    Demerol [meperidine] Other (See Comments)     Other reaction(s): Nausea  Other reaction(s): Headache    Nsaids (non-steroidal anti-inflammatory drug) Other (See Comments)       Other reaction(s): Difficulty breathing    Other reaction(s): Difficulty breathing    Phenytoin sodium extended Other (See Comments)       Current Outpatient Medications   Medication Sig Dispense Refill    amLODIPine (NORVASC) 10 MG tablet Take 1 tablet (10 mg total) by mouth once daily. 90 tablet 2    aspirin (ECOTRIN) 81 MG EC tablet Take 1 tablet (81 mg total) by mouth once daily. 90 tablet 3    atorvastatin (LIPITOR) 80 MG tablet Take 1 tablet (80 mg total) by mouth every evening. 90 tablet 3    CALCIUM  "CARBONATE/VITAMIN D3 (CALCIUM 500 + D, D3, ORAL) once daily.       carvediloL (COREG) 25 MG tablet TAKE 1 TABLET BY MOUTH TWICE A DAY WITH MEALS 180 tablet 3    ergocalciferol (ERGOCALCIFEROL) 50,000 unit Cap TAKE 1 CAPSULE BY MOUTH ONE TIME PER WEEK 12 capsule 3    estradioL (ESTRACE) 0.01 % (0.1 mg/gram) vaginal cream Place vaginally once daily.      FOLIC ACID/MV,FE,OTHER MIN (CENTRUM ORAL) Take 1 tablet by mouth.      levothyroxine (SYNTHROID) 75 MCG tablet TAKE 1 TABLET BY MOUTH EVERY DAY 90 tablet 3    omega-3 fatty acids 1,000 mg Cap Take 1 capsule by mouth Daily.      oxybutynin (DITROPAN-XL) 10 MG 24 hr tablet Take 1 tablet (10 mg total) by mouth once daily. 90 tablet 3    pen needle, diabetic (BD ULTRA-FINE AMELIA PEN NEEDLE) 32 gauge x 5/32" Ndle Use with ozempic 30 each 3    semaglutide (OZEMPIC) 0.25 mg or 0.5 mg(2 mg/1.5 mL) pen injector Inject 0.5 mg into the skin every 7 days. Start 0.25 mg SC once weekly for 4 weeks, then  Increase to 0.5 mg SC once weekly 1 pen 3    triamterene-hydrochlorothiazide 37.5-25 mg (MAXZIDE-25) 37.5-25 mg per tablet Take 1 tablet by mouth once daily. 90 tablet 2     No current facility-administered medications for this visit.       Past Medical History:   Diagnosis Date    Cholelithiasis     DDD (degenerative disc disease), lumbar     Fatty liver     - noted on U/S 11/2017    Goiter, nodular     HTN (hypertension)     Hyperglycemia     Hyperlipidemia     Hypothyroidism     Hypovitaminosis D     Meningioma     right frontal lobe ; followed by Dr. Enciso once a year    Obesity     Prediabetes     Urinary bladder disorder      Past Surgical History:   Procedure Laterality Date    BLADDER SUSPENSION      LIVER BIOPSY      thyroid biopsy  7/11/12    tonsillectomy      TONSILLECTOMY       Family History       Problem Relation (Age of Onset)    Coronary artery disease Father (55)    Heart disease Father    Heart failure Maternal Grandmother    Hypertension Father, Mother, " Sister    Melanoma Father    No Known Problems Brother, Brother    Pancreatic cancer Maternal Grandfather    Stroke Mother    Thyroid disease Sister    Thyroid nodules Sister, Sister          Social History     Socioeconomic History    Marital status:     Number of children: 1   Occupational History    Occupation: human      Employer: [employer]   Tobacco Use    Smoking status: Never    Smokeless tobacco: Never   Substance and Sexual Activity    Alcohol use: No    Drug use: No    Sexual activity: Yes     Partners: Male   Other Topics Concern    Are you pregnant or think you may be? No    Breast-feeding No   Social History Narrative    3 adopted children.       Review of Systems   Constitutional:  Positive for unexpected weight change. Negative for activity change, diaphoresis, fatigue and fever.   HENT:  Negative for hearing loss, nosebleeds, tinnitus and trouble swallowing.    Eyes:  Negative for visual disturbance.   Respiratory:  Negative for cough, shortness of breath and wheezing.    Cardiovascular:  Negative for chest pain and palpitations.   Gastrointestinal:  Negative for abdominal distention, abdominal pain, blood in stool, constipation, diarrhea, nausea and vomiting.   Endocrine: Negative for cold intolerance and heat intolerance.   Genitourinary:  Negative for difficulty urinating, dysuria, frequency and urgency.   Musculoskeletal:  Negative for back pain, gait problem, joint swelling, myalgias, neck pain and neck stiffness.   Skin:  Negative for color change, rash and wound.   Allergic/Immunologic: Negative for environmental allergies and food allergies.   Neurological:  Negative for dizziness, seizures, facial asymmetry, speech difficulty, weakness, light-headedness, numbness and headaches.   Hematological:  Does not bruise/bleed easily.   Psychiatric/Behavioral:  Negative for agitation, behavioral problems, dysphoric mood and hallucinations. The patient is not nervous/anxious.   "    OBJECTIVE:     Vital Signs  Temp: 98.1 °F (36.7 °C)  Pulse: 63  BP: (!) 140/76  Pain Score: 0-No pain  Height: 5' 9" (175.3 cm)  Weight: 102.7 kg (226 lb 6.6 oz)  Body mass index is 33.44 kg/m².      Physical Exam:  Vitals reviewed.    Constitutional: She appears well-developed and well-nourished. No distress.     Eyes: Pupils are equal, round, and reactive to light. Conjunctivae and EOM are normal.     Cardiovascular: Normal rate, regular rhythm, normal pulses and no edema.     Abdominal: Soft. Bowel sounds are normal.     Skin: Skin displays no rash on trunk and no rash on extremities. Skin displays no lesions on trunk and no lesions on extremities.     Psych/Behavior: She is alert. She is oriented to person, place, and time. She has a normal mood and affect.     Musculoskeletal: Gait is normal.        Neck: Range of motion is full. There is no tenderness. Muscle strength is 5/5. Tone is normal.        Back: Range of motion is full. There is no tenderness. Muscle strength is 5/5. Tone is normal.        Right Upper Extremities: Range of motion is full. There is no tenderness. Muscle strength is 5/5. Tone is normal.        Left Upper Extremities: Range of motion is full. There is no tenderness. Muscle strength is 5/5. Tone is normal.       Right Lower Extremities: Range of motion is full. There is no tenderness. Muscle strength is 5/5. Tone is normal.        Left Lower Extremities: Range of motion is full. There is no tenderness. Muscle strength is 5/5. Tone is normal.     Neurological:        Coordination: She has a normal Romberg Test, normal finger to nose coordination and normal tandem walking coordination.        Sensory: There is no sensory deficit in the trunk. There is no sensory deficit in the extremities.        DTRs: DTRs are DTRS NORMAL AND SYMMETRICnormal and symmetric. She displays no Babinski's sign on the right side. She displays no Babinski's sign on the left side.        Cranial nerves: Cranial " nerve(s) II, III, IV, V, VI, VII, VIII, IX, X, XI and XII are intact.       Diagnostic Results:  She has an MRI with and without contrast of the brain available for review which I personally reviewed.  This was from October 2021 and is an outside study which I reviewed and kept.  This shows an approximately 1.2 x 1 x 1.2 cm right frontal homogeneously enhancing mass consistent with meningioma.  There is no surrounding vasogenic edema or FLAIR sequence signal changes.  When compared with all of her previous MRIs of the brain, this is unchanged in size.    ASSESSMENT/PLAN:     Ms. Rodriguez is a 67-year-old female with an incidentally found known right frontal meningioma which is reasonably small in size without a surrounding mass effect or edema.  This has remained stable on multiple follow-up scans.  I agree that this is non operative and we will continue following it conservatively.  Currently, she is being seen every 2 years with a repeat MRI with and without contrast of the brain.  I believe this is reasonable.  Since her last MRI of the brain was 1 year ago, I will plan on seeing her back in 1 more year with a follow-up MRI with and without contrast of the brain.  She will need Ativan for the MRI which I have agreed to give her.  I will plan on seeing her back in 1 year with the MRI.  She knows she can call with any further questions or concerns in the meantime.        Note dictated with voice recognition software, please excuse any grammatical errors.

## 2022-10-10 ENCOUNTER — PATIENT MESSAGE (OUTPATIENT)
Dept: FAMILY MEDICINE | Facility: CLINIC | Age: 67
End: 2022-10-10
Payer: MEDICARE

## 2022-10-11 ENCOUNTER — IMMUNIZATION (OUTPATIENT)
Dept: FAMILY MEDICINE | Facility: CLINIC | Age: 67
End: 2022-10-11
Payer: MEDICARE

## 2022-10-11 DIAGNOSIS — Z23 FLU VACCINE NEED: Primary | ICD-10-CM

## 2022-10-11 PROCEDURE — 99499 NO LOS: ICD-10-PCS | Mod: S$PBB,,, | Performed by: INTERNAL MEDICINE

## 2022-10-11 PROCEDURE — G0008 ADMIN INFLUENZA VIRUS VAC: HCPCS | Mod: PBBFAC,PO

## 2022-10-11 PROCEDURE — 99499 UNLISTED E&M SERVICE: CPT | Mod: S$PBB,,, | Performed by: INTERNAL MEDICINE

## 2022-10-13 ENCOUNTER — TELEPHONE (OUTPATIENT)
Dept: BARIATRICS | Facility: CLINIC | Age: 67
End: 2022-10-13
Payer: MEDICARE

## 2022-10-16 ENCOUNTER — PATIENT MESSAGE (OUTPATIENT)
Dept: ENDOCRINOLOGY | Facility: CLINIC | Age: 67
End: 2022-10-16
Payer: MEDICARE

## 2022-10-21 ENCOUNTER — PATIENT MESSAGE (OUTPATIENT)
Dept: FAMILY MEDICINE | Facility: CLINIC | Age: 67
End: 2022-10-21
Payer: MEDICARE

## 2022-10-26 ENCOUNTER — TELEPHONE (OUTPATIENT)
Dept: BARIATRICS | Facility: CLINIC | Age: 67
End: 2022-10-26
Payer: MEDICARE

## 2022-10-27 ENCOUNTER — TELEPHONE (OUTPATIENT)
Dept: BARIATRICS | Facility: CLINIC | Age: 67
End: 2022-10-27
Payer: MEDICARE

## 2022-11-03 ENCOUNTER — TELEPHONE (OUTPATIENT)
Dept: BARIATRICS | Facility: CLINIC | Age: 67
End: 2022-11-03
Payer: MEDICARE

## 2022-11-10 ENCOUNTER — PATIENT MESSAGE (OUTPATIENT)
Dept: HEPATOLOGY | Facility: CLINIC | Age: 67
End: 2022-11-10
Payer: MEDICARE

## 2022-12-11 ENCOUNTER — PATIENT MESSAGE (OUTPATIENT)
Dept: HEPATOLOGY | Facility: CLINIC | Age: 67
End: 2022-12-11
Payer: MEDICARE

## 2022-12-12 ENCOUNTER — TELEPHONE (OUTPATIENT)
Dept: HEPATOLOGY | Facility: CLINIC | Age: 67
End: 2022-12-12
Payer: MEDICARE

## 2022-12-12 DIAGNOSIS — F40.240 CLAUSTROPHOBIA: Primary | ICD-10-CM

## 2022-12-12 RX ORDER — DIAZEPAM 2 MG/1
2 TABLET ORAL ONCE
Qty: 2 TABLET | Refills: 0 | Status: SHIPPED | OUTPATIENT
Start: 2022-12-12 | End: 2023-03-03

## 2022-12-12 NOTE — TELEPHONE ENCOUNTER
Telephone call to patient's local pharmacy on file. VO provided for RX as below:    diazePAM (VALIUM) 2 MG tablet 2 tablet 0 12/12/2022 12/12/2022 No   Sig - Route: Take 1 tablet (2 mg total) by mouth once. Take 1 tablet by mouth 30-60 minutes prior to MRI; may repeat once as needed. for 1 dose - Oral   Class: Phone In   Order: [id number]   Date/Time Signed: 12/12/2022 12:50         Patient updated regarding prescription via [facility] message.       Hepatology Nurse Practitioner  [facility]

## 2022-12-14 ENCOUNTER — TELEPHONE (OUTPATIENT)
Dept: HEPATOLOGY | Facility: CLINIC | Age: 67
End: 2022-12-14
Payer: MEDICARE

## 2022-12-14 ENCOUNTER — HOSPITAL ENCOUNTER (OUTPATIENT)
Dept: RADIOLOGY | Facility: HOSPITAL | Age: 67
Discharge: HOME | End: 2022-12-14
Attending: PHYSICIAN ASSISTANT
Payer: MEDICARE

## 2022-12-14 DIAGNOSIS — R16.0 HEPATOMEGALY: Primary | ICD-10-CM

## 2022-12-14 DIAGNOSIS — F40.240 CLAUSTROPHOBIA: ICD-10-CM

## 2022-12-14 DIAGNOSIS — K76.0 FATTY LIVER: ICD-10-CM

## 2022-12-14 DIAGNOSIS — K74.01 HEPATIC FIBROSIS, EARLY FIBROSIS: ICD-10-CM

## 2022-12-14 PROCEDURE — 76391 MR ELASTOGRAPHY: ICD-10-PCS | Mod: 26,,, | Performed by: STUDENT IN AN ORGANIZED HEALTH CARE EDUCATION/TRAINING PROGRAM

## 2022-12-14 PROCEDURE — 76391 MR ELASTOGRAPHY: CPT | Mod: TC

## 2022-12-14 PROCEDURE — 76391 MR ELASTOGRAPHY: CPT | Mod: 26,,, | Performed by: STUDENT IN AN ORGANIZED HEALTH CARE EDUCATION/TRAINING PROGRAM

## 2022-12-14 NOTE — TELEPHONE ENCOUNTER
Apparently MR pad for fibrosis staging was malfunctioning unable to obtain liver fibrosis score     Speaking with Dr Miller radiology over the phone    MR Enterography incorrectly ordered verbally under my name, this is not the same test

## 2022-12-14 NOTE — TELEPHONE ENCOUNTER
Placed phone call to patient. Her phone is not working and she cannot hear.    Sent message to radiologist Dr Miller regarding MRE malfunction.  She is scheduled for enterography which is the wrong test. Will update once communication occurs

## 2022-12-15 ENCOUNTER — TELEPHONE (OUTPATIENT)
Dept: HEPATOLOGY | Facility: CLINIC | Age: 67
End: 2022-12-15
Payer: MEDICARE

## 2022-12-15 NOTE — TELEPHONE ENCOUNTER
Called radiology department x 3     Called radiology supervisor x1     Unable to reach     Busy line for MRI Tech as well [phone]    My staff given instructions to reach out.     MRE spot to be overridden per Neha.

## 2022-12-15 NOTE — TELEPHONE ENCOUNTER
"Update reached out to RT Neha Eubanks who stated  " We have put in a ticket for epic team to do a no charge. Please let me know when the new order is in and I will over ride the schedule for Monday and book the pts repeat appt.    Confirmation reference #[id number] for no charge"    Enterography (incorrect test I did not order) cancelled.    I ordered MRI elastography & will have her schedule Monday morning for patient's preferred time.      "

## 2022-12-16 ENCOUNTER — HOSPITAL ENCOUNTER (OUTPATIENT)
Dept: RADIOLOGY | Facility: HOSPITAL | Age: 67
Discharge: HOME | End: 2022-12-16
Attending: NURSE PRACTITIONER
Payer: MEDICARE

## 2022-12-16 ENCOUNTER — OFFICE VISIT (OUTPATIENT)
Dept: FAMILY MEDICINE | Facility: CLINIC | Age: 67
End: 2022-12-16
Payer: MEDICARE

## 2022-12-16 ENCOUNTER — TELEPHONE (OUTPATIENT)
Dept: FAMILY MEDICINE | Facility: CLINIC | Age: 67
End: 2022-12-16
Payer: MEDICARE

## 2022-12-16 VITALS
BODY MASS INDEX: 32.11 KG/M2 | OXYGEN SATURATION: 99 % | HEART RATE: 63 BPM | WEIGHT: 216.81 LBS | HEIGHT: 69 IN | SYSTOLIC BLOOD PRESSURE: 136 MMHG | TEMPERATURE: 98 F | DIASTOLIC BLOOD PRESSURE: 78 MMHG

## 2022-12-16 DIAGNOSIS — M25.462 PAIN AND SWELLING OF LEFT KNEE: ICD-10-CM

## 2022-12-16 DIAGNOSIS — I10 ESSENTIAL HYPERTENSION: ICD-10-CM

## 2022-12-16 DIAGNOSIS — E66.9 OBESITY (BMI 30-39.9): ICD-10-CM

## 2022-12-16 DIAGNOSIS — M25.462 PAIN AND SWELLING OF LEFT KNEE: Primary | ICD-10-CM

## 2022-12-16 DIAGNOSIS — M25.562 PAIN AND SWELLING OF LEFT KNEE: ICD-10-CM

## 2022-12-16 DIAGNOSIS — M25.562 PAIN AND SWELLING OF LEFT KNEE: Primary | ICD-10-CM

## 2022-12-16 PROCEDURE — 73562 XR KNEE 3 VIEW LEFT: ICD-10-PCS | Mod: 26,LT,, | Performed by: INTERNAL MEDICINE

## 2022-12-16 PROCEDURE — 99215 PR OFFICE/OUTPT VISIT, EST, LEVL V, 40-54 MIN: ICD-10-PCS | Mod: S$PBB,,, | Performed by: NURSE PRACTITIONER

## 2022-12-16 PROCEDURE — 99215 OFFICE O/P EST HI 40 MIN: CPT | Mod: S$PBB,,, | Performed by: NURSE PRACTITIONER

## 2022-12-16 PROCEDURE — 73562 X-RAY EXAM OF KNEE 3: CPT | Mod: 26,LT,, | Performed by: INTERNAL MEDICINE

## 2022-12-16 PROCEDURE — 99999 PR PBB SHADOW E&M-EST. PATIENT-LVL V: CPT | Mod: PBBFAC,,, | Performed by: NURSE PRACTITIONER

## 2022-12-16 PROCEDURE — 99999 PR PBB SHADOW E&M-EST. PATIENT-LVL V: ICD-10-PCS | Mod: PBBFAC,,, | Performed by: NURSE PRACTITIONER

## 2022-12-16 PROCEDURE — 99215 OFFICE O/P EST HI 40 MIN: CPT | Mod: PBBFAC,PO | Performed by: NURSE PRACTITIONER

## 2022-12-16 PROCEDURE — 73562 X-RAY EXAM OF KNEE 3: CPT | Mod: TC,FY,PO,LT

## 2022-12-16 RX ORDER — ATORVASTATIN CALCIUM 40 MG/1
40 TABLET, FILM COATED ORAL
COMMUNITY
Start: 2022-08-18 | End: 2023-03-03

## 2022-12-16 RX ORDER — ATORVASTATIN CALCIUM 20 MG/1
20 TABLET, FILM COATED ORAL
COMMUNITY
Start: 2022-10-03 | End: 2023-03-03

## 2022-12-16 RX ORDER — ACETAMINOPHEN 500 MG
500 TABLET ORAL EVERY 6 HOURS PRN
Qty: 120 TABLET | Refills: 0 | Status: SHIPPED | OUTPATIENT
Start: 2022-12-16 | End: 2022-12-16

## 2022-12-16 NOTE — PROGRESS NOTES
Chief Complaint  Chief Complaint   Patient presents with    Knee Pain     Left knee pain with swelling       HPI    Claudia Rodriguez is being evaluated for left knee pain and swelling. Pt new to me, but known to clinic with PM and surgical history as below. Pt  reported that she tripped and fell over her sleeping dog around 11 am this morning. Denies twisting motion and/or hearing a popping sound. Denied hitting her head. Stated she fell mostly onto her left knee, with pain experienced almost immediately, with noted swelling without bruising to follow. Reports trouble with wt bearing. Denies any home treatment for pain and/or swelling. Rates pain 10/10. Pain aggravated with movement and weight bearing. Denies other aggravating factors. Otherwise without any other complaints at this time.          PAST MEDICAL HISTORY:  Past Medical History:   Diagnosis Date    Cholelithiasis     DDD (degenerative disc disease), lumbar     Fatty liver     - noted on U/S 11/2017    Goiter, nodular     HTN (hypertension)     Hyperglycemia     Hyperlipidemia     Hypothyroidism     Hypovitaminosis D     Meningioma     right frontal lobe ; followed by Dr. Enciso once a year    Obesity     Prediabetes     Urinary bladder disorder        PAST SURGICAL HISTORY:  Past Surgical History:   Procedure Laterality Date    BLADDER SUSPENSION      LIVER BIOPSY      thyroid biopsy  7/11/12    tonsillectomy      TONSILLECTOMY         SOCIAL HISTORY:  Social History     Socioeconomic History    Marital status:     Number of children: 1   Occupational History    Occupation: human      Employer: [employer]   Tobacco Use    Smoking status: Never    Smokeless tobacco: Never   Substance and Sexual Activity    Alcohol use: No    Drug use: No    Sexual activity: Yes     Partners: Male   Other Topics Concern    Are you pregnant or think you may be? No    Breast-feeding No   Social History Narrative    3 adopted children.       FAMILY  HISTORY:  Family History   Problem Relation Age of Onset    Coronary artery disease Father 55    Heart disease Father     Hypertension Father     Melanoma Father     Hypertension Mother     Stroke Mother         2015    Hypertension Sister         controlled by lifestyle    No Known Problems Brother     Pancreatic cancer Maternal Grandfather     Heart failure Maternal Grandmother     Thyroid nodules Sister         s/p thyroidectomy    Thyroid disease Sister     Thyroid nodules Sister     No Known Problems Brother     Diabetes Neg Hx     Psoriasis Neg Hx     Lupus Neg Hx     Eczema Neg Hx     Thyroid cancer Neg Hx     Breast cancer Neg Hx     Ovarian cancer Neg Hx     Cervical cancer Neg Hx     Endometrial cancer Neg Hx     Vaginal cancer Neg Hx     Cirrhosis Neg Hx        ALLERGIES AND MEDICATIONS: updated and reviewed.  Review of patient's allergies indicates:   Allergen Reactions    Zostavax [zoster vaccine live (pf)] Rash     - injection site red, hot, indurated    Demerol [meperidine] Other (See Comments)     Other reaction(s): Nausea  Other reaction(s): Headache    Nsaids (non-steroidal anti-inflammatory drug) Other (See Comments)       Other reaction(s): Difficulty breathing    Other reaction(s): Difficulty breathing    Phenytoin sodium extended Other (See Comments)     Current Outpatient Medications   Medication Sig Dispense Refill    amLODIPine (NORVASC) 10 MG tablet Take 1 tablet (10 mg total) by mouth once daily. 90 tablet 2    aspirin (ECOTRIN) 81 MG EC tablet Take 1 tablet (81 mg total) by mouth once daily. 90 tablet 3    atorvastatin (LIPITOR) 20 MG tablet Take 20 mg by mouth.      atorvastatin (LIPITOR) 40 MG tablet Take 40 mg by mouth.      atorvastatin (LIPITOR) 80 MG tablet Take 1 tablet (80 mg total) by mouth every evening. 90 tablet 3    CALCIUM CARBONATE/VITAMIN D3 (CALCIUM 500 + D, D3, ORAL) once daily.       carvediloL (COREG) 25 MG tablet TAKE 1 TABLET BY MOUTH TWICE A DAY WITH MEALS 180  "tablet 3    ergocalciferol (ERGOCALCIFEROL) 50,000 unit Cap TAKE 1 CAPSULE BY MOUTH ONE TIME PER WEEK 12 capsule 3    estradioL (ESTRACE) 0.01 % (0.1 mg/gram) vaginal cream Place vaginally once daily.      FOLIC ACID/MV,FE,OTHER MIN (CENTRUM ORAL) Take 1 tablet by mouth.      levothyroxine (SYNTHROID) 75 MCG tablet TAKE 1 TABLET BY MOUTH EVERY DAY 90 tablet 3    omega-3 fatty acids 1,000 mg Cap Take 1 capsule by mouth Daily.      oxybutynin (DITROPAN-XL) 10 MG 24 hr tablet Take 1 tablet (10 mg total) by mouth once daily. 90 tablet 3    pen needle, diabetic (BD ULTRA-FINE AMELIA PEN NEEDLE) 32 gauge x 5/32" Ndle Use with ozempic 30 each 3    semaglutide (OZEMPIC) 0.25 mg or 0.5 mg(2 mg/1.5 mL) pen injector Inject 0.5 mg into the skin every 7 days. Start 0.25 mg SC once weekly for 4 weeks, then  Increase to 0.5 mg SC once weekly 1 pen 3    triamterene-hydrochlorothiazide 37.5-25 mg (MAXZIDE-25) 37.5-25 mg per tablet Take 1 tablet by mouth once daily. 90 tablet 2    diazePAM (VALIUM) 2 MG tablet Take 1 tablet (2 mg total) by mouth once. Take 1 tablet by mouth 30-60 minutes prior to MRI; may repeat once as needed. for 1 dose (Patient not taking: Reported on 12/16/2022) 2 tablet 0     No current facility-administered medications for this visit.         ROS  Review of Systems   Respiratory:  Negative for chest tightness and shortness of breath.    Cardiovascular:  Leg swelling: left knee swelling.   Musculoskeletal:  Positive for arthralgias, gait problem and joint swelling (left knee). Negative for back pain.   Skin:  Negative for color change.         Physical Exam  Vitals:    12/16/22 1303   BP: 136/78   Pulse: 63   Temp: 97.9 °F (36.6 °C)   TempSrc: Oral   SpO2: 99%   Weight: 98.4 kg (216 lb 13.2 oz)   Height: 5' 9" (1.753 m)    Body mass index is 32.02 kg/m².  Weight: 98.4 kg (216 lb 13.2 oz)   Height: 5' 9" (175.3 cm)   Physical Exam  Vitals and nursing note reviewed.   Constitutional:       Appearance: Normal " appearance.   Cardiovascular:      Rate and Rhythm: Normal rate.      Pulses: Normal pulses.      Heart sounds: Normal heart sounds.   Pulmonary:      Effort: Pulmonary effort is normal.      Breath sounds: Normal breath sounds.   Abdominal:      General: Bowel sounds are normal.      Palpations: Abdomen is soft.   Musculoskeletal:         General: Swelling (left knee), tenderness and signs of injury present.      Right knee: Normal.      Left knee: Swelling present. No erythema or ecchymosis. Tenderness present over the patellar tendon. PCL: not able to perform LLE instability testing d/t pain 10/10.  Skin:     General: Skin is warm and dry.   Neurological:      Mental Status: She is alert and oriented to person, place, and time.   Psychiatric:         Mood and Affect: Mood normal.         Behavior: Behavior normal.           Health Maintenance         Date Due Completion Date    COVID-19 Vaccine (5 - Booster for Moderna series) 07/28/2022 6/2/2022    Lipid Panel 07/11/2023 7/11/2022    Hemoglobin A1c 07/11/2023 7/11/2022    Aspirin/Antiplatelet Therapy 10/07/2023 10/7/2022    Mammogram 07/07/2024 7/7/2022    Override on 5/25/2016: Done    Override on 2/3/2014: Done (DIS - normal)    Override on 12/11/2012: Done    Override on 10/27/2010: Done    Colorectal Cancer Screening 02/03/2027 9/11/2020    Override on 2/3/2017: Declined (patient will do yearly fecal occult blood testing)    TETANUS VACCINE 05/31/2031 5/31/2021            Assessment & Plan  Pain and swelling of left knee  -     Ambulatory referral/consult to Orthopedics; Future; Expected date: 12/23/2022  -     X-Ray Knee Complete 4 or More Views Left; Future; Expected date: 12/16/2022  Pt allergic to NSAIDS  OTC extra strength Tylenol as needed for pain - take as directed  Recommend rest, ice, leg elevation  Apply ice to affected area at least 10 minutes at a time as tolerated  Crutches for wt bearing, support - education and demonstration performed with  understanding verbalized along with proper returned demonstration    Essential hypertension  The current medical regimen is effective;  continue present plan and medications.    Obesity (BMI 30-39.9)  The patient is asked to make an attempt to improve diet and exercise patterns to aid in medical management of this problem.           Follow-up: Follow up if symptoms worsen or fail to improve.

## 2022-12-19 ENCOUNTER — HOSPITAL ENCOUNTER (OUTPATIENT)
Dept: RADIOLOGY | Facility: HOSPITAL | Age: 67
Discharge: HOME | End: 2022-12-19
Attending: PHYSICIAN ASSISTANT
Payer: MEDICARE

## 2022-12-19 DIAGNOSIS — R16.0 HEPATOMEGALY: ICD-10-CM

## 2022-12-19 DIAGNOSIS — K74.01 HEPATIC FIBROSIS, EARLY FIBROSIS: ICD-10-CM

## 2022-12-19 DIAGNOSIS — F40.240 CLAUSTROPHOBIA: ICD-10-CM

## 2022-12-19 PROCEDURE — 76391 MR ELASTOGRAPHY: CPT | Mod: 26,,, | Performed by: RADIOLOGY

## 2022-12-19 PROCEDURE — 76391 MR ELASTOGRAPHY: CPT | Mod: TC

## 2022-12-19 PROCEDURE — 76391 MR ELASTOGRAPHY: ICD-10-PCS | Mod: 26,,, | Performed by: RADIOLOGY

## 2022-12-20 ENCOUNTER — OFFICE VISIT (OUTPATIENT)
Dept: ORTHOPEDICS | Facility: CLINIC | Age: 67
End: 2022-12-20
Payer: MEDICARE

## 2022-12-20 VITALS — HEIGHT: 69 IN | WEIGHT: 216 LBS | BODY MASS INDEX: 31.99 KG/M2

## 2022-12-20 DIAGNOSIS — M25.562 PAIN AND SWELLING OF LEFT KNEE: ICD-10-CM

## 2022-12-20 DIAGNOSIS — S80.02XA CONTUSION OF LEFT KNEE, INITIAL ENCOUNTER: Primary | ICD-10-CM

## 2022-12-20 DIAGNOSIS — M25.462 PAIN AND SWELLING OF LEFT KNEE: ICD-10-CM

## 2022-12-20 DIAGNOSIS — T14.8XXA HEMATOMA: ICD-10-CM

## 2022-12-20 PROCEDURE — 99203 PR OFFICE/OUTPT VISIT, NEW, LEVL III, 30-44 MIN: ICD-10-PCS | Mod: S$PBB,,, | Performed by: ORTHOPAEDIC SURGERY

## 2022-12-20 PROCEDURE — 99203 OFFICE O/P NEW LOW 30 MIN: CPT | Mod: S$PBB,,, | Performed by: ORTHOPAEDIC SURGERY

## 2022-12-20 PROCEDURE — 99999 PR PBB SHADOW E&M-EST. PATIENT-LVL III: CPT | Mod: PBBFAC,,, | Performed by: ORTHOPAEDIC SURGERY

## 2022-12-20 PROCEDURE — 99999 PR PBB SHADOW E&M-EST. PATIENT-LVL III: ICD-10-PCS | Mod: PBBFAC,,, | Performed by: ORTHOPAEDIC SURGERY

## 2022-12-20 PROCEDURE — 99213 OFFICE O/P EST LOW 20 MIN: CPT | Mod: PBBFAC,PN | Performed by: ORTHOPAEDIC SURGERY

## 2022-12-20 NOTE — PROGRESS NOTES
NEW PATIENT ORTHOPAEDIC: Knee    PRIMARY CARE PHYSICIAN: Jessica Marquez MD   REFERRING PROVIDER: Abby Machuca, [facility]  [address]  [place]  [place] [address]     ASSESSMENT & PLAN:    Impression:  Left Knee Contusion  Left Knee Hematoma    Follow Up Plan: PRN     Non operative care:    Claudia Rodriguez has physical exam evidence of above and wishes to pursue an non-operative care. I am recommending the following: Zeina COLINDRES, Observation    Patient comes in with a 5 day history of having acute onset left knee pain.  This is stemming from a fall when she tripped over her dog and landed onto a flexed knee.  She saw a primary care provider who recommended Tylenol and ice for her as well as referral to me for additional workup.  Today the patient is reporting that her swelling and pain are improving.  She is no longer using crutches for ambulation.  She does not report any instability symptoms.  She has a large soft tissue swelling which is painful over the superior medial aspect of her knee.  This would be consistent with a hematoma and is now tracking into the upper portion of her calf and posterior thigh.  Clinically I do not suspect anything further.  She is able to straighten her leg in has good strength against gravity resistance.  I suspect that her swelling and pain we will continue to improve.  I added a compression sleeve for her.  And discussed consistent use of ice.  Should this fail to alleviate her symptoms are produce significant improvement in her pains over the next 2 weeks then my next step would be consideration of MRI to ensure no insufficiency fracture other ligamentous issue that would require alternative treatment.    The patient has been ordered:  None    CONSULTS:   None    ACTIVE PROBLEM LIST  Patient Active Problem List   Diagnosis    Goiter, nodular    Essential hypertension    Prediabetes    Other hyperlipidemia    Acquired hypothyroidism    Vitamin D deficiency disease    Benign meningioma  of brain    Statin intolerance    Urinary incontinence    Fatty liver    Vaginal atrophy    Urinary incontinence, mixed    Obesity (BMI 30-39.9)    Hepatomegaly    History of UTI    De Quervain's disease (tenosynovitis)    Agatston coronary artery calcium score between 200 and 399    FH: CAD (coronary artery disease)           SUBJECTIVE    CHIEF COMPLAINT: Knee Pain    HPI:   Claudia Rodriguez is a 67 y.o. female here for evaluation and management of left knee pain. There is a specific incident that brought about this pain. she has had progressive problems with the knee(s) starting 5 days ago but is now progressing to interfere with activities which include: walking, functional household ADL's, enjoying hobbies, and rising from a sitting position    Currently the pain in the joint is rated at severe with activity. The pain is constant and is located in the knee, located medially. The pain is described as aching, severe, stiffness, and throbbing. Relieving factors include ambulatory device, rest, ice or heat, and over the counter medication .     There is not associated Catching, Clicking, and Popping.     Claudia Rodriguez has no additional complaints.     PROGRESSIVE SYMPTOMS:  Pain worsened by weight bearing    FUNCTIONAL STATUS:   Climb a flight of stairs or walk up a hill     PREVIOUS TREATMENTS:  Medical: Intolerant of NSAIDS and Tylenol  Physical Therapy: Activities Modified   Previous Orthopaedic Surgery: None    REVIEW OF SYSTEMS:  PAIN ASSESSMENT:  See HPI.  MUSCULOSKELETAL: See HPI.  OTHER 10 point review of systems is negative except as stated in HPI above    PAST MEDICAL HISTORY   has a past medical history of Cholelithiasis, DDD (degenerative disc disease), lumbar, Fatty liver, Goiter, nodular, HTN (hypertension), Hyperglycemia, Hyperlipidemia, Hypothyroidism, Hypovitaminosis D, Meningioma, Obesity, Prediabetes, and Urinary bladder disorder.     PAST SURGICAL HISTORY   has a past surgical history that  includes Bladder suspension; thyroid biopsy (7/11/12); tonsillectomy; Tonsillectomy; and Liver biopsy.     FAMILY HISTORY  family history includes Coronary artery disease (age of onset: 55) in her father; Heart disease in her father; Heart failure in her maternal grandmother; Hypertension in her father, mother, and sister; Melanoma in her father; No Known Problems in her brother and brother; Pancreatic cancer in her maternal grandfather; Stroke in her mother; Thyroid disease in her sister; Thyroid nodules in her sister and sister.     SOCIAL HISTORY   reports that she has never smoked. She has never used smokeless tobacco. She reports that she does not drink alcohol and does not use drugs.     ALLERGIES   Review of patient's allergies indicates:   Allergen Reactions    Zostavax [zoster vaccine live (pf)] Rash     - injection site red, hot, indurated    Demerol [meperidine] Other (See Comments)     Other reaction(s): Nausea  Other reaction(s): Headache    Nsaids (non-steroidal anti-inflammatory drug) Other (See Comments)       Other reaction(s): Difficulty breathing    Other reaction(s): Difficulty breathing    Phenytoin sodium extended Other (See Comments)        MEDICATIONS  Current Outpatient Medications on File Prior to Visit   Medication Sig Dispense Refill    amLODIPine (NORVASC) 10 MG tablet Take 1 tablet (10 mg total) by mouth once daily. 90 tablet 2    aspirin (ECOTRIN) 81 MG EC tablet Take 1 tablet (81 mg total) by mouth once daily. 90 tablet 3    atorvastatin (LIPITOR) 20 MG tablet Take 20 mg by mouth.      atorvastatin (LIPITOR) 40 MG tablet Take 40 mg by mouth.      atorvastatin (LIPITOR) 80 MG tablet Take 1 tablet (80 mg total) by mouth every evening. 90 tablet 3    CALCIUM CARBONATE/VITAMIN D3 (CALCIUM 500 + D, D3, ORAL) once daily.       carvediloL (COREG) 25 MG tablet TAKE 1 TABLET BY MOUTH TWICE A DAY WITH MEALS 180 tablet 3    ergocalciferol (ERGOCALCIFEROL) 50,000 unit Cap TAKE 1 CAPSULE BY MOUTH  "ONE TIME PER WEEK 12 capsule 3    estradioL (ESTRACE) 0.01 % (0.1 mg/gram) vaginal cream Place vaginally once daily.      FOLIC ACID/MV,FE,OTHER MIN (CENTRUM ORAL) Take 1 tablet by mouth.      levothyroxine (SYNTHROID) 75 MCG tablet TAKE 1 TABLET BY MOUTH EVERY DAY 90 tablet 3    omega-3 fatty acids 1,000 mg Cap Take 1 capsule by mouth Daily.      oxybutynin (DITROPAN-XL) 10 MG 24 hr tablet Take 1 tablet (10 mg total) by mouth once daily. 90 tablet 3    pen needle, diabetic (BD ULTRA-FINE AMELIA PEN NEEDLE) 32 gauge x 5/32" Ndle Use with ozempic 30 each 3    semaglutide (OZEMPIC) 0.25 mg or 0.5 mg(2 mg/1.5 mL) pen injector Inject 0.5 mg into the skin every 7 days. Start 0.25 mg SC once weekly for 4 weeks, then  Increase to 0.5 mg SC once weekly 1 pen 3    triamterene-hydrochlorothiazide 37.5-25 mg (MAXZIDE-25) 37.5-25 mg per tablet Take 1 tablet by mouth once daily. 90 tablet 2    diazePAM (VALIUM) 2 MG tablet Take 1 tablet (2 mg total) by mouth once. Take 1 tablet by mouth 30-60 minutes prior to MRI; may repeat once as needed. for 1 dose (Patient not taking: Reported on 12/16/2022) 2 tablet 0     No current facility-administered medications on file prior to visit.          PHYSICAL EXAM   height is 5' 9" (1.753 m) and weight is 98 kg (216 lb).   Body mass index is 31.9 kg/m².      All other systems deferred.  GENERAL:  No acute distress  HABITUS: Obese  GAIT: Antalgic  SKIN: Large superomedial soft tissue swelling    KNEE EXAM:    left:   Effusion: Small joint effusion  TTP: yes over Medial Joint Line and MCL   no crepitus with passive knee ROM  Passive ROM: Extension 0, Flexion 95  No pain with manipulation of patella  Stable to varus/valgus stress. No increased laxity to anterior/posterior drawer testing  negative Houston's test  No pain with IR/ER rotation of the hip  5/5 strength in knee flexion and extension, ankle plantarflexion and dorsiflexion  Neurovascular Status: Sensation intact to light touch in Sural, " Saphenous, SPN, DPN, Tibial nerve distribution  2+ pulse DP/PT, normal capillary refill, foot has normal warmth    DATA:  Diagnostic tests reviewed for today's visit:     3v of left knee reviewed the osseous structures are intact without evidence of fracture or osseous destructive process.  There are some scattered soft tissue calcifications noted.  There is edema.  Probable small amount of joint fluid.  No significant degenerative changes about the knee.

## 2022-12-22 ENCOUNTER — PATIENT MESSAGE (OUTPATIENT)
Dept: ENDOCRINOLOGY | Facility: CLINIC | Age: 67
End: 2022-12-22
Payer: MEDICARE

## 2022-12-22 DIAGNOSIS — E27.8 ADRENAL INCIDENTALOMA: Primary | ICD-10-CM

## 2022-12-23 ENCOUNTER — TELEPHONE (OUTPATIENT)
Dept: HEPATOLOGY | Facility: CLINIC | Age: 67
End: 2022-12-23
Payer: MEDICARE

## 2022-12-23 NOTE — TELEPHONE ENCOUNTER
Placed call to patient regarding adrenal adenoma as Dr Madrigal out   I discussed that this is not an urgent or emergent finding and that I recommend waiting until Dr Madrgial is back for blood work   Most of the time these are nonfunctional tumors and benign.   She thanked me. All questions answered    Will see her January 10th for follow-up

## 2022-12-29 ENCOUNTER — PATIENT MESSAGE (OUTPATIENT)
Dept: ENDOCRINOLOGY | Facility: CLINIC | Age: 67
End: 2022-12-29
Payer: MEDICARE

## 2023-01-03 ENCOUNTER — TELEPHONE (OUTPATIENT)
Dept: HEPATOLOGY | Facility: CLINIC | Age: 68
End: 2023-01-03
Payer: MEDICARE

## 2023-01-03 ENCOUNTER — HOSPITAL ENCOUNTER (OUTPATIENT)
Dept: RADIOLOGY | Facility: HOSPITAL | Age: 68
Discharge: HOME | End: 2023-01-03
Attending: INTERNAL MEDICINE
Payer: MEDICARE

## 2023-01-03 DIAGNOSIS — E27.8 ADRENAL INCIDENTALOMA: ICD-10-CM

## 2023-01-03 PROCEDURE — 74150 CT ABDOMEN W/O CONTRAST: CPT | Mod: 26,,, | Performed by: RADIOLOGY

## 2023-01-03 PROCEDURE — 74150 CT ABDOMEN WITHOUT CONTRAST: ICD-10-PCS | Mod: 26,,, | Performed by: RADIOLOGY

## 2023-01-03 PROCEDURE — 74150 CT ABDOMEN W/O CONTRAST: CPT | Mod: TC

## 2023-01-03 NOTE — TELEPHONE ENCOUNTER
----- Message from Bisi Madrigal MD sent at 12/27/2022  9:13 AM CST -----  Good morning, I got the message. We are arranging for dedicated adrenal imaging and I will see her after.  ----- Message -----  From: Karina Chester PA-C  Sent: 12/21/2022   8:43 AM CST  To: Bisi Madrigal MD    FYI, incidental adrenal adenoma seen on MRE.

## 2023-01-04 ENCOUNTER — PATIENT MESSAGE (OUTPATIENT)
Dept: ENDOCRINOLOGY | Facility: CLINIC | Age: 68
End: 2023-01-04
Payer: MEDICARE

## 2023-01-04 DIAGNOSIS — E27.8 ADRENAL INCIDENTALOMA: Primary | ICD-10-CM

## 2023-01-04 RX ORDER — DEXAMETHASONE 1 MG/1
1 TABLET ORAL NIGHTLY
Qty: 1 TABLET | Refills: 0 | Status: SHIPPED | OUTPATIENT
Start: 2023-01-04 | End: 2023-01-05

## 2023-01-09 ENCOUNTER — TELEPHONE (OUTPATIENT)
Dept: ENDOCRINOLOGY | Facility: CLINIC | Age: 68
End: 2023-01-09
Payer: MEDICARE

## 2023-01-10 ENCOUNTER — OFFICE VISIT (OUTPATIENT)
Dept: HEPATOLOGY | Facility: CLINIC | Age: 68
End: 2023-01-10
Payer: MEDICARE

## 2023-01-10 VITALS
DIASTOLIC BLOOD PRESSURE: 76 MMHG | OXYGEN SATURATION: 99 % | HEART RATE: 68 BPM | TEMPERATURE: 97 F | HEIGHT: 69 IN | BODY MASS INDEX: 31.98 KG/M2 | WEIGHT: 215.94 LBS | SYSTOLIC BLOOD PRESSURE: 124 MMHG

## 2023-01-10 DIAGNOSIS — E78.49 OTHER HYPERLIPIDEMIA: ICD-10-CM

## 2023-01-10 DIAGNOSIS — R16.0 HEPATOMEGALY: Primary | ICD-10-CM

## 2023-01-10 DIAGNOSIS — E66.9 OBESITY (BMI 30-39.9): ICD-10-CM

## 2023-01-10 DIAGNOSIS — K76.0 FATTY LIVER: ICD-10-CM

## 2023-01-10 PROCEDURE — 99214 OFFICE O/P EST MOD 30 MIN: CPT | Mod: S$PBB,,, | Performed by: PHYSICIAN ASSISTANT

## 2023-01-10 PROCEDURE — 99215 OFFICE O/P EST HI 40 MIN: CPT | Mod: PBBFAC | Performed by: PHYSICIAN ASSISTANT

## 2023-01-10 PROCEDURE — 99999 PR PBB SHADOW E&M-EST. PATIENT-LVL V: CPT | Mod: PBBFAC,,, | Performed by: PHYSICIAN ASSISTANT

## 2023-01-10 PROCEDURE — 99214 PR OFFICE/OUTPT VISIT, EST, LEVL IV, 30-39 MIN: ICD-10-PCS | Mod: S$PBB,,, | Performed by: PHYSICIAN ASSISTANT

## 2023-01-10 PROCEDURE — 99999 PR PBB SHADOW E&M-EST. PATIENT-LVL V: ICD-10-PCS | Mod: PBBFAC,,, | Performed by: PHYSICIAN ASSISTANT

## 2023-01-10 NOTE — PROGRESS NOTES
Hepatology Consultation: [facility]    ESTABLISHED PATIENT   PCP: Jessica Marquez MD    Subjective      Ms. Rodriguez is a 67 y.o. 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 The patient was previously followed by King Braga PA-C    The patient was initially evaluated for fatty liver noted on US and intermittently elevated liver enzymes ALT>AST; on Fibroscan the patient was found to have F4 fibrosis, S3 steatosis on 12/2017.   Per chart review, she has declined further investigation into cirrhosis staging and continues with HCC screening.   Her RF for fatty liver include HTN, HLD, pre-diabetes, Body mass index is 32.75 kg/m²., hypothyroid  Her previous serologic workup was negative for genetic, autoimmune, and viral causes of liver disease.    Interval history 07/26/2022:  She is here today alone. She reports she has maintained her weight loss since last visit. She is eating better and exercising.     doing okay   She is cooking at home and conscious of her meals.     Etoh history:  Denies   Family hx liver disease or cancer: Denies     Interval history 01/10/2023:  Claudia Rodriguez arrives today alone.   Since our last visit, she has lost weight with Ozempic & underwent MRE as well.   Initially w/ ozempic and had some side effects (palpations) from increasing ozempic dose   0.25mg third shot to 0.5mg no side effects now back to 0.5mg   There is a shortage so she is concerned about running out but its worked very well for decreasing her appetite. Her goal is 175 lbs   She is selective about what she is eating these days and Ozempic is helping a lot with her appetite  Vegetables, fruit, yogurt every day, mostly chicken     Denies symptoms of hepatic decompensation including jaundice, abdominal distention or lower extremity swelling, hematemesis, melena, or periods of confusion suggestive of  hepatic encephalopathy.    L knee pain after a fall around [place], seeing an orthopedist. She cannot do her daily walking. Otherwise doing well.   She complains of a red itchy spot on her R breast, tried ketoconazole without relief.     Occupation: retired traveling   Social: lives at home with        YANETH Taylor has a past medical history of Cholelithiasis, DDD (degenerative disc disease), lumbar, Fatty liver, Goiter, nodular, HTN (hypertension), Hyperglycemia, Hyperlipidemia, Hypothyroidism, Hypovitaminosis D, Meningioma, Obesity, Prediabetes, and Urinary bladder disorder.   PSXH Claudia has a past surgical history that includes Bladder suspension; thyroid biopsy (7/11/12); tonsillectomy; Tonsillectomy; and Liver biopsy.    Claudia's family history includes Coronary artery disease (age of onset: 55) in her father; Heart disease in her father; Heart failure in her maternal grandmother; Hypertension in her father, mother, and sister; Melanoma in her father; No Known Problems in her brother and brother; Pancreatic cancer in her maternal grandfather; Stroke in her mother; Thyroid disease in her sister; Thyroid nodules in her sister and sister.   KAILEY Taylor reports that she has never smoked. She has never used smokeless tobacco. She reports that she does not drink alcohol and does not use drugs.   ELDER Taylor is allergic to zostavax [zoster vaccine live (pf)], demerol [meperidine], nsaids (non-steroidal anti-inflammatory drug), and phenytoin sodium extended.   ADELE Taylor has a current medication list which includes the following prescription(s): amlodipine, aspirin, atorvastatin, calcium carbonate/vitamin d3, carvedilol, ergocalciferol, estradiol, multivitamin/iron/folic acid, levothyroxine, omega-3 fatty acids, oxybutynin, pen needle, diabetic, ozempic, triamterene-hydrochlorothiazide 37.5-25 mg, atorvastatin, atorvastatin, and diazepam.           ROS:   As per hpi      Objective     /76   Pulse 68   " Temp 97.2 °F (36.2 °C) (Oral)   Ht 5' 9" (1.753 m)   Wt 98 kg (215 lb 15.1 oz)   SpO2 99%   BMI 31.89 kg/m²     PHYSICAL EXAM:   Friendly woman, in no acute distress; alert and oriented to person, place and time  EYES: Sclerae anicteric  BREAST: (+) medial R breast, small erythematous plaque with excoriations, appearance of eczema/fungal irritation  GI: Obese abdomen. Soft, non-tender, non-distended. No ascites.  EXTREMITIES:  No edema.  SKIN: Warm and dry. No jaundice. No telangectasias noted. No palmar erythema.  NEURO:  Normal gait. No asterixis.  PSYCH:  Memory intact. Thought and speech pattern appropriate. Behavior normal      DIAGNOSTICS  Lab Results   Component Value Date    ALT 28 12/14/2022    AST 26 12/14/2022    ALKPHOS 75 12/14/2022    BILITOT 1.0 12/14/2022    ALBUMIN 4.2 12/14/2022    INR 1.1 12/14/2022     12/14/2022     Lab Results   Component Value Date    AFP 3.5 12/14/2022       In relation to metabolic risk factors:   Lab Results   Component Value Date    HGBA1C 5.7 (H) 07/11/2022    CHOL 135 07/11/2022    TSH 1.272 07/11/2022     Body mass index is 31.89 kg/m².      Prior serologic workup:   Lab Results   Component Value Date    SMOOTHMUSCAB Negative 1:40 12/26/2017    AMAIFA Negative 1:40 12/26/2017    IGGSERUM 591 (L) 12/26/2017    ANASCREEN Negative <1:160 12/26/2017    FESATURATED 20 12/26/2017    PETH Negative 12/26/2017    CERULOPLSM 30.0 12/26/2017    HEPBSAG Negative 12/26/2017    HEPCAB Negative 05/23/2014       Imaging:    Ultrasound 7/22   Liver: Enlarged, measuring 19.3 cm. Homogeneous echotexture with diffusely increased echogenicity.  HRI measures 1.42. no focal hepatic lesions.     Gallbladder: There is a 2.4 cm mobile gallstone.  No wall thickening or pericholecystic fluid.  No sonographic Ascencio's sign.     Biliary system: The common duct is not dilated, measuring 3 mm.  No intrahepatic ductal dilatation.     Spleen: Normal in size with a homogeneous echotexture, " measuring 9.4 x 4.9 cm.     Pancreas: The visualized portions of pancreas appear normal.     IVC: The visualized IVC appears unremarkable.     Miscellaneous: No ascites.     Impression:     Hepatomegaly with hepatic steatosis.     Cholelithiasis without evidence for cholecystitis.     Electronically signed by resident: Latanya Stapleton  Date:                                            07/11/2022  Time:                                           09:13     Electronically signed by: Landon Sin MD  Date:                                            07/11/2022  Time:                                           09:23    MR ELASTOGRAPHY  CLINICAL HISTORY:  Claustrophobia     TECHNIQUE:  MRI abdomen performed without contrast per MR elastography protocol.     COMPARISON:  MR elastography 12/14/2022     FINDINGS:  Liver is enlarged with normal attenuation.  Few subcentimeter hepatic T2 hyperintensities, likely cysts.  Single large gallstone.  Common bile duct is mildly prominent size measuring 0.8 cm with distal tapering to the ampulla.  No intrahepatic biliary dilatation.  The spleen and pancreas are unremarkable.  Fat containing 1.8 cm right adrenal lesion with signal loss on opposed phase imaging.  Bilateral simple renal cysts.  No hydronephrosis.  No bowel obstruction.  No lymphadenopathy.  Bone marrow signal intensity is unremarkable.  L1 hemangiomas.     Liver fat fraction measurement as follows:     MR spectroscopy Voxel-3.7%     Liver elasticity measures 1.77 kPa consistent with Normal or F0 fibrosis.     Liver R2* value is within normal limits measuring 41.5 Hz consistent with no are an overload.     Impression:     1. Liver fat fraction measures 3.7%%, within normal limits.  2. Liver elasticity measures 1.77 kPa consistent with Normal or F0 fibrosis.  3. Hepatomegaly.  4. Cholelithiasis.  5. Right adrenal adenoma versus myelolipoma.  6. Additional findings as above.  REFERENCE RANGES:     Fat fraction analysis:      <5%: Normal.  5 to 15%: Mild steatosis.  >15%: Moderate or severe steatosis.     Elastography:     <2.93 kPa: Normal or F0 fibrosis.  2.93 to 4.15 kPa: F1 or F2 fibrosis.  >4.15 kPa: F3 or F4 fibrosis.     * If steatosis is present, elevated liver stiffness (>2.74 kPa) may be secondary to inflammation (MOURA).     R2*:     > 65 Hz: Mild iron overload  > 215 Hz: Moderate  > 440 Hz: Severe iron overload     Estimated LIC=0.032 * R2*     Electronically signed by resident: Germán Streeter MD  Date:                                            2022  Time:                                           08:37     Electronically signed by: Glen Velasquez MD  Date:                                            2022  Time:                                           19:23      Fibroscan 2021  Median liver stiffness score:  6.1  CAP Reading: dB/m:  373  IQR/med %:  13  Interpretation  Fibrosis interpretation is based on medial liver stiffness - Kilopascal (kPa).  Fibrosis Stage:  F 0-1  Steatosis interpretation is based on controlled attenuation parameter - (dB/m).  Steatosis Grade:  S3      Fibroscan 2017    Diagnosis: NAFLD  Probe: XL  Fibroscan readin.7 KPa  Fibrosis:F4   CAP readin dB/m  Steatosis: :S3     Assessment/Plan     67 y.o. female with:    1. ?Fibrosis/cirrhosis  - conflicting fibrosis staging   MELD-Na score: 7 at 2022  9:22 AM  MELD score: 7 at 2022  9:22 AM  Calculated from:  Serum Creatinine: 0.7 mg/dL (Using min of 1 mg/dL) at 2022  9:22 AM  Serum Sodium: 141 mmol/L (Using max of 137 mmol/L) at 2022  9:22 AM  Total Bilirubin: 1.0 mg/dL at 2022  9:22 AM  INR(ratio): 1.1 at 2022  9:22 AM  Age: 67 years  -- no signs or symptoms of hepatic decompensation  -- liver enzymes and synthetic function wnl; have normalized with weight loss    -- metabolic risks have improved  -- Fibroscan F4,S3 in   -- Fibroscan  suggested F0-1, S3   -- MRE   suggesting F0-1, S1  -- counseling as per above  -- today is UTD with HCC screening, no concerns   -- continue with HCC screening q6mo, f/u yearly    Lab Results   Component Value Date    ALT 28 12/14/2022    AST 26 12/14/2022    ALKPHOS 75 12/14/2022    BILITOT 1.0 12/14/2022    ALBUMIN 4.2 12/14/2022    INR 1.1 12/14/2022     12/14/2022       2. Hepatomegaly  -- likely 2/2 fatty liver  -- has actually decreased in size since 2017  -- recommend continued weight loss    3. Metabolic syndrome with Body mass index is 31.89 kg/m². HTN, HLD  -- continue good control of BP, HLD  -- recommend continued weight loss   -- education provided as per above       Orders Placed This Encounter   Procedures    MR Elastography    US Abdomen Limited    US Abdomen Limited    AFP Tumor Marker    CBC Auto Differential    Comprehensive Metabolic Panel    Protime-INR         MRE reviewed with patient indicating no advanced fibrosis. She has hepatomegaly and I suspect continued NAFLD. She otherwise does not have signs of significant or advanced liver disease. Her risk factors have metabolically improved since our last visit. Her liver enzymes WNL. At this time she does not require q6mo screening as we have evidence x2 that she has no significant fibrosis. We discussed this at length. Patient ok with annual monitoring. Following with endocrine for continued evaluation of adenoma and ozempic  Recommend continued weight loss   Recommend ultrasound and labs in 1 year with me.  F/u with PCP or derm if breast rash does not improve in 1-2 weeks.      Follow up in about 1 year (around 1/10/2024). W/ ultrasound and labs prior.      I spent 30 minutes on the day of this encounter preparing for, evaluating, treating, and managing this patient.     Thank you for allowing me to participate in the care of Claudia Chester PA-C  Hepatology Advanced Practice Provider  [facility]

## 2023-01-10 NOTE — PATIENT INSTRUCTIONS
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 Improvement with weight loss. Now we have two scans suggesting no cirrhosis.  Follow-up in 1 year with ultrasound and labs. If you want to see me prior to this let me know.  Contact me with anything next      FATTY LIVER RECOMMENDATIONS:    There is no FDA approved therapy for non-alcoholic fatty liver disease (NAFLD); therefore, lifestyle changes are important:  1. Weight loss goal of 20 lbs  2. Mediterranean diet is recommended that focuses on low-carb, low-sugar, and low-processed foods.  3. Exercise, 5 days per week, 30 minutes per day, as tolerated  4. Recommend good control of cholesterol, blood pressure, blood sugar levels (as these are risk factors for fatty liver). Cholesterol lowering medications including statins are typically safe and beneficial (even if liver enzymes are elevated).    5. Limit alcohol consumption, no more than 1 serving(s) of alcohol in any day (1 serving is 5 ounces of wine, 12 ounces of beer, or 1.5 ounces of liquor). Do not recommend daily alcohol use.        In some people, fatty liver can progress to steatohepatitis (inflammatory fatty liver) and possibly to cirrhosis, putting one at increased risk for liver cancer and liver failure in the future. Lifestyle changes can help to decrease this risk.     If interested in seeing a dietician to create a weight loss plan, contact the dietician team at [facility]: [email or web address].  You can also call to schedule a consult with a dietician: [phone]. *Virtual visits are available with one of our dieticians.     If you have diabetes or high blood pressure, you can meet with a Health  through [facility] program. Let us know if you are interested in a referral.     Ask about our fatty liver/MOURA clinical trials if you have fibrosis / scar tissue related to fatty liver.    *If statins are being considered for HLD, statins are safe in patients with liver disease. Statins can be  used to treat dyslipidemia in patients with both NAFLD and MOURA.      FATTY LIVER DIET TIPS:    ADD OLIVE OIL. I recommend olive oil daily (in salads or when cooking)  ADD COFFEE. Black coffee has also been found to be potentially beneficial (skip the sweets and creamer). Add 1-2 cups per day,  if you are able to tolerate. Decaf is fine.  BE MINDFUL OF CARBS AND ADDED SUGARS. Try to limit your carb intake to LESS than 30-45 grams of carbs with a meal or LESS than 5-10 grams with any snack (total of any snack foods eaten during that time).   KEEP A FOOD DIARY. Use MyFitness Pal  OR LOSE IT lias to add up your carbs through the day - the most successful folks on weight loss journey TRACK their progress and food.  LIMIT WHAT YOU ARE DRINKING. Do NOT drink any beverages with calories or carbs (this can lead to high blood sugar and weight gain). Also, some of our patients have been very successful with weight loss using the pre made/planned meal planning services that limit calories and portion size (one example is Sensible Meals but there are many out there). Unsweetened black or green tea is fine! Hibiscus tea is also great and refreshing, especially iced.     Tips for low/moderate carbohydrate diet:  3 meals a day made up of the following:  -- Green vegetables, tomatoes, mushrooms, spaghetti squash, cauliflower, meat, poultry, seafood, eggs   -- Beans (1-1.5 cups) or nuts (1/4 cup): can have 1 x a day.  -- Greek yogurt and fermented foods like kefir, kimchi, saurkraut (be careful if you have swelling issues as lots of salt can worsen swelling or blood pressure)  -- Dressings, seasonings, condiments, etc should have less than 2 g sugars.   -- 1-2 servings of citrus fruits, berries, pineapple or melon a day (1/2 cup)  -- Avoid fried foods  -- Avoid grains, rice, pasta, potatoes, bread, corn, peas, oatmeal, grits, tortillas, crackers, chips  -- No soda, sweet tea, juices or lemonade  -- Use olive/avocado oil rather than  vegetable oils or butter.        ADDITIONAL RESOURCES:  Websites with information about fatty liver and inflammation related to fatty liver (MOURA): www.nashtruth.com and www.nashactually.com   [facility]: Non-Alcoholic Fatty Liver Disease (NAFLD)    Facebook support group with tips and recipes:   Non-Alcoholic Fatty Liver Disease (NAFLD) Diet & Nutrition Support     Try www.dietdoctor.mymission2 for recipes.

## 2023-01-11 ENCOUNTER — LAB VISIT (OUTPATIENT)
Dept: LAB | Facility: HOSPITAL | Age: 68
End: 2023-01-11
Attending: INTERNAL MEDICINE
Payer: MEDICARE

## 2023-01-11 DIAGNOSIS — E27.8 ADRENAL INCIDENTALOMA: ICD-10-CM

## 2023-01-11 LAB
ALBUMIN SERPL BCP-MCNC: 4.1 G/DL (ref 3.5–5.2)
ALP SERPL-CCNC: 79 U/L (ref 55–135)
ALT SERPL W/O P-5'-P-CCNC: 32 U/L (ref 10–44)
ANION GAP SERPL CALC-SCNC: 11 MMOL/L (ref 8–16)
AST SERPL-CCNC: 26 U/L (ref 10–40)
BILIRUB SERPL-MCNC: 1 MG/DL (ref 0.1–1)
BUN SERPL-MCNC: 11 MG/DL (ref 8–23)
CALCIUM SERPL-MCNC: 10 MG/DL (ref 8.7–10.5)
CHLORIDE SERPL-SCNC: 105 MMOL/L (ref 95–110)
CO2 SERPL-SCNC: 25 MMOL/L (ref 23–29)
CORTIS SERPL-MCNC: 1.4 UG/DL (ref 4.3–22.4)
CREAT SERPL-MCNC: 0.7 MG/DL (ref 0.5–1.4)
DHEA-S SERPL-MCNC: 19.9 UG/DL (ref 33.6–78.9)
EST. GFR  (NO RACE VARIABLE): >60 ML/MIN/1.73 M^2
GLUCOSE SERPL-MCNC: 113 MG/DL (ref 70–110)
POTASSIUM SERPL-SCNC: 4.3 MMOL/L (ref 3.5–5.1)
PROT SERPL-MCNC: 6.8 G/DL (ref 6–8.4)
SODIUM SERPL-SCNC: 141 MMOL/L (ref 136–145)

## 2023-01-11 PROCEDURE — 36415 COLL VENOUS BLD VENIPUNCTURE: CPT | Mod: PO | Performed by: INTERNAL MEDICINE

## 2023-01-11 PROCEDURE — 82533 TOTAL CORTISOL: CPT | Performed by: INTERNAL MEDICINE

## 2023-01-11 PROCEDURE — 82627 DEHYDROEPIANDROSTERONE: CPT | Performed by: INTERNAL MEDICINE

## 2023-01-11 PROCEDURE — 80053 COMPREHEN METABOLIC PANEL: CPT | Performed by: INTERNAL MEDICINE

## 2023-01-11 PROCEDURE — 82088 ASSAY OF ALDOSTERONE: CPT | Performed by: INTERNAL MEDICINE

## 2023-01-11 PROCEDURE — 82542 COL CHROMOTOGRAPHY QUAL/QUAN: CPT | Performed by: INTERNAL MEDICINE

## 2023-01-12 ENCOUNTER — PATIENT MESSAGE (OUTPATIENT)
Dept: ENDOCRINOLOGY | Facility: CLINIC | Age: 68
End: 2023-01-12
Payer: MEDICARE

## 2023-01-13 ENCOUNTER — PATIENT MESSAGE (OUTPATIENT)
Dept: ENDOCRINOLOGY | Facility: CLINIC | Age: 68
End: 2023-01-13
Payer: MEDICARE

## 2023-01-13 LAB — RENIN PLAS-CCNC: 1.3 NG/ML/H

## 2023-01-16 LAB — ALDOST SERPL-MCNC: 36 NG/DL

## 2023-01-17 ENCOUNTER — PATIENT MESSAGE (OUTPATIENT)
Dept: FAMILY MEDICINE | Facility: CLINIC | Age: 68
End: 2023-01-17
Payer: MEDICARE

## 2023-01-17 ENCOUNTER — PATIENT MESSAGE (OUTPATIENT)
Dept: ENDOCRINOLOGY | Facility: CLINIC | Age: 68
End: 2023-01-17
Payer: MEDICARE

## 2023-01-18 ENCOUNTER — PATIENT MESSAGE (OUTPATIENT)
Dept: ENDOCRINOLOGY | Facility: CLINIC | Age: 68
End: 2023-01-18
Payer: MEDICARE

## 2023-01-24 LAB — DEXAMETHASONE SERPL-MCNC: 581 NG/DL

## 2023-01-30 ENCOUNTER — PATIENT MESSAGE (OUTPATIENT)
Dept: FAMILY MEDICINE | Facility: CLINIC | Age: 68
End: 2023-01-30
Payer: MEDICARE

## 2023-02-03 DIAGNOSIS — Z00.6 RESEARCH STUDY PATIENT: Primary | ICD-10-CM

## 2023-02-07 ENCOUNTER — RESEARCH ENCOUNTER (OUTPATIENT)
Dept: RESEARCH | Facility: HOSPITAL | Age: 68
End: 2023-02-07
Payer: MEDICARE

## 2023-02-07 ENCOUNTER — LAB VISIT (OUTPATIENT)
Dept: LAB | Facility: HOSPITAL | Age: 68
End: 2023-02-07
Attending: INTERNAL MEDICINE
Payer: MEDICARE

## 2023-02-07 DIAGNOSIS — Z00.6 RESEARCH STUDY PATIENT: ICD-10-CM

## 2023-02-07 DIAGNOSIS — R73.03 PREDIABETES: ICD-10-CM

## 2023-02-07 DIAGNOSIS — E55.9 VITAMIN D DEFICIENCY DISEASE: ICD-10-CM

## 2023-02-07 DIAGNOSIS — E03.9 ACQUIRED HYPOTHYROIDISM: ICD-10-CM

## 2023-02-07 DIAGNOSIS — E04.9 GOITER, NODULAR: ICD-10-CM

## 2023-02-07 LAB
25(OH)D3+25(OH)D2 SERPL-MCNC: 56 NG/ML (ref 30–96)
ALBUMIN SERPL BCP-MCNC: 3.9 G/DL (ref 3.5–5.2)
ALP SERPL-CCNC: 67 U/L (ref 55–135)
ALT SERPL W/O P-5'-P-CCNC: 24 U/L (ref 10–44)
ANION GAP SERPL CALC-SCNC: 10 MMOL/L (ref 8–16)
AST SERPL-CCNC: 25 U/L (ref 10–40)
BILIRUB SERPL-MCNC: 0.8 MG/DL (ref 0.1–1)
BUN SERPL-MCNC: 19 MG/DL (ref 8–23)
CALCIUM SERPL-MCNC: 9.7 MG/DL (ref 8.7–10.5)
CHLORIDE SERPL-SCNC: 104 MMOL/L (ref 95–110)
CO2 SERPL-SCNC: 27 MMOL/L (ref 23–29)
CREAT SERPL-MCNC: 0.8 MG/DL (ref 0.5–1.4)
EST. GFR  (NO RACE VARIABLE): >60 ML/MIN/1.73 M^2
ESTIMATED AVG GLUCOSE: 114 MG/DL (ref 68–131)
GLUCOSE SERPL-MCNC: 104 MG/DL (ref 70–110)
HBA1C MFR BLD: 5.6 % (ref 4–5.6)
POTASSIUM SERPL-SCNC: 3.9 MMOL/L (ref 3.5–5.1)
PROT SERPL-MCNC: 6.6 G/DL (ref 6–8.4)
RESEARCH LAB: NORMAL
SODIUM SERPL-SCNC: 141 MMOL/L (ref 136–145)
TSH SERPL DL<=0.005 MIU/L-ACNC: 1.36 UIU/ML (ref 0.4–4)

## 2023-02-07 PROCEDURE — 84443 ASSAY THYROID STIM HORMONE: CPT | Performed by: INTERNAL MEDICINE

## 2023-02-07 PROCEDURE — 83036 HEMOGLOBIN GLYCOSYLATED A1C: CPT | Performed by: INTERNAL MEDICINE

## 2023-02-07 PROCEDURE — 82306 VITAMIN D 25 HYDROXY: CPT | Performed by: INTERNAL MEDICINE

## 2023-02-07 PROCEDURE — 36415 COLL VENOUS BLD VENIPUNCTURE: CPT | Mod: PO | Performed by: INTERNAL MEDICINE

## 2023-02-07 PROCEDURE — 80053 COMPREHEN METABOLIC PANEL: CPT | Performed by: INTERNAL MEDICINE

## 2023-02-07 NOTE — PROGRESS NOTES
RESEARCH STUDY FOLLOW-UP SPECIMEN COLLECTION ENCOUNTER  ORGAN TRANSPLANT  [facility]    Study Title: Role of Tumor-Induced Immune Tolerance in the Patient Response to Locoregional Therapy: Implications in Assessment Risk of Hepatocellular Carcinoma Recurrence Following Liver Transplantation    IRB #: [id number]    IRB Approval Date: 6/8/2016    : Daniel Thurman MD  Sub-investigator: Stan Hannah, PhD    Patient Number: [id number]    In accordance with the study protocol, Research Lab orders were placed and follow-up specimens were collected on (date: 2/7/2023) in:  [facility] VNP: YES/NO: no  [facility] LABTX: YES/NO: no  [facility] IM: YES/NO: no  Collected at [facility]- Liver Transplant

## 2023-02-12 DIAGNOSIS — I10 ESSENTIAL HYPERTENSION: ICD-10-CM

## 2023-02-12 NOTE — TELEPHONE ENCOUNTER
No new care gaps identified.  [facility] Embedded Care Gaps. Reference number: [id number]. 2/12/2023   10:03:41 AM CST

## 2023-02-13 RX ORDER — TRIAMTERENE/HYDROCHLOROTHIAZID 37.5-25 MG
1 TABLET ORAL DAILY
Qty: 90 TABLET | Refills: 0 | Status: SHIPPED | OUTPATIENT
Start: 2023-02-13 | End: 2023-04-04

## 2023-02-13 NOTE — TELEPHONE ENCOUNTER
Refill Routing Note   Medication(s) are not appropriate for processing by [facility] for the following reason(s):       Drug-disease interaction    [facility] action(s):  Defer              Medication Therapy Plan: Drug-Disease: triamterene-hydrochlorothiazide 37.5-25 mg and Hepatomegaly; Fatty liver    Appointments  past 12m or future 3m with PCP    Date Provider   Last Visit   2/7/2022 Jessica Marquez MD   Next Visit   5/22/2023 Jessica Marquez MD   ED visits in past 90 days: 0        Note composed:8:47 AM 02/13/2023

## 2023-02-13 NOTE — TELEPHONE ENCOUNTER
Refill Decision Note   Claudia Rodriguez  is requesting a refill authorization.  Brief Assessment and Rationale for Refill:  Approve     Medication Therapy Plan:       Medication Reconciliation Completed: No   Comments:     No Care Gaps recommended.     Note composed:10:32 AM 02/13/2023

## 2023-02-14 ENCOUNTER — OFFICE VISIT (OUTPATIENT)
Dept: ENDOCRINOLOGY | Facility: CLINIC | Age: 68
End: 2023-02-14
Payer: MEDICARE

## 2023-02-14 DIAGNOSIS — E27.8 ADRENAL INCIDENTALOMA: Primary | ICD-10-CM

## 2023-02-14 DIAGNOSIS — R73.03 PREDIABETES: ICD-10-CM

## 2023-02-14 DIAGNOSIS — E04.9 GOITER, NODULAR: ICD-10-CM

## 2023-02-14 DIAGNOSIS — E03.9 ACQUIRED HYPOTHYROIDISM: ICD-10-CM

## 2023-02-14 PROCEDURE — 99214 PR OFFICE/OUTPT VISIT, EST, LEVL IV, 30-39 MIN: ICD-10-PCS | Mod: 95,,, | Performed by: INTERNAL MEDICINE

## 2023-02-14 PROCEDURE — 99214 OFFICE O/P EST MOD 30 MIN: CPT | Mod: 95,,, | Performed by: INTERNAL MEDICINE

## 2023-02-14 NOTE — PROGRESS NOTES
Subjective:      Patient ID: Claudia Rodriguez is a 67 y.o. female.    Chief Complaint:  mng    History of Present Illness          With regards to the MNG  She initially presented with neck fullness  U/s confirmed the mng    2 FNAs done in 2012- both nondianostic  FNA L/isthmus 10/2013-benign     FNA  Done 12/18/18  THYROID, LEFT MID (ASPIRATION):  [facility] Thyroid Cytology Category: Benign  [place] follicular cells and colloid        Last ultrasound 7/12/22  COMPARISON:  Neck ultrasound dated 8/23/2021     When compared to prior dated study all thyroid nodules are stable in size and appearance.     Impression:     Thyroid gland has homogeneous echotexture and normal vascularity.     There is a 0.7 x 0.4 x 0.8 cm hypoechoic nodule seen in the right superior lobe.  This nodule is stable in size and does not meet FNA criteria.     There is a 0.9 x 0.9 x 0.9 cm hypoechoic nodule seen in the right middle lobe.  This nodule is stable in size and does not meet FNA criteria.     There is a 1.4 x 0.6 x 1.0 cm heterogeneous nodule seen in the isthmus.  This nodule is stable and does not meet criteria for repeat FNA.     There is a 1.5 x 1.1 x 1.1 cm slightly hypoechoic nodule seen in the left middle lobe.  This nodule is stable and does not meet criteria for repeat FNA.     There is a 0.8 x 0.7 x 0.8 cm slightly hypoechoic nodule seen in the left inferior lobe. This nodule is stable in size and does not meet FNA criteria.     RECOMMENDATIONS:  Repeat thyroid ultrasound in 1-2 years.      2 Sister with nodules -1 had thyroidectomy - benign      Patient denies any neck enlargement.   No dysphagia, no dyspnea.   Energy level good.  sleeps well.   No skin/nail hair changes  Denies constipation       With regards to the adrenal incidentaloma    Found on MRI   Fat containing 1.8 cm right adrenal lesion with signal loss on opposed phase imaging      Dedicated imaging 01/23/2023    A 1.5 cm right adrenal nodule with noncontrast  attenuation, most in keeping with adenoma (series 2, image 27).  Additional smaller hypoattenuating adrenal nodule on the right, probable additional adenoma (series 2, image 34).    Renin detectable      Normal dexamethasone suppression test     Latest Reference Range & Units 01/11/23 08:34   ALDOSTERONE ng/dL 36.0   Cortisol, 8 AM 4.30 - 22.40 ug/dL 1.40 (L)   DHEA-SO4 33.6 - 78.9 ug/dL 19.9 (L)   Renin Activity ng/mL/h 1.3   (L): Data is abnormally low         With regards to the Hypothyroidism   She is on lt4 75 mcg qd   Takes medication in the morning without food or medication.        With regards to the Vit D deficiency   Taking ergo 100k q 2 weeks      With regards to the IGT and obesity   Off metformin  Was on Ozempic but unable to get it for past 2 months  - lost 17 lbs with 4 months of use          2019 nl bmd       Review of Systems  As above       Objective:   Physical Exam  Constitutional:       Appearance: Normal appearance.   Psychiatric:         Attention and Perception: Attention normal.         Mood and Affect: Mood normal.         Speech: Speech normal.         Behavior: Behavior normal.         Thought Content: Thought content normal.         Judgment: Judgment normal.       There is no height or weight on file to calculate BMI.    Lab Review:   Lab Results   Component Value Date    HGBA1C 5.6 02/07/2023     Lab Results   Component Value Date    CHOL 135 07/11/2022    HDL 50 07/11/2022    LDLCALC 69.0 07/11/2022    TRIG 80 07/11/2022    CHOLHDL 37.0 07/11/2022     Lab Results   Component Value Date     02/07/2023    K 3.9 02/07/2023     02/07/2023    CO2 27 02/07/2023     02/07/2023    BUN 19 02/07/2023    CREATININE 0.8 02/07/2023    CALCIUM 9.7 02/07/2023    PROT 6.6 02/07/2023    ALBUMIN 3.9 02/07/2023    BILITOT 0.8 02/07/2023    ALKPHOS 67 02/07/2023    AST 25 02/07/2023    ALT 24 02/07/2023    ANIONGAP 10 02/07/2023    ESTGFRAFRICA >60.0 07/11/2022    EGFRNONAA >60.0  07/11/2022    TSH 1.356 02/07/2023       Assessment and Plan     Goiter, nodular   I have reviewed management options       Discussed indications to repeat thyroid nodule biopsy as well as  surgical indications (abnormal FNA, compressive symptoms or interval change)     plan follow up in 1 year with TSH and thyroid u/s prior        Acquired hypothyroidism  Clinically and biochemically euthyroid  Goal is a normal TSH  Continue lt4 dose at this time  Avoid exogenous hyperthyroidism as this can accelerate bone loss and increase risk of CV complications.       Prediabetes  Better       Adrenal incidentaloma  reviewed incidence   Discussed that indications for surgery are size, concerning characteristics and functionality (pheochromocytoma or cortisol excess).      Will need yearly 1 mg overnight DST x 3          The patient location is: [place]  The chief complaint leading to consultation is: above    Visit type: audiovisual    Face to Face time with patient: 20  minutes of total time spent on the encounter, which includes face to face time and non-face to face time preparing to see the patient (eg, review of tests), Obtaining and/or reviewing separately obtained history, Documenting clinical information in the electronic or other health record, Independently interpreting results (not separately reported) and communicating results to the patient/family/caregiver, or Care coordination (not separately reported).         Each patient to whom he or she provides medical services by telemedicine is:  (1) informed of the relationship between the physician and patient and the respective role of any other health care provider with respect to management of the patient; and (2) notified that he or she may decline to receive medical services by telemedicine and may withdraw from such care at any time.

## 2023-02-14 NOTE — ASSESSMENT & PLAN NOTE
reviewed incidence   Discussed that indications for surgery are size, concerning characteristics and functionality (pheochromocytoma or cortisol excess).      Will need yearly 1 mg overnight DST x 3

## 2023-02-14 NOTE — PATIENT INSTRUCTIONS
Thank you for completing a virtual visit with me!     Per our conversation, please let me know when you restart the Ozempic and if you are tolerating the 0.5 weekly.  I can then send in the higher dose.  We will plan a follow-up visit in 6 months with blood work and a thyroid ultrasound prior.       Please let me know if you have any other questions.    Thank you,  Bisi Madrigal MD

## 2023-02-24 ENCOUNTER — PATIENT MESSAGE (OUTPATIENT)
Dept: UROGYNECOLOGY | Facility: CLINIC | Age: 68
End: 2023-02-24
Payer: MEDICARE

## 2023-03-03 ENCOUNTER — OFFICE VISIT (OUTPATIENT)
Dept: UROGYNECOLOGY | Facility: CLINIC | Age: 68
End: 2023-03-03
Payer: MEDICARE

## 2023-03-03 ENCOUNTER — LAB VISIT (OUTPATIENT)
Dept: LAB | Facility: HOSPITAL | Age: 68
End: 2023-03-03
Payer: MEDICARE

## 2023-03-03 DIAGNOSIS — R35.0 URINARY FREQUENCY: Primary | ICD-10-CM

## 2023-03-03 DIAGNOSIS — R35.0 URINARY FREQUENCY: ICD-10-CM

## 2023-03-03 DIAGNOSIS — Z12.11 COLON CANCER SCREENING: ICD-10-CM

## 2023-03-03 DIAGNOSIS — N39.46 URINARY INCONTINENCE, MIXED: ICD-10-CM

## 2023-03-03 LAB
BILIRUB UR QL STRIP: NEGATIVE
CLARITY UR REFRACT.AUTO: CLEAR
COLOR UR AUTO: YELLOW
GLUCOSE UR QL STRIP: NEGATIVE
HGB UR QL STRIP: NEGATIVE
KETONES UR QL STRIP: NEGATIVE
LEUKOCYTE ESTERASE UR QL STRIP: NEGATIVE
NITRITE UR QL STRIP: NEGATIVE
PH UR STRIP: 8 [PH] (ref 5–8)
PROT UR QL STRIP: NEGATIVE
SP GR UR STRIP: 1.01 (ref 1–1.03)
URN SPEC COLLECT METH UR: NORMAL

## 2023-03-03 PROCEDURE — 99442 PR PHYSICIAN TELEPHONE EVALUATION 11-20 MIN: CPT | Mod: 95,,, | Performed by: NURSE PRACTITIONER

## 2023-03-03 PROCEDURE — 81003 URINALYSIS AUTO W/O SCOPE: CPT | Performed by: NURSE PRACTITIONER

## 2023-03-03 PROCEDURE — 99442 PR PHYSICIAN TELEPHONE EVALUATION 11-20 MIN: ICD-10-PCS | Mod: 95,,, | Performed by: NURSE PRACTITIONER

## 2023-03-03 PROCEDURE — 87086 URINE CULTURE/COLONY COUNT: CPT | Performed by: NURSE PRACTITIONER

## 2023-03-03 RX ORDER — OXYBUTYNIN CHLORIDE 10 MG/1
10 TABLET, EXTENDED RELEASE ORAL DAILY
Qty: 90 TABLET | Refills: 3 | Status: SHIPPED | OUTPATIENT
Start: 2023-03-03 | End: 2023-03-06

## 2023-03-03 NOTE — PROGRESS NOTES
Urogyn follow up  03/03/2023  .  [facility] - UROGYNECOLOGY  [address] [address]    Claudia Rodriguez  [id number]  1955      Claudia Rodriguez is a 67 y.o. here for a urogyn follow up of mixed urinary incontinence and urinary urgency    The patient location is: [place]  The chief complaint leading to consultation is: medication refill for mixed urinary incontinence and urinary urgency    Visit type: audiovisual    Each patient to whom he or she provides medical services by telemedicine is:  (1) informed of the relationship between the physician and patient and the respective role of any other health care provider with respect to management of the patient; and (2) notified that he or she may decline to receive medical services by telemedicine and may withdraw from such care at any time.    Notes:       HPI: 2017.      1)  UI:  (+) GARY < (+) UUI but mostly just small, spontaneous leaks, alcira with movement.  Was having some U/F/UI, then had bladder lift in her 30s--better.  UI returned after some years, now having more U/F x 2-3 months.   (+) pads (liners):1/day, usually severe wetness and 1/night usually (liner) minimum-moderate wetness.  Daytime frequency: Q 1 hours after taking diuretic; once that wears off Q2h.  Nocturia: Yes: 4-7/night. Does have a little insomnia--started taking 1/4 benadryl.  Has decreased liquid consumption before bed--stops at 6PM, no water by bed.  Has improved nocturia some.  No LE swelling.  Pelvic floor PT:  Has scheduled for next week with [facility].  Is going to work on weight loss. Is emotional crutch for her.   (--) dysuria,  (--) hematuria,  (--) frequent UTIs.  (--) complete bladder emptying. Has to PV to help with moderate 2nd void.       2)  POP:  Absent.  (--) vaginal bleeding. (--) vaginal discharge. (+) sexually active.  (--) dyspareunia.  (+)  Vaginal dryness. Uses lube during intercourse.  (--) vaginal estrogen use.      3)  BM:  (--)  constipation/straining.  (--) chronic diarrhea. Does have some IBS--controlled with avoiding dietary triggers.  Has diarrhea with triggers.  (--) hematochezia.  (--) fecal incontinence.  (--) fecal smearing/urgency.  (+) complete evacuation.      09/22/2020  1)  Mixed urinary incontinence, urge/spontaneous > stress:    --had UTI a few months ago; finished ABX with neg ANNIA, still feels U/F increased              --urine C&S 6/2020 + ecoli--took macrobid x 2 rounds              --urine C&S 9/2020 mixed orgs  --baseline UI: (+) pads (liners):1/day, usually severe wetness and 1/night usually (liner) minimum-moderate wetness.  Daytime frequency: Q 1 hours after taking diuretic; once that wears off Q2h.  Nocturia: Yes: 4-7/night  --currently: +liner/day--damp by end of day; +liner/night, very damp by AM; daytime is stable but nighttime worse; feels like very sensitive to urine being in urethra--makes her feel increased U/F: freq Qh; nocturia: 7x/night, alcira since UTI; ? Complete emptying--has PV urgency with small volume void  --weight loss +: has lost 20 lbs  --pre-DM: was taking metformin but was able to stop        Lab Results   Component Value Date     HGBA1C 5.4 06/26/2020   --did PT in past but had trouble with regular visits; used heating pad x 30 min--not sure she got as much as possible     2)  Vaginal atrophy (dryness):    --was taking estrace  --was changed to imvexxy--didn't like  --now restarted estrace x 2 weeks with applicator     3)  Nocturia (nighttime urination): stop fluids 2 hours before bed/no water by bed.  If have leg swelling:  Elevate feet above chest x 1 hour before bed to get excess fluid off.  Can also use support hose (knee highs).    --work on sleep pattern (sleep hygiene sheet)  --continue to watch snoring--may need sleep apnea evaluation     4)  Constipation:  --uncommon--mosly when diet off  --takes daily fiber  --takes stool softeners PRN    01/29/2021  1)  Mixed urinary incontinence,  urge/spontaneous > stress:    --completed pelvic floor with Abimbola Jarret.   --using oxybutynin xl 10 mg  --voiding every 1 1/2- 2 hours  --nocturia 3/ night (limits fluids prior to bedtime most night)  --rare GARY, UUI at night mostly--very scant-- reports 75 % improvement.  --feels like she emptying her bladder      2)  Vaginal atrophy (dryness):    --using estrogen cream twice weekly  --did not like imvexxy     3)  Nocturia (nighttime urination):   --improved to 3/ night  --has lost 25 pounds  --she does snore     4)  Recent UTI:  --denies symptoms at this time  --symptoms improved with oxybutynin    01/19/2022  1)  Mixed urinary incontinence, urge/spontaneous > stress:    --completed pelvic floor with Abimbola Jarret--doing HEP  --using oxybutynin xl 10 mg  --voiding every 1 1/2- 2 hours  --nocturia 3/ night (limits fluids prior to bedtime most night)  --using mini pads with scant UUI  --minimal GARY     2)  Vaginal atrophy (dryness):    --using estrogen cream twice weekly  --estrace was > $200  --did not like imvexxy     3)  Nocturia (nighttime urination):   --improved to 3/ night       4)  Recent UTI:  --denies symptoms at this time  --symptoms improved with oxybutynin    Changes since last visit:  1)  Mixed urinary incontinence, urge/spontaneous > stress:    --has not been doing pelvic floor PT HEP x 6-7 months -- recently restarted  --using oxybutynin xl 10 mg  --voiding every 1 hours  --nocturia 6-7/ night (limits fluids prior to bedtime most night)  --using mini pads with scant UUI--still improved  --minimal GARY     2)  Vaginal atrophy (dryness):    --using estrogen cream twice weekly  --estrace was > $200  --did not like imvexxy     3)  Nocturia (nighttime urination):   --6-7/ night     4)  Recent UTI:  --having urinary frequency x 3 days  --concerned she has a uti  --symptoms improved with oxybutynin         Past Medical History:   Diagnosis Date    Cholelithiasis     DDD (degenerative disc disease), lumbar      Fatty liver     - noted on U/S 11/2017    Goiter, nodular     HTN (hypertension)     Hyperglycemia     Hyperlipidemia     Hypothyroidism     Hypovitaminosis D     Meningioma     right frontal lobe ; followed by Dr. Enciso once a year    Obesity     Prediabetes     Urinary bladder disorder        Past Surgical History:   Procedure Laterality Date    BLADDER SUSPENSION      LIVER BIOPSY      thyroid biopsy  7/11/12    tonsillectomy      TONSILLECTOMY         Family History   Problem Relation Age of Onset    Coronary artery disease Father 55    Heart disease Father     Hypertension Father     Melanoma Father     Hypertension Mother     Stroke Mother         2015    Hypertension Sister         controlled by lifestyle    No Known Problems Brother     Pancreatic cancer Maternal Grandfather     Heart failure Maternal Grandmother     Thyroid nodules Sister         s/p thyroidectomy    Thyroid disease Sister     Thyroid nodules Sister     No Known Problems Brother     Diabetes Neg Hx     Psoriasis Neg Hx     Lupus Neg Hx     Eczema Neg Hx     Thyroid cancer Neg Hx     Breast cancer Neg Hx     Ovarian cancer Neg Hx     Cervical cancer Neg Hx     Endometrial cancer Neg Hx     Vaginal cancer Neg Hx     Cirrhosis Neg Hx        Social History     Socioeconomic History    Marital status:     Number of children: 1   Occupational History    Occupation: human      Employer: [employer]   Tobacco Use    Smoking status: Never    Smokeless tobacco: Never   Substance and Sexual Activity    Alcohol use: No    Drug use: No    Sexual activity: Yes     Partners: Male   Other Topics Concern    Are you pregnant or think you may be? No    Breast-feeding No   Social History Narrative    3 adopted children.       Current Outpatient Medications   Medication Sig Dispense Refill    amLODIPine (NORVASC) 10 MG tablet Take 1 tablet (10 mg total) by mouth once daily. 90 tablet 2    aspirin (ECOTRIN) 81 MG EC tablet Take 1 tablet  "(81 mg total) by mouth once daily. 90 tablet 3    atorvastatin (LIPITOR) 20 MG tablet Take 20 mg by mouth.      atorvastatin (LIPITOR) 40 MG tablet Take 40 mg by mouth.      atorvastatin (LIPITOR) 80 MG tablet Take 1 tablet (80 mg total) by mouth every evening. 90 tablet 3    CALCIUM CARBONATE/VITAMIN D3 (CALCIUM 500 + D, D3, ORAL) once daily.       carvediloL (COREG) 25 MG tablet TAKE 1 TABLET BY MOUTH TWICE A DAY WITH MEALS 180 tablet 0    diazePAM (VALIUM) 2 MG tablet Take 1 tablet (2 mg total) by mouth once. Take 1 tablet by mouth 30-60 minutes prior to MRI; may repeat once as needed. for 1 dose (Patient not taking: Reported on 12/16/2022) 2 tablet 0    ergocalciferol (ERGOCALCIFEROL) 50,000 unit Cap TAKE 1 CAPSULE BY MOUTH ONE TIME PER WEEK 12 capsule 3    estradioL (ESTRACE) 0.01 % (0.1 mg/gram) vaginal cream Place vaginally once daily.      FOLIC ACID/MV,FE,OTHER MIN (CENTRUM ORAL) Take 1 tablet by mouth.      levothyroxine (SYNTHROID) 75 MCG tablet TAKE 1 TABLET BY MOUTH EVERY DAY 90 tablet 3    omega-3 fatty acids 1,000 mg Cap Take 1 capsule by mouth Daily.      oxybutynin (DITROPAN-XL) 10 MG 24 hr tablet Take 1 tablet (10 mg total) by mouth once daily. 90 tablet 3    pen needle, diabetic (BD ULTRA-FINE AMELIA PEN NEEDLE) 32 gauge x 5/32" Ndle Use with ozempic 30 each 3    semaglutide (OZEMPIC) 0.25 mg or 0.5 mg(2 mg/1.5 mL) pen injector INJECT 0.25 MG INTO THE SKIN ONCE WEEKLY FOR 4 WEEKS, THEN INCREASE TO 0.5 MG ONCE WEEKLY 3 pen 3    triamterene-hydrochlorothiazide 37.5-25 mg (MAXZIDE-25) 37.5-25 mg per tablet Take 1 tablet by mouth once daily. 90 tablet 0     No current facility-administered medications for this visit.       Review of patient's allergies indicates:   Allergen Reactions    Zostavax [zoster vaccine live (pf)] Rash     - injection site red, hot, indurated    Demerol [meperidine] Other (See Comments)     Other reaction(s): Nausea  Other reaction(s): Headache    Nsaids (non-steroidal " anti-inflammatory drug) Other (See Comments)       Other reaction(s): Difficulty breathing    Other reaction(s): Difficulty breathing    Phenytoin sodium extended Other (See Comments)       Well woman:  Pap test: 2017, normal per report.  History of abnormal paps: No.  History of STIs:  No  Mammogram: Date of last:06/2022--normal per patient report at [facility]  Colonoscopy: none.  Worried since had bladder rupture.  Was told by some MDs at [facility] she should never have invasive procedure.  Does have stool CA screening with PCP.   DEXA:  Date of last:04/2019    Elevated BMD at the total hip and lumbar spine.  There is a decline in BMD at the total hip (-5.2%) when compared to previous study done four years ago.  No change at the lumbar spine.     ROS:  As per HPI.      Exam  There were no vitals taken for this visit.  General: alert and oriented, no acute distress  Respiratory: normal respiratory effort  Abd: soft, non-tender, non-distended    Pelvic--deferred      Impression  1. Urinary frequency  CULTURE, URINE    Urinalysis    oxybutynin (DITROPAN-XL) 10 MG 24 hr tablet      2. Colon cancer screening  Cologuard Screening (Multitarget Stool DNA)    Cologuard Screening (Multitarget Stool DNA)      3. Urinary incontinence, mixed  oxybutynin (DITROPAN-XL) 10 MG 24 hr tablet          We reviewed the above issues and discussed options for short-term versus long-term management of her problems.   Plan:      1)  Mixed urinary incontinence, urge/spontaneous > stress:    --continue pelvic floor home exercise program  --URGE: continue oxybutynin xl 10 mg daily  --STRESS:  Pessary vs. Sling.   --urine culture today     2)  Vaginal atrophy (dryness):    --Continue 0.5 gram of estrogen cream in vagina with applicator twice a week.  Also, Use small amount with finger around vaginal opening/inner lips at same frequency. ( I sent in premarin-- see if it is cheaper-- you only need to do 0.5 g instead of 1 gm of it.  You can put a dime  size amount on your finger-- insert it to the 2nd knuckle and rub around inside and along the vaginal opening as well.  You do not need to use the applicator.  Let me know if it is less expensive for you).  --use at night right before bed  --may also help reduce UTI frequency and bladder urgency/frequency symptoms     3)  Nocturia (nighttime urination): stop fluids 2 hours before bed/no water by bed.  If have leg swelling:  Elevate feet above chest x 1 hour before bed to get excess fluid off.  Can also use support hose (knee highs).    --work on sleep pattern (sleep hygiene sheet)       4) history of  UTI:  --If you feel like you have a UTI, please call our office so that we can place an order for you to drop off a urine specimen for urine culture at the closest [facility] lab (we will arrange).                --We can call in antibiotics for you to start right after you drop off specimen.                --In this way, we can determine:                           1)  Do you have a UTI?                            2) If you have a UTI, is it sensitive to the antibiotics we prescribed?   --follow UTI prevention tips (see attached)  --control bowel movements/fecal cross-contamination: continue daily fiber gummies              --can use stool softener as needed for flares  --treat vaginal atrophy (dryness)  --empty bladder before and after intercourse  --consider need for further evaluation (pelvic imaging and cystoscopy) if issue persists     5)  RTC 1 year for medication refill-- can be virtual    Face to Face time with patient: 15  20 minutes of total time spent on the encounter, which includes face to face time and non-face to face time preparing to see the patient (eg, review of tests), Obtaining and/or reviewing separately obtained history, Documenting clinical information in the electronic or other health record, Independently interpreting results (not separately reported) and communicating results to the  patient/family/caregiver, or Care coordination (not separately reported).       Kimberlyn Ledesma, FNP-BC  [facility]  Division of Female Pelvic Medicine and Reconstructive Surgery  Department of Obstetrics & Gynecology

## 2023-03-03 NOTE — PATIENT INSTRUCTIONS
1)  Mixed urinary incontinence, urge/spontaneous > stress:    --continue pelvic floor home exercise program  --URGE: continue oxybutynin xl 10 mg daily  --STRESS:  Pessary vs. Sling.   --urine culture today     2)  Vaginal atrophy (dryness):    --Continue 0.5 gram of estrogen cream in vagina with applicator twice a week.  Also, Use small amount with finger around vaginal opening/inner lips at same frequency. ( I sent in premarin-- see if it is cheaper-- you only need to do 0.5 g instead of 1 gm of it.  You can put a dime size amount on your finger-- insert it to the 2nd knuckle and rub around inside and along the vaginal opening as well.  You do not need to use the applicator.  Let me know if it is less expensive for you).  --use at night right before bed  --may also help reduce UTI frequency and bladder urgency/frequency symptoms     3)  Nocturia (nighttime urination): stop fluids 2 hours before bed/no water by bed.  If have leg swelling:  Elevate feet above chest x 1 hour before bed to get excess fluid off.  Can also use support hose (knee highs).    --work on sleep pattern (sleep hygiene sheet)        4) history of  UTI:  --If you feel like you have a UTI, please call our office so that we can place an order for you to drop off a urine specimen for urine culture at the closest [facility] lab (we will arrange).                --We can call in antibiotics for you to start right after you drop off specimen.                --In this way, we can determine:                           1)  Do you have a UTI?                            2) If you have a UTI, is it sensitive to the antibiotics we prescribed?   --follow UTI prevention tips (see attached)  --control bowel movements/fecal cross-contamination: continue daily fiber gummies              --can use stool softener as needed for flares  --treat vaginal atrophy (dryness)  --empty bladder before and after intercourse  --consider need for further evaluation (pelvic  imaging and cystoscopy) if issue persists     5)  RTC 1 year for medication refill-- can be virtual

## 2023-03-05 LAB
BACTERIA UR CULT: NORMAL
BACTERIA UR CULT: NORMAL

## 2023-03-06 ENCOUNTER — PATIENT MESSAGE (OUTPATIENT)
Dept: UROGYNECOLOGY | Facility: CLINIC | Age: 68
End: 2023-03-06
Payer: MEDICARE

## 2023-03-06 DIAGNOSIS — R35.0 URINARY FREQUENCY: Primary | ICD-10-CM

## 2023-03-06 RX ORDER — DARIFENACIN 15 MG/1
15 TABLET, EXTENDED RELEASE ORAL DAILY
Qty: 30 TABLET | Refills: 11 | Status: SHIPPED | OUTPATIENT
Start: 2023-03-06 | End: 2023-06-22

## 2023-03-14 ENCOUNTER — PATIENT MESSAGE (OUTPATIENT)
Dept: FAMILY MEDICINE | Facility: CLINIC | Age: 68
End: 2023-03-14
Payer: MEDICARE

## 2023-04-13 ENCOUNTER — PATIENT MESSAGE (OUTPATIENT)
Dept: ENDOCRINOLOGY | Facility: CLINIC | Age: 68
End: 2023-04-13
Payer: MEDICARE

## 2023-04-14 ENCOUNTER — TELEPHONE (OUTPATIENT)
Dept: ENDOCRINOLOGY | Facility: CLINIC | Age: 68
End: 2023-04-14
Payer: MEDICARE

## 2023-04-18 ENCOUNTER — PATIENT MESSAGE (OUTPATIENT)
Dept: ENDOCRINOLOGY | Facility: CLINIC | Age: 68
End: 2023-04-18
Payer: MEDICARE

## 2023-04-18 RX ORDER — SEMAGLUTIDE 1.34 MG/ML
1 INJECTION, SOLUTION SUBCUTANEOUS
Qty: 3 ML | Refills: 11 | Status: SHIPPED | OUTPATIENT
Start: 2023-04-18 | End: 2023-07-11

## 2023-04-27 ENCOUNTER — OFFICE VISIT (OUTPATIENT)
Dept: FAMILY MEDICINE | Facility: CLINIC | Age: 68
End: 2023-04-27
Payer: MEDICARE

## 2023-04-27 VITALS
TEMPERATURE: 98 F | HEART RATE: 71 BPM | SYSTOLIC BLOOD PRESSURE: 120 MMHG | OXYGEN SATURATION: 97 % | WEIGHT: 196 LBS | BODY MASS INDEX: 28.94 KG/M2 | DIASTOLIC BLOOD PRESSURE: 70 MMHG

## 2023-04-27 DIAGNOSIS — I10 ESSENTIAL HYPERTENSION: ICD-10-CM

## 2023-04-27 DIAGNOSIS — D32.0 BENIGN MENINGIOMA OF BRAIN: ICD-10-CM

## 2023-04-27 DIAGNOSIS — B37.0 ORAL THRUSH: ICD-10-CM

## 2023-04-27 DIAGNOSIS — E78.49 OTHER HYPERLIPIDEMIA: ICD-10-CM

## 2023-04-27 DIAGNOSIS — R39.89 SUSPECTED UTI: ICD-10-CM

## 2023-04-27 DIAGNOSIS — N30.00 ACUTE CYSTITIS WITHOUT HEMATURIA: Primary | ICD-10-CM

## 2023-04-27 PROCEDURE — 99214 OFFICE O/P EST MOD 30 MIN: CPT | Mod: PBBFAC,PO | Performed by: NURSE PRACTITIONER

## 2023-04-27 PROCEDURE — 99214 PR OFFICE/OUTPT VISIT, EST, LEVL IV, 30-39 MIN: ICD-10-PCS | Mod: S$PBB,,, | Performed by: NURSE PRACTITIONER

## 2023-04-27 PROCEDURE — 99214 OFFICE O/P EST MOD 30 MIN: CPT | Mod: S$PBB,,, | Performed by: NURSE PRACTITIONER

## 2023-04-27 PROCEDURE — 99999 PR PBB SHADOW E&M-EST. PATIENT-LVL IV: ICD-10-PCS | Mod: PBBFAC,,, | Performed by: NURSE PRACTITIONER

## 2023-04-27 PROCEDURE — 99999 PR PBB SHADOW E&M-EST. PATIENT-LVL IV: CPT | Mod: PBBFAC,,, | Performed by: NURSE PRACTITIONER

## 2023-04-27 RX ORDER — NITROFURANTOIN 25; 75 MG/1; MG/1
100 CAPSULE ORAL 2 TIMES DAILY
Qty: 10 CAPSULE | Refills: 0 | Status: SHIPPED | OUTPATIENT
Start: 2023-04-27 | End: 2023-05-16 | Stop reason: ALTCHOICE

## 2023-04-27 RX ORDER — CLOTRIMAZOLE 10 MG/1
10 LOZENGE ORAL; TOPICAL
Qty: 50 TABLET | Refills: 0 | Status: SHIPPED | OUTPATIENT
Start: 2023-04-27 | End: 2023-05-07

## 2023-04-27 NOTE — PATIENT INSTRUCTIONS
Medical Fitness--[phone]  Imaging, Xray, CT, MRI, Ultrasound---[phone]  Bariatrics---[phone]  Breast Surgery---[phone]  Case Management---[phone]  Colonoscopy---[phone]  DME---[phone]  Infectious Disease---[phone]  Interventional Radiology---[phone]  Medical Records---[phone]  [facility]---[phone]  Optometry/Ophthalmology---[phone]  [facility] Bar---[phone]  Physical Therapy---[phone]  Psychiatry---[phone] or [phone]  Plastic Surgery---[phone]  Recovery--[phone] option 2, or [phone].  Sleep Study---[phone]  Smoking Cessation---[phone]  Wound Care---[phone]  Referral Desk---[phone]

## 2023-04-27 NOTE — PROGRESS NOTES
HPI     Chief Complaint:  Chief Complaint   Patient presents with    Urinary Tract Infection       Claudia Rodriguez is a 68 y.o. female with multiple medical diagnoses as listed in the medical history and problem list that presents for suspected uti.  Pt is new to me but is known to this clinic with her last appointment being 12/16/2022.      Urinary Tract Infection   This is a new problem. The current episode started in the past 7 days. The problem occurs every urination. The problem has been gradually worsening. The quality of the pain is described as aching. The pain is mild. There has been no fever. Associated symptoms include frequency, urgency and constipation. Pertinent negatives include no chills, flank pain, hematuria, nausea, vomiting or rash. She has tried nothing for the symptoms. Her past medical history is significant for hypertension.   Oral Pain   This is a new problem. The current episode started yesterday. The problem occurs every few minutes. The problem has been gradually worsening. The pain is mild. Pertinent negatives include no difficulty swallowing, fever or oral bleeding. She has tried nothing for the symptoms.         Assessment & Plan     Problem List Items Addressed This Visit          Cardiac/Vascular    Other hyperlipidemia    discussed ways to lower triglycerides such as cutting simple sugars out of diet (white breads, candies, cookies, cakes, etc.) and reducing/eliminating intake of highly processed trans fatty acids.   Exercise 30 minutes a day for 4-5 days a week.   Eat more fiber.      Overview     Patient could not tolerate atorvastatin.           Essential hypertension    BP Readings from Last 3 Encounters:   04/27/23 120/70   01/10/23 124/76   12/16/22 136/78       -continue current medication regimen  -DASH diet, regular cardiovascular exercises, portion control  - ?weight loss  -f/u with BP logs in 2 weeks          Oncology    Benign meningioma of brain    The current medical  regimen is effective;  continue present plan      Overview     - followed by neurosurgery, Dr. Hammond            Other Visit Diagnoses       Acute cystitis without hematuria    -  Primary    Relevant Medications    nitrofurantoin, macrocrystal-monohydrate, (MACROBID) 100 MG capsule    Suspected UTI        Patient reports urinary frequency, urgency, and discomfort starting last night. Up every 30 minutes to urinate throughout the night. Denies fever, hematuria. Patient with history of UTI with E. Coli; Will order     -AF VSS, no CVA tenderness on exam.   Dipstick with +LE, f/u UCx  -advised adequate hydration and proper hygiene  -avoid bladder irritants such as tea, coffee, caffeine, alcohol, artificial sweeteners, citrus, spicy foods, acidic foods,chocolate, tomato-based foods, smoking.  -pelvic rest until symptoms resolve    UTI prevention:  a. perineal hygiene  b. drink water prior to intercourse and urinate afterwards and avoid certain positions which could increase likelyhood of UTIs  c. establish regular bladder habits   e. increase fluids and avoid caffeine and alcohol    Macrobid 100mg BID x5 days    Discussed DDx, condition, and treatment.   Education sent to patient portal/included in after visit summary.  Discussed signs of pyelonephritis fever, chills, n/v, back pain, worsening dyuria, hematuria.   ED precautions given.   Notify provider if symptoms do not resolve or increase in severity.   Patient verbalizes understanding and agrees with plan of care.      Relevant Orders    POCT urine dipstick without microscope    Urinalysis, Reflex to Urine Culture Urine, Clean Catch    Oral thrush        Noticed gingival redness and swelling last night. Has thick white coating across tongue, reports a burning sensation when eating. Suspect oral thrush; will order clotrimazole bryan 1 tab by mouth 5x daily x 10 days.     Relevant Medications    clotrimazole (MYCELEX) 10 mg bryan             --------------------------------------------      Health Maintenance:  Health Maintenance         Date Due Completion Date    COVID-19 Vaccine (5 - Booster for Moderna series) 07/28/2022 6/2/2022    Lipid Panel 07/11/2023 7/11/2022    Hemoglobin A1c 02/07/2024 2/7/2023    Aspirin/Antiplatelet Therapy 02/14/2024 2/14/2023    Mammogram 07/07/2024 7/7/2022    Override on 5/25/2016: Done    Override on 2/3/2014: Done (DIS - normal)    Override on 12/11/2012: Done    Override on 10/27/2010: Done    Colorectal Cancer Screening 02/03/2027 9/11/2020    Override on 2/3/2017: Declined (patient will do yearly fecal occult blood testing)    TETANUS VACCINE 05/31/2031 5/31/2021            Advised patient on the importance of completing overdue health maintenance items    Follow Up:  Follow up in about 2 weeks (around 5/11/2023), or if symptoms worsen or fail to improve.    Exam     Review of Systems   Constitutional:  Negative for chills, fatigue and fever.   HENT:  Positive for mouth dryness and mouth sores. Negative for facial swelling, sore throat and trouble swallowing.    Respiratory:  Negative for chest tightness and shortness of breath.    Cardiovascular:  Negative for chest pain and palpitations.   Gastrointestinal:  Positive for constipation. Negative for abdominal pain, nausea and vomiting.   Genitourinary:  Positive for frequency and urgency. Negative for decreased urine volume, flank pain and hematuria.   Musculoskeletal:  Negative for arthralgias and myalgias.   Integumentary:  Negative for rash and wound.   Neurological:  Negative for dizziness and weakness.   Hematological:  Bruises/bleeds easily.   Psychiatric/Behavioral:  Negative for agitation and confusion. The patient is not nervous/anxious.     Physical Exam  Vitals reviewed.   Constitutional:       Appearance: Normal appearance.   HENT:      Head: Normocephalic and atraumatic.      Right Ear: Tympanic membrane normal.      Left Ear: Tympanic membrane  normal.      Nose: Nose normal.      Mouth/Throat:      Mouth: Mucous membranes are moist.      Dentition: Gingival swelling present.      Tongue: Lesions present.      Pharynx: Oropharynx is clear. No pharyngeal swelling or posterior oropharyngeal erythema.      Tonsils: No tonsillar exudate.      Comments: White coating noted to tongue   Eyes:      Conjunctiva/sclera: Conjunctivae normal.   Cardiovascular:      Rate and Rhythm: Normal rate and regular rhythm.      Pulses: Normal pulses.      Heart sounds: Normal heart sounds.   Pulmonary:      Effort: Pulmonary effort is normal.      Breath sounds: Normal breath sounds.   Abdominal:      Palpations: Abdomen is soft.      Tenderness: There is no abdominal tenderness. There is no right CVA tenderness or left CVA tenderness.   Musculoskeletal:         General: Normal range of motion.      Cervical back: Normal range of motion and neck supple.   Skin:     General: Skin is warm and dry.      Capillary Refill: Capillary refill takes 2 to 3 seconds.   Neurological:      General: No focal deficit present.      Mental Status: She is alert and oriented to person, place, and time.   Psychiatric:         Mood and Affect: Mood normal.         Behavior: Behavior normal.     Vitals:    04/27/23 1152   BP: 120/70   BP Location: Left arm   Patient Position: Sitting   BP Method: Medium (Manual)   Pulse: 71   Temp: 98.3 °F (36.8 °C)   TempSrc: Oral   SpO2: 97%   Weight: 88.9 kg (195 lb 15.8 oz)      Body mass index is 28.94 kg/m².        History     Past Medical History:  Past Medical History:   Diagnosis Date    Cholelithiasis     DDD (degenerative disc disease), lumbar     Fatty liver     - noted on U/S 11/2017    Goiter, nodular     HTN (hypertension)     Hyperglycemia     Hyperlipidemia     Hypothyroidism     Hypovitaminosis D     Meningioma     right frontal lobe ; followed by Dr. Enciso once a year    Obesity     Prediabetes     Urinary bladder disorder        Past Surgical  History:  Past Surgical History:   Procedure Laterality Date    BLADDER SUSPENSION      LIVER BIOPSY      thyroid biopsy  7/11/12    tonsillectomy      TONSILLECTOMY         Social History:  Social History     Socioeconomic History    Marital status:     Number of children: 1   Occupational History    Occupation: human      Employer: [employer]   Tobacco Use    Smoking status: Never    Smokeless tobacco: Never   Substance and Sexual Activity    Alcohol use: No    Drug use: No    Sexual activity: Yes     Partners: Male   Other Topics Concern    Are you pregnant or think you may be? No    Breast-feeding No   Social History Narrative    3 adopted children.       Family History:  Family History   Problem Relation Age of Onset    Coronary artery disease Father 55    Heart disease Father     Hypertension Father     Melanoma Father     Hypertension Mother     Stroke Mother         2015    Hypertension Sister         controlled by lifestyle    No Known Problems Brother     Pancreatic cancer Maternal Grandfather     Heart failure Maternal Grandmother     Thyroid nodules Sister         s/p thyroidectomy    Thyroid disease Sister     Thyroid nodules Sister     No Known Problems Brother     Diabetes Neg Hx     Psoriasis Neg Hx     Lupus Neg Hx     Eczema Neg Hx     Thyroid cancer Neg Hx     Breast cancer Neg Hx     Ovarian cancer Neg Hx     Cervical cancer Neg Hx     Endometrial cancer Neg Hx     Vaginal cancer Neg Hx     Cirrhosis Neg Hx        Allergies and Medications: (updated and reviewed)  Review of patient's allergies indicates:   Allergen Reactions    Zostavax [zoster vaccine live (pf)] Rash     - injection site red, hot, indurated    Demerol [meperidine] Other (See Comments)     Other reaction(s): Nausea  Other reaction(s): Headache    Nsaids (non-steroidal anti-inflammatory drug) Other (See Comments)       Other reaction(s): Difficulty breathing    Other reaction(s): Difficulty breathing     "Phenytoin sodium extended Other (See Comments)     Current Outpatient Medications   Medication Sig Dispense Refill    amLODIPine (NORVASC) 10 MG tablet TAKE 1 TABLET BY MOUTH EVERY DAY 90 tablet 0    aspirin (ECOTRIN) 81 MG EC tablet Take 1 tablet (81 mg total) by mouth once daily. 90 tablet 3    atorvastatin (LIPITOR) 80 MG tablet TAKE 1 TABLET BY MOUTH EVERY EVENING 90 tablet 3    CALCIUM CARBONATE/VITAMIN D3 (CALCIUM 500 + D, D3, ORAL) once daily.       carvediloL (COREG) 25 MG tablet TAKE 1 TABLET BY MOUTH TWICE A DAY WITH MEALS 180 tablet 0    ergocalciferol (ERGOCALCIFEROL) 50,000 unit Cap TAKE 1 CAPSULE BY MOUTH ONE TIME PER WEEK 12 capsule 3    estradioL (ESTRACE) 0.01 % (0.1 mg/gram) vaginal cream Place vaginally once daily.      FOLIC ACID/MV,FE,OTHER MIN (CENTRUM ORAL) Take 1 tablet by mouth.      levothyroxine (SYNTHROID) 75 MCG tablet TAKE 1 TABLET BY MOUTH EVERY DAY 90 tablet 3    omega-3 fatty acids 1,000 mg Cap Take 1 capsule by mouth Daily.      pen needle, diabetic (BD ULTRA-FINE AMELIA PEN NEEDLE) 32 gauge x 5/32" Ndle Use with ozempic 30 each 3    semaglutide (OZEMPIC) 1 mg/dose (4 mg/3 mL) Inject 1 mg into the skin every 7 days. 3 mL 11    triamterene-hydrochlorothiazide 37.5-25 mg (MAXZIDE-25) 37.5-25 mg per tablet TAKE 1 TABLET BY MOUTH EVERY DAY 90 tablet 0    clotrimazole (MYCELEX) 10 mg bryan Take 1 tablet (10 mg total) by mouth 5 (five) times daily. for 10 days 50 tablet 0    darifenacin (ENABLEX) 15 mg 24 hr tablet Take 1 tablet (15 mg total) by mouth once daily. 30 tablet 11    nitrofurantoin, macrocrystal-monohydrate, (MACROBID) 100 MG capsule Take 1 capsule (100 mg total) by mouth 2 (two) times daily. for 5 days 10 capsule 0     No current facility-administered medications for this visit.       Patient Care Team:  Jessica Marquez MD as PCP - General (Internal Medicine)  Bisi Madrigal MD as Consulting Physician (Endocrinology)  Fernie Hammond MD as Consulting Physician " (Neurosurgery)  Jessica Marquez MD as Hypertension Digital Medicine Responsible Provider (Internal Medicine)  Stephanie Guadalupe PharmD as Hypertension Digital Medicine Clinician (Pharmacist)  Anirudh Selby MD (Inactive) as Consulting Physician (Obstetrics and Gynecology)  June Sherman as Digital Medicine Health   Albertina Pickens LPN as Licensed Practical Nurse  [employer] as Hypertension Digital Medicine Contract  Jessica Marquez MD as Hyperlipidemia Digital Medicine Responsible Provider (Internal Medicine)  Stephanie Guadalupe PharmD as Hyperlipidemia Digital Medicine Clinician (Pharmacist)         - The patient is given an After Visit Summary that lists all medications with directions, allergies, education, orders placed during this encounter and follow-up instructions.      - I have reviewed the patient's medical information including past medical, family, and social history sections including the medications and allergies.      - We discussed the patient's current medications.     This note was created by combination of typed  and MModal dictation.  Transcription errors may be present.  If there are any questions, please contact me.       Catrachito Alvarado NP

## 2023-04-27 NOTE — PROGRESS NOTES
.   Writer spoke Susana and notified her of below  1) she asked if we scheduled Maribeth for a follow up appt  Scheduled for tomorrow  2) Pt is on bactrim daily to prevent UTIs, she asked if they should be intermittently checked to make sure she doesn't have a UTI, as she is typically asymptomatic  Writer noted this, but encouraged to discuss this with MD tomorrow  3) Covid vaccine. Writer told her that we do not have a timeline of when we will be able to vaccinate our patients or how that will roll out. But they will be notified.   Pt's daughter understood and agreed. Had no further questions

## 2023-05-03 ENCOUNTER — OFFICE VISIT (OUTPATIENT)
Dept: FAMILY MEDICINE | Facility: CLINIC | Age: 68
End: 2023-05-03
Payer: MEDICARE

## 2023-05-03 ENCOUNTER — PATIENT MESSAGE (OUTPATIENT)
Dept: FAMILY MEDICINE | Facility: CLINIC | Age: 68
End: 2023-05-03

## 2023-05-03 DIAGNOSIS — R73.03 PREDIABETES: ICD-10-CM

## 2023-05-03 DIAGNOSIS — I10 ESSENTIAL HYPERTENSION: ICD-10-CM

## 2023-05-03 DIAGNOSIS — E66.9 OBESITY (BMI 30-39.9): ICD-10-CM

## 2023-05-03 DIAGNOSIS — J06.9 UPPER RESPIRATORY TRACT INFECTION, UNSPECIFIED TYPE: Primary | ICD-10-CM

## 2023-05-03 PROCEDURE — 99214 PR OFFICE/OUTPT VISIT, EST, LEVL IV, 30-39 MIN: ICD-10-PCS | Mod: 95,,, | Performed by: NURSE PRACTITIONER

## 2023-05-03 PROCEDURE — 99214 OFFICE O/P EST MOD 30 MIN: CPT | Mod: 95,,, | Performed by: NURSE PRACTITIONER

## 2023-05-03 RX ORDER — BENZOCAINE AND MENTHOL, UNSPECIFIED FORM 15; 20 MG/1; MG/1
1 LOZENGE ORAL
Qty: 16 LOZENGE | Refills: 1 | Status: SHIPPED | OUTPATIENT
Start: 2023-05-03 | End: 2023-06-12

## 2023-05-03 RX ORDER — GUAIFENESIN 600 MG/1
600 TABLET, EXTENDED RELEASE ORAL 2 TIMES DAILY PRN
Qty: 20 TABLET | Refills: 0 | Status: SHIPPED | OUTPATIENT
Start: 2023-05-03 | End: 2023-06-12

## 2023-05-03 RX ORDER — BENZONATATE 200 MG/1
200 CAPSULE ORAL 3 TIMES DAILY PRN
Qty: 15 CAPSULE | Refills: 0 | Status: SHIPPED | OUTPATIENT
Start: 2023-05-03 | End: 2023-06-12

## 2023-05-03 RX ORDER — FLUTICASONE PROPIONATE 50 MCG
2 SPRAY, SUSPENSION (ML) NASAL DAILY
Qty: 11.1 ML | Refills: 0 | Status: SHIPPED | OUTPATIENT
Start: 2023-05-03 | End: 2023-06-12

## 2023-05-03 NOTE — PROGRESS NOTES
The patient location is:  Patient Home  The chief complaint leading to consultation is: as below  Visit type: Virtual visit with synchronous audio and video  Total time spent with patient: 15 minutes  Each patient to whom he or she provides medical services by telemedicine is:  (1) informed of the relationship between the physician and patient and the respective role of any other health care provider with respect to management of the patient; and (2) notified that she may decline to receive medical services by telemedicine and may withdraw from such care at any time.      HPI     Chief Complaint:  Cough and congestion      Claudia Rodriguez is a 68 y.o. female with multiple medical diagnoses as listed in the medical history and problem list that presents for cough and congestion.  Pt is new to me but is known to this clinic with her last appointment being 4/27/2023.      Cough  This is a new problem. The current episode started in the past 7 days. The problem has been gradually worsening. The problem occurs every few minutes. The cough is Productive of sputum. Associated symptoms include headaches, nasal congestion, postnasal drip, rhinorrhea and a sore throat. Pertinent negatives include no chills or fever. Nothing aggravates the symptoms. She has tried a beta-agonist inhaler, oral steroids, prescription cough suppressant, rest and steroid inhaler for the symptoms. The treatment provided significant relief. Her past medical history is significant for bronchitis. There is no history of asthma, bronchiectasis, COPD, emphysema, environmental allergies or pneumonia.     Reports recent home COVID-19 test was negative.     She has taken coricidin with mild improvement in symptoms.     Assessment & Plan     Problem List Items Addressed This Visit          Cardiac/Vascular    Essential hypertension    BP Readings from Last 3 Encounters:   04/27/23 120/70   01/10/23 124/76   12/16/22 136/78       -continue current medication  regimen  -DASH diet, regular cardiovascular exercises, portion control  - ?weight loss  -f/u with BP logs in 2 weeks          Endocrine    Prediabetes    Patient does have a Hx of prediabtes, which we discussed increased risk for diabetes in the future, therefore, I recommend dietary modification such as lowering carbohydrates in diet (pasta, rice, bread, potatoes, crackers, cookies, candy, soda, etc.) to decrease the risk of progression to diabetes.          Obesity (BMI 30-39.9)    We discussed weight issues and safe, effective ways of losing pounds, includin) diet:  low carbohydrate, low fat diet, stay away from fast food, fried and processed food, use whole grain, lot of fruits and vegetables, use healthy fat such as avocado, nuts and olive oil in reasonable quantity, stay away from sodas. Regular meals with lean proteins.  2) physical activity: ideally 150 min a week, with cardiovascular and resistance activity.  Patient was encouraged to set realistic attainable goals for weight loss, and we will follow up periodically.    Discussed Mediterranean Diet recommendations (Adopted from Ben et al, [facility], 2018.)  - Eat primarily plant-based foods, such as fruits and vegetables, whole grains, legumes (beans) and nuts  - Limit refined carbohydrates (white pasta, bread, rice).  - Replace butter with healthy fats such as olive oil.  - Use herbs and spices instead of salt to flavor foods.  - Limit red meat and processed meats to no more than a few times a month.  - Avoid sugary sodas, bakery goods, and sweets.  - Eat fish and poultry at least twice a week.       Other Visit Diagnoses       Upper respiratory tract infection, unspecified type    -  Primary    -AFVSS in clinic today.  -Mild symptoms, most likely viral etiology.  -based on clinical findings today, does not warrant Abx treatment at this time. D/w pt about the potential risks of inappropriate Abx use and that if patient's Sx worsens, we will re-evaluate  to assess the need to change the treatment plan.    -Warm tea with honey and lemon, and/or warm salt water gargles every 4 hours PRN for sore throat. May try OTC Cepacol if pt desires.  -advised frequent hand washing, rest, and plenty of fluids. Increase water intake to 64-80 oz daily to help thin mucus.  -Tylenol tablets as needed for fever, headaches, sore throat, ear pain, bodyaches, and/or nasal/sinus inflammation.   -Nasal Saline spray (Over the counter [place] spray or [place])  2 sprays each nostril 2-3 times a day for nasal congestion.     Medications as below.     I counseled the patient on fluids, rest, OTC medications that can safely be used, hand/cough hygiene, expected course of illness, and when further medical attention would be warranted.      Discussed DDx, condition, and treatment.   Education sent to patient portal/included in after visit summary.  ED precautions given.   Notify provider if symptoms do not resolve or increase in severity.   Patient verbalizes understanding and agrees with plan of care.        Relevant Medications    guaiFENesin (MUCINEX) 600 mg 12 hr tablet    benzonatate (TESSALON) 200 MG capsule    fluticasone propionate (FLONASE) 50 mcg/actuation nasal spray    benzocaine-menthoL (CEPACOL INSTAMAX SORE THROAT) 15-20 mg Lozg            --------------------------------------------      Health Maintenance:  Health Maintenance         Date Due Completion Date    COVID-19 Vaccine (5 - Booster for Moderna series) 07/28/2022 6/2/2022    Lipid Panel 07/11/2023 7/11/2022    Hemoglobin A1c 02/07/2024 2/7/2023    Aspirin/Antiplatelet Therapy 04/27/2024 4/27/2023    Mammogram 07/07/2024 7/7/2022    Override on 5/25/2016: Done    Override on 2/3/2014: Done (DIS - normal)    Override on 12/11/2012: Done    Override on 10/27/2010: Done    Colorectal Cancer Screening 02/03/2027 9/11/2020    Override on 2/3/2017: Declined (patient will do yearly fecal occult blood testing)    TETANUS VACCINE  05/31/2031 5/31/2021            Advised patient on the importance of completing overdue health maintenance items    Follow Up:  Follow up in about 2 weeks (around 5/17/2023), or if symptoms worsen or fail to improve.    Exam     Review of Systems:  (as noted above)  Review of Systems   Constitutional:  Negative for chills and fever.   HENT:  Positive for postnasal drip, rhinorrhea and sore throat.    Respiratory:  Positive for cough.    Allergic/Immunologic: Negative for environmental allergies.   Neurological:  Positive for headaches.     Physical Exam:   Physical Exam  Constitutional:       General: She is not in acute distress.     Appearance: She is not toxic-appearing or diaphoretic.   Pulmonary:      Effort: Pulmonary effort is normal.   Neurological:      Mental Status: She is alert.   Psychiatric:         Mood and Affect: Mood normal.     There were no vitals filed for this visit.   There is no height or weight on file to calculate BMI.        History     Past Medical History:  Past Medical History:   Diagnosis Date    Cholelithiasis     DDD (degenerative disc disease), lumbar     Fatty liver     - noted on U/S 11/2017    Goiter, nodular     HTN (hypertension)     Hyperglycemia     Hyperlipidemia     Hypothyroidism     Hypovitaminosis D     Meningioma     right frontal lobe ; followed by Dr. Enciso once a year    Obesity     Prediabetes     Urinary bladder disorder        Past Surgical History:  Past Surgical History:   Procedure Laterality Date    BLADDER SUSPENSION      LIVER BIOPSY      thyroid biopsy  7/11/12    tonsillectomy      TONSILLECTOMY         Social History:  Social History     Socioeconomic History    Marital status:     Number of children: 1   Occupational History    Occupation: human      Employer: [employer]   Tobacco Use    Smoking status: Never    Smokeless tobacco: Never   Substance and Sexual Activity    Alcohol use: No    Drug use: No    Sexual activity: Yes      Partners: Male   Other Topics Concern    Are you pregnant or think you may be? No    Breast-feeding No   Social History Narrative    3 adopted children.       Family History:  Family History   Problem Relation Age of Onset    Coronary artery disease Father 55    Heart disease Father     Hypertension Father     Melanoma Father     Hypertension Mother     Stroke Mother         2015    Hypertension Sister         controlled by lifestyle    No Known Problems Brother     Pancreatic cancer Maternal Grandfather     Heart failure Maternal Grandmother     Thyroid nodules Sister         s/p thyroidectomy    Thyroid disease Sister     Thyroid nodules Sister     No Known Problems Brother     Diabetes Neg Hx     Psoriasis Neg Hx     Lupus Neg Hx     Eczema Neg Hx     Thyroid cancer Neg Hx     Breast cancer Neg Hx     Ovarian cancer Neg Hx     Cervical cancer Neg Hx     Endometrial cancer Neg Hx     Vaginal cancer Neg Hx     Cirrhosis Neg Hx        Allergies and Medications: (updated and reviewed)  Review of patient's allergies indicates:   Allergen Reactions    Zostavax [zoster vaccine live (pf)] Rash     - injection site red, hot, indurated    Demerol [meperidine] Other (See Comments)     Other reaction(s): Nausea  Other reaction(s): Headache    Nsaids (non-steroidal anti-inflammatory drug) Other (See Comments)       Other reaction(s): Difficulty breathing    Other reaction(s): Difficulty breathing    Phenytoin sodium extended Other (See Comments)     Current Outpatient Medications   Medication Sig Dispense Refill    amLODIPine (NORVASC) 10 MG tablet TAKE 1 TABLET BY MOUTH EVERY DAY 90 tablet 0    aspirin (ECOTRIN) 81 MG EC tablet Take 1 tablet (81 mg total) by mouth once daily. 90 tablet 3    atorvastatin (LIPITOR) 80 MG tablet TAKE 1 TABLET BY MOUTH EVERY EVENING 90 tablet 3    benzocaine-menthoL (CEPACOL INSTAMAX SORE THROAT) 15-20 mg Lozg 1 lozenge by Mucous Membrane route every 2 (two) hours as needed (sore throat). 16  "lozenge 1    benzonatate (TESSALON) 200 MG capsule Take 1 capsule (200 mg total) by mouth 3 (three) times daily as needed for Cough. 15 capsule 0    CALCIUM CARBONATE/VITAMIN D3 (CALCIUM 500 + D, D3, ORAL) once daily.       carvediloL (COREG) 25 MG tablet TAKE 1 TABLET BY MOUTH TWICE A DAY WITH MEALS 180 tablet 0    clotrimazole (MYCELEX) 10 mg bryan Take 1 tablet (10 mg total) by mouth 5 (five) times daily. for 10 days 50 tablet 0    darifenacin (ENABLEX) 15 mg 24 hr tablet Take 1 tablet (15 mg total) by mouth once daily. 30 tablet 11    ergocalciferol (ERGOCALCIFEROL) 50,000 unit Cap TAKE 1 CAPSULE BY MOUTH ONE TIME PER WEEK 12 capsule 3    estradioL (ESTRACE) 0.01 % (0.1 mg/gram) vaginal cream Place vaginally once daily.      fluticasone propionate (FLONASE) 50 mcg/actuation nasal spray 2 sprays (100 mcg total) by Each Nostril route once daily. 11.1 mL 0    FOLIC ACID/MV,FE,OTHER MIN (CENTRUM ORAL) Take 1 tablet by mouth.      guaiFENesin (MUCINEX) 600 mg 12 hr tablet Take 1 tablet (600 mg total) by mouth 2 (two) times daily as needed for Congestion. 20 tablet 0    levothyroxine (SYNTHROID) 75 MCG tablet TAKE 1 TABLET BY MOUTH EVERY DAY 90 tablet 3    omega-3 fatty acids 1,000 mg Cap Take 1 capsule by mouth Daily.      pen needle, diabetic (BD ULTRA-FINE AMELIA PEN NEEDLE) 32 gauge x 5/32" Ndle Use with ozempic 30 each 3    semaglutide (OZEMPIC) 1 mg/dose (4 mg/3 mL) Inject 1 mg into the skin every 7 days. 3 mL 11    triamterene-hydrochlorothiazide 37.5-25 mg (MAXZIDE-25) 37.5-25 mg per tablet TAKE 1 TABLET BY MOUTH EVERY DAY 90 tablet 0     No current facility-administered medications for this visit.       Patient Care Team:  Jessica Marquez MD as PCP - General (Internal Medicine)  Bisi Madrigal MD as Consulting Physician (Endocrinology)  Fernie Hammond MD as Consulting Physician (Neurosurgery)  Jessica Marquez MD as Hypertension Digital Medicine Responsible Provider (Internal Medicine)  Stephanie PICKARD" Marija Guadalupe as Hypertension Digital Medicine Clinician (Pharmacist)  Anirudh Selby MD (Inactive) as Consulting Physician (Obstetrics and Gynecology)  June Sherman as Digital Medicine Health   Albertina Pickens LPN as Licensed Practical Nurse  Med[employer]are [employer] Program as Hypertension Digital Medicine Contract  Jessica Marquez MD as Hyperlipidemia Digital Medicine Responsible Provider (Internal Medicine)  Stephanie Guadalupe PharmD as Hyperlipidemia Digital Medicine Clinician (Pharmacist)       - The patient was sent an After Visit Summary virtually that lists all medications with directions, allergies, education, orders placed during this encounter and follow-up instructions.      - I have reviewed the patient's medical information including past medical, family, and social history sections including the medications and allergies.      - We discussed the patient's current medications.     This note was created by combination of typed  and MModal dictation.  Transcription errors may be present.  If there are any questions, please contact me.       Catrachito Alvarado NP

## 2023-05-04 NOTE — PATIENT INSTRUCTIONS
[facility]--[phone]  Imaging, Xray, CT, MRI, Ultrasound---[phone]  Bariatrics---[phone]  Breast Surgery---[phone]  Case Management---[phone]  Colonoscopy---[phone]  DME---[phone]  Infectious Disease---[phone]  Interventional Radiology---[phone]  Medical Records---[phone]  [facility]---[phone]  Optometry/Ophthalmology---[phone]  [facility]---[phone]  Physical Therapy---[phone]  Psychiatry---[phone] or [phone]  Plastic Surgery---[phone]  Recovery--[phone] option 2, or [phone].  Sleep Study---[phone]  Smoking Cessation---[phone]  Wound Care---[phone]  Referral Desk---[phone]

## 2023-05-15 DIAGNOSIS — I10 ESSENTIAL HYPERTENSION: ICD-10-CM

## 2023-05-15 RX ORDER — CARVEDILOL 25 MG/1
TABLET ORAL
Qty: 180 TABLET | Refills: 0 | Status: SHIPPED | OUTPATIENT
Start: 2023-05-15 | End: 2023-10-17

## 2023-05-15 RX ORDER — AMLODIPINE BESYLATE 10 MG/1
TABLET ORAL
Qty: 90 TABLET | Refills: 0 | Status: SHIPPED | OUTPATIENT
Start: 2023-05-15 | End: 2023-09-25

## 2023-05-15 NOTE — TELEPHONE ENCOUNTER
Refill Routing Note   Medication(s) are not appropriate for processing by [facility] for the following reason(s):      Patient not seen by PCP within 15 months    [facility] action(s):  Defer None identified            Appointments  past 12m or future 3m with PCP    Date Provider   Last Visit   2/7/2022 Jessica Marquez MD   Next Visit   5/22/2023 Jessica Marquez MD   ED visits in past 90 days: 0        Note composed:4:11 PM 05/15/2023

## 2023-05-15 NOTE — TELEPHONE ENCOUNTER
No care due was identified.  [facility] Embedded Care Due Messages. Reference number: [id number].   5/15/2023 11:42:39 AM CDT

## 2023-05-22 ENCOUNTER — OFFICE VISIT (OUTPATIENT)
Dept: FAMILY MEDICINE | Facility: CLINIC | Age: 68
End: 2023-05-22
Payer: MEDICARE

## 2023-05-22 VITALS
HEIGHT: 69 IN | OXYGEN SATURATION: 97 % | DIASTOLIC BLOOD PRESSURE: 76 MMHG | SYSTOLIC BLOOD PRESSURE: 134 MMHG | WEIGHT: 188.19 LBS | HEART RATE: 68 BPM | BODY MASS INDEX: 27.87 KG/M2 | TEMPERATURE: 98 F

## 2023-05-22 DIAGNOSIS — E66.3 OVERWEIGHT (BMI 25.0-29.9): ICD-10-CM

## 2023-05-22 DIAGNOSIS — Z00.00 ROUTINE MEDICAL EXAM: Primary | ICD-10-CM

## 2023-05-22 DIAGNOSIS — R16.0 HEPATOMEGALY: ICD-10-CM

## 2023-05-22 DIAGNOSIS — I10 ESSENTIAL HYPERTENSION: ICD-10-CM

## 2023-05-22 DIAGNOSIS — E78.49 OTHER HYPERLIPIDEMIA: ICD-10-CM

## 2023-05-22 DIAGNOSIS — R73.03 PREDIABETES: ICD-10-CM

## 2023-05-22 DIAGNOSIS — E03.9 ACQUIRED HYPOTHYROIDISM: ICD-10-CM

## 2023-05-22 DIAGNOSIS — K76.0 FATTY LIVER: ICD-10-CM

## 2023-05-22 DIAGNOSIS — D32.0 BENIGN MENINGIOMA OF BRAIN: ICD-10-CM

## 2023-05-22 PROCEDURE — 99397 PER PM REEVAL EST PAT 65+ YR: CPT | Mod: S$PBB,GZ,, | Performed by: INTERNAL MEDICINE

## 2023-05-22 PROCEDURE — 99397 PR PREVENTIVE VISIT,EST,65 & OVER: ICD-10-PCS | Mod: S$PBB,GZ,, | Performed by: INTERNAL MEDICINE

## 2023-05-22 PROCEDURE — 99999 PR PBB SHADOW E&M-EST. PATIENT-LVL IV: CPT | Mod: PBBFAC,,, | Performed by: INTERNAL MEDICINE

## 2023-05-22 PROCEDURE — 99999 PR PBB SHADOW E&M-EST. PATIENT-LVL IV: ICD-10-PCS | Mod: PBBFAC,,, | Performed by: INTERNAL MEDICINE

## 2023-05-22 PROCEDURE — 99214 OFFICE O/P EST MOD 30 MIN: CPT | Mod: PBBFAC,PO | Performed by: INTERNAL MEDICINE

## 2023-05-22 NOTE — PROGRESS NOTES
CHIEF COMPLAINT:   Chief Complaint   Patient presents with    Annual Exam        HISTORY OF PRESENT ILLNESS:  Claudia Rodriguez is a 68 y.o. female who presents to the clinic today for a routine physical exam. Her last physical exam was approximately 1 years(s) ago.      Objective     PAST MEDICAL HISTORY:  Past Medical History:   Diagnosis Date    Cholelithiasis     DDD (degenerative disc disease), lumbar     Fatty liver     - noted on U/S 11/2017    Goiter, nodular     HTN (hypertension)     Hyperglycemia     Hyperlipidemia     Hypothyroidism     Hypovitaminosis D     Meningioma     right frontal lobe ; followed by Dr. Enciso once a year    Obesity     Prediabetes     Urinary bladder disorder        PAST SURGICAL HISTORY:  Past Surgical History:   Procedure Laterality Date    BLADDER SUSPENSION      LIVER BIOPSY      thyroid biopsy  7/11/12    tonsillectomy      TONSILLECTOMY         SOCIAL HISTORY:  Social History     Socioeconomic History    Marital status:     Number of children: 1   Occupational History    Occupation: human      Employer: [employer]   Tobacco Use    Smoking status: Never    Smokeless tobacco: Never   Substance and Sexual Activity    Alcohol use: No    Drug use: No    Sexual activity: Yes     Partners: Male   Other Topics Concern    Are you pregnant or think you may be? No    Breast-feeding No   Social History Narrative    3 adopted children.       FAMILY HISTORY:  Family History   Problem Relation Age of Onset    Coronary artery disease Father 55    Heart disease Father     Hypertension Father     Melanoma Father     Hypertension Mother     Stroke Mother         2015    Hypertension Sister         controlled by lifestyle    No Known Problems Brother     Pancreatic cancer Maternal Grandfather     Heart failure Maternal Grandmother     Thyroid nodules Sister         s/p thyroidectomy    Thyroid disease Sister     Thyroid nodules Sister     No Known Problems Brother      Diabetes Neg Hx     Psoriasis Neg Hx     Lupus Neg Hx     Eczema Neg Hx     Thyroid cancer Neg Hx     Breast cancer Neg Hx     Ovarian cancer Neg Hx     Cervical cancer Neg Hx     Endometrial cancer Neg Hx     Vaginal cancer Neg Hx     Cirrhosis Neg Hx        ALLERGIES AND MEDICATIONS: updated and reviewed.  Review of patient's allergies indicates:   Allergen Reactions    Zostavax [zoster vaccine live (pf)] Rash     - injection site red, hot, indurated    Demerol [meperidine] Other (See Comments)     Other reaction(s): Nausea  Other reaction(s): Headache    Nsaids (non-steroidal anti-inflammatory drug) Other (See Comments)       Other reaction(s): Difficulty breathing    Other reaction(s): Difficulty breathing    Phenytoin sodium extended Other (See Comments)     Medication List with Changes/Refills   Current Medications    AMLODIPINE (NORVASC) 10 MG TABLET    TAKE 1 TABLET BY MOUTH EVERY DAY    ASPIRIN (ECOTRIN) 81 MG EC TABLET    Take 1 tablet (81 mg total) by mouth once daily.    ATORVASTATIN (LIPITOR) 80 MG TABLET    TAKE 1 TABLET BY MOUTH EVERY EVENING    BENZOCAINE-MENTHOL (CEPACOL INSTAMAX SORE THROAT) 15-20 MG LOZG    1 lozenge by Mucous Membrane route every 2 (two) hours as needed (sore throat).    BENZONATATE (TESSALON) 200 MG CAPSULE    Take 1 capsule (200 mg total) by mouth 3 (three) times daily as needed for Cough.    CALCIUM CARBONATE/VITAMIN D3 (CALCIUM 500 + D, D3, ORAL)    once daily.     CARVEDILOL (COREG) 25 MG TABLET    TAKE 1 TABLET BY MOUTH TWICE A DAY WITH MEALS    DARIFENACIN (ENABLEX) 15 MG 24 HR TABLET    Take 1 tablet (15 mg total) by mouth once daily.    ERGOCALCIFEROL (ERGOCALCIFEROL) 50,000 UNIT CAP    TAKE 1 CAPSULE BY MOUTH ONE TIME PER WEEK    ESTRADIOL (ESTRACE) 0.01 % (0.1 MG/GRAM) VAGINAL CREAM    Place vaginally once daily.    FLUTICASONE PROPIONATE (FLONASE) 50 MCG/ACTUATION NASAL SPRAY    2 sprays (100 mcg total) by Each Nostril route once daily.    FOLIC ACID/MV,FE,OTHER MIN  "(CENTRUM ORAL)    Take 1 tablet by mouth.    GUAIFENESIN (MUCINEX) 600 MG 12 HR TABLET    Take 1 tablet (600 mg total) by mouth 2 (two) times daily as needed for Congestion.    LEVOTHYROXINE (SYNTHROID) 75 MCG TABLET    TAKE 1 TABLET BY MOUTH EVERY DAY    OMEGA-3 FATTY ACIDS 1,000 MG CAP    Take 1 capsule by mouth Daily.    PEN NEEDLE, DIABETIC (BD ULTRA-FINE AMELIA PEN NEEDLE) 32 GAUGE X 5/32" NDLE    Use with ozempic    SEMAGLUTIDE (OZEMPIC) 1 MG/DOSE (4 MG/3 ML)    Inject 1 mg into the skin every 7 days.    TRIAMTERENE-HYDROCHLOROTHIAZIDE 37.5-25 MG (MAXZIDE-25) 37.5-25 MG PER TABLET    TAKE 1 TABLET BY MOUTH EVERY DAY   Discontinued Medications    NITROFURANTOIN, MACROCRYSTAL-MONOHYDRATE, (MACROBID) 100 MG CAPSULE    Take 1 capsule (100 mg total) by mouth 2 (two) times daily. for 5 days          CARE TEAM:  Patient Care Team:  Jessica Marquez MD as PCP - General (Internal Medicine)  Bisi Madrigal MD as Consulting Physician (Endocrinology)  Fernie Hammond MD as Consulting Physician (Neurosurgery)  Jessica Marquez MD as Hypertension Digital Medicine Responsible Provider (Internal Medicine)  Stephanie Guadalupe, PharmD as Hypertension Digital Medicine Clinician (Pharmacist)  Anirudh Selby MD (Inactive) as Consulting Physician (Obstetrics and Gynecology)  June Sherman as Digital Medicine Health   Albertina Pickens LPN as Licensed Practical Nurse  Med[employer] Program as Hypertension Digital Medicine Contract  Jessica Marquez MD as Hyperlipidemia Digital Medicine Responsible Provider (Internal Medicine)  Stephanie Guadalupe, PharmD as Hyperlipidemia Digital Medicine Clinician (Pharmacist)              SCREENING HISTORY:  Health Maintenance         Date Due Completion Date    COVID-19 Vaccine (5 - Booster for Moderna series) 07/28/2022 6/2/2022    Lipid Panel 07/11/2023 7/11/2022    Hemoglobin A1c 02/07/2024 2/7/2023    Aspirin/Antiplatelet Therapy 05/22/2024 5/22/2023    Mammogram 07/07/2024 " "7/7/2022    Override on 5/25/2016: Done    Override on 2/3/2014: Done (DIS - normal)    Override on 12/11/2012: Done    Override on 10/27/2010: Done    Colorectal Cancer Screening 02/03/2027 9/11/2020    Override on 2/3/2017: Declined (patient will do yearly fecal occult blood testing)    TETANUS VACCINE 05/31/2031 5/31/2021              REVIEW OF SYSTEMS:   The patient reports : good dietary habits - she has made significant dietary changes since starting ozempic and has lost about 40 lbs  The patient reports  : that they exercise occasionally  Review of Systems   Constitutional:  Negative for activity change and unexpected weight change.   HENT:  Negative for hearing loss, rhinorrhea and trouble swallowing.    Eyes:  Negative for discharge and visual disturbance.   Respiratory:  Negative for chest tightness and wheezing.    Cardiovascular:  Negative for chest pain and palpitations.   Gastrointestinal:  Positive for constipation. Negative for blood in stool, diarrhea and vomiting.   Endocrine: Negative for polydipsia and polyuria.   Genitourinary:  Negative for difficulty urinating, dysuria, hematuria and menstrual problem.   Musculoskeletal:  Negative for arthralgias, joint swelling and neck pain.   Neurological:  Negative for weakness and headaches.   Psychiatric/Behavioral:  Negative for confusion and dysphoric mood.     ROS (Optional)-: no pelvic pain  Breast ROS (Optional)-: negative for breast lumps/discharge          Physical Examination: 274}  Vitals:    05/22/23 1411   BP: 134/76   BP Location: Left arm   Patient Position: Sitting   BP Method: Medium (Manual)   Pulse: 68   Temp: 98.3 °F (36.8 °C)   TempSrc: Oral   SpO2: 97%   Weight: 85.3 kg (188 lb 2.6 oz)   Height: 5' 9" (1.753 m)        Body mass index is 27.79 kg/m².        General appearance - alert, well appearing, and in no distress, overweight  Psychiatric - alert, oriented to person, place, and time, normal behavior, speech, dress, motor " activity and thought process  Eyes - pupils equal and reactive, extraocular eye movements intact, sclera anicteric  Mouth - not examined  Neck - supple, no significant adenopathy, carotids upstroke normal bilaterally, no bruits  Lymphatics - no palpable cervical lymphadenopathy  Chest - clear to auscultation, no wheezes, rales or rhonchi, symmetric air entry  Heart - normal rate and regular rhythm, no gallops noted  Neurological - alert, normal speech, no focal findings; cranial nerves II through XII intact  Musculoskeletal - no joint tenderness, deformity or swelling, no muscular tenderness noted  Extremities - no pedal edema noted  Skin - normal coloration, no suspicious skin lesions      Labs:  Ordered/Scheduled      ASSESSMENT AND PLAN:  274}  1. Routine medical exam  Counseled on age appropriate medical preventative services including age appropriate cancer screenings, age appropriate eye and dental exams, over all nutritional health, need for a consistent exercise regimen, and an over all push towards maintaining a vigorous and active lifestyle.  Counseled on age appropriate vaccines and discussed upcoming health care needs based on age/gender. Discussed good sleep hygiene and stress management.    2. Essential hypertension  BP Readings from Last 1 Encounters:   05/22/23 134/76      The current medical regimen is effective;  continue present plan and medications. Recommended patient to check home readings to monitor and see me for followup as scheduled or sooner as needed.   Discussed sodium restriction, maintaining ideal body weight and regular exercise program as physiologic means to continue to achieve blood pressure control in addition to medication compliance.  Patient was educated that both decongestant and anti-inflammatory medication may raise blood pressure.  The patient is active on the digital hypertension program.    3. Other hyperlipidemia  Lab Results   Component Value Date    CHOL 135 07/11/2022      Lab Results   Component Value Date    HDL 50 07/11/2022     Lab Results   Component Value Date    LDLCALC 69.0 07/11/2022     Lab Results   Component Value Date    TRIG 80 07/11/2022     Lab Results   Component Value Date    LDLCALC 69.0 07/11/2022     We discussed low fat diet and regular exercise.The current medical regimen is effective;  continue present plan and medications.     Orders:  -     Lipid Panel; Future; Expected date: 05/22/2023    4. Prediabetes  Lab Results   Component Value Date    HGBA1C 5.6 02/07/2023     Stable. We discussed low sugar/low carbohydrate diet and regular exercise to prevent progression. No need for prescription medication at this time.  Check A1c yearly.    5. Acquired hypothyroidism  Lab Results   Component Value Date    TSH 1.356 02/07/2023     Patient is clinically euthyroid. Continue current regimen.    6. Benign meningioma of brain  Stable. Asymptomatic. Observe.  Overview:  - followed by neurosurgery, Dr. Hammond      7. Fatty liver/8. Hepatomegaly  Lab Results   Component Value Date    ALT 24 02/07/2023    AST 25 02/07/2023    ALKPHOS 67 02/07/2023    BILITOT 0.8 02/07/2023     Asymptomatic. We discussed the need for weight loss to prevent progression to cirrhosis of the liver. LFTs normal. Patient is followed by hepatology.  Overview:  - noted on U/S 11/2017  - 2/2018 - fibroscan F4, NAFLD fibrosis score indeterminate  - 12/2021 - Fibroscan: Fibrosis Stage:  F 0-1; Steatosis Grade:  S3        9. Overweight (BMI 25.0-29.9)  BMI Readings from Last 3 Encounters:   05/22/23 27.79 kg/m²   04/27/23 28.94 kg/m²   01/10/23 31.89 kg/m²     The patient is asked to continue to make an attempt to improve diet and exercise patterns to aid in medical management of this problem.              Orders Placed This Encounter   Procedures    Lipid Panel      Follow up in about 1 year (around 5/22/2024), or if symptoms worsen or fail to improve, for annual exam. or sooner as  needed.          Answers submitted by the patient for this visit:  Review of Systems Questionnaire (Submitted on 5/22/2023)  activity change: No  unexpected weight change: No  neck pain: No  hearing loss: No  rhinorrhea: No  trouble swallowing: No  eye discharge: No  visual disturbance: No  chest tightness: No  wheezing: No  chest pain: No  palpitations: No  blood in stool: No  constipation: Yes  vomiting: No  diarrhea: No  polydipsia: No  polyuria: No  difficulty urinating: No  hematuria: No  menstrual problem: No  dysuria: No  joint swelling: No  arthralgias: No  headaches: No  weakness: No  confusion: No  dysphoric mood: No

## 2023-05-22 NOTE — PATIENT INSTRUCTIONS
"Estimated body mass index is 27.79 kg/m² as calculated from the following:    Height as of this encounter: 5' 9" (1.753 m).    Weight as of this encounter: 85.3 kg (188 lb 2.6 oz).    For Adults 20 Years and Older:    BMI Weight Status   Below 18.5 Underweight   18.6-24.9 Normal/Healthy   25.0-29.9 Overweight   30.0 & Above Obese     Ideal Weight Range for Your Height: 5'9" = 128 - 168 lbs     "

## 2023-05-26 ENCOUNTER — PES CALL (OUTPATIENT)
Dept: ADMINISTRATIVE | Facility: CLINIC | Age: 68
End: 2023-05-26
Payer: MEDICARE

## 2023-06-02 DIAGNOSIS — Z00.6 RESEARCH STUDY PATIENT: Primary | ICD-10-CM

## 2023-06-12 ENCOUNTER — OFFICE VISIT (OUTPATIENT)
Dept: CARDIOLOGY | Facility: CLINIC | Age: 68
End: 2023-06-12
Payer: MEDICARE

## 2023-06-12 VITALS
DIASTOLIC BLOOD PRESSURE: 74 MMHG | RESPIRATION RATE: 18 BRPM | HEIGHT: 69 IN | BODY MASS INDEX: 27.82 KG/M2 | WEIGHT: 187.81 LBS | HEART RATE: 71 BPM | OXYGEN SATURATION: 97 % | SYSTOLIC BLOOD PRESSURE: 128 MMHG

## 2023-06-12 DIAGNOSIS — R93.1 AGATSTON CORONARY ARTERY CALCIUM SCORE BETWEEN 200 AND 399: Primary | ICD-10-CM
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
## 2023-06-12 DIAGNOSIS — Z09 FOLLOW-UP EXAM: ICD-10-CM

## 2023-06-12 DIAGNOSIS — Z82.49 FH: CAD (CORONARY ARTERY DISEASE): ICD-10-CM

## 2023-06-12 DIAGNOSIS — I10 ESSENTIAL HYPERTENSION: ICD-10-CM

## 2023-06-12 DIAGNOSIS — I25.10 CORONARY ARTERY DISEASE INVOLVING NATIVE CORONARY ARTERY OF NATIVE HEART WITHOUT ANGINA PECTORIS: ICD-10-CM

## 2023-06-12 DIAGNOSIS — E78.49 OTHER HYPERLIPIDEMIA: ICD-10-CM

## 2023-06-12 PROCEDURE — 99214 OFFICE O/P EST MOD 30 MIN: CPT | Mod: PBBFAC,PO | Performed by: INTERNAL MEDICINE

## 2023-06-12 PROCEDURE — 93010 EKG 12-LEAD: ICD-10-PCS | Mod: S$PBB,,, | Performed by: INTERNAL MEDICINE

## 2023-06-12 PROCEDURE — 93010 ELECTROCARDIOGRAM REPORT: CPT | Mod: S$PBB,,, | Performed by: INTERNAL MEDICINE

## 2023-06-12 PROCEDURE — 99999 PR PBB SHADOW E&M-EST. PATIENT-LVL IV: CPT | Mod: PBBFAC,,, | Performed by: INTERNAL MEDICINE

## 2023-06-12 PROCEDURE — 93005 ELECTROCARDIOGRAM TRACING: CPT | Mod: PBBFAC,PO | Performed by: INTERNAL MEDICINE

## 2023-06-12 PROCEDURE — 99999 PR PBB SHADOW E&M-EST. PATIENT-LVL IV: ICD-10-PCS | Mod: PBBFAC,,, | Performed by: INTERNAL MEDICINE

## 2023-06-12 PROCEDURE — 99214 OFFICE O/P EST MOD 30 MIN: CPT | Mod: S$PBB,,, | Performed by: INTERNAL MEDICINE

## 2023-06-12 PROCEDURE — 99214 PR OFFICE/OUTPT VISIT, EST, LEVL IV, 30-39 MIN: ICD-10-PCS | Mod: S$PBB,,, | Performed by: INTERNAL MEDICINE

## 2023-06-12 NOTE — PROGRESS NOTES
CARDIOVASCULAR PROGRESS NOTE    REASON FOR CONSULT:   Claudia Rodriguez is a 68 y.o. female who presents for f/u presumed CAD, HLP/statin intol.    PCP: Jimmy  HISTORY OF PRESENT ILLNESS:   Last seen September 2022.    The patient returns for follow-up.  Her main complaint today appears to be very occasional palpitations which will wake her from sleep.  She thinks these may be due to nightmares.  They will last for about 15 seconds and resolved spontaneously.  She otherwise denies angina, dyspnea, or syncope.  She is had no PND, orthopnea, melena, hematuria, or claudicant symptoms.  She does report somewhat frequent falls when walking.  These appear to be due to tripping/stumbling and are not syncopal in nature.  I suggested she try to walk on a more level track and if this persists, to see neurology.    CARDIOVASCULAR HISTORY:   CAD, cor Ca++ 311 on CT 10/2021, cardiac PET 6/2022 neg.    PAST MEDICAL HISTORY:     Past Medical History:   Diagnosis Date    Cholelithiasis     DDD (degenerative disc disease), lumbar     Fatty liver     - noted on U/S 11/2017    Goiter, nodular     HTN (hypertension)     Hyperglycemia     Hyperlipidemia     Hypothyroidism     Hypovitaminosis D     Meningioma     right frontal lobe ; followed by Dr. Enciso once a year    Obesity     Prediabetes     Urinary bladder disorder        PAST SURGICAL HISTORY:     Past Surgical History:   Procedure Laterality Date    BLADDER SUSPENSION      LIVER BIOPSY      thyroid biopsy  7/11/12    tonsillectomy      TONSILLECTOMY         ALLERGIES AND MEDICATION:     Review of patient's allergies indicates:   Allergen Reactions    Zostavax [zoster vaccine live (pf)] Rash     - injection site red, hot, indurated    Demerol [meperidine] Other (See Comments)     Other reaction(s): Nausea  Other reaction(s): Headache    Nsaids (non-steroidal anti-inflammatory drug) Other (See Comments)       Other reaction(s): Difficulty breathing    Other reaction(s):  "Difficulty breathing    Phenytoin sodium extended Other (See Comments)        Medication List            Accurate as of June 12, 2023 10:58 AM. If you have any questions, ask your nurse or doctor.                CONTINUE taking these medications      amLODIPine 10 MG tablet  Commonly known as: NORVASC  TAKE 1 TABLET BY MOUTH EVERY DAY     aspirin 81 MG EC tablet  Commonly known as: ECOTRIN  Take 1 tablet (81 mg total) by mouth once daily.     atorvastatin 80 MG tablet  Commonly known as: LIPITOR  TAKE 1 TABLET BY MOUTH EVERY EVENING     CALCIUM 500 + D (D3) ORAL     carvediloL 25 MG tablet  Commonly known as: COREG  TAKE 1 TABLET BY MOUTH TWICE A DAY WITH MEALS     CENTRUM ORAL     darifenacin 15 mg 24 hr tablet  Commonly known as: ENABLEX  Take 1 tablet (15 mg total) by mouth once daily.     ergocalciferol 50,000 unit Cap  Commonly known as: ERGOCALCIFEROL  TAKE 1 CAPSULE BY MOUTH ONE TIME PER WEEK     estradioL 0.01 % (0.1 mg/gram) vaginal cream  Commonly known as: ESTRACE     levothyroxine 75 MCG tablet  Commonly known as: SYNTHROID  TAKE 1 TABLET BY MOUTH EVERY DAY     omega-3 fatty acids 1,000 mg Cap     OZEMPIC 1 mg/dose (4 mg/3 mL)  Generic drug: semaglutide  Inject 1 mg into the skin every 7 days.     pen needle, diabetic 32 gauge x 5/32" Ndle  Commonly known as: BD ULTRA-FINE AMELIA PEN NEEDLE  Use with ozempic     triamterene-hydrochlorothiazide 37.5-25 mg 37.5-25 mg per tablet  Commonly known as: MAXZIDE-25  TAKE 1 TABLET BY MOUTH EVERY DAY            STOP taking these medications      benzonatate 200 MG capsule  Commonly known as: TESSALON  Stopped by: Kev Almodovar MD     CEPACOL INSTAMAX SORE THROAT 15-20 mg Lozg  Generic drug: benzocaine-menthoL  Stopped by: Kev Almodovar MD     fluticasone propionate 50 mcg/actuation nasal spray  Commonly known as: FLONASE  Stopped by: Kev Almodovar MD     guaiFENesin 600 mg 12 hr tablet  Commonly known as: MUCINEX  Stopped by: Kev Almodovar MD   "            SOCIAL HISTORY:     Social History     Socioeconomic History    Marital status:     Number of children: 1   Occupational History    Occupation: human      Employer: [employer]   Tobacco Use    Smoking status: Never    Smokeless tobacco: Never   Substance and Sexual Activity    Alcohol use: No    Drug use: No    Sexual activity: Yes     Partners: Male   Other Topics Concern    Are you pregnant or think you may be? No    Breast-feeding No   Social History Narrative    3 adopted children.       FAMILY HISTORY:     Family History   Problem Relation Age of Onset    Coronary artery disease Father 55    Heart disease Father     Hypertension Father     Melanoma Father     Hypertension Mother     Stroke Mother         2015    Hypertension Sister         controlled by lifestyle    No Known Problems Brother     Pancreatic cancer Maternal Grandfather     Heart failure Maternal Grandmother     Thyroid nodules Sister         s/p thyroidectomy    Thyroid disease Sister     Thyroid nodules Sister     No Known Problems Brother     Diabetes Neg Hx     Psoriasis Neg Hx     Lupus Neg Hx     Eczema Neg Hx     Thyroid cancer Neg Hx     Breast cancer Neg Hx     Ovarian cancer Neg Hx     Cervical cancer Neg Hx     Endometrial cancer Neg Hx     Vaginal cancer Neg Hx     Cirrhosis Neg Hx        REVIEW OF SYSTEMS:   Review of Systems   Constitutional:  Negative for chills, diaphoresis and fever.   HENT:  Negative for nosebleeds.    Eyes:  Negative for blurred vision, double vision and photophobia.   Respiratory:  Negative for hemoptysis, shortness of breath and wheezing.    Cardiovascular:  Negative for chest pain, palpitations, orthopnea, claudication, leg swelling and PND.   Gastrointestinal:  Negative for abdominal pain, blood in stool, heartburn, melena, nausea and vomiting.   Genitourinary:  Negative for flank pain and hematuria.   Musculoskeletal:  Positive for falls. Negative for myalgias and neck pain.  "  Skin:  Negative for rash.   Neurological:  Negative for dizziness, seizures, loss of consciousness, weakness and headaches.   Endo/Heme/Allergies:  Negative for polydipsia. Bruises/bleeds easily.   Psychiatric/Behavioral:  Negative for depression and memory loss. The patient is not nervous/anxious.      PHYSICAL EXAM:     Vitals:    06/12/23 1043   BP: 128/74   Pulse: 71   Resp: 18      Body mass index is 27.74 kg/m².  Weight: 85.2 kg (187 lb 13.3 oz)   Height: 5' 9" (175.3 cm)     Physical Exam  Vitals reviewed.   Constitutional:       General: She is not in acute distress.     Appearance: She is well-developed. She is obese. She is not ill-appearing, toxic-appearing or diaphoretic.   HENT:      Head: Normocephalic and atraumatic.   Eyes:      General: No scleral icterus.     Extraocular Movements: Extraocular movements intact.      Conjunctiva/sclera: Conjunctivae normal.      Pupils: Pupils are equal, round, and reactive to light.   Neck:      Thyroid: No thyromegaly.      Vascular: Normal carotid pulses. No JVD.      Trachea: Trachea normal.   Cardiovascular:      Rate and Rhythm: Normal rate and regular rhythm.      Pulses:           Carotid pulses are 2+ on the right side and 2+ on the left side.     Heart sounds: S1 normal and S2 normal. No murmur heard.    No friction rub. No gallop.   Pulmonary:      Effort: Pulmonary effort is normal. No respiratory distress.      Breath sounds: Normal breath sounds. No stridor. No wheezing, rhonchi or rales.   Chest:      Chest wall: No tenderness.   Abdominal:      General: There is no distension.      Palpations: Abdomen is soft.   Musculoskeletal:         General: No swelling or tenderness. Normal range of motion.      Cervical back: Normal range of motion and neck supple. No edema or rigidity.      Right lower leg: No edema.      Left lower leg: No edema.   Feet:      Right foot:      Skin integrity: No ulcer.      Left foot:      Skin integrity: No ulcer.   Skin:   "   General: Skin is warm and dry.      Coloration: Skin is not jaundiced.   Neurological:      General: No focal deficit present.      Mental Status: She is alert and oriented to person, place, and time.      Cranial Nerves: No cranial nerve deficit.   Psychiatric:         Mood and Affect: Mood normal.         Speech: Speech normal.         Behavior: Behavior normal. Behavior is cooperative.       DATA:   EKG: (personally reviewed tracing)  6/12/23 SR 68    Laboratory:  CBC:  Recent Labs   Lab 12/14/21  1250 07/11/22 0924 12/14/22 0922   WBC 7.87 7.34 7.05   Hemoglobin 12.9 12.9 12.5   Hematocrit 40.6 40.5 39.2   Platelets 394 327 341         CHEMISTRIES:  Recent Labs   Lab 07/22/21  0933 12/14/21  1250 07/11/22  0924 12/14/22  0922 01/11/23  0834 02/07/23  0914   Glucose 116 H 96 106 105 113 H 104   Sodium 140 137 141 141 141 141   Potassium 4.2 3.8 4.1 4.2 4.3 3.9   BUN 16 14 16 17 11 19   Creatinine 0.7 0.7 0.7 0.7 0.7 0.8   eGFR if African American >60.0 >60.0 >60.0  --   --   --    eGFR if non African American >60.0 >60.0 >60.0  --   --   --    Calcium 9.4 10.1 9.4 10.4 10.0 9.7         CARDIAC BIOMARKERS:        COAGS:  Recent Labs   Lab 12/14/21  1256 07/11/22 0924 12/14/22 0922   INR 1.0 1.0 1.1         LIPIDS/LFTS:  Recent Labs   Lab 04/30/21  1515 07/22/21  0933 07/11/22  0924 12/14/22  0922 01/11/23  0834 02/07/23  0914   Cholesterol 253 H  --  135  --   --   --    Triglycerides 154 H  --  80  --   --   --    HDL 46  --  50  --   --   --    LDL Cholesterol 176.2 H  --  69.0  --   --   --    Non-HDL Cholesterol 207  --  85  --   --   --    AST  --    < > 21 26 26 25   ALT  --    < > 22 28 32 24    < > = values in this interval not displayed.       The 10-year ASCVD risk score (Nancy MICHAELS, et al., 2019) is: 10.2%    Values used to calculate the score:      Age: 68 years      Sex: Female      Is Non- : No      Diabetic: No      Tobacco smoker: No      Systolic Blood Pressure: 128  mmHg      Is BP treated: Yes      HDL Cholesterol: 50 mg/dL      Total Cholesterol: 135 mg/dL      Cardiovascular Testing:  Cardiac PET 6/13/22    The myocardial perfusion images are normal without evidence of ischemia or scar.    The whole heart absolute myocardial perfusion values averaged 0.55 cc/min/g at rest, which is reduced; 1.24 cc/min/g at stress, which is mildly reduced; and CFR is 2.27 , which is mildly reduced.    CT attenuation images demonstrate mild diffuse coronary calcifications in the LAD, LCX and RCA territory.    The gated perfusion images showed an ejection fraction of 59% at rest and 66% during stress. A normal ejection fraction is greater than 53%.    The wall motion is normal at rest and during stress.    The LV cavity size is normal at rest and stress.    The EKG portion of this study is negative for ischemia.    The patient reported no chest pain during the stress test.    Ex MPI 5/12/22 ((images prev personally reviewed and interpreted, ?anterior ischemia)    Equivocal myocardial perfusion scan.    There is a mild to moderate intensity, moderate to large sized, equivocal perfusion abnormality that is mostly fixed with some reversible areas in the anterior wall(s). This finding is equivocal due to a breast shadow overlying the myocardium.    There are no other significant perfusion abnormalities.    There is a mild to moderate intensity perfusion abnormality in the anterior wall of the left ventricle, secondary to breast attenuation.    The gated perfusion images showed an ejection fraction of 64% post stress.    There is normal wall motion post stress.    The EKG portion of this study is negative for ischemia. (Juventino 5:44, 7 METS, 89% MPHR)    The patient reported no chest pain during the stress test.    During stress, rare PACs are noted.    The exercise capacity was average.    A PET stress exam is recommended if clinically indicated.    Echo 5/12/22  The left ventricle is normal in size  with mild concentric hypertrophy and normal systolic function.  The estimated ejection fraction is 60%.  Grade II left ventricular diastolic dysfunction.  Normal right ventricular size with normal right ventricular systolic function.  Severe left atrial enlargement.  Mild right atrial enlargement.  Moderate mitral regurgitation.  Normal central venous pressure (3 mmHg).  The estimated PA systolic pressure is 35 mmHg.  There is mild pulmonary hypertension.    CT Cor Ca+ 10/21/21  Agatston calcium score equals 311, which translates to the 85-90th percentile for coronary calcium load based on age and sex.  Additional findings and recommendations above.      ASSESSMENT:   # CAD, Agatston score 311, MPI 5/2022 with ?anterior ischemia.  Cardiac PET 6/2022 neg.  Asymptomatic.  # HLP, tolerating atorva 80mg (hx statin intolerance)  # BMI 28, down 5 unit(s) vs last OV  # HTN, controlled  # FH CAD    PLAN:   Cont med rx  Cont ASA 81mg qd, nuisance bruising noted  Cont atorva 80mg qhs  Diet/exercise/weight loss  RTC prn    Kev Almodovar MD, FACC

## 2023-06-20 ENCOUNTER — PATIENT MESSAGE (OUTPATIENT)
Dept: UROGYNECOLOGY | Facility: CLINIC | Age: 68
End: 2023-06-20
Payer: MEDICARE

## 2023-06-22 ENCOUNTER — OFFICE VISIT (OUTPATIENT)
Dept: UROGYNECOLOGY | Facility: CLINIC | Age: 68
End: 2023-06-22
Payer: MEDICARE

## 2023-06-22 VITALS
DIASTOLIC BLOOD PRESSURE: 80 MMHG | SYSTOLIC BLOOD PRESSURE: 122 MMHG | WEIGHT: 188.69 LBS | BODY MASS INDEX: 27.87 KG/M2

## 2023-06-22 DIAGNOSIS — R39.15 URINARY URGENCY: ICD-10-CM

## 2023-06-22 DIAGNOSIS — N95.2 VAGINAL ATROPHY: Primary | ICD-10-CM

## 2023-06-22 LAB
AMORPH CRY UR QL COMP ASSIST: ABNORMAL
BACTERIA #/AREA URNS AUTO: ABNORMAL /HPF
HYALINE CASTS UR QL AUTO: 3 /LPF
MICROSCOPIC COMMENT: ABNORMAL
RBC #/AREA URNS AUTO: 1 /HPF (ref 0–4)
SQUAMOUS #/AREA URNS AUTO: 0 /HPF
WBC #/AREA URNS AUTO: 1 /HPF (ref 0–5)

## 2023-06-22 PROCEDURE — 99214 OFFICE O/P EST MOD 30 MIN: CPT | Mod: 25,S$PBB,, | Performed by: NURSE PRACTITIONER

## 2023-06-22 PROCEDURE — 87086 URINE CULTURE/COLONY COUNT: CPT | Performed by: NURSE PRACTITIONER

## 2023-06-22 PROCEDURE — 51701 PR INSERTION OF NON-INDWELLING BLADDER CATHETERIZATION FOR RESIDUAL UR: ICD-10-PCS | Mod: S$PBB,,, | Performed by: NURSE PRACTITIONER

## 2023-06-22 PROCEDURE — 51701 INSERT BLADDER CATHETER: CPT | Mod: PBBFAC | Performed by: NURSE PRACTITIONER

## 2023-06-22 PROCEDURE — 99999 PR PBB SHADOW E&M-EST. PATIENT-LVL IV: ICD-10-PCS | Mod: PBBFAC,,, | Performed by: NURSE PRACTITIONER

## 2023-06-22 PROCEDURE — 81001 URINALYSIS AUTO W/SCOPE: CPT | Performed by: NURSE PRACTITIONER

## 2023-06-22 PROCEDURE — 99214 OFFICE O/P EST MOD 30 MIN: CPT | Mod: PBBFAC | Performed by: NURSE PRACTITIONER

## 2023-06-22 PROCEDURE — 99214 PR OFFICE/OUTPT VISIT, EST, LEVL IV, 30-39 MIN: ICD-10-PCS | Mod: 25,S$PBB,, | Performed by: NURSE PRACTITIONER

## 2023-06-22 PROCEDURE — 51701 INSERT BLADDER CATHETER: CPT | Mod: S$PBB,,, | Performed by: NURSE PRACTITIONER

## 2023-06-22 PROCEDURE — 99999 PR PBB SHADOW E&M-EST. PATIENT-LVL IV: CPT | Mod: PBBFAC,,, | Performed by: NURSE PRACTITIONER

## 2023-06-22 RX ORDER — OXYBUTYNIN CHLORIDE 10 MG/1
TABLET, EXTENDED RELEASE ORAL
COMMUNITY
Start: 2023-05-29 | End: 2023-06-22

## 2023-06-22 RX ORDER — PHENAZOPYRIDINE HYDROCHLORIDE 200 MG/1
TABLET, FILM COATED ORAL
COMMUNITY
Start: 2023-06-11 | End: 2023-10-30 | Stop reason: ALTCHOICE

## 2023-06-22 RX ORDER — ESTRADIOL 0.1 MG/G
1 CREAM VAGINAL
Qty: 4205 G | Refills: 3 | Status: SHIPPED | OUTPATIENT
Start: 2023-06-22 | End: 2023-06-30 | Stop reason: SDUPTHER

## 2023-06-22 RX ORDER — ATORVASTATIN CALCIUM 80 MG/1
80 TABLET, FILM COATED ORAL EVERY EVENING
COMMUNITY
Start: 2023-06-03 | End: 2023-10-30 | Stop reason: SDUPTHER

## 2023-06-22 RX ORDER — SULFAMETHOXAZOLE AND TRIMETHOPRIM 800; 160 MG/1; MG/1
1 TABLET ORAL 2 TIMES DAILY
COMMUNITY
Start: 2023-06-15 | End: 2023-10-30 | Stop reason: ALTCHOICE

## 2023-06-22 RX ORDER — DARIFENACIN 15 MG/1
15 TABLET, EXTENDED RELEASE ORAL DAILY
Qty: 30 TABLET | Refills: 0 | Status: SHIPPED | OUTPATIENT
Start: 2023-06-22 | End: 2023-06-22 | Stop reason: SDUPTHER

## 2023-06-22 RX ORDER — CIPROFLOXACIN 500 MG/1
500 TABLET ORAL 2 TIMES DAILY
COMMUNITY
Start: 2023-06-11 | End: 2023-10-30 | Stop reason: ALTCHOICE

## 2023-06-22 RX ORDER — PHENAZOPYRIDINE HYDROCHLORIDE 100 MG/1
100 TABLET, FILM COATED ORAL
COMMUNITY
Start: 2023-06-15 | End: 2023-10-30 | Stop reason: ALTCHOICE

## 2023-06-22 RX ORDER — DARIFENACIN 15 MG/1
15 TABLET, EXTENDED RELEASE ORAL DAILY
Qty: 30 TABLET | Refills: 11 | Status: SHIPPED | OUTPATIENT
Start: 2023-06-22 | End: 2023-06-30 | Stop reason: SDUPTHER

## 2023-06-22 RX ORDER — PHENTERMINE HYDROCHLORIDE 37.5 MG/1
37.5 TABLET ORAL
COMMUNITY
Start: 2023-04-29

## 2023-06-22 NOTE — PROGRESS NOTES
Urogyn follow up  06/22/2023  .  [facility] - UROGYNECOLOGY  [address] [id number]    Claudia Rodriguez  [id number]  1955      Claudia Rodriguez is a 68 y.o. here for a urogyn follow up of mixed urinary incontinence and urinary urgency      HPI: 2017.      1)  UI:  (+) GARY < (+) UUI but mostly just small, spontaneous leaks, alcira with movement.  Was having some U/F/UI, then had bladder lift in her 30s--better.  UI returned after some years, now having more U/F x 2-3 months.   (+) pads (liners):1/day, usually severe wetness and 1/night usually (liner) minimum-moderate wetness.  Daytime frequency: Q 1 hours after taking diuretic; once that wears off Q2h.  Nocturia: Yes: 4-7/night. Does have a little insomnia--started taking 1/4 benadryl.  Has decreased liquid consumption before bed--stops at 6PM, no water by bed.  Has improved nocturia some.  No LE swelling.  Pelvic floor PT:  Has scheduled for next week with [facility].  Is going to work on weight loss. Is emotional crutch for her.   (--) dysuria,  (--) hematuria,  (--) frequent UTIs.  (--) complete bladder emptying. Has to PV to help with moderate 2nd void.       2)  POP:  Absent.  (--) vaginal bleeding. (--) vaginal discharge. (+) sexually active.  (--) dyspareunia.  (+)  Vaginal dryness. Uses lube during intercourse.  (--) vaginal estrogen use.      3)  BM:  (--) constipation/straining.  (--) chronic diarrhea. Does have some IBS--controlled with avoiding dietary triggers.  Has diarrhea with triggers.  (--) hematochezia.  (--) fecal incontinence.  (--) fecal smearing/urgency.  (+) complete evacuation.      09/22/2020  1)  Mixed urinary incontinence, urge/spontaneous > stress:    --had UTI a few months ago; finished ABX with neg ANNIA, still feels U/F increased              --urine C&S 6/2020 + ecoli--took macrobid x 2 rounds              --urine C&S 9/2020 mixed orgs  --baseline UI: (+) pads (liners):1/day, usually severe wetness and 1/night  usually (liner) minimum-moderate wetness.  Daytime frequency: Q 1 hours after taking diuretic; once that wears off Q2h.  Nocturia: Yes: 4-7/night  --currently: +liner/day--damp by end of day; +liner/night, very damp by AM; daytime is stable but nighttime worse; feels like very sensitive to urine being in urethra--makes her feel increased U/F: freq Qh; nocturia: 7x/night, alcira since UTI; ? Complete emptying--has PV urgency with small volume void  --weight loss +: has lost 20 lbs  --pre-DM: was taking metformin but was able to stop        Lab Results   Component Value Date     HGBA1C 5.4 06/26/2020   --did PT in past but had trouble with regular visits; used heating pad x 30 min--not sure she got as much as possible     2)  Vaginal atrophy (dryness):    --was taking estrace  --was changed to imvexxy--didn't like  --now restarted estrace x 2 weeks with applicator     3)  Nocturia (nighttime urination): stop fluids 2 hours before bed/no water by bed.  If have leg swelling:  Elevate feet above chest x 1 hour before bed to get excess fluid off.  Can also use support hose (knee highs).    --work on sleep pattern (sleep hygiene sheet)  --continue to watch snoring--may need sleep apnea evaluation     4)  Constipation:  --uncommon--mosly when diet off  --takes daily fiber  --takes stool softeners PRN    01/29/2021  1)  Mixed urinary incontinence, urge/spontaneous > stress:    --completed pelvic floor with Abimbola Wheat.   --using oxybutynin xl 10 mg  --voiding every 1 1/2- 2 hours  --nocturia 3/ night (limits fluids prior to bedtime most night)  --rare GARY, UUI at night mostly--very scant-- reports 75 % improvement.  --feels like she emptying her bladder      2)  Vaginal atrophy (dryness):    --using estrogen cream twice weekly  --did not like imvexxy     3)  Nocturia (nighttime urination):   --improved to 3/ night  --has lost 25 pounds  --she does snore     4)  Recent UTI:  --denies symptoms at this time  --symptoms improved  with oxybutynin    01/19/2022  1)  Mixed urinary incontinence, urge/spontaneous > stress:    --completed pelvic floor with Abimbola Wheat--doing HEP  --using oxybutynin xl 10 mg  --voiding every 1 1/2- 2 hours  --nocturia 3/ night (limits fluids prior to bedtime most night)  --using mini pads with scant UUI  --minimal GARY     2)  Vaginal atrophy (dryness):    --using estrogen cream twice weekly  --estrace was > $200  --did not like imvexxy     3)  Nocturia (nighttime urination):   --improved to 3/ night       4)  Recent UTI:  --denies symptoms at this time  --symptoms improved with oxybutynin    03/03/2023--virtual visit  1)  Mixed urinary incontinence, urge/spontaneous > stress:    --has not been doing pelvic floor PT HEP x 6-7 months -- recently restarted  --using oxybutynin xl 10 mg  --voiding every 1 hours  --nocturia 6-7/ night (limits fluids prior to bedtime most night)  --using mini pads with scant UUI--still improved  --minimal GARY     2)  Vaginal atrophy (dryness):    --using estrogen cream twice weekly  --estrace was > $200  --did not like imvexxy     3)  Nocturia (nighttime urination):   --6-7/ night     4)  Recent UTI:  --having urinary frequency x 3 days  --concerned she has a uti  --symptoms improved with oxybutynin    Changes since last visit:  1)  Mixed urinary incontinence, urge/spontaneous > stress:    --oxybutynin xl 10 mg-- did not help urinary urgency  --taking enablex 15 mg--initially helped until recent uti  --was voiding every 2 1/2-3 hours during the day and 3/ night  --since uti-- voiding every hour during the day and 7-8/ night  --not limiting fluids prior to bedtime     2)  Vaginal atrophy (dryness):    --using vaginal estrogen cream once / week--refill was expensive         4) history of  UTI:  --treated for uti recently-- culture not available              Past Medical History:   Diagnosis Date    Cholelithiasis     DDD (degenerative disc disease), lumbar     Fatty liver     - noted on  U/S 11/2017    Goiter, nodular     HTN (hypertension)     Hyperglycemia     Hyperlipidemia     Hypothyroidism     Hypovitaminosis D     Meningioma     right frontal lobe ; followed by Dr. Enciso once a year    Obesity     Prediabetes     Urinary bladder disorder        Past Surgical History:   Procedure Laterality Date    BLADDER SUSPENSION      LIVER BIOPSY      thyroid biopsy  7/11/12    tonsillectomy      TONSILLECTOMY         Family History   Problem Relation Age of Onset    Coronary artery disease Father 55    Heart disease Father     Hypertension Father     Melanoma Father     Hypertension Mother     Stroke Mother         2015    Hypertension Sister         controlled by lifestyle    No Known Problems Brother     Pancreatic cancer Maternal Grandfather     Heart failure Maternal Grandmother     Thyroid nodules Sister         s/p thyroidectomy    Thyroid disease Sister     Thyroid nodules Sister     No Known Problems Brother     Diabetes Neg Hx     Psoriasis Neg Hx     Lupus Neg Hx     Eczema Neg Hx     Thyroid cancer Neg Hx     Breast cancer Neg Hx     Ovarian cancer Neg Hx     Cervical cancer Neg Hx     Endometrial cancer Neg Hx     Vaginal cancer Neg Hx     Cirrhosis Neg Hx        Social History     Socioeconomic History    Marital status:     Number of children: 1   Occupational History    Occupation: human      Employer: [employer]   Tobacco Use    Smoking status: Never    Smokeless tobacco: Never   Substance and Sexual Activity    Alcohol use: No    Drug use: No    Sexual activity: Yes     Partners: Male   Other Topics Concern    Are you pregnant or think you may be? No    Breast-feeding No   Social History Narrative    3 adopted children.       Current Outpatient Medications   Medication Sig Dispense Refill    amLODIPine (NORVASC) 10 MG tablet TAKE 1 TABLET BY MOUTH EVERY DAY 90 tablet 0    aspirin (ECOTRIN) 81 MG EC tablet Take 1 tablet (81 mg total) by mouth once daily. 90 tablet 3  "   atorvastatin (LIPITOR) 80 MG tablet Take 80 mg by mouth once daily.      CALCIUM CARBONATE/VITAMIN D3 (CALCIUM 500 + D, D3, ORAL) once daily.       carvediloL (COREG) 25 MG tablet TAKE 1 TABLET BY MOUTH TWICE A DAY WITH MEALS 180 tablet 0    ergocalciferol (ERGOCALCIFEROL) 50,000 unit Cap TAKE 1 CAPSULE BY MOUTH ONE TIME PER WEEK 12 capsule 3    estradioL (ESTRACE) 0.01 % (0.1 mg/gram) vaginal cream Place vaginally once daily.      FOLIC ACID/MV,FE,OTHER MIN (CENTRUM ORAL) Take 1 tablet by mouth.      levothyroxine (SYNTHROID) 75 MCG tablet TAKE 1 TABLET BY MOUTH EVERY DAY 90 tablet 3    omega-3 fatty acids 1,000 mg Cap Take 1 capsule by mouth Daily.      pen needle, diabetic (BD ULTRA-FINE AMELIA PEN NEEDLE) 32 gauge x 5/32" Ndle Use with ozempic 30 each 3    phentermine (ADIPEX-P) 37.5 mg tablet Take 37.5 mg by mouth.      semaglutide (OZEMPIC) 1 mg/dose (4 mg/3 mL) Inject 1 mg into the skin every 7 days. 3 mL 11    atorvastatin (LIPITOR) 80 MG tablet TAKE 1 TABLET BY MOUTH EVERY EVENING 90 tablet 3    ciprofloxacin HCl (CIPRO) 500 MG tablet Take 500 mg by mouth 2 (two) times daily.      darifenacin (ENABLEX) 15 mg 24 hr tablet Take 1 tablet (15 mg total) by mouth once daily. 30 tablet 11    oxybutynin (DITROPAN-XL) 10 MG 24 hr tablet       oxybutynin (DITROPAN-XL) 10 MG 24 hr tablet       phenazopyridine (PYRIDIUM) 100 MG tablet Take 100 mg by mouth.      phenazopyridine (PYRIDIUM) 200 MG tablet Take by mouth.      sulfamethoxazole-trimethoprim 800-160mg (BACTRIM DS) 800-160 mg Tab Take 1 tablet by mouth 2 (two) times daily.      triamterene-hydrochlorothiazide 37.5-25 mg (MAXZIDE-25) 37.5-25 mg per tablet TAKE 1 TABLET BY MOUTH EVERY DAY 90 tablet 0     No current facility-administered medications for this visit.       Review of patient's allergies indicates:   Allergen Reactions    Zostavax [zoster vaccine live (pf)] Rash     - injection site red, hot, indurated    Demerol [meperidine] Other (See Comments)     " Other reaction(s): Nausea  Other reaction(s): Headache    Nsaids (non-steroidal anti-inflammatory drug) Other (See Comments)       Other reaction(s): Difficulty breathing    Other reaction(s): Difficulty breathing    Phenytoin sodium extended Other (See Comments)       Well woman:  Pap test: 2017, normal per report.  History of abnormal paps: No.  History of STIs:  No  Mammogram: Date of last:06/2022--normal per patient report at [facility]  Colonoscopy: none.  Worried since had bladder rupture.  Was told by some MDs at [facility] she should never have invasive procedure.  Does have stool CA screening with PCP.   DEXA:  Date of last:04/2019    Elevated BMD at the total hip and lumbar spine.  There is a decline in BMD at the total hip (-5.2%) when compared to previous study done four years ago.  No change at the lumbar spine.     ROS:  As per HPI.      Exam  /80 (BP Location: Right arm, Patient Position: Sitting, BP Method: Medium (Manual))   Wt 85.6 kg (188 lb 11.4 oz)   BMI 27.87 kg/m²   General: alert and oriented, no acute distress  Respiratory: normal respiratory effort  Abd: soft, non-tender, non-distended    PELVIC EXAM:   VULVA: normal appearing vulva with no masses, tenderness or lesions,   VAGINA: normal appearing vagina with normal color and discharge, no lesions, atrophic,   CERVIX: normal appearing cervix without discharge or lesions,   UTERUS: uterus is normal size, shape, consistency and nontender,   ADNEXA: no masses,   RECTAL: rectocele noted stage 2     POP-Q:  Aa -3; Ba -3; C -7; Ap 0; Bp 0; D -9.  Genital hiatus 3.5, perineal body 2 total vaginal length 10.      Sterile cath urine obtained      Impression  No diagnosis found.      We reviewed the above issues and discussed options for short-term versus long-term management of her problems.   Plan:      1)  Mixed urinary incontinence, urge/spontaneous > stress:    --continue pelvic floor home exercise program  --URGE: continue enablex 15 mg  daily  --STRESS:  Pessary vs. Sling.   --urine culture / micro todaytoday     2)  Vaginal atrophy (dryness):    --Continue 1 gm vaginal estrogen cream twice weekly  --[facility]-- let me know if there is a problem getting it     3)  Nocturia (nighttime urination): stop fluids 2 hours before bed/no water by bed.  If have leg swelling:  Elevate feet above chest x 1 hour before bed to get excess fluid off.  Can also use support hose (knee highs).    --work on sleep pattern (sleep hygiene sheet)       4) history of  UTI:  --If you feel like you have a UTI, please call our office so that we can place an order for you to drop off a urine specimen for urine culture at the closest [facility] lab (we will arrange).                --We can call in antibiotics for you to start right after you drop off specimen.                --In this way, we can determine:                           1)  Do you have a UTI?                            2) If you have a UTI, is it sensitive to the antibiotics we prescribed?   --follow UTI prevention tips (see attached)  --control bowel movements/fecal cross-contamination: continue daily fiber gummies              --can use stool softener as needed for flares  --treat vaginal atrophy (dryness)  --empty bladder before and after intercourse  --consider need for further evaluation (pelvic imaging and cystoscopy) if issue persists     5)  rectocele stage 2/ Constipation:  --hydrate well  --continue daily fiber--can take up to 6 tsp..  --take stool softener twice daily  --add magnesium oxide 400 mg daily   --can add 1/2- 1 capful of miralax daily    6. RTC pending results    I spent a total of 30 minutes on the day of the visit.  This includes face to face time and non-face to face time preparing to see the patient (eg, review of tests), obtaining and/or reviewing separately obtained history, documenting clinical information in the electronic or other health record, independently interpreting  results and communicating results to the patient/family/caregiver, or care coordinator.     Kimberlyn Ledesma, JEANNE-BC [facility]  Division of Female Pelvic Medicine and Reconstructive Surgery  Department of Obstetrics & Gynecology

## 2023-06-22 NOTE — PATIENT INSTRUCTIONS
1)  Mixed urinary incontinence, urge/spontaneous > stress:    --continue pelvic floor home exercise program  --URGE: continue enablex 15 mg daily  --STRESS:  Pessary vs. Sling.   --urine culture / micro todaytoday     2)  Vaginal atrophy (dryness):    --Continue 1 gm vaginal estrogen cream twice weekly  --[facility]-- let me know if there is a problem getting it     3)  Nocturia (nighttime urination): stop fluids 2 hours before bed/no water by bed.  If have leg swelling:  Elevate feet above chest x 1 hour before bed to get excess fluid off.  Can also use support hose (knee highs).    --work on sleep pattern (sleep hygiene sheet)       4) history of  UTI:  --If you feel like you have a UTI, please call our office so that we can place an order for you to drop off a urine specimen for urine culture at the closest [facility] lab (we will arrange).                --We can call in antibiotics for you to start right after you drop off specimen.                --In this way, we can determine:                           1)  Do you have a UTI?                            2) If you have a UTI, is it sensitive to the antibiotics we prescribed?   --follow UTI prevention tips (see attached)  --control bowel movements/fecal cross-contamination: continue daily fiber gummies              --can use stool softener as needed for flares  --treat vaginal atrophy (dryness)  --empty bladder before and after intercourse  --consider need for further evaluation (pelvic imaging and cystoscopy) if issue persists     5)  rectocele stage 2/ Constipation:  --hydrate well  --continue daily fiber--can take up to 6 tsp..  --take stool softener twice daily  --add magnesium oxide 400 mg daily   --can add 1/2- 1 capful of miralax daily    6. RTC pending results

## 2023-06-23 ENCOUNTER — LAB VISIT (OUTPATIENT)
Dept: LAB | Facility: HOSPITAL | Age: 68
End: 2023-06-23
Attending: INTERNAL MEDICINE
Payer: MEDICARE

## 2023-06-23 ENCOUNTER — PATIENT MESSAGE (OUTPATIENT)
Dept: UROGYNECOLOGY | Facility: CLINIC | Age: 68
End: 2023-06-23
Payer: MEDICARE

## 2023-06-23 ENCOUNTER — RESEARCH ENCOUNTER (OUTPATIENT)
Dept: RESEARCH | Facility: HOSPITAL | Age: 68
End: 2023-06-23
Payer: MEDICARE

## 2023-06-23 DIAGNOSIS — R73.03 PREDIABETES: ICD-10-CM

## 2023-06-23 DIAGNOSIS — E04.9 GOITER, NODULAR: ICD-10-CM

## 2023-06-23 DIAGNOSIS — Z00.6 RESEARCH STUDY PATIENT: ICD-10-CM

## 2023-06-23 DIAGNOSIS — E03.9 ACQUIRED HYPOTHYROIDISM: ICD-10-CM

## 2023-06-23 DIAGNOSIS — E27.8 ADRENAL INCIDENTALOMA: ICD-10-CM

## 2023-06-23 DIAGNOSIS — E78.49 OTHER HYPERLIPIDEMIA: ICD-10-CM

## 2023-06-23 LAB
ALBUMIN SERPL BCP-MCNC: 4.1 G/DL (ref 3.5–5.2)
ALP SERPL-CCNC: 74 U/L (ref 55–135)
ALT SERPL W/O P-5'-P-CCNC: 35 U/L (ref 10–44)
ANION GAP SERPL CALC-SCNC: 9 MMOL/L (ref 8–16)
AST SERPL-CCNC: 31 U/L (ref 10–40)
BACTERIA UR CULT: NO GROWTH
BILIRUB SERPL-MCNC: 1.3 MG/DL (ref 0.1–1)
BUN SERPL-MCNC: 15 MG/DL (ref 8–23)
CALCIUM SERPL-MCNC: 10.1 MG/DL (ref 8.7–10.5)
CHLORIDE SERPL-SCNC: 105 MMOL/L (ref 95–110)
CHOLEST SERPL-MCNC: 134 MG/DL (ref 120–199)
CHOLEST/HDLC SERPL: 3 {RATIO} (ref 2–5)
CO2 SERPL-SCNC: 28 MMOL/L (ref 23–29)
CORTIS SERPL-MCNC: 12.9 UG/DL (ref 4.3–22.4)
CREAT SERPL-MCNC: 0.8 MG/DL (ref 0.5–1.4)
DHEA-S SERPL-MCNC: 25.9 UG/DL (ref 33.6–78.9)
EST. GFR  (NO RACE VARIABLE): >60 ML/MIN/1.73 M^2
ESTIMATED AVG GLUCOSE: 97 MG/DL (ref 68–131)
GLUCOSE SERPL-MCNC: 105 MG/DL (ref 70–110)
HBA1C MFR BLD: 5 % (ref 4–5.6)
HDLC SERPL-MCNC: 44 MG/DL (ref 40–75)
HDLC SERPL: 32.8 % (ref 20–50)
LDLC SERPL CALC-MCNC: 74.4 MG/DL (ref 63–159)
NONHDLC SERPL-MCNC: 90 MG/DL
POTASSIUM SERPL-SCNC: 3.6 MMOL/L (ref 3.5–5.1)
PROT SERPL-MCNC: 6.8 G/DL (ref 6–8.4)
RESEARCH LAB: NORMAL
SODIUM SERPL-SCNC: 142 MMOL/L (ref 136–145)
TRIGL SERPL-MCNC: 78 MG/DL (ref 30–150)
TSH SERPL DL<=0.005 MIU/L-ACNC: 1 UIU/ML (ref 0.4–4)

## 2023-06-23 PROCEDURE — 80053 COMPREHEN METABOLIC PANEL: CPT | Performed by: INTERNAL MEDICINE

## 2023-06-23 PROCEDURE — 36415 COLL VENOUS BLD VENIPUNCTURE: CPT | Mod: PO | Performed by: SURGERY

## 2023-06-23 PROCEDURE — 82024 ASSAY OF ACTH: CPT | Performed by: INTERNAL MEDICINE

## 2023-06-23 PROCEDURE — 82533 TOTAL CORTISOL: CPT | Performed by: INTERNAL MEDICINE

## 2023-06-23 PROCEDURE — 82627 DEHYDROEPIANDROSTERONE: CPT | Performed by: INTERNAL MEDICINE

## 2023-06-23 PROCEDURE — 84443 ASSAY THYROID STIM HORMONE: CPT | Performed by: INTERNAL MEDICINE

## 2023-06-23 PROCEDURE — 80061 LIPID PANEL: CPT | Performed by: INTERNAL MEDICINE

## 2023-06-23 PROCEDURE — 83036 HEMOGLOBIN GLYCOSYLATED A1C: CPT | Performed by: INTERNAL MEDICINE

## 2023-06-23 NOTE — PROGRESS NOTES
RESEARCH STUDY SPECIMEN COLLECTION ENCOUNTER  ORGAN TRANSPLANT  [facility]    Study Title: Role of Tumor-Induced Immune Tolerance in the Patient Response to Locoregional Therapy: Implications in Assessment Risk of Hepatocellular Carcinoma Recurrence Following Liver Transplantation    IRB #: [id number]    IRB Approval Date: 6/8/2016    : Daniel Thurman MD  Sub-investigator: Stan Hannah, PhD    Patient Number: [id number]    In accordance with the study protocol, [facility] orders were placed and follow-up specimens were collected on (date: 06/23/2023) in:  [facility] LAB VNP: YES/NO: No  [facility] LABTX: YES/NO: No  [facility] LAB IM: YES/NO: No  Other [facility] location: YES/NO: Yes   Location: [facility]    A  was used to transport blood specimens to [facility] for processing: YES/NO: Yes  Blood specimens were transported to [facility] for processing: YES/NO: Yes    Domingo Jenkins  Admin Research- Liver Transplant

## 2023-06-26 ENCOUNTER — PATIENT MESSAGE (OUTPATIENT)
Dept: UROGYNECOLOGY | Facility: CLINIC | Age: 68
End: 2023-06-26
Payer: MEDICARE

## 2023-06-27 LAB — ACTH PLAS-MCNC: 11 PG/ML (ref 0–46)

## 2023-06-28 ENCOUNTER — HOSPITAL ENCOUNTER (OUTPATIENT)
Dept: ENDOCRINOLOGY | Facility: CLINIC | Age: 68
Discharge: HOME | End: 2023-06-28
Attending: INTERNAL MEDICINE
Payer: MEDICARE

## 2023-06-28 DIAGNOSIS — E04.9 GOITER, NODULAR: ICD-10-CM

## 2023-06-28 PROCEDURE — 76536 US SOFT TISSUE HEAD NECK THYROID: ICD-10-PCS | Mod: 26,,, | Performed by: INTERNAL MEDICINE

## 2023-06-28 PROCEDURE — 76536 US EXAM OF HEAD AND NECK: CPT | Mod: 26,,, | Performed by: INTERNAL MEDICINE

## 2023-06-29 ENCOUNTER — PATIENT MESSAGE (OUTPATIENT)
Dept: UROGYNECOLOGY | Facility: CLINIC | Age: 68
End: 2023-06-29
Payer: MEDICARE

## 2023-06-29 ENCOUNTER — PATIENT MESSAGE (OUTPATIENT)
Dept: ENDOCRINOLOGY | Facility: CLINIC | Age: 68
End: 2023-06-29
Payer: MEDICARE

## 2023-06-29 DIAGNOSIS — E27.8 ADRENAL INCIDENTALOMA: Primary | ICD-10-CM

## 2023-06-29 DIAGNOSIS — N95.2 VAGINAL ATROPHY: ICD-10-CM

## 2023-06-29 DIAGNOSIS — R39.15 URINARY URGENCY: ICD-10-CM

## 2023-06-29 RX ORDER — DEXAMETHASONE 1 MG/1
1 TABLET ORAL ONCE
Qty: 1 TABLET | Refills: 0 | Status: SHIPPED | OUTPATIENT
Start: 2023-06-29 | End: 2023-06-29

## 2023-06-30 RX ORDER — ESTRADIOL 0.1 MG/G
1 CREAM VAGINAL DAILY
Qty: 42 G | Refills: 3 | Status: SHIPPED | OUTPATIENT
Start: 2023-06-30 | End: 2023-10-30 | Stop reason: SDUPTHER

## 2023-06-30 RX ORDER — ESTRADIOL 0.1 MG/G
1 CREAM VAGINAL
Qty: 42.5 G | Refills: 3 | Status: SHIPPED | OUTPATIENT
Start: 2023-07-03

## 2023-06-30 RX ORDER — DARIFENACIN 15 MG/1
15 TABLET, EXTENDED RELEASE ORAL DAILY
Qty: 30 TABLET | Refills: 11 | Status: SHIPPED | OUTPATIENT
Start: 2023-06-30 | End: 2024-03-20 | Stop reason: SDUPTHER

## 2023-07-03 ENCOUNTER — LAB VISIT (OUTPATIENT)
Dept: LAB | Facility: HOSPITAL | Age: 68
End: 2023-07-03
Attending: INTERNAL MEDICINE
Payer: MEDICARE

## 2023-07-03 DIAGNOSIS — E27.8 ADRENAL INCIDENTALOMA: ICD-10-CM

## 2023-07-03 LAB — CORTIS SERPL-MCNC: 1.3 UG/DL (ref 4.3–22.4)

## 2023-07-03 PROCEDURE — 36415 COLL VENOUS BLD VENIPUNCTURE: CPT | Mod: PO | Performed by: INTERNAL MEDICINE

## 2023-07-03 PROCEDURE — 82533 TOTAL CORTISOL: CPT | Performed by: INTERNAL MEDICINE

## 2023-07-03 PROCEDURE — 82542 COL CHROMOTOGRAPHY QUAL/QUAN: CPT | Performed by: INTERNAL MEDICINE

## 2023-07-04 ENCOUNTER — PATIENT MESSAGE (OUTPATIENT)
Dept: ENDOCRINOLOGY | Facility: CLINIC | Age: 68
End: 2023-07-04
Payer: MEDICARE

## 2023-07-09 ENCOUNTER — PATIENT MESSAGE (OUTPATIENT)
Dept: NEUROSURGERY | Facility: CLINIC | Age: 68
End: 2023-07-09
Payer: MEDICARE

## 2023-07-10 RX ORDER — LORAZEPAM 1 MG/1
1 TABLET ORAL ONCE
Qty: 1 TABLET | Refills: 0 | Status: SHIPPED | OUTPATIENT
Start: 2023-07-10 | End: 2023-07-31

## 2023-07-11 ENCOUNTER — PATIENT MESSAGE (OUTPATIENT)
Dept: ENDOCRINOLOGY | Facility: CLINIC | Age: 68
End: 2023-07-11
Payer: MEDICARE

## 2023-07-11 RX ORDER — SEMAGLUTIDE 2.68 MG/ML
2 INJECTION, SOLUTION SUBCUTANEOUS
Qty: 3 ML | Refills: 11 | Status: SHIPPED | OUTPATIENT
Start: 2023-07-11 | End: 2023-09-18

## 2023-07-12 LAB — DEXAMETHASONE SERPL-MCNC: 717 NG/DL

## 2023-07-28 ENCOUNTER — PATIENT MESSAGE (OUTPATIENT)
Dept: NEUROSURGERY | Facility: CLINIC | Age: 68
End: 2023-07-28
Payer: MEDICARE

## 2023-07-31 RX ORDER — LORAZEPAM 1 MG/1
1 TABLET ORAL ONCE
Qty: 1 TABLET | Refills: 0 | Status: SHIPPED | OUTPATIENT
Start: 2023-07-31 | End: 2023-10-30 | Stop reason: ALTCHOICE

## 2023-08-01 ENCOUNTER — OFFICE VISIT (OUTPATIENT)
Dept: ENDOCRINOLOGY | Facility: CLINIC | Age: 68
End: 2023-08-01
Payer: MEDICARE

## 2023-08-01 VITALS
SYSTOLIC BLOOD PRESSURE: 110 MMHG | BODY MASS INDEX: 27.3 KG/M2 | WEIGHT: 184.31 LBS | DIASTOLIC BLOOD PRESSURE: 78 MMHG | HEIGHT: 69 IN

## 2023-08-01 DIAGNOSIS — E03.9 ACQUIRED HYPOTHYROIDISM: ICD-10-CM

## 2023-08-01 DIAGNOSIS — E55.9 VITAMIN D DEFICIENCY DISEASE: ICD-10-CM

## 2023-08-01 DIAGNOSIS — Z78.0 MENOPAUSE: ICD-10-CM

## 2023-08-01 DIAGNOSIS — R79.9 ABNORMAL FINDING OF BLOOD CHEMISTRY, UNSPECIFIED: ICD-10-CM

## 2023-08-01 DIAGNOSIS — R73.03 PREDIABETES: ICD-10-CM

## 2023-08-01 DIAGNOSIS — E66.3 OVERWEIGHT (BMI 25.0-29.9): ICD-10-CM

## 2023-08-01 DIAGNOSIS — E27.8 ADRENAL INCIDENTALOMA: Primary | ICD-10-CM

## 2023-08-01 DIAGNOSIS — E04.9 GOITER, NODULAR: ICD-10-CM

## 2023-08-01 PROCEDURE — 99999 PR PBB SHADOW E&M-EST. PATIENT-LVL V: CPT | Mod: PBBFAC,,, | Performed by: INTERNAL MEDICINE

## 2023-08-01 PROCEDURE — 99214 PR OFFICE/OUTPT VISIT, EST, LEVL IV, 30-39 MIN: ICD-10-PCS | Mod: S$PBB,,, | Performed by: INTERNAL MEDICINE

## 2023-08-01 PROCEDURE — 99215 OFFICE O/P EST HI 40 MIN: CPT | Mod: PBBFAC | Performed by: INTERNAL MEDICINE

## 2023-08-01 PROCEDURE — 99214 OFFICE O/P EST MOD 30 MIN: CPT | Mod: S$PBB,,, | Performed by: INTERNAL MEDICINE

## 2023-08-01 PROCEDURE — 99999 PR PBB SHADOW E&M-EST. PATIENT-LVL V: ICD-10-PCS | Mod: PBBFAC,,, | Performed by: INTERNAL MEDICINE

## 2023-08-01 NOTE — PROGRESS NOTES
Subjective:      Patient ID: Claudia Rodriguez is a 68 y.o. female.    Chief Complaint:  mng    History of Present Illness          With regards to the MNG  She initially presented with neck fullness  U/s confirmed the mng    2 FNAs done in 2012- both nondianostic  FNA L/isthmus 10/2013-benign     FNA  Done 12/18/18  THYROID, LEFT MID (ASPIRATION):  [facility] Thyroid Cytology Category: Benign  [place] follicular cells and colloid        Last ultrasound  6/8/23    COMPARISON:  Neck ultrasound dated 07/12/2022.     Impression:     1.)  Thyroid gland is normal in size with heterogeneous echotexture and normal vascularity     2.) 0.7 x 0.4 x 0.8 cm right superior lobe nodule     3.) 0.9 x 0.9 x 0.8 cm right middle lobe nodule     4.) 0.6 x 0.5 x 0.7 cm solid, heterogenous nodule is seen in the right inferior pole     5.) 1.6 x 0.9 x 1.1 cm left middle lobe nodule     6.) 0.7 x 0.7 x 0.8 cm left inferior lobe nodule     7.) 1.1 x 0.6 x 1.1 cm isthmus nodule     RECOMMENDATIONS:  Recommend repeat thyroid ultrasound in 1-2 years.      2 Sister with nodules -1 had thyroidectomy - benign      Patient denies any neck enlargement.   No dysphagia, no dyspnea.   Energy level good.  sleeps well.   No skin/nail hair changes  Denies constipation       With regards to the adrenal incidentaloma    Found on MRI   Fat containing 1.8 cm right adrenal lesion with signal loss on opposed phase imaging      Dedicated imaging 01/23/2023    A 1.5 cm right adrenal nodule with noncontrast attenuation, most in keeping with adenoma (series 2, image 27).  Additional smaller hypoattenuating adrenal nodule on the right, probable additional adenoma (series 2, image 34).    Renin detectable      Normal dexamethasone suppression test 1/1/23  and 7/3/23    Basal labs    Latest Reference Range & Units 06/23/23 07:40   Cortisol, 8 AM 4.30 - 22.40 ug/dL 12.90   ACTH 0 - 46 pg/mL 11   DHEA-SO4 33.6 - 78.9 ug/dL 25.9 (L)   (L): Data is abnormally low             With regards to the Hypothyroidism   She is on lt4 75 mcg qd   Takes medication in the morning without food or medication.        With regards to the Vit D deficiency   Taking ergo 50k q   week      With regards to the IGT and obesity   Off metformin    Down 45 lbs -- on/off ozempic        2019 nl bmd       Review of Systems  As above       Objective:   Physical Exam  Vitals reviewed.   Constitutional:       Appearance: Normal appearance.   Neck:      Comments: No goiter   Cardiovascular:      Rate and Rhythm: Normal rate.   Pulmonary:      Effort: Pulmonary effort is normal.   Abdominal:      Palpations: Abdomen is soft.   Musculoskeletal:      Right lower leg: No edema.      Left lower leg: No edema.   Psychiatric:         Mood and Affect: Mood normal.         Body mass index is 27.22 kg/m².    Lab Review:   Lab Results   Component Value Date    HGBA1C 5.0 06/23/2023     Lab Results   Component Value Date    CHOL 134 06/23/2023    HDL 44 06/23/2023    LDLCALC 74.4 06/23/2023    TRIG 78 06/23/2023    CHOLHDL 32.8 06/23/2023     Lab Results   Component Value Date     06/23/2023    K 3.6 06/23/2023     06/23/2023    CO2 28 06/23/2023     06/23/2023    BUN 15 06/23/2023    CREATININE 0.8 06/23/2023    CALCIUM 10.1 06/23/2023    PROT 6.8 06/23/2023    ALBUMIN 4.1 06/23/2023    BILITOT 1.3 (H) 06/23/2023    ALKPHOS 74 06/23/2023    AST 31 06/23/2023    ALT 35 06/23/2023    ANIONGAP 9 06/23/2023    ESTGFRAFRICA >60.0 07/11/2022    EGFRNONAA >60.0 07/11/2022    TSH 1.005 06/23/2023       Assessment and Plan     Goiter, nodular   I have reviewed management options       Discussed indications to repeat thyroid nodule biopsy as well as  surgical indications (abnormal FNA, compressive symptoms or interval change)     plan follow up in 1 year with TSH and thyroid u/s prior        Acquired hypothyroidism  Clinically and biochemically euthyroid  Goal is a normal TSH  Continue lt4 dose at this  time  Avoid exogenous hyperthyroidism as this can accelerate bone loss and increase risk of CV complications.         Prediabetes  Resolved with weight loss.    Overweight (BMI 25.0-29.9)  Doing great    Vitamin D deficiency disease  Continue prescription vitamin-D    Adrenal incidentaloma  DST x2 was normal....  Although, her baseline ACTH and DHEA-S are on the lower side.  Will check 24 hour urine for cortisol.  It is reassuring that she has been able to lose weight with Ozempic     Check bmd     Rtc 6 months

## 2023-08-01 NOTE — ASSESSMENT & PLAN NOTE
DST x2 was normal....  Although, her baseline ACTH and DHEA-S are on the lower side.  Will check 24 hour urine for cortisol.  It is reassuring that she has been able to lose weight with Ozempic

## 2023-08-03 ENCOUNTER — OFFICE VISIT (OUTPATIENT)
Dept: NEUROSURGERY | Facility: CLINIC | Age: 68
End: 2023-08-03
Payer: MEDICARE

## 2023-08-03 ENCOUNTER — HOSPITAL ENCOUNTER (OUTPATIENT)
Dept: RADIOLOGY | Facility: HOSPITAL | Age: 68
Discharge: HOME | End: 2023-08-03
Attending: NEUROLOGICAL SURGERY
Payer: MEDICARE

## 2023-08-03 VITALS
HEIGHT: 69 IN | DIASTOLIC BLOOD PRESSURE: 64 MMHG | BODY MASS INDEX: 26.66 KG/M2 | HEART RATE: 67 BPM | TEMPERATURE: 98 F | SYSTOLIC BLOOD PRESSURE: 106 MMHG | WEIGHT: 180 LBS

## 2023-08-03 DIAGNOSIS — D32.9 MENINGIOMA: ICD-10-CM

## 2023-08-03 DIAGNOSIS — I10 ESSENTIAL HYPERTENSION: ICD-10-CM

## 2023-08-03 DIAGNOSIS — D32.0 BENIGN MENINGIOMA OF BRAIN: ICD-10-CM

## 2023-08-03 PROCEDURE — 70553 MRI BRAIN STEM W/O & W/DYE: CPT | Mod: 26,,, | Performed by: RADIOLOGY

## 2023-08-03 PROCEDURE — 99999 PR PBB SHADOW E&M-EST. PATIENT-LVL III: ICD-10-PCS | Mod: PBBFAC,,, | Performed by: NEUROLOGICAL SURGERY

## 2023-08-03 PROCEDURE — 99999 PR PBB SHADOW E&M-EST. PATIENT-LVL III: CPT | Mod: PBBFAC,,, | Performed by: NEUROLOGICAL SURGERY

## 2023-08-03 PROCEDURE — 25500020 PHARM REV CODE 255: Performed by: NEUROLOGICAL SURGERY

## 2023-08-03 PROCEDURE — 99213 PR OFFICE/OUTPT VISIT, EST, LEVL III, 20-29 MIN: ICD-10-PCS | Mod: S$PBB,,, | Performed by: NEUROLOGICAL SURGERY

## 2023-08-03 PROCEDURE — 99213 OFFICE O/P EST LOW 20 MIN: CPT | Mod: S$PBB,,, | Performed by: NEUROLOGICAL SURGERY

## 2023-08-03 PROCEDURE — 70553 MRI BRAIN STEM W/O & W/DYE: CPT | Mod: TC

## 2023-08-03 PROCEDURE — 99213 OFFICE O/P EST LOW 20 MIN: CPT | Mod: PBBFAC,25 | Performed by: NEUROLOGICAL SURGERY

## 2023-08-03 PROCEDURE — 70553 MRI BRAIN W WO CONTRAST: ICD-10-PCS | Mod: 26,,, | Performed by: RADIOLOGY

## 2023-08-03 PROCEDURE — A9585 GADOBUTROL INJECTION: HCPCS | Performed by: NEUROLOGICAL SURGERY

## 2023-08-03 RX ORDER — GADOBUTROL 604.72 MG/ML
9 INJECTION INTRAVENOUS
Status: COMPLETED | OUTPATIENT
Start: 2023-08-03 | End: 2023-08-03

## 2023-08-03 RX ORDER — TRIAMTERENE/HYDROCHLOROTHIAZID 37.5-25 MG
TABLET ORAL
Qty: 90 TABLET | Refills: 3 | Status: SHIPPED | OUTPATIENT
Start: 2023-08-03

## 2023-08-03 RX ADMIN — GADOBUTROL 9 ML: 604.72 INJECTION INTRAVENOUS at 08:08

## 2023-08-03 NOTE — TELEPHONE ENCOUNTER
No care due was identified.  [facility] Embedded Care Due Messages. Reference number: [id number].   8/03/2023 12:59:48 AM CDT

## 2023-08-03 NOTE — TELEPHONE ENCOUNTER
Refill Decision Note   Claudia Rodriguez  is requesting a refill authorization.  Brief Assessment and Rationale for Refill:  Approve     Medication Therapy Plan:       Medication Reconciliation Completed: No   Comments:     No Care Gaps recommended.     Note composed:10:37 AM 08/03/2023

## 2023-08-11 PROBLEM — D32.9 MENINGIOMA: Status: ACTIVE | Noted: 2023-08-11

## 2023-08-11 NOTE — PROGRESS NOTES
Neurosurgery  Established Patient    SUBJECTIVE:     History of Present Illness:  Ms. Rodriguez is a 68-year-old female who is seeing me today in follow-up.  Her last neurosurgery clinic appointment was on October 6, 2022.  She has a known history of a small right frontal meningioma that has been followed for approximately 10 years and has been unchanged in size over the course of that time.  At the time of her last clinic appointment she would transferred her care to [facility].  She was asymptomatic.  She denied headaches, nausea, vomiting, dizziness, weakness, paresthesias, or seizure-like activity.  She is here today to see me in follow-up with a repeat MRI with and without contrast of the brain.  She continues to feel well.  She denies headaches, nausea, vomiting, weakness, dizziness, or seizure-like activity.    Review of patient's allergies indicates:   Allergen Reactions    Zostavax [zoster vaccine live (pf)] Rash     - injection site red, hot, indurated    Demerol [meperidine] Other (See Comments)     Other reaction(s): Nausea  Other reaction(s): Headache    Nsaids (non-steroidal anti-inflammatory drug) Other (See Comments)       Other reaction(s): Difficulty breathing    Other reaction(s): Difficulty breathing    Phenytoin sodium extended Other (See Comments)       Current Outpatient Medications   Medication Sig Dispense Refill    amLODIPine (NORVASC) 10 MG tablet TAKE 1 TABLET BY MOUTH EVERY DAY 90 tablet 0    aspirin (ECOTRIN) 81 MG EC tablet Take 1 tablet (81 mg total) by mouth once daily. 90 tablet 3    atorvastatin (LIPITOR) 80 MG tablet TAKE 1 TABLET BY MOUTH EVERY EVENING 90 tablet 3    atorvastatin (LIPITOR) 80 MG tablet Take 80 mg by mouth once daily.      CALCIUM CARBONATE/VITAMIN D3 (CALCIUM 500 + D, D3, ORAL) once daily.       carvediloL (COREG) 25 MG tablet TAKE 1 TABLET BY MOUTH TWICE A DAY WITH MEALS 180 tablet 0    darifenacin (ENABLEX) 15 mg 24 hr tablet Take 1 tablet (15 mg total) by mouth once  "daily. 30 tablet 11    ergocalciferol (ERGOCALCIFEROL) 50,000 unit Cap TAKE 1 CAPSULE BY MOUTH ONE TIME PER WEEK 12 capsule 3    estradioL (ESTRACE) 0.01 % (0.1 mg/gram) vaginal cream Place 1 g vaginally twice a week. 42.5 g 3    FOLIC ACID/MV,FE,OTHER MIN (CENTRUM ORAL) Take 1 tablet by mouth.      levothyroxine (SYNTHROID) 75 MCG tablet TAKE 1 TABLET BY MOUTH EVERY DAY 90 tablet 3    omega-3 fatty acids 1,000 mg Cap Take 1 capsule by mouth Daily.      pen needle, diabetic (BD ULTRA-FINE AMELIA PEN NEEDLE) 32 gauge x 5/32" Ndle Use with ozempic 30 each 3    phentermine (ADIPEX-P) 37.5 mg tablet Take 37.5 mg by mouth.      semaglutide (OZEMPIC) 2 mg/dose (8 mg/3 mL) PnIj Inject 2 mg into the skin every 7 days. 3 mL 11    ciprofloxacin HCl (CIPRO) 500 MG tablet Take 500 mg by mouth 2 (two) times daily.      estradioL (ESTRACE) 0.01 % (0.1 mg/gram) vaginal cream Place 1 g vaginally once daily. 42 g 3    LORazepam (ATIVAN) 1 MG tablet Take 1 tablet (1 mg total) by mouth once. for 1 dose 1 tablet 0    phenazopyridine (PYRIDIUM) 100 MG tablet Take 100 mg by mouth.      phenazopyridine (PYRIDIUM) 200 MG tablet Take by mouth.      sulfamethoxazole-trimethoprim 800-160mg (BACTRIM DS) 800-160 mg Tab Take 1 tablet by mouth 2 (two) times daily.      triamterene-hydrochlorothiazide 37.5-25 mg (MAXZIDE-25) 37.5-25 mg per tablet TAKE 1 TABLET BY MOUTH EVERY DAY 90 tablet 3     No current facility-administered medications for this visit.       Past Medical History:   Diagnosis Date    Cholelithiasis     DDD (degenerative disc disease), lumbar     Fatty liver     - noted on U/S 11/2017    Goiter, nodular     HTN (hypertension)     Hyperglycemia     Hyperlipidemia     Hypothyroidism     Hypovitaminosis D     Meningioma     right frontal lobe ; followed by Dr. Enciso once a year    Obesity     Prediabetes     Urinary bladder disorder      Past Surgical History:   Procedure Laterality Date    BLADDER SUSPENSION      LIVER BIOPSY      " "thyroid biopsy  7/11/12    tonsillectomy      TONSILLECTOMY       Family History       Problem Relation (Age of Onset)    Coronary artery disease Father (55)    Heart disease Father    Heart failure Maternal Grandmother    Hypertension Father, Mother, Sister    Melanoma Father    No Known Problems Brother, Brother    Pancreatic cancer Maternal Grandfather    Stroke Mother    Thyroid disease Sister    Thyroid nodules Sister, Sister          Social History     Socioeconomic History    Marital status:     Number of children: 1   Occupational History    Occupation: human      Employer: [employer]   Tobacco Use    Smoking status: Never    Smokeless tobacco: Never   Substance and Sexual Activity    Alcohol use: No    Drug use: No    Sexual activity: Yes     Partners: Male   Other Topics Concern    Are you pregnant or think you may be? No    Breast-feeding No   Social History Narrative    3 adopted children.       Review of Systems    OBJECTIVE:     Vital Signs  Temp: 98.1 °F (36.7 °C)  Pulse: 67  BP: 106/64  Pain Score: 0-No pain  Height: 5' 9" (175.3 cm)  Weight: 81.6 kg (180 lb)  Body mass index is 26.58 kg/m².    Physical Exam:  Vitals reviewed.    Constitutional: She appears well-developed and well-nourished. No distress.     Eyes: Pupils are equal, round, and reactive to light. Conjunctivae and EOM are normal.     Cardiovascular: Normal rate, regular rhythm, normal pulses and no edema.     Abdominal: Soft. Bowel sounds are normal.     Skin: Skin displays no rash on trunk and no rash on extremities. Skin displays no lesions on trunk and no lesions on extremities.     Psych/Behavior: She is alert. She is oriented to person, place, and time. She has a normal mood and affect.     Musculoskeletal: Gait is normal.        Neck: Range of motion is full. There is no tenderness. Muscle strength is 5/5. Tone is normal.        Back: Range of motion is full. There is no tenderness. Muscle strength is 5/5. Tone " is normal.        Right Upper Extremities: Range of motion is full. There is no tenderness. Muscle strength is 5/5. Tone is normal.        Left Upper Extremities: Range of motion is full. There is no tenderness. Muscle strength is 5/5. Tone is normal.       Right Lower Extremities: Range of motion is full. There is no tenderness. Muscle strength is 5/5. Tone is normal.        Left Lower Extremities: Range of motion is full. There is no tenderness. Muscle strength is 5/5. Tone is normal.     Neurological:        Coordination: She has a normal Romberg Test, normal finger to nose coordination and normal tandem walking coordination.        Sensory: There is no sensory deficit in the trunk. There is no sensory deficit in the extremities.        DTRs: DTRs are DTRS NORMAL AND SYMMETRICnormal and symmetric. She displays no Babinski's sign on the right side. She displays no Babinski's sign on the left side.        Cranial nerves: Cranial nerve(s) II, III, IV, V, VI, VII, VIII, IX, X, XI and XII are intact.         Diagnostic Results:  She has an MRI with and without contrast of the brain available for review which I personally reviewed.  This redemonstrates the known right frontal homogeneously enhancing mass, consistent with meningioma.  It previously measured 1.2 x 1 x 1.2 cm.  It now measures 1.2 x 1.1 x 1 cm.  It is grossly unchanged when compared with her prior MRI.  There continues to be no surrounding vasogenic edema surrounding the right frontal lesion.    ASSESSMENT/PLAN:     Ms. Rodriguez is a 68-year-old female with a known right frontal homogeneously enhancing mass consistent with meningioma.  It is unchanged in size on the most recent MRI with and without contrast of the brain.  There continues to be no surrounding vasogenic edema.  Given this, I believe that it is reasonable to extend her follow-up to every 2 years.  Therefore, I will plan on seeing her back in 2 years with a repeat MRI with without contrast of  the brain at that time.  She knows if she should develop any symptoms or if anything should change that she should call for sooner follow-up.  Otherwise I will see her back in 2 years.  She knows she can call with any further questions or concerns in the meantime.        Note dictated with voice recognition software, please excuse any grammatical errors.

## 2023-08-22 DIAGNOSIS — Z86.39 HISTORY OF VITAMIN D DEFICIENCY: ICD-10-CM

## 2023-08-22 RX ORDER — ERGOCALCIFEROL 1.25 MG/1
CAPSULE ORAL
Qty: 12 CAPSULE | Refills: 3 | Status: SHIPPED | OUTPATIENT
Start: 2023-08-22

## 2023-08-29 ENCOUNTER — LAB VISIT (OUTPATIENT)
Dept: LAB | Facility: HOSPITAL | Age: 68
End: 2023-08-29
Payer: MEDICARE

## 2023-08-29 DIAGNOSIS — E27.8 ADRENAL INCIDENTALOMA: ICD-10-CM

## 2023-08-29 LAB
CREAT 24H UR-MRATE: 32.4 MG/HR (ref 40–75)
CREAT UR-MCNC: 51 MG/DL (ref 15–325)
CREATININE, URINE (MG/SPEC): 777.8 MG/SPEC
URINE COLLECTION DURATION: 24 HR
URINE VOLUME: 1525 ML

## 2023-08-29 PROCEDURE — 82570 ASSAY OF URINE CREATININE: CPT | Performed by: INTERNAL MEDICINE

## 2023-09-01 LAB
COLLECT DURATION TIME UR: 24 H
CORTIS 24H UR-MRATE: 8.2 MCG/24 H (ref 3.5–45)
SPECIMEN VOL ?TM UR: 1525 ML

## 2023-09-11 ENCOUNTER — HOSPITAL ENCOUNTER (OUTPATIENT)
Dept: RADIOLOGY | Facility: CLINIC | Age: 68
Discharge: HOME | End: 2023-09-11
Attending: INTERNAL MEDICINE
Payer: MEDICARE

## 2023-09-11 DIAGNOSIS — Z78.0 MENOPAUSE: ICD-10-CM

## 2023-09-11 PROCEDURE — 77080 DXA BONE DENSITY AXIAL: CPT | Mod: 26,,, | Performed by: INTERNAL MEDICINE

## 2023-09-11 PROCEDURE — 77080 DXA BONE DENSITY AXIAL: CPT | Mod: TC,PO

## 2023-09-11 PROCEDURE — 77080 DXA BONE DENSITY AXIAL SKELETON 1 OR MORE SITES: ICD-10-PCS | Mod: 26,,, | Performed by: INTERNAL MEDICINE

## 2023-09-14 ENCOUNTER — PATIENT MESSAGE (OUTPATIENT)
Dept: ADMINISTRATIVE | Facility: OTHER | Age: 68
End: 2023-09-14
Payer: MEDICARE

## 2023-09-17 ENCOUNTER — PATIENT MESSAGE (OUTPATIENT)
Dept: ENDOCRINOLOGY | Facility: CLINIC | Age: 68
End: 2023-09-17
Payer: MEDICARE

## 2023-09-18 ENCOUNTER — PATIENT MESSAGE (OUTPATIENT)
Dept: ENDOCRINOLOGY | Facility: CLINIC | Age: 68
End: 2023-09-18
Payer: MEDICARE

## 2023-09-18 RX ORDER — SEMAGLUTIDE 2.4 MG/.75ML
2.4 INJECTION, SOLUTION SUBCUTANEOUS WEEKLY
Qty: 3 ML | Refills: 5 | Status: SHIPPED | OUTPATIENT
Start: 2023-09-18 | End: 2023-09-25

## 2023-09-19 ENCOUNTER — PATIENT MESSAGE (OUTPATIENT)
Dept: ENDOCRINOLOGY | Facility: CLINIC | Age: 68
End: 2023-09-19
Payer: MEDICARE

## 2023-09-20 ENCOUNTER — TELEPHONE (OUTPATIENT)
Dept: HEPATOLOGY | Facility: CLINIC | Age: 68
End: 2023-09-20
Payer: MEDICARE

## 2023-09-20 NOTE — TELEPHONE ENCOUNTER
Wegovy denied. Pt insurance does not cover drugs used for weight loss diagnoses. Pt does not have type 2 diabetes diagnosis so will not be able to get the other drugs in the same class covered either. Please advise.

## 2023-09-24 DIAGNOSIS — I10 ESSENTIAL HYPERTENSION: ICD-10-CM

## 2023-09-24 NOTE — TELEPHONE ENCOUNTER
No care due was identified.  [facility] Embedded Care Due Messages. Reference number: [id number].   9/24/2023 7:59:52 AM CDT

## 2023-09-25 RX ORDER — AMLODIPINE BESYLATE 10 MG/1
TABLET ORAL
Qty: 90 TABLET | Refills: 2 | Status: SHIPPED | OUTPATIENT
Start: 2023-09-25

## 2023-09-25 RX ORDER — SEMAGLUTIDE 2.68 MG/ML
2 INJECTION, SOLUTION SUBCUTANEOUS
Qty: 3 ML | Refills: 11 | Status: SHIPPED | OUTPATIENT
Start: 2023-09-25 | End: 2023-10-30

## 2023-09-25 NOTE — TELEPHONE ENCOUNTER
Refill Routing Note   Medication(s) are not appropriate for processing by [facility] for the following reason(s):      Drug-disease interaction    [facility] action(s):  Defer Care Due:  None identified     Medication Therapy Plan: Drug-Disease: amLODIPine and Hepatomegaly; Fatty liver; DEFER    Pharmacist review requested: Yes     Appointments  past 12m or future 3m with PCP    Date Provider   Last Visit   5/22/2023 Jessica Marquez MD   Next Visit   Visit date not found Jessica Marquez MD   ED visits in past 90 days: 0        Note composed:10:33 AM 09/25/2023

## 2023-09-25 NOTE — TELEPHONE ENCOUNTER
Refill Decision Note      Refill Decision Note   Claudia Rodriguez  is requesting a refill authorization.  Brief Assessment and Rationale for Refill:  Approve     Medication Therapy Plan:         Pharmacist review requested: Yes   Extended chart review required: Yes   Comments:     Note composed:12:31 PM 09/25/2023             Appointments     Last Visit   5/22/2023 Jessica Marquez MD   Next Visit   Visit date not found Jessica Marquez MD

## 2023-09-27 LAB — NONINV COLON CA DNA+OCC BLD SCRN STL QL: POSITIVE

## 2023-10-03 ENCOUNTER — PATIENT MESSAGE (OUTPATIENT)
Dept: FAMILY MEDICINE | Facility: CLINIC | Age: 68
End: 2023-10-03
Payer: MEDICARE

## 2023-10-04 ENCOUNTER — PATIENT MESSAGE (OUTPATIENT)
Dept: FAMILY MEDICINE | Facility: CLINIC | Age: 68
End: 2023-10-04
Payer: MEDICARE

## 2023-10-04 DIAGNOSIS — Z12.11 COLON CANCER SCREENING: Primary | ICD-10-CM

## 2023-10-04 NOTE — TELEPHONE ENCOUNTER
Pt states her colo guard was positive and she does  not know what to do next as she can not get a colonoscopy

## 2023-10-09 NOTE — PROGRESS NOTES
Gastroenterology Clinic Consultation Note    Reason for Visit:  The primary encounter diagnosis was Constipation, unspecified constipation type. A diagnosis of Colon cancer screening was also pertinent to this visit.    PCP:   Jessica Marquez   [address] [facility] / [place] [address]      Initial HPI   This is a 68 y.o. female referred from her PCP for discussion of colon cancer screening with positive cologuard. Patient with a subjective history of a connective tissue disorder that she was told that she had 40+ years ago. Unable to recall what the specific condition is called, however, she does report previous complications from surgical procedures. Denies any unhealing lesions. Of note, patient has been on Ozempic for the last 16 months which has causes profound constipation for her. Taking Dulcolax occasionally with some mild relief. Has trialed fiber supplements which she thinks worsens her constipation. Endorse hemorrhoids that are new for her and has had occassional episodes of rectal bleeding from these new hemorrhoids. She is worried this contributed to her positive cologuard result.       ROS:  Review of Systems   Constitutional:  Negative for chills, diaphoresis, fever, malaise/fatigue and weight loss.   HENT:  Negative for sore throat.    Gastrointestinal:  Negative for abdominal pain, blood in stool, constipation, diarrhea, heartburn, melena, nausea and vomiting.   Skin:  Negative for itching and rash.   Neurological:  Negative for dizziness, loss of consciousness and weakness.        Medical History:  has a past medical history of Cholelithiasis, DDD (degenerative disc disease), lumbar, Fatty liver, Goiter, nodular, HTN (hypertension), Hyperglycemia, Hyperlipidemia, Hypothyroidism, Hypovitaminosis D, Meningioma, Obesity, Prediabetes, and Urinary bladder disorder.    Surgical History:  has a past surgical history that includes  Bladder suspension; thyroid biopsy (7/11/12); tonsillectomy; Tonsillectomy; and Liver biopsy.    Family History: family history includes Coronary artery disease (age of onset: 55) in her father; Heart disease in her father; Heart failure in her maternal grandmother; Hypertension in her father, mother, and sister; Melanoma in her father; No Known Problems in her brother and brother; Pancreatic cancer in her maternal grandfather; Stroke in her mother; Thyroid disease in her sister; Thyroid nodules in her sister and sister..       Review of patient's allergies indicates:   Allergen Reactions    Zostavax [zoster vaccine live (pf)] Rash     - injection site red, hot, indurated    Demerol [meperidine] Other (See Comments)     Other reaction(s): Nausea  Other reaction(s): Headache    Nsaids (non-steroidal anti-inflammatory drug) Other (See Comments)       Other reaction(s): Difficulty breathing    Other reaction(s): Difficulty breathing    Phenytoin sodium extended Other (See Comments)       Current Outpatient Medications on File Prior to Visit   Medication Sig Dispense Refill    amLODIPine (NORVASC) 10 MG tablet TAKE 1 TABLET BY MOUTH EVERY DAY 90 tablet 2    aspirin (ECOTRIN) 81 MG EC tablet Take 1 tablet (81 mg total) by mouth once daily. 90 tablet 3    atorvastatin (LIPITOR) 80 MG tablet TAKE 1 TABLET BY MOUTH EVERY EVENING 90 tablet 3    atorvastatin (LIPITOR) 80 MG tablet Take 80 mg by mouth once daily.      CALCIUM CARBONATE/VITAMIN D3 (CALCIUM 500 + D, D3, ORAL) once daily.       carvediloL (COREG) 25 MG tablet TAKE 1 TABLET BY MOUTH TWICE A DAY WITH MEALS 180 tablet 0    ciprofloxacin HCl (CIPRO) 500 MG tablet Take 500 mg by mouth 2 (two) times daily.      darifenacin (ENABLEX) 15 mg 24 hr tablet Take 1 tablet (15 mg total) by mouth once daily. 30 tablet 11    ergocalciferol (ERGOCALCIFEROL) 50,000 unit Cap TAKE 1 CAPSULE BY MOUTH ONE TIME PER WEEK 12 capsule 3    estradioL (ESTRACE) 0.01 % (0.1 mg/gram) vaginal  "cream Place 1 g vaginally twice a week. 42.5 g 3    estradioL (ESTRACE) 0.01 % (0.1 mg/gram) vaginal cream Place 1 g vaginally once daily. 42 g 3    FOLIC ACID/MV,FE,OTHER MIN (CENTRUM ORAL) Take 1 tablet by mouth.      levothyroxine (SYNTHROID) 75 MCG tablet TAKE 1 TABLET BY MOUTH EVERY DAY 90 tablet 3    LORazepam (ATIVAN) 1 MG tablet Take 1 tablet (1 mg total) by mouth once. for 1 dose 1 tablet 0    omega-3 fatty acids 1,000 mg Cap Take 1 capsule by mouth Daily.      pen needle, diabetic (BD ULTRA-FINE AMELIA PEN NEEDLE) 32 gauge x 5/32" Ndle Use with ozempic 30 each 3    phenazopyridine (PYRIDIUM) 100 MG tablet Take 100 mg by mouth.      phenazopyridine (PYRIDIUM) 200 MG tablet Take by mouth.      phentermine (ADIPEX-P) 37.5 mg tablet Take 37.5 mg by mouth.      semaglutide (OZEMPIC) 2 mg/dose (8 mg/3 mL) PnIj Inject 2 mg into the skin every 7 days. 3 mL 11    sulfamethoxazole-trimethoprim 800-160mg (BACTRIM DS) 800-160 mg Tab Take 1 tablet by mouth 2 (two) times daily.      triamterene-hydrochlorothiazide 37.5-25 mg (MAXZIDE-25) 37.5-25 mg per tablet TAKE 1 TABLET BY MOUTH EVERY DAY 90 tablet 3     No current facility-administered medications on file prior to visit.         Objective Findings:    Vital Signs:  /63   Pulse 67   Ht 5' 9" (1.753 m)   Wt 81.3 kg (179 lb 3.7 oz)   BMI 26.47 kg/m²   Body mass index is 26.47 kg/m².    Physical Exam:  Physical Exam  Vitals reviewed.   Constitutional:       Appearance: She is normal weight. She is not ill-appearing.   HENT:      Mouth/Throat:      Mouth: Mucous membranes are moist.      Pharynx: Oropharynx is clear.   Eyes:      General: No scleral icterus.  Abdominal:      General: Bowel sounds are normal. There is no distension.      Palpations: Abdomen is soft. There is no mass.      Tenderness: There is no abdominal tenderness.      Hernia: No hernia is present.   Skin:     General: Skin is warm and dry.      Capillary Refill: Capillary refill takes less " than 2 seconds.      Coloration: Skin is not jaundiced or pale.      Findings: No bruising or erythema.   Neurological:      Mental Status: She is alert and oriented to person, place, and time. Mental status is at baseline.             Labs:  Lab Results   Component Value Date    WBC 7.05 12/14/2022    HGB 12.5 12/14/2022    HCT 39.2 12/14/2022     12/14/2022    CHOL 134 06/23/2023    TRIG 78 06/23/2023    HDL 44 06/23/2023    LDLDIRECT 180 (H) 07/17/2012    ALKPHOS 72 09/11/2023    ALT 43 09/11/2023    AST 30 09/11/2023     09/11/2023    K 3.9 09/11/2023     09/11/2023    CREATININE 0.8 09/11/2023    BUN 15 09/11/2023    CO2 26 09/11/2023    TSH 0.840 09/11/2023    INR 1.1 12/14/2022    HGBA1C 5.2 09/11/2023       Imaging reviewed: No pertinent imaging reviewed      Endoscopy reviewed: No prior endoscopy performed      Assessment:  1. Constipation, unspecified constipation type    2. Colon cancer screening             Plan:  Recommended Miralax for her constipation. Can trial Linzess as needed.  After conversing with GI colleagues, it would be best to refer patient to either Derm or Rheumatology to further evaluate this connective tissue disorder to better assess patients risks. While there are other screening modalities, with a positive cologuard, our recommendation is to proceed with colonoscopy for further evaluation of colon polyps and the retrieval of any abnormal tissue. Other screening modalities would be suboptimal and not provide the adequate evaluation warranted given her positive Cologuard.    RTC after workup if colonoscopy is acceptable and within patients goals.       Thank you for allowing me to participate in this patient's care.    Sincerely,     Maggy Cervantes NP  Gastroenterology Department  [facility]

## 2023-10-10 ENCOUNTER — OFFICE VISIT (OUTPATIENT)
Dept: GASTROENTEROLOGY | Facility: CLINIC | Age: 68
End: 2023-10-10
Payer: MEDICARE

## 2023-10-10 ENCOUNTER — PATIENT MESSAGE (OUTPATIENT)
Dept: GASTROENTEROLOGY | Facility: HOSPITAL | Age: 68
End: 2023-10-10
Payer: MEDICARE

## 2023-10-10 VITALS
WEIGHT: 179.25 LBS | SYSTOLIC BLOOD PRESSURE: 117 MMHG | HEIGHT: 69 IN | BODY MASS INDEX: 26.55 KG/M2 | DIASTOLIC BLOOD PRESSURE: 63 MMHG | HEART RATE: 67 BPM

## 2023-10-10 DIAGNOSIS — Z12.11 COLON CANCER SCREENING: ICD-10-CM

## 2023-10-10 DIAGNOSIS — K59.00 CONSTIPATION, UNSPECIFIED CONSTIPATION TYPE: Primary | ICD-10-CM

## 2023-10-10 PROCEDURE — 99214 OFFICE O/P EST MOD 30 MIN: CPT | Mod: S$PBB,,,

## 2023-10-10 PROCEDURE — 99999 PR PBB SHADOW E&M-EST. PATIENT-LVL V: CPT | Mod: PBBFAC,,,

## 2023-10-10 PROCEDURE — 99999 PR PBB SHADOW E&M-EST. PATIENT-LVL V: ICD-10-PCS | Mod: PBBFAC,,,

## 2023-10-10 PROCEDURE — 99215 OFFICE O/P EST HI 40 MIN: CPT | Mod: PBBFAC

## 2023-10-10 PROCEDURE — 99214 PR OFFICE/OUTPT VISIT, EST, LEVL IV, 30-39 MIN: ICD-10-PCS | Mod: S$PBB,,,

## 2023-10-10 NOTE — PROGRESS NOTES
"GENERAL GI PATIENT INTAKE:    COVID symptoms in the last 7 days (runny nose, sore throat, congestion, cough, fever): No  PCP: Jessica Marquez  If not PCP-  number given to establish [phone]: N/A    ALLERGIES REVIEWED:  Yes    CHIEF COMPLAINT:    Chief Complaint   Patient presents with    Other     Patient has positive cologuard test results, and she can not have a colonoscopy due to tissue defection.        VITAL SIGNS:  /63   Pulse 67   Ht 5' 9" (1.753 m)   Wt 81.3 kg (179 lb 3.7 oz)   BMI 26.47 kg/m²      Change in medical, surgical, family or social history: No      REVIEWED MEDICATION LIST RECONCILED INCLUDING ABOVE MEDS:  Yes     "

## 2023-10-10 NOTE — PATIENT INSTRUCTIONS
--Miralax 17g twice daily and titrate down as stool consistency improves  --Can start taking Linzess if still having constipation

## 2023-10-11 ENCOUNTER — TELEPHONE (OUTPATIENT)
Dept: FAMILY MEDICINE | Facility: CLINIC | Age: 68
End: 2023-10-11
Payer: MEDICARE

## 2023-10-11 ENCOUNTER — PATIENT MESSAGE (OUTPATIENT)
Dept: GASTROENTEROLOGY | Facility: CLINIC | Age: 68
End: 2023-10-11
Payer: MEDICARE

## 2023-10-11 DIAGNOSIS — K59.00 CONSTIPATION, UNSPECIFIED CONSTIPATION TYPE: ICD-10-CM

## 2023-10-11 NOTE — TELEPHONE ENCOUNTER
Please call patient: I received a message from [facility] that they discussed with her about possibly seeing a rheumatologist to discuss her connective tissue disorder. Would she like us to place a referral order for her?

## 2023-10-12 NOTE — TELEPHONE ENCOUNTER
Spoke with patient. Patient states that a rheumatologist was not discussed at GI visit. Patient was notified that the recommendation was to either see a dermatologist or a rheumatologist. Patient states she is unsure if she wants follow GI recommendations. Patient wishes to speak with provider regarding matter.

## 2023-10-13 ENCOUNTER — DOCUMENTATION ONLY (OUTPATIENT)
Dept: GASTROENTEROLOGY | Facility: HOSPITAL | Age: 68
End: 2023-10-13
Payer: MEDICARE

## 2023-10-13 NOTE — PROGRESS NOTES
Followed up with patient regarding possible referral to rheumatology and dermatology for further workup of her connective tissue disorder. Patient declines further workup at this time and wishes to consider further screening options for colon cancer. Will discuss further screening with her PCP. If patient decides to proceed with diagnostic colonoscopy, she will let me know and I will send the referral.

## 2023-10-19 ENCOUNTER — PATIENT MESSAGE (OUTPATIENT)
Dept: UROGYNECOLOGY | Facility: CLINIC | Age: 68
End: 2023-10-19
Payer: MEDICARE

## 2023-10-24 PROBLEM — D32.9 MENINGIOMA: Status: RESOLVED | Noted: 2023-08-11 | Resolved: 2023-10-24

## 2023-10-24 PROBLEM — I70.0 ATHEROSCLEROSIS OF ABDOMINAL AORTA: Status: ACTIVE | Noted: 2023-10-24

## 2023-10-25 ENCOUNTER — PATIENT MESSAGE (OUTPATIENT)
Dept: ENDOCRINOLOGY | Facility: CLINIC | Age: 68
End: 2023-10-25
Payer: MEDICARE

## 2023-10-30 ENCOUNTER — OFFICE VISIT (OUTPATIENT)
Dept: FAMILY MEDICINE | Facility: CLINIC | Age: 68
End: 2023-10-30
Payer: MEDICARE

## 2023-10-30 DIAGNOSIS — E03.9 ACQUIRED HYPOTHYROIDISM: ICD-10-CM

## 2023-10-30 DIAGNOSIS — I10 ESSENTIAL HYPERTENSION: ICD-10-CM

## 2023-10-30 DIAGNOSIS — K76.0 FATTY LIVER: ICD-10-CM

## 2023-10-30 DIAGNOSIS — R73.03 PREDIABETES: ICD-10-CM

## 2023-10-30 DIAGNOSIS — E66.3 OVERWEIGHT (BMI 25.0-29.9): ICD-10-CM

## 2023-10-30 DIAGNOSIS — R19.5 POSITIVE COLORECTAL CANCER SCREENING USING COLOGUARD TEST: Primary | ICD-10-CM

## 2023-10-30 PROCEDURE — 99214 PR OFFICE/OUTPT VISIT, EST, LEVL IV, 30-39 MIN: ICD-10-PCS | Mod: 95,,, | Performed by: INTERNAL MEDICINE

## 2023-10-30 PROCEDURE — 99214 OFFICE O/P EST MOD 30 MIN: CPT | Mod: 95,,, | Performed by: INTERNAL MEDICINE

## 2023-10-30 NOTE — PROGRESS NOTES
CHIEF COMPLAINT:   Chief Complaint   Patient presents with    positive cologuard         The patient location is:  Patient Home.  The chief complaint leading to consultation is: positive cologuard   .  Visit type: Virtual visit with synchronous audio and video.  Total time spent with patient: 12 minutes.  Each patient to whom he or she provides medical services by telemedicine is:  (1) informed of the relationship between the physician and patient and the respective role of any other health care provider with respect to management of the patient; and (2) notified that he or she may decline to receive medical services by telemedicine and may withdraw from such care at any time.      HISTORY OF PRESENT ILLNESS:  Claudia Rodriguez is a 68 y.o. female who was seen virtually today for positive cologuard  .  The patient was seen today for a virtual visit to discuss recent positive Cologuard test results.  The patient was advised that she needed a colonoscopy.  She reports that about 40 years ago she was diagnosed with a soft tissue disorder and was told that her body does not replace college normally and has too much estrogen in the tissue and therefore the tissue easily tears and disintegrated with manipulation.  She was advised not to have any invasive procedures.  She recently saw GI who initially had recommended other forms of screening, but eventually felt it was best for her to have a colonoscopy.  They recommended she see Rheumatology for further evaluation of the soft tissue disorder so that we can clarify next steps.  The patient states she is still doing some investigation on her own regarding other possible screening solutions.  She does report that she was on Ozempic from July of 2022 until recently.  It gave her a lot of GI side effects including severe constipation and hemorrhoids, which occasionally bled.  She thinks this was the reason for the positive Cologuard test.  She is now off Ozempic.  She has had  significant weight loss.  She is doing much better with her blood pressure and blood sugars.      Subjective    PAST MEDICAL HISTORY:  Past Medical History:   Diagnosis Date    Cholelithiasis     DDD (degenerative disc disease), lumbar     Fatty liver     - noted on U/S 11/2017    Goiter, nodular     HTN (hypertension)     Hyperglycemia     Hyperlipidemia     Hypothyroidism     Hypovitaminosis D     Meningioma     right frontal lobe ; followed by Dr. Enciso once a year    Obesity     Prediabetes     Urinary bladder disorder        PAST SURGICAL HISTORY:  Past Surgical History:   Procedure Laterality Date    BLADDER SUSPENSION      LIVER BIOPSY      thyroid biopsy  7/11/12    tonsillectomy      TONSILLECTOMY         SOCIAL HISTORY:  Social History     Socioeconomic History    Marital status:     Number of children: 1   Occupational History    Occupation: human      Employer: [employer]   Tobacco Use    Smoking status: Never    Smokeless tobacco: Never   Substance and Sexual Activity    Alcohol use: No    Drug use: No    Sexual activity: Yes     Partners: Male   Other Topics Concern    Are you pregnant or think you may be? No    Breast-feeding No   Social History Narrative    3 adopted children.       FAMILY HISTORY:  Family History   Problem Relation Age of Onset    Hypertension Mother     Stroke Mother         2015    Coronary artery disease Father 55    Heart disease Father     Hypertension Father     Melanoma Father     Hypertension Sister         controlled by lifestyle    Thyroid nodules Sister         s/p thyroidectomy    Thyroid disease Sister     Thyroid nodules Sister     No Known Problems Brother     No Known Problems Brother     Heart failure Maternal Grandmother     Pancreatic cancer Maternal Grandfather     Diabetes Neg Hx     Psoriasis Neg Hx     Lupus Neg Hx     Eczema Neg Hx     Thyroid cancer Neg Hx     Breast cancer Neg Hx     Ovarian cancer Neg Hx     Cervical cancer Neg Hx   "   Endometrial cancer Neg Hx     Vaginal cancer Neg Hx     Cirrhosis Neg Hx     Esophageal cancer Neg Hx        ALLERGIES AND MEDICATIONS: updated and reviewed.   Review of patient's allergies indicates:   Allergen Reactions    Zostavax [zoster vaccine live (pf)] Rash     - injection site red, hot, indurated    Demerol [meperidine] Other (See Comments)     Other reaction(s): Nausea  Other reaction(s): Headache    Nsaids (non-steroidal anti-inflammatory drug) Other (See Comments)       Other reaction(s): Difficulty breathing    Other reaction(s): Difficulty breathing    Phenytoin sodium extended Other (See Comments)     Medication List with Changes/Refills   Current Medications    AMLODIPINE (NORVASC) 10 MG TABLET    TAKE 1 TABLET BY MOUTH EVERY DAY    ASPIRIN (ECOTRIN) 81 MG EC TABLET    Take 1 tablet (81 mg total) by mouth once daily.    ATORVASTATIN (LIPITOR) 80 MG TABLET    TAKE 1 TABLET BY MOUTH EVERY EVENING    CALCIUM CARBONATE/VITAMIN D3 (CALCIUM 500 + D, D3, ORAL)    once daily.     CARVEDILOL (COREG) 25 MG TABLET    TAKE 1 TABLET BY MOUTH TWICE A DAY WITH MEALS    DARIFENACIN (ENABLEX) 15 MG 24 HR TABLET    Take 1 tablet (15 mg total) by mouth once daily.    ERGOCALCIFEROL (ERGOCALCIFEROL) 50,000 UNIT CAP    TAKE 1 CAPSULE BY MOUTH ONE TIME PER WEEK    ESTRADIOL (ESTRACE) 0.01 % (0.1 MG/GRAM) VAGINAL CREAM    Place 1 g vaginally twice a week.    FOLIC ACID/MV,FE,OTHER MIN (CENTRUM ORAL)    Take 1 tablet by mouth.    LEVOTHYROXINE (SYNTHROID) 75 MCG TABLET    TAKE 1 TABLET BY MOUTH EVERY DAY    LINACLOTIDE (LINZESS) 72 MCG CAP CAPSULE    Take 1 capsule (72 mcg total) by mouth before breakfast.    OMEGA-3 FATTY ACIDS 1,000 MG CAP    Take 1 capsule by mouth Daily.    PEN NEEDLE, DIABETIC (BD ULTRA-FINE AMELIA PEN NEEDLE) 32 GAUGE X 5/32" NDLE    Use with ozempic    PHENTERMINE (ADIPEX-P) 37.5 MG TABLET    Take 37.5 mg by mouth.    TRIAMTERENE-HYDROCHLOROTHIAZIDE 37.5-25 MG (MAXZIDE-25) 37.5-25 MG PER TABLET    " TAKE 1 TABLET BY MOUTH EVERY DAY   Discontinued Medications    ATORVASTATIN (LIPITOR) 80 MG TABLET    Take 80 mg by mouth once daily.    CIPROFLOXACIN HCL (CIPRO) 500 MG TABLET    Take 500 mg by mouth 2 (two) times daily.    ESTRADIOL (ESTRACE) 0.01 % (0.1 MG/GRAM) VAGINAL CREAM    Place 1 g vaginally once daily.    LORAZEPAM (ATIVAN) 1 MG TABLET    Take 1 tablet (1 mg total) by mouth once. for 1 dose    PHENAZOPYRIDINE (PYRIDIUM) 100 MG TABLET    Take 100 mg by mouth.    PHENAZOPYRIDINE (PYRIDIUM) 200 MG TABLET    Take by mouth.    SEMAGLUTIDE (OZEMPIC) 2 MG/DOSE (8 MG/3 ML) PNIJ    Inject 2 mg into the skin every 7 days.    SULFAMETHOXAZOLE-TRIMETHOPRIM 800-160MG (BACTRIM DS) 800-160 MG TAB    Take 1 tablet by mouth 2 (two) times daily.          CARE TEAM:  Patient Care Team:  Jessica Marquez MD as PCP - General (Internal Medicine)  Bisi Madrigal MD as Consulting Physician (Endocrinology)  Fernie Hammond MD as Consulting Physician (Neurosurgery)  Jessica Marquez MD as Hypertension Digital Medicine Responsible Provider (Internal Medicine)  Stephanie Guadalupe, PharmD as Hypertension Digital Medicine Clinician (Pharmacist)  Anirudh Selby MD (Inactive) as Consulting Physician (Obstetrics and Gynecology)  June Sherman as Digital Medicine Health   Albertina Pickens LPN as Licensed Practical Nurse  Program, Medicare Shared Savings as Hypertension Digital Medicine Contract  Jessica Marquez MD as Hyperlipidemia Digital Medicine Responsible Provider (Internal Medicine)  Stephanie Guadalupe, PharmD as Hyperlipidemia Digital Medicine Clinician (Pharmacist)         REVIEW OF SYSTEMS:  Review of Systems   Constitutional:  Negative for activity change and unexpected weight change.   HENT:  Negative for hearing loss, rhinorrhea and trouble swallowing.    Eyes:  Negative for discharge and visual disturbance.   Respiratory:  Negative for chest tightness and wheezing.    Cardiovascular:  Negative for chest  pain and palpitations.   Gastrointestinal:  Negative for blood in stool, constipation, diarrhea and vomiting.   Endocrine: Negative for polydipsia and polyuria.   Genitourinary:  Negative for difficulty urinating, dysuria, hematuria and menstrual problem.   Musculoskeletal:  Negative for arthralgias, joint swelling and neck pain.   Neurological:  Negative for weakness and headaches.   Psychiatric/Behavioral:  Negative for confusion and dysphoric mood.            Objective    PHYSICAL EXAM: (exam limited as this was a virtual visit)  alert, well appearing, and in no distress  alert, oriented to person, place and time, normal speech       LABS:  No labs needed at this time          ASSESSMENT AND PLAN:  1. Positive colorectal cancer screening using Cologuard test  I discussed with the patient that the standard of care after a positive Cologuard test is a colonoscopy.  I am not able to order any further screening tests as she is not due for screening at this time.  She is due for diagnostic follow-up.  She will see if she can find another GI specialist who may have different thoughts on further testing as well as possibly going to [place] for further evaluation.  For now she denies any unexplained changes in her weight or other signs/symptoms of possible colon cancer.  The patient is also open to seeing either a rheumatologist or dermatologist for further evaluation of her soft tissue disorder to see what exactly is going on.    2. Essential hypertension  BP Readings from Last 1 Encounters:   10/10/23 117/63      The current medical regimen is effective;  continue present plan and medications. Recommended patient to check home readings to monitor and see me for followup as scheduled or sooner as needed.   Discussed sodium restriction, maintaining ideal body weight and regular exercise program as physiologic means to continue to achieve blood pressure control in addition to medication compliance.  Patient was educated that  both decongestant and anti-inflammatory medication may raise blood pressure.  The patient is active on the digital hypertension program.    3. Prediabetes  Lab Results   Component Value Date    HGBA1C 5.2 09/11/2023     Stable. We discussed low sugar/low carbohydrate diet and regular exercise to prevent progression. No need for prescription medication at this time.  Check A1c yearly.    4. Acquired hypothyroidism  Lab Results   Component Value Date    TSH 0.840 09/11/2023     Patient is clinically euthyroid. Continue current regimen.    5. Fatty liver  Lab Results   Component Value Date    ALT 43 09/11/2023    AST 30 09/11/2023    ALKPHOS 72 09/11/2023    BILITOT 1.1 (H) 09/11/2023     Asymptomatic. We discussed the need for weight loss to prevent progression to cirrhosis of the liver.   LFTs normal.  Patient is followed by hepatology.  Overview:  - noted on U/S 11/2017  - 2/2018 - fibroscan F4, NAFLD fibrosis score indeterminate  - 12/2021 - Fibroscan: Fibrosis Stage:  F 0-1; Steatosis Grade:  S3      6. Overweight (BMI 25.0-29.9)  BMI Readings from Last 3 Encounters:   10/10/23 26.47 kg/m²   08/03/23 26.58 kg/m²   08/01/23 27.22 kg/m²     The patient is asked to continue to make an attempt to improve diet and exercise patterns to aid in medical management of this problem.             No orders of the defined types were placed in this encounter.     FOLLOW UP: Follow up in about 7 months (around 5/30/2024), or if symptoms worsen or fail to improve, for annual exam. or sooner as needed.

## 2023-10-31 ENCOUNTER — PATIENT MESSAGE (OUTPATIENT)
Dept: FAMILY MEDICINE | Facility: CLINIC | Age: 68
End: 2023-10-31
Payer: MEDICARE

## 2023-11-29 DIAGNOSIS — Z13.79 GENETIC TESTING: Primary | ICD-10-CM

## 2023-12-07 ENCOUNTER — PATIENT MESSAGE (OUTPATIENT)
Dept: FAMILY MEDICINE | Facility: CLINIC | Age: 68
End: 2023-12-07
Payer: MEDICARE

## 2023-12-12 ENCOUNTER — OFFICE VISIT (OUTPATIENT)
Dept: HEMATOLOGY/ONCOLOGY | Facility: CLINIC | Age: 68
End: 2023-12-12
Payer: MEDICARE

## 2023-12-12 DIAGNOSIS — R19.5 POSITIVE COLORECTAL CANCER SCREENING USING COLOGUARD TEST: ICD-10-CM

## 2023-12-12 DIAGNOSIS — Z71.83 ENCOUNTER FOR NONPROCREATIVE GENETIC COUNSELING: Primary | ICD-10-CM

## 2023-12-12 DIAGNOSIS — Z13.79 GENETIC TESTING: ICD-10-CM

## 2023-12-12 DIAGNOSIS — Z12.9 CANCER SCREENING: Primary | ICD-10-CM

## 2023-12-12 PROCEDURE — 99999 PR PBB SHADOW E&M-EST. PATIENT-LVL II: ICD-10-PCS | Mod: PBBFAC,,, | Performed by: PHYSICIAN ASSISTANT

## 2023-12-12 PROCEDURE — 99212 OFFICE O/P EST SF 10 MIN: CPT | Mod: PBBFAC | Performed by: PHYSICIAN ASSISTANT

## 2023-12-12 PROCEDURE — 99417 PR PROLONGED SVC, OUTPT, W/WO DIRECT PT CONTACT,  EA ADDTL 15 MIN: ICD-10-PCS | Mod: S$PBB,,, | Performed by: PHYSICIAN ASSISTANT

## 2023-12-12 PROCEDURE — 99215 OFFICE O/P EST HI 40 MIN: CPT | Mod: S$PBB,,, | Performed by: PHYSICIAN ASSISTANT

## 2023-12-12 PROCEDURE — 99999 PR PBB SHADOW E&M-EST. PATIENT-LVL II: CPT | Mod: PBBFAC,,, | Performed by: PHYSICIAN ASSISTANT

## 2023-12-12 PROCEDURE — 99215 PR OFFICE/OUTPT VISIT, EST, LEVL V, 40-54 MIN: ICD-10-PCS | Mod: S$PBB,,, | Performed by: PHYSICIAN ASSISTANT

## 2023-12-12 PROCEDURE — 99417 PROLNG OP E/M EACH 15 MIN: CPT | Mod: S$PBB,,, | Performed by: PHYSICIAN ASSISTANT

## 2023-12-12 NOTE — PROGRESS NOTES
"Hereditary/High Risk Clinic  Department of Hematology and Oncology  [facility]    Cancer Genetics  Initial Consultation    Date of Service:  23  Visit Provider:  Natalie Cullen PA-C  Collaborating Physician:  Avis Mo MD    Patient:  Claudia Rodriguez  :  1955  MRN:   [id number]     Referring Provider    Jessica Marquez MD  [address]  [place] [address]    ASSESSMENT   Claudia Rodriguez, 68 y.o., assigned female sex at birth, had a positive Cologuard test in 2023 and is unable to undergo colonoscopy at this time due to a self-reported connective tissue disorder that resulted a provider telling her in the  that she cannot undergo surgery or other invasive procedures. She was referred to Cancer Genetics for Galleri testing to determine if there is evidence of colorectal cancer DNA.     Detailed history:   Undiagnosed connective tissue disorder. Per patient, she underwent bladder suspension surgery (, age 31-32) and was told her bladder tissue "fell apart" and hemorrhaged. A biopsy was done and she was told her body didn't replace collagen sufficiently due to excessive estrogen. She was told she should never have surgery or other invasive procedures. She does not know the name of the condition and was not followed by a physician for it. In her 50s and 60s she underwent thyroid biopsies and a liver biopsy with no complications. She has never had a colonoscopy/EGD.    Positive cologuard in 2023. Patient thinks this was a false positive due to blood in the stool. She takes Ozempic, which causes constipation and bleeding hemorrhoids. (She has been off Ozempic since then.) Gastroenterology recommended she see Rheumatology to evaluate the connective tissue disorder to she can undergo a colonoscopy. She has reached out to someone at [facility] for evaluation.     Case discussed with Dr. Olivera.     PLAN   Consultation with Rheumatology is scheduled for 24. I messaged Dr." Berry to see if it is possible to expedite the appointment.   Referral to Colorectal Surgery (Dr. Escalante, Dr. Thomas, Dr. Ayala, etc). Per Dr. Olivera, their expertise will be beneficial in this situation.   Will hold off on ordering Galleri. Given the risks for false positives/negatives, the results would not change the need for colonoscopy and it would be an $800 out of pocket expense for the patient.   Similarly, we can repeat the Cologuard, but a negative result would not change the plan to get her to colonoscopy and could be an out of pocket expense if not covered by insurance.   Germline genetic testing is not indicated.   Follow up:  Await results from Rheum, CRS. .      Visit Diagnosis:   1. Encounter for nonprocreative genetic counseling    2. Positive colorectal cancer screening using Cologuard test  - Ambulatory referral/consult to Colorectal Surgery; Future    3. Genetic testing  - Ambulatory referral/consult to Genetics    Questions were encouraged and answered to the patient's satisfaction, and she verbalized understanding of information and agreement with the plan.       Approximately 50 minutes were spent face-to-face with the patient.  Approximately 90 minutes in total were spent on this encounter, which includes face-to-face time and non-face-to-face time preparing to see the patient (e.g., review of tests), obtaining and/or reviewing separately obtained history, documenting clinical information in the electronic or other health record, independently interpreting results (not separately reported) and communicating results to the patient/family/caregiver, or care coordination (not separately reported).     SUBJECTIVE   Chief Complaint: Genetic evaluation  History of Present Illness (HPI):  Claudia Rodriguez is new to the [facility] Department of Hematology and Oncology and to me.  She presents for genetic evaluation due to her personal/family history of cancer.      Genetic Assessment  History:  Germline cancer-genetic testing: no  Personal history of cancer:  no  Benign tumors:  Benign meninigioma of the brain (~2010) stable on imaging, no treatment required.   Pancreatitis:  no  Chemoprevention: Never used  Uterus and ovaries intact:  yes  Colonoscopy: Never    Past Medical History:   Diagnosis Date    Cholelithiasis     DDD (degenerative disc disease), lumbar     Fatty liver     - noted on U/S 11/2017    Goiter, nodular     HTN (hypertension)     Hyperglycemia     Hyperlipidemia     Hypothyroidism     Hypovitaminosis D     Meningioma 2010    ~2010; right frontal lobe ; followed by Dr. Enciso once a year    Obesity     Prediabetes     Urinary bladder disorder      Patient Active Problem List    Diagnosis Date Noted    Atherosclerosis of abdominal aorta 10/24/2023    Adrenal incidentaloma 02/14/2023    Agatston coronary artery calcium score between 200 and 399 04/07/2022    FH: CAD (coronary artery disease) 04/07/2022    De Quervain's disease (tenosynovitis) 04/30/2021    History of UTI 09/27/2020    Overweight (BMI 25.0-29.9) 08/03/2020    Hepatomegaly 08/03/2020    Vaginal atrophy 12/13/2017    Urinary incontinence, mixed 12/13/2017    Fatty liver     Urinary incontinence 11/20/2017    Statin intolerance 11/13/2017    Benign meningioma of brain 02/03/2017    Vitamin D deficiency disease 01/05/2015    Other hyperlipidemia     Acquired hypothyroidism     Prediabetes 07/11/2012    Goiter, nodular     Essential hypertension       Family History  Germline cancer-genetic testing in blood relatives:  No  Ashkenazi [employer] ancestry: No  Consanguinity in ancestors:  No  No known history of cancer of colorectal polyps in extended family other than noted below.     ** If the pedigree is small/illegible, expand this note window horizontally to view the pedigree in a larger format. A copy of the pedigree is also available in Media.**      Family History   Problem Relation Age of Onset    Hypertension  Mother     Stroke Mother         2015    Coronary artery disease Father 55    Heart disease Father     Hypertension Father     Melanoma Father     Thyroid nodules Sister         s/p thyroidectomy    Thyroid nodules Sister     No Known Problems Brother     No Known Problems Brother     Heart failure Maternal Grandmother     Pancreatic cancer Maternal Grandfather     Diabetes Neg Hx     Thyroid cancer Neg Hx     Breast cancer Neg Hx     Ovarian cancer Neg Hx     Endometrial cancer Neg Hx     Colon cancer Neg Hx     Rectal cancer Neg Hx       Review of Systems  See HPI.    Pain 0/10  Recent falls: None   Patient's Distress Score today was 3/10 (with 10 being the worst).       OBJECTIVE   Physical Exam  Very pleasant patient.  Unaccompanied  Vitals signs:  There were no vitals filed for this visit.   Constitutional: No apparent distress.   Pulmonary: Normal effort  Neurological: Alert and oriented. No obvious neurological deficits.   Psychiatric: Normal mood, affect, thought content, speech, behavior, judgment.  Genetics-specific: It is my assessment that the patient is ready to proceed with cancer-genetic testing from a psychosocial perspective.    COUNSELING   Types of cancer:  Hereditary cancer: Approximately one out of ten cancers is hereditary. This means it is caused by an inherited mutation/variant in a single gene that is passed directly from parent to child. These families usually have multiple individuals in successive generations (parents and their children) with the same or related cancers occurring at a younger age than usual.   Sporadic cancer: Approximately nine out of ten cancers are sporadic or random. This means they are caused by genetic mutations acquired during a person's lifetime. These mutations can be caused by environmental, lifestyle, or medical risk factors or even random errors that occur during DNA replication. These families usually have individuals with unrelated cancers at older ages that  occur randomly in the family.   Familial cancer: Some families may have what is called familial cancer. This means they have a clustering of a particular cancer that is more than you would expect to see by chance, but is not caused by an inherited mutation in a single gene. Instead, they are caused by a combination of shared risk factors.     Risk factors for cancer:   Lifestyle:  Smoking, alcohol consumption, obesity, lack of exercise, unhealthy diet.   Environmental: Chemical and radiation exposures in the workplace, contaminated air and water, UV exposure from the sun and tanning beds, and ionizing radiation from xrays and CT scans.   Medical conditions: Chronic inflammation (Crohn's disease, diabetes), viral infections (HPV, hepatitis), fatty liver disease, etc.     REFERENCES   National Comprehensive Cancer Network (NCCN). (2021). Genetic/familial high-risk assessment: Breast, ovarian, and pancreatic. NCCN Clinical Practice Guidelines in Oncology (NCCN Guidelines), Version 1.2022.  National Comprehensive Cancer Network (NCCN). (2021). Genetic/familial high-risk assessment: Colorectal. NCCN Clinical Practice Guidelines in Oncology (NCCN Guidelines), Version 1.2021.    OTTO Seals, PA-C  Physician Assistant, Hereditary/High Risk Clinic  Hematology/Oncology, [facility]

## 2023-12-17 ENCOUNTER — PATIENT MESSAGE (OUTPATIENT)
Dept: HEMATOLOGY/ONCOLOGY | Facility: CLINIC | Age: 68
End: 2023-12-17
Payer: MEDICARE

## 2023-12-21 NOTE — PROGRESS NOTES
Last 5 Patient Entered Readings                                      Current 30 Day Average: 139/76     Recent Readings 5/20/2018 5/14/2018 5/11/2018 5/8/2018 5/5/2018    SBP (mmHg) 134 138 132 147 139    DBP (mmHg) 77 74 80 69 73    Pulse 62 60 68 69 61          Patient's BP average is above goal of <130/80.     Patient denies s/s of hypotension (lightheadedness, dizziness, nausea, fatigue) associated with low readings. Instructed patient to inform me if this occurs, patient confirms understanding.      Patient denies s/s of hypertension (SOB, CP, severe headaches, changes in vision) associated with high readings. Instructed patient to go to the ED if BP > 180/110 and accompanied by hypertensive s/s, patient confirms understanding.    Will continue to monitor regularly. Will follow up in 2-3 weeks, sooner if BP begins to trend upward or downward.    Patient has my contact information and knows to call with any concerns or clinical changes.     Current HTN regimen:  Hypertension Medications             amLODIPine (NORVASC) 10 MG tablet TAKE ONE TABLET BY MOUTH EVERY DAY    atenolol (TENORMIN) 50 MG tablet TAKE ONE TABLET BY MOUTH TWICE DAILY    triamterene-hydrochlorothiazide 37.5-25 mg (MAXZIDE-25) 37.5-25 mg per tablet TAKE ONE TABLET BY MOUTH EVERY DAY        Patient reports she traveled end of April through 5/18. Traveling between 22 offices while gone, so did not have time to exercise. She reports she was able to maintain a healthy diet while she was gone. BP previously improved and near goal with consistent exercise. Patient states she will likely be traveling every other week for next month. Thinks she can be consistent with exercise while she is home but unable to commit while she is traveling. Recommended medication change (atenolol to carvedilol) to provide more consistent BP control, however, patient declines. She understands Dr. Marquez referred her to our program but does not want to make medication  change until she sees Dr. Marquez in July. Will follow up with patient next month.     Admission Reconciliation is Not Complete  Discharge Reconciliation is Not Complete Admission Reconciliation is Not Complete  Discharge Reconciliation is Completed

## 2024-01-04 ENCOUNTER — HOSPITAL ENCOUNTER (OUTPATIENT)
Dept: RADIOLOGY | Facility: HOSPITAL | Age: 69
Discharge: HOME | End: 2024-01-04
Attending: PHYSICIAN ASSISTANT
Payer: MEDICARE

## 2024-01-04 DIAGNOSIS — R16.0 HEPATOMEGALY: ICD-10-CM

## 2024-01-04 DIAGNOSIS — K76.0 FATTY LIVER: ICD-10-CM

## 2024-01-04 PROCEDURE — 76705 ECHO EXAM OF ABDOMEN: CPT | Mod: TC

## 2024-01-04 PROCEDURE — 76705 ECHO EXAM OF ABDOMEN: CPT | Mod: 26,,, | Performed by: RADIOLOGY

## 2024-01-05 ENCOUNTER — LAB VISIT (OUTPATIENT)
Dept: LAB | Facility: HOSPITAL | Age: 69
End: 2024-01-05
Attending: INTERNAL MEDICINE
Payer: MEDICARE

## 2024-01-05 DIAGNOSIS — K76.0 FATTY LIVER: ICD-10-CM

## 2024-01-05 DIAGNOSIS — R16.0 HEPATOMEGALY: ICD-10-CM

## 2024-01-05 LAB
ALBUMIN SERPL BCP-MCNC: 3.9 G/DL (ref 3.5–5.2)
ALP SERPL-CCNC: 66 U/L (ref 55–135)
ALT SERPL W/O P-5'-P-CCNC: 21 U/L (ref 10–44)
AST SERPL-CCNC: 23 U/L (ref 10–40)
BILIRUB DIRECT SERPL-MCNC: 0.4 MG/DL (ref 0.1–0.3)
BILIRUB SERPL-MCNC: 0.9 MG/DL (ref 0.1–1)
PROT SERPL-MCNC: 6.5 G/DL (ref 6–8.4)

## 2024-01-05 PROCEDURE — 80076 HEPATIC FUNCTION PANEL: CPT | Performed by: PHYSICIAN ASSISTANT

## 2024-01-05 PROCEDURE — 36415 COLL VENOUS BLD VENIPUNCTURE: CPT | Mod: PO | Performed by: PHYSICIAN ASSISTANT

## 2024-01-09 ENCOUNTER — OFFICE VISIT (OUTPATIENT)
Dept: HEPATOLOGY | Facility: CLINIC | Age: 69
End: 2024-01-09
Payer: MEDICARE

## 2024-01-09 VITALS — HEIGHT: 69 IN | BODY MASS INDEX: 25.53 KG/M2 | WEIGHT: 172.38 LBS

## 2024-01-09 DIAGNOSIS — Z87.898 HISTORY OF PREDIABETES: ICD-10-CM

## 2024-01-09 DIAGNOSIS — K76.0 FATTY LIVER: Primary | ICD-10-CM

## 2024-01-09 DIAGNOSIS — I10 ESSENTIAL HYPERTENSION: ICD-10-CM

## 2024-01-09 DIAGNOSIS — E78.49 OTHER HYPERLIPIDEMIA: ICD-10-CM

## 2024-01-09 PROCEDURE — 99213 OFFICE O/P EST LOW 20 MIN: CPT | Mod: PBBFAC | Performed by: NURSE PRACTITIONER

## 2024-01-09 PROCEDURE — 99999 PR PBB SHADOW E&M-EST. PATIENT-LVL III: CPT | Mod: PBBFAC,,, | Performed by: NURSE PRACTITIONER

## 2024-01-09 PROCEDURE — 99214 OFFICE O/P EST MOD 30 MIN: CPT | Mod: S$PBB,,, | Performed by: NURSE PRACTITIONER

## 2024-01-09 RX ORDER — SEMAGLUTIDE 2.68 MG/ML
2 INJECTION, SOLUTION SUBCUTANEOUS
COMMUNITY
Start: 2023-09-25

## 2024-01-09 NOTE — Clinical Note
Please contact patient to schedule labs and Fibroscan in 1 year with a f/u visit with me same day to review results. Orders in Epic. Thanks

## 2024-01-09 NOTE — PROGRESS NOTES
Hepatology Consultation: [facility]    ESTABLISHED PATIENT   PCP: Jessica Marquez MD    Subjective      Ms. Rodriguez is a 68 y.o. 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 The patient was previously followed by King Braga PA-C.    The patient was initially evaluated for fatty liver noted on US and intermittently elevated liver enzymes ALT>AST; on Fibroscan the patient was found to have F4 fibrosis, S3 steatosis on 12/2017.   Per chart review, she has declined further investigation into cirrhosis staging and continues with HCC screening.   Her RF for fatty liver include HTN, HLD, pre-diabetes, Body mass index is 32.75 kg/m²., hypothyroid  Her previous serologic workup was negative for genetic, autoimmune, and viral causes of liver disease.    Interval history 07/26/2022:  She is here today alone. She reports she has maintained her weight loss since last visit. She is eating better and exercising.     doing okay   She is cooking at home and conscious of her meals.     Etoh history:  Denies   Family hx liver disease or cancer: Denies     Interval history 01/10/2023:  Claudia Rodriguez arrives today alone.   Since our last visit, she has lost weight with Ozempic & underwent MRE as well.   Initially w/ ozempic and had some side effects (palpations) from increasing ozempic dose   0.25mg third shot to 0.5mg no side effects now back to 0.5mg   There is a shortage so she is concerned about running out but its worked very well for decreasing her appetite. Her goal is 175 lbs   She is selective about what she is eating these days and Ozempic is helping a lot with her appetite  Vegetables, fruit, yogurt every day, mostly chicken     Denies symptoms of hepatic decompensation including jaundice, abdominal distention or lower extremity swelling, hematemesis, melena, or periods of confusion suggestive of  hepatic encephalopathy.    L knee pain after a fall around [place], seeing an orthopedist. She cannot do her daily walking. Otherwise doing well.   She complains of a red itchy spot on her R breast, tried ketoconazole without relief.     Occupation: retired traveling   Social: lives at home with      Interval history 1/9/2024:  Claudia Rodriguez arrives today alone. She was last seen in clinic by Karina Chester PA-C in 1/2023.  She is currently undergoing work up for positive Cologuard test. She remains on Ozempic, and is at a healthy weight (BMI 25).   LFT's are normal. B/P, blood sugar and lipids are normal/well controlled on current regimen.   MR Elastography in 12/2022 was suggestive of no fibrosis (remote Fibroscan in 2017 was suggestive of F4 fibrosis, but more recent Fibroscan in 2021 was suggestive of F0-F1 fibrosis).     FIB-4 Calculation: 1.02 at 1/15/2024  4:09 PM   Calculated from:  Last SGOT/AST : 23 at 1/15/2024  4:09 PM  Last SGPT/ALT: 21 at 1/15/2024  4:09 PM   Platelets: 341 at 1/15/2024  4:09 PM   Age: 68 y.o.     FIB-4 below 1.30 is considered as low-risk for advanced fibrosis  FIB-4 over 2.67 is considered as high-risk for advanced fibrosis  FIB-4 values between 1.30 and 2.67 are considered as intermediate-risk of advanced fibrosis for ages 36-64.     For ages > 64 the cut-off for low-risk goes to < 2.  This is a screening tool and clinical judgement should be used in the interpretation of these results.    Most recent abdominal US this month showed:    US Abdomen Limited  Narrative: EXAMINATION:  US ABDOMEN LIMITED    CLINICAL HISTORY:  .    Hepatomegaly, not elsewhere classified    TECHNIQUE:  Limited ultrasound of the right upper quadrant of the abdomen including pancreas, liver, gallbladder, common bile duct was performed.    COMPARISON:  Abdominal ultrasound 07/11/2022, CT abdomen 01/03/2023    FINDINGS:  Pancreas: The visualized portions of pancreas appear normal.    Liver:  Normal in size, measuring 16 cm. Diffuse increased echogenicity.  No focal hepatic lesions.    Gallbladder: 2.2 cm gallstone.  No wall thickening, hypervascularity, pericholecystic fluid, or sonographic Ascencio sign to suggest acute cholecystitis.    Biliary system: The common duct is not dilated, measuring 4 mm.  No intrahepatic ductal dilatation.    Spleen: Normal in size with a homogeneous echotexture, measuring 9.0 x 2.5 cm.    Miscellaneous: No ascites.  Impression: Hepatic steatosis.    Cholelithiasis without cholecystitis.    Electronically signed by resident: Levi Ceron  Date:    01/04/2024  Time:    09:38    Electronically signed by: Glen Velasquez MD  Date:    01/04/2024  Time:    09:58    She is well appearing, and denies any signs or symptoms of hepatic decompensation including jaundice, abdominal distention or lower extremity swelling, hematemesis, melena, or periods of confusion suggestive of hepatic encephalopathy.    [facility] Claudia has a past medical history of Cholelithiasis, DDD (degenerative disc disease), lumbar, Fatty liver, Goiter, nodular, HTN (hypertension), Hyperglycemia, Hyperlipidemia, Hypothyroidism, Hypovitaminosis D, Meningioma (2010), Obesity, Prediabetes, and Urinary bladder disorder.   PSX Claudia has a past surgical history that includes Bladder suspension (1986); thyroid biopsy (07/11/2012); Tonsillectomy (1959); and Liver biopsy (2020).    Claudia's family history includes Coronary artery disease (age of onset: 55) in her father; Heart disease in her father; Heart failure in her maternal grandmother; Hypertension in her father and mother; Melanoma in her father; No Known Problems in her brother and brother; Pancreatic cancer in her maternal grandfather; Stroke in her mother; Thyroid nodules in her sister and sister.    Claudia reports that she has never smoked. She has never used smokeless tobacco. She reports that she does not drink alcohol and does not use drugs.   ELDER Claudia  "is allergic to zostavax [zoster vaccine live (pf)], demerol [meperidine], nsaids (non-steroidal anti-inflammatory drug), and phenytoin sodium extended.   ADELE Taylor has a current medication list which includes the following prescription(s): amlodipine, aspirin, atorvastatin, calcium carbonate/vitamin d3, carvedilol, darifenacin, ergocalciferol, estradiol, multivitamin/iron/folic acid, levothyroxine, linaclotide, omega-3 fatty acids, pen needle, diabetic, phentermine, triamterene-hydrochlorothiazide 37.5-25 mg, and ozempic.           ROS:   As per hpi      Objective     Ht 5' 9" (1.753 m)   Wt 78.2 kg (172 lb 6.4 oz)   BMI 25.46 kg/m²     PHYSICAL EXAM:   Friendly woman, in no acute distress; alert and oriented to person, place and time  EYES: Sclerae anicteric  GI: Non-distended. No obvious ascites.  EXTREMITIES:  No edema.  SKIN: Warm and dry. No jaundice. No telangectasias noted. No palmar erythema.  NEURO:  Normal gait. No asterixis.  PSYCH:  Memory intact. Thought and speech pattern appropriate. Behavior normal    DIAGNOSTICS  Lab Results   Component Value Date    ALT 21 01/05/2024    AST 23 01/05/2024    ALKPHOS 66 01/05/2024    BILITOT 0.9 01/05/2024    ALBUMIN 3.9 01/05/2024    INR 1.1 12/14/2022     12/14/2022     Lab Results   Component Value Date    AFP 3.5 12/14/2022       In relation to metabolic risk factors:   Lab Results   Component Value Date    HGBA1C 5.2 09/11/2023    CHOL 134 06/23/2023    TSH 0.840 09/11/2023     Body mass index is 25.46 kg/m².      Prior serologic workup:   Lab Results   Component Value Date    SMOOTHMUSCAB Negative 1:40 12/26/2017    AMAIFA Negative 1:40 12/26/2017    IGGSERUM 591 (L) 12/26/2017    ANASCREEN Negative <1:160 12/26/2017    FESATURATED 20 12/26/2017    PETH Negative 12/26/2017    CERULOPLSM 30.0 12/26/2017    HEPBSAG Negative 12/26/2017    HEPCAB Negative 05/23/2014     DIAGNOSTIC STUDIES:    US Abdomen Limited  Narrative: EXAMINATION:  US ABDOMEN " LIMITED    CLINICAL HISTORY:  .    Hepatomegaly, not elsewhere classified    TECHNIQUE:  Limited ultrasound of the right upper quadrant of the abdomen including pancreas, liver, gallbladder, common bile duct was performed.    COMPARISON:  Abdominal ultrasound 07/11/2022, CT abdomen 01/03/2023    FINDINGS:  Pancreas: The visualized portions of pancreas appear normal.    Liver: Normal in size, measuring 16 cm. Diffuse increased echogenicity.  No focal hepatic lesions.    Gallbladder: 2.2 cm gallstone.  No wall thickening, hypervascularity, pericholecystic fluid, or sonographic Ascencio sign to suggest acute cholecystitis.    Biliary system: The common duct is not dilated, measuring 4 mm.  No intrahepatic ductal dilatation.    Spleen: Normal in size with a homogeneous echotexture, measuring 9.0 x 2.5 cm.    Miscellaneous: No ascites.  Impression: Hepatic steatosis.    Cholelithiasis without cholecystitis.    Electronically signed by resident: Levi Ceron  Date:    01/04/2024  Time:    09:38    Electronically signed by: Glen Velasquez MD  Date:    01/04/2024  Time:    09:58    Abdominal Ultrasound 7/11/2022:    Liver: Enlarged, measuring 19.3 cm. Homogeneous echotexture with diffusely increased echogenicity.  HRI measures 1.42. no focal hepatic lesions.     Gallbladder: There is a 2.4 cm mobile gallstone.  No wall thickening or pericholecystic fluid.  No sonographic Ascencio's sign.     Biliary system: The common duct is not dilated, measuring 3 mm.  No intrahepatic ductal dilatation.     Spleen: Normal in size with a homogeneous echotexture, measuring 9.4 x 4.9 cm.     Pancreas: The visualized portions of pancreas appear normal.     IVC: The visualized IVC appears unremarkable.     Miscellaneous: No ascites.     Impression:     Hepatomegaly with hepatic steatosis.     Cholelithiasis without evidence for cholecystitis.     Electronically signed by resident: Latanya Stapleton  Date:                                             07/11/2022  Time:                                           09:13     Electronically signed by: Landon Sin MD  Date:                                            07/11/2022  Time:                                           09:23    MR ELASTOGRAPHY 12/19/2022:    CLINICAL HISTORY:  Claustrophobia     TECHNIQUE:  MRI abdomen performed without contrast per MR elastography protocol.     COMPARISON:  MR elastography 12/14/2022     FINDINGS:  Liver is enlarged with normal attenuation.  Few subcentimeter hepatic T2 hyperintensities, likely cysts.  Single large gallstone.  Common bile duct is mildly prominent size measuring 0.8 cm with distal tapering to the ampulla.  No intrahepatic biliary dilatation.  The spleen and pancreas are unremarkable.  Fat containing 1.8 cm right adrenal lesion with signal loss on opposed phase imaging.  Bilateral simple renal cysts.  No hydronephrosis.  No bowel obstruction.  No lymphadenopathy.  Bone marrow signal intensity is unremarkable.  L1 hemangiomas.     Liver fat fraction measurement as follows:     MR spectroscopy Voxel-3.7%     Liver elasticity measures 1.77 kPa consistent with Normal or F0 fibrosis.     Liver R2* value is within normal limits measuring 41.5 Hz consistent with no are an overload.     Impression:     1. Liver fat fraction measures 3.7%%, within normal limits.  2. Liver elasticity measures 1.77 kPa consistent with Normal or F0 fibrosis.  3. Hepatomegaly.  4. Cholelithiasis.  5. Right adrenal adenoma versus myelolipoma.  6. Additional findings as above.  REFERENCE RANGES:     Fat fraction analysis:     <5%: Normal.  5 to 15%: Mild steatosis.  >15%: Moderate or severe steatosis.     Elastography:     <2.93 kPa: Normal or F0 fibrosis.  2.93 to 4.15 kPa: F1 or F2 fibrosis.  >4.15 kPa: F3 or F4 fibrosis.     * If steatosis is present, elevated liver stiffness (>2.74 kPa) may be secondary to inflammation (MOURA).     R2*:     > 65 Hz: Mild iron overload  > 215 Hz:  Moderate  > 440 Hz: Severe iron overload     Estimated LIC=0.032 * R2*     Electronically signed by resident: Germán Streeter MD  Date:                                            2022  Time:                                           08:37     Electronically signed by: Glen Velasquez MD  Date:                                            2022  Time:                                           19:23    Fibroscan 2021:  Median liver stiffness score:  6.1  CAP Reading: dB/m:  373  IQR/med %:  13  Interpretation  Fibrosis interpretation is based on medial liver stiffness - Kilopascal (kPa).  Fibrosis Stage:  F 0-1  Steatosis interpretation is based on controlled attenuation parameter - (dB/m).  Steatosis Grade:  S3    Fibroscan 2017:  Diagnosis: NAFLD  Probe: XL  Fibroscan readin.7 KPa  Fibrosis:F4   CAP readin dB/m  Steatosis: :S3     Assessment/Plan     68 y.o. female with:    1. Fatty liver  Hepatic Function Panel    FibroScan Kim (Vibration Controlled Transient Elastography)      2. Other hyperlipidemia        3. Essential hypertension        4. History of prediabetes          - Schedule Fibroscan in 1 year to non-invasively restage liver disease.  - Repeat liver function tests in 1 year.   - Maintain a healthy weight, through diet and exercise.  - Recommend good control of cholesterol, blood pressure, & blood sugar levels.  - Avoid alcohol and herbal supplements/alternative remedies.  - Return to clinic in 1 year with labs and Fibroscan prior to visit.    I spent 30 minutes on the day of this encounter preparing for, evaluating, treating, and managing this patient.     Thank you for allowing me to participate in the care of Claudia Rodriguez       Hepatology Nurse Practitioner  [facility]

## 2024-01-10 ENCOUNTER — PATIENT MESSAGE (OUTPATIENT)
Dept: HEMATOLOGY/ONCOLOGY | Facility: CLINIC | Age: 69
End: 2024-01-10
Payer: MEDICARE

## 2024-01-10 ENCOUNTER — TELEPHONE (OUTPATIENT)
Dept: SURGERY | Facility: CLINIC | Age: 69
End: 2024-01-10
Payer: MEDICARE

## 2024-01-11 ENCOUNTER — TELEPHONE (OUTPATIENT)
Dept: SURGERY | Facility: CLINIC | Age: 69
End: 2024-01-11
Payer: MEDICARE

## 2024-01-11 NOTE — TELEPHONE ENCOUNTER
Placed call to pt for appt rescheduling as Dr. Thomas will be unavailable on previously scheduled date (1/19/2024).     As pt is new to our practice, RN offered rescheduling options including Dr. Thomas's earliest available or earliest available with alternative providers.    Pt agreed to appt with Dr. Barraza on 1/16 @ 3:40. Successfully scheduled her with pt's verbal approval. Will place appt reminder slip in the mail.

## 2024-01-12 ENCOUNTER — TELEPHONE (OUTPATIENT)
Dept: SURGERY | Facility: CLINIC | Age: 69
End: 2024-01-12
Payer: MEDICARE

## 2024-01-13 DIAGNOSIS — E03.9 ACQUIRED HYPOTHYROIDISM: Primary | ICD-10-CM

## 2024-01-15 PROBLEM — Z87.898 HISTORY OF PREDIABETES: Status: ACTIVE | Noted: 2024-01-15

## 2024-01-16 ENCOUNTER — TELEPHONE (OUTPATIENT)
Dept: HEPATOLOGY | Facility: CLINIC | Age: 69
End: 2024-01-16
Payer: MEDICARE

## 2024-01-16 ENCOUNTER — OFFICE VISIT (OUTPATIENT)
Dept: SURGERY | Facility: CLINIC | Age: 69
End: 2024-01-16
Payer: MEDICARE

## 2024-01-16 VITALS
DIASTOLIC BLOOD PRESSURE: 73 MMHG | SYSTOLIC BLOOD PRESSURE: 132 MMHG | BODY MASS INDEX: 25.76 KG/M2 | HEART RATE: 77 BPM | HEIGHT: 69 IN | WEIGHT: 173.94 LBS

## 2024-01-16 DIAGNOSIS — Z12.11 SCREENING FOR COLON CANCER: ICD-10-CM

## 2024-01-16 DIAGNOSIS — R19.5 POSITIVE COLORECTAL CANCER SCREENING USING COLOGUARD TEST: Primary | ICD-10-CM

## 2024-01-16 DIAGNOSIS — M35.9 COLLAGEN DISORDER: ICD-10-CM

## 2024-01-16 PROCEDURE — 99204 OFFICE O/P NEW MOD 45 MIN: CPT | Mod: S$PBB,,, | Performed by: COLON & RECTAL SURGERY

## 2024-01-16 PROCEDURE — 99214 OFFICE O/P EST MOD 30 MIN: CPT | Mod: PBBFAC | Performed by: COLON & RECTAL SURGERY

## 2024-01-16 PROCEDURE — 99999 PR PBB SHADOW E&M-EST. PATIENT-LVL IV: CPT | Mod: PBBFAC,,, | Performed by: COLON & RECTAL SURGERY

## 2024-01-16 NOTE — PROGRESS NOTES
CRS Office Visit History and Physical    Referring Md:   No address on file    SUBJECTIVE:     Chief Complaint:  Colon cancer screening    History of Present Illness:  The patient is a new patient to this practice.   Course is as follows:  Patient is a 68 y.o. female presents with positive Cologuard test in need of colon cancer screening.    She has a history of a connective tissue disorder and was told by many physicians in the past to avoid any major surgery or procedure due to poor collagen quality.  This relates to a bladder suspension that was done in 1986 where she had multiple tears in her bladder requiring complex repair.   She later saw another physician who did testing and told her she had excessive estrogen and insufficient collagen.  As a result, she never tried for more children for fear of organ rupture and has avoided all procedures.    She is functional and healthy.  She walks, falls, recovers, has teeth cleaning, etc.  She does bruise easily.    No family history of colon or rectal cancer.  She previously had normal bowel movements but developed constipation while taking Ozempic from 6/2022 to 9/2023.  As a result, she had bleeding hemorrhoids.  She feels her + cologuard was related to the bleeding hemorrhoids.  Since stopping the Ozempic, her Bms have returned to normal for her.    She has BM every other day.      Last Colonoscopy: never    Review of patient's allergies indicates:   Allergen Reactions    Zostavax [zoster vaccine live (pf)] Rash     - injection site red, hot, indurated    Demerol [meperidine] Other (See Comments)     Other reaction(s): Nausea  Other reaction(s): Headache    Nsaids (non-steroidal anti-inflammatory drug) Other (See Comments)       Other reaction(s): Difficulty breathing    Other reaction(s): Difficulty breathing    Phenytoin sodium extended Other (See Comments)       Past Medical History:   Diagnosis Date    Cholelithiasis     DDD (degenerative disc disease), lumbar      Fatty liver     - noted on U/S 11/2017    Goiter, nodular     HTN (hypertension)     Hyperglycemia     Hyperlipidemia     Hypothyroidism     Hypovitaminosis D     Meningioma 2010    ~2010; right frontal lobe ; followed by Dr. Enciso once a year    Obesity     Prediabetes     Urinary bladder disorder      Past Surgical History:   Procedure Laterality Date    BLADDER SUSPENSION  1986    LIVER BIOPSY  2020    Per pt, [facility] misdx her with stage I liver cancer but no tx and no sign of it one year later    thyroid biopsy  07/11/2012    TONSILLECTOMY  1959     Family History   Problem Relation Age of Onset    Hypertension Mother     Stroke Mother         2015    Coronary artery disease Father 55    Heart disease Father     Hypertension Father     Melanoma Father     Thyroid nodules Sister         s/p thyroidectomy    Thyroid nodules Sister     No Known Problems Brother     No Known Problems Brother     Heart failure Maternal Grandmother     Pancreatic cancer Maternal Grandfather     Diabetes Neg Hx     Thyroid cancer Neg Hx     Breast cancer Neg Hx     Ovarian cancer Neg Hx     Endometrial cancer Neg Hx     Colon cancer Neg Hx     Rectal cancer Neg Hx      Social History     Tobacco Use    Smoking status: Never    Smokeless tobacco: Never   Substance Use Topics    Alcohol use: No    Drug use: No        Review of Systems:  Review of Systems   Constitutional:  Negative for chills, diaphoresis, fever, malaise/fatigue and weight loss.   HENT:  Negative for congestion.    Respiratory:  Negative for shortness of breath.    Cardiovascular:  Negative for chest pain and leg swelling.   Gastrointestinal:  Negative for abdominal pain, blood in stool, constipation, nausea and vomiting.   Genitourinary:  Negative for dysuria.   Musculoskeletal:  Negative for back pain and myalgias.   Skin:  Negative for rash.   Neurological:  Negative for dizziness and weakness.   Endo/Heme/Allergies:  Bruises/bleeds easily.  "  Psychiatric/Behavioral:  Negative for depression. The patient is nervous/anxious.        OBJECTIVE:     Vital Signs (Most Recent)  /73 (BP Location: Left arm, Patient Position: Sitting)   Pulse 77   Ht 5' 9.02" (1.753 m)   Wt 78.9 kg (173 lb 15.1 oz)   BMI 25.68 kg/m²     Physical Exam:  General: White female in no distress   Neuro: alert and oriented x 4.  Moves all extremities.     HEENT: no icterus.  Trachea midline  Respiratory: respirations are even and unlabored  Cardiac: regular rate  Abdomen: soft, nontender, no masses  Extremities: Warm dry and intact  Skin: no rashes  Anorectal: deferred    Labs:  Normal renal function.  Normal thyroid function.  Albumin 4.1.    Imaging:  No recent abdominal imaging      ASSESSMENT/PLAN:     Diagnoses and all orders for this visit:    Positive colorectal cancer screening using Cologuard test  -     CT Abdomen Pelvis With IV Contrast Routine Oral Contrast; Future  -     Creatinine, serum; Future    Collagen disorder  -     CT Abdomen Pelvis With IV Contrast Routine Oral Contrast; Future    Screening for colon cancer  -     Cologuard Screening (Multitarget Stool DNA); Future  -     Cologuard Screening (Multitarget Stool DNA)        68 y.o. female with + cologuard in the setting of bleeding hemorrhoids.  She strongly wishes to avoid any intervention that would put her at risk of organ perforation.  She previously worked in resource management for a large company and had two employees suffer perforation during colonoscopy,  She is not interested in colonoscopy for her colon screening.      We discussed alternative options.  CT colonography is rarely done at this institution and therefore not reliably interpreted.  Similarly, barium enema or double contrasted barium enemas are also rarely performed.  CT with rectal contrast could be done with good lumen evaluation.  We discussed that this would likely detect any large mass (over 2cm) but not detect smaller polyps " that could be pre-cancerous.  If the CT scan were positive or concerning, she would be interested in further workup and potentially resection.     We will also repeat her cologuard.  We discussed that while Cologuard is over 90% sensitive for detecting cancers, it is only 50% sensitive for detecting polyps.      While the cologuard and the CT are not ideal for her screening, this is the most invasive evaluation that she is comfortable with at this time.      I have asked for her to see rheumatology as well.  She has proven over time that she is quite durable, since she is nearly 69 years old and doing well.  It may be that with menopause, her estrogen levels have changed and she has developed more durable tissue integrity.      ZION Barraza MD, FACS, FASCRS  Staff Surgeon  Colon & Rectal Surgery

## 2024-01-16 NOTE — TELEPHONE ENCOUNTER
----- Message from Philomena Puckett NP sent at 1/15/2024  4:17 PM CST -----  Please contact patient to schedule labs and Fibroscan in 1 year with a f/u visit with me same day to review results. Orders in Epic. Thanks

## 2024-01-17 RX ORDER — LEVOTHYROXINE SODIUM 75 UG/1
TABLET ORAL
Qty: 90 TABLET | Refills: 3 | Status: SHIPPED | OUTPATIENT
Start: 2024-01-17

## 2024-01-19 ENCOUNTER — TELEPHONE (OUTPATIENT)
Dept: ENDOSCOPY | Facility: HOSPITAL | Age: 69
End: 2024-01-19
Payer: MEDICARE

## 2024-01-19 NOTE — TELEPHONE ENCOUNTER
"Contacted the patient to schedule an endoscopy procedure(s) colonoscopy. The patient did not answer the call and left a voice message requesting a call back.      ----- Message -----   From: Patience Morales RN   Sent: 1/10/2024  10:02 AM CST   To: [facility] Endo Schedulers   Subject: Colonoscopy request                              + Cologuard test; Colonoscopy recommended     Procedure: Colonoscopy     Diagnosis: + Cologuard test     Procedure Timin-12 weeks     *If within 4 weeks selected, please tali as high priority*     *If greater than 12 weeks, please select "5-12 weeks" and delay sending until 3 months prior to requested date*     Provider: Any CRS provider     Location: No Preference     Additional Scheduling Information: No scheduling concerns     Prep Specifications:Standard prep     Is the patient taking a GLP-1 Agonist:no     Have you attached a patient to this message: yes           "

## 2024-01-23 ENCOUNTER — OFFICE VISIT (OUTPATIENT)
Dept: RHEUMATOLOGY | Facility: CLINIC | Age: 69
End: 2024-01-23
Payer: MEDICARE

## 2024-01-23 VITALS
HEIGHT: 69 IN | WEIGHT: 176.38 LBS | DIASTOLIC BLOOD PRESSURE: 64 MMHG | BODY MASS INDEX: 26.12 KG/M2 | SYSTOLIC BLOOD PRESSURE: 122 MMHG | HEART RATE: 81 BPM

## 2024-01-23 DIAGNOSIS — M35.9 COLLAGEN DISEASE: Primary | ICD-10-CM

## 2024-01-23 PROCEDURE — 99205 OFFICE O/P NEW HI 60 MIN: CPT | Mod: S$PBB,,, | Performed by: INTERNAL MEDICINE

## 2024-01-23 PROCEDURE — 99999 PR PBB SHADOW E&M-EST. PATIENT-LVL IV: CPT | Mod: PBBFAC,,, | Performed by: INTERNAL MEDICINE

## 2024-01-23 PROCEDURE — 99214 OFFICE O/P EST MOD 30 MIN: CPT | Mod: PBBFAC | Performed by: INTERNAL MEDICINE

## 2024-01-23 NOTE — PROGRESS NOTES
"Chief Complaint   Patient presents with    Disease Management       Patient was referred by     History of presenting illness    68 year old white female    - she had a baby -he - tetrology of fallot  - bladder had fallen- she needed bladder suspension  When bladder was lifted, she had a tear in the bladder and needed 150 stitches  While in the surgery- she was told that internal tissues were sensitive  " Dont do any invasive procedure " was the recommendation    She then went to head of dermatology-  Biopsy of vagina vs cervix vs internal female organ- too much estrogen in her body  Body was not making collagen  " Never have an invasive procedure in the abdomen and pelvis" was his recommendation    She needs colonoscopy   Cologuard was positive  Colorectal surgeon will do   Barium enema and CT abdomen and pelvis and rp cologuard ordered    Her skin bruises easy  Even with no aspirin  She was always covered in black and blue marks    No epistaxis,gum bleeds  She had a lot of blood loss in  when she delivered    No joint pains  Never flexible at all  Not hypermobile    She has urinary incontinence and she needed pelvic floor therapy  She has vaginal atrophy needing estradiol    She has TMJ issues for 30 years and has needed a night plate for years     She has 5 siblings  They do colonoscopies and they all have no problems with CT issues    She started ozempic- 2022-she stopped it 3 months ago  She lost 60 pounds  She was constipated and she had bleeding hemorrhoids    Echo    The left ventricle is normal in size with mild concentric hypertrophy and normal systolic function.  The estimated ejection fraction is 60%.  Grade II left ventricular diastolic dysfunction.  Normal right ventricular size with normal right ventricular systolic function.  Severe left atrial enlargement.  Mild right atrial enlargement.  Moderate mitral regurgitation.  Normal central venous pressure (3 mmHg).  The " estimated PA systolic pressure is 35 mmHg.  There is mild pulmonary hypertension.      Last opthal eval 10 years ago was normal  She will see one soon      No skin rashes,malar rash,photosensitivity   No telangiectasias   No calcinosis   No psoriasis   No patchy alopecia   No oral and nasal ulcers   No dry eyes and dry mouth   No pleurisy or any cardiopulmonary complaints   No dysphagia,diplopia and dysphonia and muscle weakness   No n/v/d/c   No acid reflux+   No raynaud's+   No digital ulcers   No cytopenias   No renal issues   No blood clots   No fever,chills,night sweats,weight loss and loss of appetite   No new onset headaches   No recurrent conjunctivitis or uveitis or scleritis or episcleritis   No chronic or bloody diarrhea with no u colitis or crohn's /inflammatory bowel disease   No vaginal or  urethral  d/c/STDs/no ulcers   No unexplained lymphadenopathy,parotitis   No seizures,strokes,psychosis  No sclerodactyly  No puffy hands  No perioral tightness           Past history : meningioma, thyroid nodules,goiter,prediabetes  HTN,HLD  Gall stones  Kidney stones  Right adrenal adenoma  Fatty liver    Family history : nothing significant     Social history : not a smoker,alcoholic        Review of Systems   Constitutional:  Negative for fever and unexpected weight change.   HENT:  Negative for mouth sores and trouble swallowing.    Eyes:  Negative for redness.   Respiratory:  Negative for cough and shortness of breath.    Cardiovascular:  Negative for chest pain.   Gastrointestinal:  Negative for constipation and diarrhea.   Genitourinary:  Negative for dysuria and genital sores.   Skin:  Positive for rash.   Neurological:  Negative for headaches.   Hematological:  Bruises/bleeds easily.       As detailed above        Physical Exam   Constitutional: She is oriented to person, place, and time. No distress.   HENT:   Head: Normocephalic.   Mouth/Throat: Oropharynx is clear and moist.   Eyes: Pupils are equal,  round, and reactive to light. Conjunctivae are normal. Right eye exhibits no discharge. Left eye exhibits no discharge. No scleral icterus.   Neck: No thyromegaly present.   Cardiovascular: Normal rate, regular rhythm and normal heart sounds.   Pulmonary/Chest: Effort normal and breath sounds normal. No stridor.   Abdominal: Soft. Bowel sounds are normal.   Musculoskeletal:         General: Normal range of motion.      Cervical back: Normal range of motion.   Lymphadenopathy:     She has no cervical adenopathy.   Neurological: She is alert and oriented to person, place, and time.   Skin: Skin is warm. No rash noted. She is not diaphoretic.   Psychiatric: Affect and judgment normal.         Assessment     Patient is a 68 year old female who comes in with the question    Does she have a collagen disease ?    She needs a colonoscopy since she had a positive cologuard    In -when she had a childbirth, she bled a lot,this baby  due to tetrology of fallot  Next year in - her bladder had fallen  She was asked to do bladder suspension  But what happened was-- during the procedure she had a bladder tear- that needed 100 stitches  At this time she was told that she should not get another procedure since she has weak connective tissue disease    She then saw derm- who did biopsy of something internal- can't say vagina vs cervix vs something else- but she was told that she had a lot of estrogen and also no collagen    Once again- no more surgeries in the abdomen and pelvic area-was the recommendation    She is here now- because she needs the colonoscopy and question to us is    Will she bleed during the procedure  What is the collagen disease she has?    She has no other symptoms except that she bruises easy even with no aspirin    I asked her all questions related to Riky danlos syndrome    Beighton score for joint hypermobility*  Ability to: Left   Right   Passively dorsiflex the 5th metacarpophalangeal joint by  at least 90 degrees 1   1   Oppose the thumb to the volar aspect of the ipsilateral forearm 1   1   Hyperextend the elbow by at least 10 degrees 1   1   Hyperextend the knee by at least 10 degrees 1   1   Place the hands flat on the floor without bending the knees   1     * 1 point is scored for each of the maneuvers above. A total of 9 points is achievable, and 4 or more points is considered an indication of generalized joint hypermobility.    Her score is  : 0/9  She has no hypermobility of joints        The 2017 international criteria for hypermobile Riky-Danlos syndrome    Criteria Score   Criterion 1: Generalized joint hypermobility (GJH):   Beighton score: ?6 for pre-pubertal children and adolescents, ?5 for pubertal and post-pubertal men and women up to the age of 50, and ?4 for men and women >50 years of age for hypermobile Riky-Danlos syndrome (hEDS). If the score is 4 or less and the person is positive for 2 or more of the 5-part questionnaire, add 1 point to the Beighton score.   Criterion 2: 2 or more of A, B, and C   Feature A: Manifestations of a connective tissue disorder:   Fascia:  Unusually soft or velvety skin  Mild skin hyperextensibility  Unexplained striae (ie, excluding striae distensae or rubrae)  Bilateral piezogenic papules of the heel  Recurrent or multiple abdominal hernia(s) (2 points)  Atrophic scarring involving at least two sites and without the formation of truly papyraceous and/or hemosiderotic scars as seen in classical EDS  Pelvic floor and/or rectal prolapse in children, men, or nulliparous women without predisposing medical condition; uterine prolapse in children or nulliparous women without predisposing medical condition  Marfanoid features:  Dental crowding and high-arched or narrow palate  Mitral valve prolapse (MVP) mild or greater based on strict echocardiographic criteria  Arm [id number] ?1.05  Arachnodactyly, as defined in one or more of the following: (i)  positive wrist sign (Angie sign) on both sides; (ii) positive thumb sign (Walker sign) on both sides  Aortic root dilatation with Z-score >+2 She does not have any of these features    Must have ?5 of these 12 findings   Feature B: Family history   One or more first-degree relatives independently meeting the current diagnostic criteria for hEDS     Feature C: Musculoskeletal complications   Musculoskeletal pain in 2 or more limbs, recurring daily for at least 3 months  Widespread pain for ?3 months  Recurrent joint dislocations or trey joint instability, in the absence of trauma  3 or more atraumatic dislocations in the same joint or 2 or more atraumatic dislocations in 2 different joints occurring at different times  Medical confirmation of joint instability at 2 or more sites not related to trauma Must have at least 1   Criterion 3: All the following prerequisites MUST be met   Absence of unusual skin fragility, which should prompt consideration of other types of EDS.  Exclusion of other heritable and acquired connective tissue disorders, including autoimmune rheumatologic conditions as a primary cause for signs and symptoms. In patients with an acquired connective tissue disorder (eg, systemic lupus erythematosus, rheumatoid arthritis, etc), additional diagnosis of hEDS requires meeting both features A and B of criterion 2. Feature C of criterion 2 (chronic pain and/or instability) cannot be counted towards a diagnosis of hEDS in this situation.  Exclusion of diagnoses that may also include joint hypermobility by means of hypotonia and/or connective tissue laxity. Alternative diagnoses and diagnostic categories include, but are not limited to, neuromuscular disorders and other hereditary disorders of connective tissue (eg, other types of EDS, Loeys-Vick syndrome, Marfan syndrome), and skeletal dysplasias.   For a diagnosis of hEDS, a person must meet criterion 1; at least 2 of features A, B, and C in  criterion 2; and have no other primary explanation for the clinical findings as per criterion 3.      She does not have any of the above features    I think this might be a skin specific issue      I messaged dermatology    They said if she has a disease related to collagen and elastin- may be some genetic mutations- she will benefit from seeing a       1. Collagen disease        Reviewed labs/xrays  Reviewed medications    Ordered labs /xrays    I have independently reviewed       New problem     Plan    I will send her to genetics- ask them does she have any genetic disease with mutations in collagen and elastin gene which will cause her to have weak connective tissues    Its beyond my expertise to help her at this point    I will see what the  has to say and then help with testing if the need arises    I can't give her any assurance that she will not bleed from the colonoscopy more so,if they plan to do biopsies, it might be even more challenging not knowing what's going on    More than 60 minutes spent taking care of the patient    D/c and rtc [place]        Claudia was seen today for disease management.    Diagnoses and all orders for this visit:    Collagen disease  -     Ambulatory referral/consult to Dermatology; Future        Medication changes made  Refilled medications  Toxicity issues discussed    Old records reviewed and summarised  Records are from       Follow up in     Notes will be sent to PCP    Preventive medicine     Caller: Unspecified (Yesterday,  2:24 PM)  Stacy Smart,    I talked to Génesis about it. I think it would be best for her to see genetics probably and they can do a panel of tests for different collagen or elastin mutations? Possibly a type of sunday-Danlos but would be hard to say based on clinical exam alone so genetics would be best.    Thanks,  Ha

## 2024-01-24 ENCOUNTER — PATIENT MESSAGE (OUTPATIENT)
Dept: RHEUMATOLOGY | Facility: CLINIC | Age: 69
End: 2024-01-24
Payer: MEDICARE

## 2024-01-25 DIAGNOSIS — M35.9 COLLAGEN DISEASE: Primary | ICD-10-CM

## 2024-01-27 ENCOUNTER — HOSPITAL ENCOUNTER (OUTPATIENT)
Dept: RADIOLOGY | Facility: HOSPITAL | Age: 69
Discharge: HOME | End: 2024-01-27
Attending: COLON & RECTAL SURGERY
Payer: MEDICARE

## 2024-01-27 DIAGNOSIS — M35.9 COLLAGEN DISORDER: ICD-10-CM

## 2024-01-27 DIAGNOSIS — R19.5 POSITIVE COLORECTAL CANCER SCREENING USING COLOGUARD TEST: ICD-10-CM

## 2024-01-27 PROCEDURE — 25500020 PHARM REV CODE 255: Performed by: COLON & RECTAL SURGERY

## 2024-01-27 PROCEDURE — A9698 NON-RAD CONTRAST MATERIALNOC: HCPCS | Performed by: COLON & RECTAL SURGERY

## 2024-01-27 PROCEDURE — 74177 CT ABD & PELVIS W/CONTRAST: CPT | Mod: 26,,, | Performed by: RADIOLOGY

## 2024-01-27 PROCEDURE — 74177 CT ABD & PELVIS W/CONTRAST: CPT | Mod: TC

## 2024-01-27 RX ADMIN — BARIUM SULFATE 450 ML: 20 SUSPENSION ORAL at 11:01

## 2024-01-27 RX ADMIN — IOHEXOL 100 ML: 350 INJECTION, SOLUTION INTRAVENOUS at 11:01

## 2024-01-28 ENCOUNTER — PATIENT MESSAGE (OUTPATIENT)
Dept: SURGERY | Facility: CLINIC | Age: 69
End: 2024-01-28
Payer: MEDICARE

## 2024-01-29 LAB
CREAT SERPL-MCNC: 0.8 MG/DL (ref 0.5–1.4)
SAMPLE: NORMAL

## 2024-01-30 ENCOUNTER — PATIENT MESSAGE (OUTPATIENT)
Dept: HEMATOLOGY/ONCOLOGY | Facility: CLINIC | Age: 69
End: 2024-01-30
Payer: MEDICARE

## 2024-01-30 ENCOUNTER — TELEPHONE (OUTPATIENT)
Dept: ENDOSCOPY | Facility: HOSPITAL | Age: 69
End: 2024-01-30
Payer: MEDICARE

## 2024-02-01 ENCOUNTER — TELEPHONE (OUTPATIENT)
Dept: ENDOSCOPY | Facility: HOSPITAL | Age: 69
End: 2024-02-01
Payer: MEDICARE

## 2024-02-01 NOTE — TELEPHONE ENCOUNTER
Order has been canceled. Per PA's request      ----- Message -----   From: Natalie Cullen PA-C   Sent: 1/31/2024  10:50 AM CST   To: Jayson Christensen MA   Subject: Colonoscopy                                      Fabrizio Tyler,     I see that you called this patient yesterday about scheduling a colonoscopy. I have been working with this patient and she does not want a colonoscopy. Is there an active order for a colonoscopy that needs to be cancelled?     Best regards,   Natalie Cullen PA-C   Cancer Genetics   Hereditary/High Risk Clinic   Department of Hematology and Oncology   [facility]     Natalie Cullen PA-C Craft, [place], MA  Hi Jayson,    I see that you called this patient yesterday about scheduling a colonoscopy. I have been working with this patient and she does not want a colonoscopy. Is there an active order for a colonoscopy that needs to be cancelled?    Best regards,  Natalie Cullen PA-C  Cancer Genetics  Hereditary/High Risk Clinic  Department of Hematology and Oncology  [facility]

## 2024-02-05 ENCOUNTER — PATIENT MESSAGE (OUTPATIENT)
Dept: ENDOCRINOLOGY | Facility: CLINIC | Age: 69
End: 2024-02-05
Payer: MEDICARE

## 2024-02-12 ENCOUNTER — PATIENT MESSAGE (OUTPATIENT)
Dept: ENDOCRINOLOGY | Facility: CLINIC | Age: 69
End: 2024-02-12
Payer: MEDICARE

## 2024-02-19 ENCOUNTER — PATIENT MESSAGE (OUTPATIENT)
Dept: ADMINISTRATIVE | Facility: HOSPITAL | Age: 69
End: 2024-02-19
Payer: MEDICARE

## 2024-02-28 LAB — NONINV COLON CA DNA+OCC BLD SCRN STL QL: NEGATIVE

## 2024-03-20 ENCOUNTER — PATIENT MESSAGE (OUTPATIENT)
Dept: UROGYNECOLOGY | Facility: CLINIC | Age: 69
End: 2024-03-20
Payer: MEDICARE

## 2024-03-20 DIAGNOSIS — R39.15 URINARY URGENCY: ICD-10-CM

## 2024-03-20 RX ORDER — DARIFENACIN 15 MG/1
15 TABLET, EXTENDED RELEASE ORAL DAILY
Qty: 30 TABLET | Refills: 11 | Status: SHIPPED | OUTPATIENT
Start: 2024-03-20 | End: 2025-04-20

## 2024-03-25 ENCOUNTER — PATIENT MESSAGE (OUTPATIENT)
Dept: FAMILY MEDICINE | Facility: CLINIC | Age: 69
End: 2024-03-25
Payer: MEDICARE

## 2024-03-25 ENCOUNTER — PATIENT MESSAGE (OUTPATIENT)
Dept: ENDOCRINOLOGY | Facility: CLINIC | Age: 69
End: 2024-03-25
Payer: MEDICARE

## 2024-04-01 ENCOUNTER — OFFICE VISIT (OUTPATIENT)
Dept: FAMILY MEDICINE | Facility: CLINIC | Age: 69
End: 2024-04-01
Payer: MEDICARE

## 2024-04-01 VITALS
SYSTOLIC BLOOD PRESSURE: 128 MMHG | BODY MASS INDEX: 25.24 KG/M2 | HEART RATE: 72 BPM | TEMPERATURE: 98 F | OXYGEN SATURATION: 96 % | WEIGHT: 170.44 LBS | DIASTOLIC BLOOD PRESSURE: 70 MMHG | HEIGHT: 69 IN

## 2024-04-01 DIAGNOSIS — I70.0 ATHEROSCLEROSIS OF ABDOMINAL AORTA: ICD-10-CM

## 2024-04-01 DIAGNOSIS — H10.9 CONJUNCTIVITIS, UNSPECIFIED CONJUNCTIVITIS TYPE, UNSPECIFIED LATERALITY: Primary | ICD-10-CM

## 2024-04-01 DIAGNOSIS — E27.8 ADRENAL INCIDENTALOMA: ICD-10-CM

## 2024-04-01 DIAGNOSIS — D32.9 MENINGIOMA: ICD-10-CM

## 2024-04-01 DIAGNOSIS — I10 ESSENTIAL HYPERTENSION: ICD-10-CM

## 2024-04-01 PROCEDURE — 99999 PR PBB SHADOW E&M-EST. PATIENT-LVL V: CPT | Mod: PBBFAC,,, | Performed by: FAMILY MEDICINE

## 2024-04-01 PROCEDURE — 99215 OFFICE O/P EST HI 40 MIN: CPT | Mod: PBBFAC,PO | Performed by: FAMILY MEDICINE

## 2024-04-01 PROCEDURE — 99214 OFFICE O/P EST MOD 30 MIN: CPT | Mod: S$PBB,,, | Performed by: FAMILY MEDICINE

## 2024-04-01 RX ORDER — CIPROFLOXACIN HYDROCHLORIDE 3 MG/ML
SOLUTION/ DROPS OPHTHALMIC
COMMUNITY
Start: 2024-03-23 | End: 2024-04-01 | Stop reason: ALTCHOICE

## 2024-04-01 RX ORDER — ERYTHROMYCIN 5 MG/G
OINTMENT OPHTHALMIC NIGHTLY
COMMUNITY
Start: 2024-03-23 | End: 2024-04-01 | Stop reason: ALTCHOICE

## 2024-04-01 RX ORDER — NEOMYCIN SULFATE, POLYMYXIN B SULFATE AND DEXAMETHASONE 3.5; 10000; 1 MG/ML; [USP'U]/ML; MG/ML
1 SUSPENSION/ DROPS OPHTHALMIC EVERY 8 HOURS
Qty: 5 ML | Refills: 0 | Status: SHIPPED | OUTPATIENT
Start: 2024-04-01

## 2024-04-01 NOTE — PROGRESS NOTES
"Routine Office Visit    Claudia Rodriguez  1955  [id number]      Subjective     Claudia is a 69 y.o. female who presents today for:    Left eye redness - started 11 days ago. Patient went to urgent care 10 days ago and was prescribed erythromycin eye ointment and cipro eye drops. She has been using eye drops with some relief of symptoms. She reports that she has eye itchiness and irritation. There is no more purulent drainage from eye. She denies any scratching of eyes, with exception of first day before she knew what was going on. She denies any trauma to eye. No loss of vision. There is associated eye lid redness     Objective     Review of Systems   Constitutional:  Negative for chills and fever.   HENT:  Negative for congestion.    Eyes:  Positive for discharge and redness. Negative for blurred vision.   Respiratory:  Negative for cough.    Cardiovascular:  Negative for chest pain.   Gastrointestinal:  Negative for abdominal pain, constipation, diarrhea, heartburn, nausea and vomiting.   Genitourinary:  Negative for dysuria.   Musculoskeletal:  Negative for myalgias.   Skin:  Negative for itching and rash.   Neurological:  Negative for dizziness and headaches.   Psychiatric/Behavioral:  Negative for depression.      Do you have leakage of urine? No    /70   Pulse 72   Temp 98.2 °F (36.8 °C)   Ht 5' 9" (1.753 m)   Wt 77.3 kg (170 lb 6.7 oz)   SpO2 96%   BMI 25.17 kg/m²   Physical Exam  Constitutional:       Appearance: She is well-developed.   HENT:      Head: Normocephalic and atraumatic.   Eyes:      General: Lids are everted, no foreign bodies appreciated. Vision grossly intact.         Left eye: Discharge present.     Conjunctiva/sclera:      Left eye: Left conjunctiva is injected. No exudate or hemorrhage.     Pupils: Pupils are equal, round, and reactive to light.   Cardiovascular:      Rate and Rhythm: Normal rate and regular rhythm.      Heart sounds: Normal heart sounds. No murmur heard.     No " friction rub. No gallop.   Pulmonary:      Effort: No respiratory distress.      Breath sounds: Normal breath sounds.   Abdominal:      General: Bowel sounds are normal. There is no distension.      Palpations: Abdomen is soft.      Tenderness: There is no abdominal tenderness.   Musculoskeletal:         General: Normal range of motion.      Cervical back: Normal range of motion and neck supple.   Lymphadenopathy:      Cervical: No cervical adenopathy.   Skin:     General: Skin is warm.   Neurological:      Mental Status: She is alert and oriented to person, place, and time.             Assessment     Health Maintenance         Date Due Completion Date    RSV Vaccine (Age 60+ and Pregnant patients) (1 - 1-dose 60+ series) Never done ---    COVID-19 Vaccine (5 - 2023-24 season) 09/01/2023 6/2/2022    Colorectal Cancer Screening 02/17/2024 2/16/2024    Lipid Panel 06/23/2024 6/23/2023    Hemoglobin A1c (Prediabetes) 09/11/2024 9/11/2023    Aspirin/Antiplatelet Therapy 04/01/2025 4/1/2024    Mammogram 12/11/2025 12/11/2023    Override on 5/25/2016: Done    Override on 2/3/2014: Done (DIS - normal)    Override on 12/11/2012: Done    Override on 10/27/2010: Done    DEXA Scan 09/11/2028 9/11/2023    TETANUS VACCINE 05/31/2031 5/31/2021              Problem List Items Addressed This Visit          Cardiac/Vascular    Atherosclerosis of abdominal aorta    Overview     - mild - noted on CT scan of abdomen 1/2023  Patient with Atherosclerosis of the Aorta.  Stable/asymptomatic. Currently stable on lipid lowering treatment and b/p monitoring.           Essential hypertension  The current medical regimen is effective;  continue present plan and medications.          Endocrine    Adrenal incidentaloma  F/u with endocrine - Dr. Madrigal      Other Visit Diagnoses       Conjunctivitis, unspecified conjunctivitis type, unspecified laterality    -  Primary    Relevant Medications    neomycin-polymyxin-dexamethasone (MAXITROL)  3.5mg/mL-10,000 unit/mL-0.1 % DrpS    Other Relevant Orders    Ambulatory referral/consult to Optometry    Meningioma      Noted in chart  F/u with [facility]                   Follow up if symptoms worsen or fail to improve.

## 2024-04-02 ENCOUNTER — PATIENT MESSAGE (OUTPATIENT)
Dept: ENDOCRINOLOGY | Facility: CLINIC | Age: 69
End: 2024-04-02
Payer: MEDICARE

## 2024-04-02 DIAGNOSIS — E03.9 ACQUIRED HYPOTHYROIDISM: ICD-10-CM

## 2024-04-02 DIAGNOSIS — R73.03 PREDIABETES: ICD-10-CM

## 2024-04-02 DIAGNOSIS — E04.9 GOITER, NODULAR: Primary | ICD-10-CM

## 2024-04-09 ENCOUNTER — OFFICE VISIT (OUTPATIENT)
Dept: OPTOMETRY | Facility: CLINIC | Age: 69
End: 2024-04-09
Payer: MEDICARE

## 2024-04-09 DIAGNOSIS — H01.00A BLEPHARITIS OF UPPER AND LOWER EYELIDS OF BOTH EYES, UNSPECIFIED TYPE: Primary | ICD-10-CM

## 2024-04-09 DIAGNOSIS — H01.00B BLEPHARITIS OF UPPER AND LOWER EYELIDS OF BOTH EYES, UNSPECIFIED TYPE: Primary | ICD-10-CM

## 2024-04-09 DIAGNOSIS — H10.9 CONJUNCTIVITIS, UNSPECIFIED CONJUNCTIVITIS TYPE, UNSPECIFIED LATERALITY: ICD-10-CM

## 2024-04-09 PROCEDURE — 99999 PR PBB SHADOW E&M-EST. PATIENT-LVL II: CPT | Mod: PBBFAC,,, | Performed by: OPTOMETRIST

## 2024-04-09 PROCEDURE — 99212 OFFICE O/P EST SF 10 MIN: CPT | Mod: PBBFAC,PO | Performed by: OPTOMETRIST

## 2024-04-09 PROCEDURE — 99203 OFFICE O/P NEW LOW 30 MIN: CPT | Mod: S$PBB,,, | Performed by: OPTOMETRIST

## 2024-04-09 NOTE — PROGRESS NOTES
Subjective:       Patient ID: Claudia Rodriguez is a 69 y.o. female      Chief Complaint   Patient presents with    Eye Problem     History of Present Illness  HPI    Dls: ?     70 y/o female presents today with c/o x 1 month was dx with conjunctivitis from urgent care was given cipro gtts and erythromycin calvin no improvement was seen by pcp was given steriod gtts some improvement.  Pt states x 1 month ago had rash upper and lower lids os turned into blisters mucous os . Pt c/o red os itching mucous os mostly in am now tearing os swollen lids os fbs off/on os no pain no photophobia no changes   in vision.     Eye meds  Maxitrol OS Q 8 hrs last dose x 3 days ago lost gtts        Assessment/Plan:     1. Blepharitis of upper and lower eyelids of both eyes, unspecified type  Educated patient on blepharitis and options of treatment including artificial tears, lid scrubs and warm compresses. Discussed chronicity.      2. Conjunctivitis, unspecified conjunctivitis type, unspecified laterality  Recommend Pataday or Zaditor OU PRN for itchiness.  Discussed that oral anti-histamines can increase LALA and to supplement with AT (refresh/systane) PRN.    - Ambulatory referral/consult to Optometry    Follow up if symptoms worsen or fail to improve.

## 2024-04-10 ENCOUNTER — PATIENT MESSAGE (OUTPATIENT)
Dept: OPTOMETRY | Facility: CLINIC | Age: 69
End: 2024-04-10
Payer: MEDICARE

## 2024-04-10 DIAGNOSIS — H01.116 EYELID DERMATITIS, ALLERGIC/CONTACT, LEFT: Primary | ICD-10-CM

## 2024-04-10 RX ORDER — NEOMYCIN SULFATE, POLYMYXIN B SULFATE, AND DEXAMETHASONE 3.5; 10000; 1 MG/G; [USP'U]/G; MG/G
OINTMENT OPHTHALMIC 3 TIMES DAILY
Qty: 1 EACH | Refills: 0 | Status: SHIPPED | OUTPATIENT
Start: 2024-04-10

## 2024-04-16 ENCOUNTER — PATIENT MESSAGE (OUTPATIENT)
Dept: ENDOCRINOLOGY | Facility: CLINIC | Age: 69
End: 2024-04-16
Payer: MEDICARE

## 2024-04-16 RX ORDER — SEMAGLUTIDE 1.34 MG/ML
1 INJECTION, SOLUTION SUBCUTANEOUS
Qty: 3 ML | Refills: 11 | Status: SHIPPED | OUTPATIENT
Start: 2024-04-16 | End: 2025-04-16

## 2024-04-19 ENCOUNTER — PATIENT MESSAGE (OUTPATIENT)
Dept: OPTOMETRY | Facility: CLINIC | Age: 69
End: 2024-04-19
Payer: MEDICARE

## 2024-05-09 ENCOUNTER — PATIENT MESSAGE (OUTPATIENT)
Dept: UROGYNECOLOGY | Facility: CLINIC | Age: 69
End: 2024-05-09
Payer: MEDICARE

## 2024-05-10 ENCOUNTER — OFFICE VISIT (OUTPATIENT)
Dept: OPHTHALMOLOGY | Facility: CLINIC | Age: 69
End: 2024-05-10
Payer: MEDICARE

## 2024-05-10 DIAGNOSIS — H10.13 CONJUNCTIVITIS, ALLERGIC, BILATERAL: ICD-10-CM

## 2024-05-10 DIAGNOSIS — H10.89 PAPILLARY CONJUNCTIVITIS: Primary | ICD-10-CM

## 2024-05-10 PROCEDURE — 99204 OFFICE O/P NEW MOD 45 MIN: CPT | Mod: 25,S$PBB,, | Performed by: OPHTHALMOLOGY

## 2024-05-10 PROCEDURE — 99999 PR PBB SHADOW E&M-EST. PATIENT-LVL III: CPT | Mod: PBBFAC,,, | Performed by: OPHTHALMOLOGY

## 2024-05-10 PROCEDURE — 92285 EXTERNAL OCULAR PHOTOGRAPHY: CPT | Mod: PBBFAC | Performed by: OPHTHALMOLOGY

## 2024-05-10 PROCEDURE — 65430 CORNEAL SMEAR: CPT | Mod: S$PBB,RT,, | Performed by: OPHTHALMOLOGY

## 2024-05-10 PROCEDURE — 87070 CULTURE OTHR SPECIMN AEROBIC: CPT | Performed by: OPHTHALMOLOGY

## 2024-05-10 PROCEDURE — 65430 CORNEAL SMEAR: CPT | Mod: PBBFAC,RT | Performed by: OPHTHALMOLOGY

## 2024-05-10 PROCEDURE — 87102 FUNGUS ISOLATION CULTURE: CPT | Performed by: OPHTHALMOLOGY

## 2024-05-10 PROCEDURE — 99213 OFFICE O/P EST LOW 20 MIN: CPT | Mod: PBBFAC | Performed by: OPHTHALMOLOGY

## 2024-05-10 RX ORDER — PREDNISOLONE ACETATE 10 MG/ML
1 SUSPENSION/ DROPS OPHTHALMIC 3 TIMES DAILY
Qty: 5 ML | Refills: 3 | Status: SHIPPED | OUTPATIENT
Start: 2024-05-10 | End: 2024-06-09

## 2024-05-10 NOTE — Clinical Note
Start steroids TID OU, pls call pt end of next wk to see how eyes are feeling, if improving - okay to decr to BID

## 2024-05-10 NOTE — PROGRESS NOTES
HPI    Referred by Dr. Calderon    Papillary conj - chronic    Blink every hour OU  Pataday QD OU  Neomycin QHS OU     Patient is here today due to blepharitis and possible conjunctivitis OU.   Pt. States on the morning of Marche 23 woke up with seal shot eyes, green   discharge, extremely red, and the next day all around the eyes were   inflame. Pt. Have been treated for pink eye and for foreign body   sensation.  Pt. States eyes are always burning, itching, constant tearing, red and   still have yellow discharge in the morning. Pt. States eyes feel very   irritated. Pt. States vision have been stable. Pain level 3.  Last edited by Hilary Bianchi MD on 5/10/2024 10:44 AM.            Assessment /Plan     For exam results, see Encounter Report.    Papillary conjunctivitis  -     Aerobic culture  -     Fungus culture  -     Ambulatory referral/consult to Allergy; Future; Expected date: 05/17/2024  -     Slit Lamp Photography - OU - Both Eyes    Conjunctivitis, allergic, bilateral  -     Slit Lamp Photography - OU - Both Eyes    Other orders  -     prednisoLONE acetate (PRED FORTE) 1 % DrpS; Place 1 drop into both eyes 3 (three) times daily.  Dispense: 5 mL; Refill: 3      Red swollen eyes since march 23rd.     Sx wax and wane, some improvement with steroids.    Question allergy to pine trees    Cx today to r/out infectious etiology    PROCEDURE: conj cornea -    INDICATION: determine infectious etiology to ulceration    Anesthesia: topical drops    PROCEDURE IN DETAIL: After informed consent was discussed and obtained, the eye was anesthetized with proparacaine.   An e swab was used to culture the inferior right fornix.  Patient tolerated the procedure well    C/o: none    EBL: none      Start steroids TID OU, will call pt end of next wk to see how eyes are feeling, if improving - okay to decr to BID    Allergy referral for testing / shots?    Today's visit is associated with current and anticipated ongoing medical care  related to this patient's single serious/complex condition (chronic conjunctivis). Follow up is to be continued indefinitely to monitor the condition.

## 2024-05-14 LAB — BACTERIA SPEC AEROBE CULT: NO GROWTH

## 2024-05-15 ENCOUNTER — PATIENT MESSAGE (OUTPATIENT)
Dept: OPHTHALMOLOGY | Facility: CLINIC | Age: 69
End: 2024-05-15
Payer: MEDICARE

## 2024-05-21 ENCOUNTER — PATIENT MESSAGE (OUTPATIENT)
Dept: ENDOCRINOLOGY | Facility: CLINIC | Age: 69
End: 2024-05-21
Payer: MEDICARE

## 2024-05-26 ENCOUNTER — PATIENT MESSAGE (OUTPATIENT)
Dept: OPHTHALMOLOGY | Facility: CLINIC | Age: 69
End: 2024-05-26
Payer: MEDICARE

## 2024-05-26 DIAGNOSIS — Z86.39 HISTORY OF VITAMIN D DEFICIENCY: ICD-10-CM

## 2024-05-27 RX ORDER — ERGOCALCIFEROL 1.25 MG/1
CAPSULE ORAL
Qty: 12 CAPSULE | Refills: 3 | Status: SHIPPED | OUTPATIENT
Start: 2024-05-27

## 2024-05-29 ENCOUNTER — PATIENT MESSAGE (OUTPATIENT)
Dept: ENDOCRINOLOGY | Facility: CLINIC | Age: 69
End: 2024-05-29
Payer: MEDICARE

## 2024-05-29 ENCOUNTER — LAB VISIT (OUTPATIENT)
Dept: LAB | Facility: HOSPITAL | Age: 69
End: 2024-05-29
Payer: MEDICARE

## 2024-05-29 ENCOUNTER — PATIENT MESSAGE (OUTPATIENT)
Dept: FAMILY MEDICINE | Facility: CLINIC | Age: 69
End: 2024-05-29
Payer: MEDICARE

## 2024-05-29 ENCOUNTER — TELEPHONE (OUTPATIENT)
Dept: OPHTHALMOLOGY | Facility: CLINIC | Age: 69
End: 2024-05-29
Payer: MEDICARE

## 2024-05-29 ENCOUNTER — OFFICE VISIT (OUTPATIENT)
Dept: ALLERGY | Facility: CLINIC | Age: 69
End: 2024-05-29
Payer: MEDICARE

## 2024-05-29 VITALS — HEIGHT: 69 IN | BODY MASS INDEX: 26.02 KG/M2 | WEIGHT: 175.69 LBS

## 2024-05-29 DIAGNOSIS — J31.0 CHRONIC RHINITIS: ICD-10-CM

## 2024-05-29 DIAGNOSIS — E03.9 ACQUIRED HYPOTHYROIDISM: Primary | ICD-10-CM

## 2024-05-29 DIAGNOSIS — I10 ESSENTIAL HYPERTENSION: ICD-10-CM

## 2024-05-29 DIAGNOSIS — H10.403 CHRONIC CONJUNCTIVITIS OF BOTH EYES, UNSPECIFIED CHRONIC CONJUNCTIVITIS TYPE: ICD-10-CM

## 2024-05-29 DIAGNOSIS — H10.89 PAPILLARY CONJUNCTIVITIS: ICD-10-CM

## 2024-05-29 DIAGNOSIS — E03.9 ACQUIRED HYPOTHYROIDISM: ICD-10-CM

## 2024-05-29 DIAGNOSIS — H10.403 CHRONIC CONJUNCTIVITIS OF BOTH EYES, UNSPECIFIED CHRONIC CONJUNCTIVITIS TYPE: Primary | ICD-10-CM

## 2024-05-29 PROCEDURE — 99999 PR PBB SHADOW E&M-EST. PATIENT-LVL IV: CPT | Mod: PBBFAC,,, | Performed by: ALLERGY & IMMUNOLOGY

## 2024-05-29 PROCEDURE — 86003 ALLG SPEC IGE CRUDE XTRC EA: CPT | Performed by: ALLERGY & IMMUNOLOGY

## 2024-05-29 PROCEDURE — 36415 COLL VENOUS BLD VENIPUNCTURE: CPT | Performed by: ALLERGY & IMMUNOLOGY

## 2024-05-29 PROCEDURE — 99205 OFFICE O/P NEW HI 60 MIN: CPT | Mod: S$PBB,,, | Performed by: ALLERGY & IMMUNOLOGY

## 2024-05-29 PROCEDURE — 99214 OFFICE O/P EST MOD 30 MIN: CPT | Mod: PBBFAC | Performed by: ALLERGY & IMMUNOLOGY

## 2024-05-29 PROCEDURE — 86003 ALLG SPEC IGE CRUDE XTRC EA: CPT | Mod: 59 | Performed by: ALLERGY & IMMUNOLOGY

## 2024-05-29 NOTE — PROGRESS NOTES
Claudia Rodriguez is referred by Dr. Hilary Bianchi for a consult regarding conjunctivitis.  She is here alone.    Twenty-seven years ago they were building a new home.  They had to move out of the house into a rental unit in [place] for about six months.  While she was living in this house she developed increased eye symptoms with redness, tearing, pain, and discharge.  She saw multiple providers and was eventually told by a non-allergist that she was probably allergic to pine pollen.  At the time the house they were living in had a lot of pine trees.  They then moved into their new house without a lot of pine exposure.    Her symptoms resolved and she has not had until recently.    On March 30, 2024, she developed eye itching with redness, pain, a discharge, and redness around the eyelids. It was initially on the left eye but has been alternating since then.  It is occasionally is in both eyes.    She saw Dr. Carole Garrison in family Medicine on April 1, 2024.  She prescribed neomycin/polymyxin B/dexamethasone eyedrops.  She thought she may have a foreign body in her eyes.  This did not help.    She then saw Dr. Natacha Calderon in Optometry who did not see any foreign body.  She diagnosed her with blepharitis and conjunctivitis.  She recommended Pataday or Zaditor as well as OTC oral antihistamines.  She did not see any improvement and then saw Dr. Hilary Bianchi in Ophthalmology.  She prescribed prednisolone acetate eyedrops on May 10, 2024.  She has been taking these since then.  While on them all of her symptoms have resolved.    She does have chronic throat clearing but no postnasal drip, hoarseness, sore throats, difficulty swallowing, or cough.    She has been taking 1/2 Benadryl at night for sleep. Her last one was last night.    She does have a history of gastroesophageal reflux in the past but does not have any now.    She attributes this to a recent 60 pound weight loss while on Ozempic. She is no longer taking  this.    She does have hypothyroidism followed by Dr. Madrigal in Endocrinology.  She has been taking levothyroxine 75 micrograms a day.  She does not remember if this was diagnosed while she was having her prior eye symptoms.    She has hypertension and takes triamterene-hydrochlorothiazide, carvedilol, and amlodipine.     She has hyperlipidemia and takes Lipitor.    She worked in [place] at at [employer] for 48 years.        5/28/2024     9:21 PM   [facility] ALLERGY QUESTIONNAIRE LONG   Head or facial pain: Facial swelling   Please describe your head and/or facial pain.  Swelling only around my eyes and below the eyes   Eyes: Itching    Tearing    Swelling    Redness    Eye discharge    Eye pain   Which kind of eye drops do you use, if applicable? Prednisolone Acetate 1%   Ears: No symptoms   Do you have ear infections? No   Do you have ear tubes? No   Did you have ear surgery? No   Nose: No symptoms   Did you have a blocked nose? No   Did you have nasal surgery? No   Has your nose ever been broken? No   Throat: No symptoms   Sinuses: No symptoms   Have you had x-rays done for your sinuses? No   Have you had a CT scan done for your sinuses? No   Lungs: No symptoms   When was your last chest x-ray, if known and applicable? Unknown   Have you ever has a tuberculosis skin test?  Unknown   Have you ever had a lung-function test? No   Have you had a flu shot this year? Yes   When was your flu shot? February, 2024   Have you had the pneumonia vaccine?  Yes   When did you have the pneumonia vaccine? Christopher magaña?   Do you have any known problems with your immune system? No   Do you suspect you may have problems with your immune system? No   Do you have frequent infections? No   Skin: Bruising   When did these symptoms first occur? Late March, 2024   Are they getting worse or better? Not applicable   How often do these symptoms occur? Every three days or so, unless I am taking steroid drops.   When do these symptoms occur?  Consistently, every few days. As soon as the eyes get infected, it takes 2 to 3 days for treatment to clear up then it starts all over again.   Do they occur year round? Yes   If there is any seasonal variation in your symptoms, when are they worse? Not that I have been able to detect. The infections only began in March of this year.   Is there a particular time of the day or night when the symptoms are worse? Worse, as soon as I wake up in the morning and open my eyes. They begin to itch during the night, and by the time I wake up, my eyes are swollen, red inside and out, and infected   Is there anything you have identified, which can cause symptoms or make them worse? (such as dust, grass, plant or animal products, mold, heat, cold, strong odors, exercise) Not that I have been able to detect or identify.   Is there anything you have identified, which can make symptoms better?  After consulting with four different doctors and about eight different prescriptions and four or five over-the-counter medications, steroid drops are the only thing that helped so far.   What medications have you tried in the past to help control these symptoms?  Ciprofloxacin .3% eye drops, Erythromycin .5% eye ountment, Blink dry eye drops, Systane  Lubricant eye drops, Maury-Poly-Dex eye drops, Pataday Eye Allergy Itch Relief, Prednisolone AC 1% Eye Drop, (all of the above were either prescribed or recommended by four different physicians)   Please list all the vitamins or herbal medications you are taking. Centrum multi vitamin, Caltrate calciym pills, Fish oil   Have you ever seen an allergist for these symptoms? No   Have you ever had skin tests? No   Have you ever had any other type of allergy testing? No   Have you ever had allergy shots? No   Do you have food allergies? No   Do you have insect allergies? No   Do you have latex allergies? No   Constitution No symptoms   Cardiovascular: No symptoms   Gastrointestinal: Constipation    Genital/ urinary: No symptoms   Musculoskeletal: No symptoms   Endocrine: No symptoms   Hematologic: No symptoms   Please note which family members have allergies or asthma and specify which they have. None that I am am aware of.   How long have you lived at your current address? 30 years   Has your residence ever had water or flood damage? No   Is there any evidence of mold in the house? No   Does your house have: Central air conditioning    Wood-burning fireplace   Does your bedroom have: Ceiling fan   What type of pillow do you have, for example feather, foam and fiberfill?  Dont know   Do you have pets? No   Does anyone in the house smoke? 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 Please specify which symptoms, if applicable.  No   Do you suspect anything at work or school, which may be causing your symptoms? If so, please elaborate.  No   Is there anything else that might be important for the doctor to know?  No   Did you find this questionnaire helpful in addressing your symptoms?  Yes       Physical Examination:  General: Well-developed, well-nourished, no acute distress.  Clearing throat throughout interview.  Head: No sinus tenderness.  Eyes: Conjunctivae:  No bulbar or palpebral conjunctival injection.  Ears: EAC's clear.  TM's clear.  No pre-auricular nodes.  Nose: Nasal Mucosa:  Pink.  Septum: No apparent deviation.  Turbinates:  No significant edema.  Polyps/Mass:  None visible.  Teeth/Gums:  No bleeding noted.  Oropharynx: No exudates.  Neck: Supple without thyromegaly. No cervical lymphadenopathy.    Respiratory/Chest: Effort: Good.  Auscultation:  Clear bilaterally.  Cardiovascular:  No murmur, rubs, or gallop heard.   GI:  Non-tender.  No masses.  No organomegaly.  Extremities:  No cyanosis, clubbing, or edema.  Skin: Good turgor.  No urticaria or angioedema.  Neuro/Psych: Oriented x  3.    Assessment:  1. Bilateral conjunctivitis, consider allergic component.   2. Mild rhinitis with throat clearing, consider allergic component.   3. History of GERD, now controlled.   4. Hypothyroidism on levothyroxine.   5. Recent weight loss on Ozempic.   6. Hypertension on triamterene-hydrochlorothiazide, carvedilol, and amlodipine.   7. Hyperlipidemia on Lipitor.    Recommendations:  1. Laboratory as ordered.   2. Discontinue Benadryl.   3. She will check with her primary care physician to see if she can hold her carvedilol for 48 hours.   4. Consider skin testing off antihistamines and beta blockade in one week.  5. Follow-up with Dr. Madrigal regarding possible thyroid eye disease.  This was discussed.  6. Return to clinic in one week.    This includes face to face time and non-face to face time preparing to see the patient (eg, review of tests), obtaining and/or reviewing separately obtained history, documenting clinical information in the electronic or other health record, independently interpreting results and communicating results to the patient/family/caregiver, or care coordinator.  65 minutes.

## 2024-05-30 ENCOUNTER — PATIENT MESSAGE (OUTPATIENT)
Dept: ALLERGY | Facility: CLINIC | Age: 69
End: 2024-05-30
Payer: MEDICARE

## 2024-05-30 DIAGNOSIS — I10 ESSENTIAL HYPERTENSION: ICD-10-CM

## 2024-05-30 RX ORDER — AMLODIPINE BESYLATE 10 MG/1
10 TABLET ORAL
Qty: 90 TABLET | Refills: 1 | Status: SHIPPED | OUTPATIENT
Start: 2024-05-30

## 2024-05-31 ENCOUNTER — TELEPHONE (OUTPATIENT)
Dept: FAMILY MEDICINE | Facility: CLINIC | Age: 69
End: 2024-05-31
Payer: MEDICARE

## 2024-05-31 ENCOUNTER — OFFICE VISIT (OUTPATIENT)
Dept: FAMILY MEDICINE | Facility: CLINIC | Age: 69
End: 2024-05-31
Payer: MEDICARE

## 2024-05-31 DIAGNOSIS — Z71.89 MEDICATION CARE PLAN DISCUSSED WITH PATIENT: Primary | ICD-10-CM

## 2024-05-31 DIAGNOSIS — I10 ESSENTIAL HYPERTENSION: ICD-10-CM

## 2024-05-31 PROCEDURE — 99213 OFFICE O/P EST LOW 20 MIN: CPT | Mod: 95,,,

## 2024-05-31 RX ORDER — TRIAMTERENE/HYDROCHLOROTHIAZID 37.5-25 MG
TABLET ORAL
Qty: 90 TABLET | Refills: 3 | Status: SHIPPED | OUTPATIENT
Start: 2024-05-31

## 2024-05-31 RX ORDER — CARVEDILOL 25 MG/1
25 TABLET ORAL 2 TIMES DAILY WITH MEALS
Qty: 180 TABLET | Refills: 1 | Status: SHIPPED | OUTPATIENT
Start: 2024-05-31

## 2024-05-31 NOTE — Clinical Note
Hi, I just wanted to forward my approval for Ms. Taylor to hold her Coreg for her allergy testing. Amalia Doran NP

## 2024-05-31 NOTE — TELEPHONE ENCOUNTER
No care due was identified.  Health [facility] Embedded Care Due Messages. Reference number: [id number].   5/30/2024 8:12:52 PM CDT

## 2024-05-31 NOTE — TELEPHONE ENCOUNTER
Refill Decision Note   Claudia Rodriguez  is requesting a refill authorization.  Brief Assessment and Rationale for Refill:  Approve     Medication Therapy Plan:        Pharmacist review requested: Yes   Comments:     Note composed:12:40 PM 05/31/2024

## 2024-05-31 NOTE — PROGRESS NOTES
The patient location is:  Patient Home  The chief complaint leading to consultation is: as below  Visit type: Virtual visit with synchronous audio and video  Total time spent with patient: 20 minutes  Each patient to whom he or she provides medical services by telemedicine is:  (1) informed of the relationship between the physician and patient and the respective role of any other health care provider with respect to management of the patient; and (2) notified that she may decline to receive medical services by telemedicine and may withdraw from such care at any time.      HPI     Chief Complaint:  No chief complaint on file.      Claudia Rodriguez is a 69 y.o. female with multiple medical diagnoses as listed in the medical history and problem list that presents for medication question.  Pt is new to me but is known to this clinic with her last appointment being 4/1/2024.      HPI  Pt presents for medication question.  Requesting approval to hold Coreg for upcoming allergy testing. Has been on it for several years. Followed by Dr. Almodovar in cardiology. BP well controlled at home, monitored by digital medicine.      Assessment & Plan     Problem List Items Addressed This Visit          Cardiac/Vascular    Essential hypertension  Most recent BP reading 123/73 with digital medicine. Patient approved to hold Coreg for 48 hours for allergy testing. Continue to monitor BP closely and resume Coreg after allergy testing.       Other Visit Diagnoses       Medication care plan discussed with patient    -  Primary  Requesting approval to hold Coreg for 48 hours prior to allergy testing. On Coreg for BP management. BP well controlled, monitored by digital medicine. Reviewed cardiology note by Scooby. Patient approved to hold Coreg for 48 hours for allergy testing. Continue to monitor BP closely and resume Coreg after allergy testing.            --------------------------------------------      Health Maintenance:  Health  Maintenance         Date Due Completion Date    RSV Vaccine (Age 60+ and Pregnant patients) (1 - 1-dose 60+ series) Never done ---    COVID-19 Vaccine (5 - 2023-24 season) 09/01/2023 6/2/2022    Colorectal Cancer Screening 02/17/2024 2/16/2024    Lipid Panel 06/23/2024 6/23/2023    Hemoglobin A1c (Prediabetes) 09/11/2024 9/11/2023    High Dose Statin 05/29/2025 5/29/2024    Aspirin/Antiplatelet Therapy 05/29/2025 5/29/2024    Mammogram 12/11/2025 12/11/2023    Override on 5/25/2016: Done    Override on 2/3/2014: Done (DIS - normal)    Override on 12/11/2012: Done    Override on 10/27/2010: Done    DEXA Scan 09/11/2028 9/11/2023    TETANUS VACCINE 05/31/2031 5/31/2021            Health maintenance reviewed    Follow Up:  No follow-ups on file.    Exam     Review of Systems:  (as noted above)  Review of Systems   Constitutional:  Negative for activity change and unexpected weight change.   HENT:  Negative for hearing loss, rhinorrhea and trouble swallowing.    Eyes:  Negative for discharge and visual disturbance.   Respiratory:  Negative for chest tightness and wheezing.    Cardiovascular:  Negative for chest pain and palpitations.   Gastrointestinal:  Negative for blood in stool, constipation, diarrhea and vomiting.   Endocrine: Negative for polydipsia and polyuria.   Genitourinary:  Negative for difficulty urinating, dysuria, hematuria and menstrual problem.   Musculoskeletal:  Negative for arthralgias, joint swelling and neck pain.   Neurological:  Negative for weakness and headaches.   Psychiatric/Behavioral:  Negative for confusion and dysphoric mood.        Physical Exam:   Physical Exam  There were no vitals filed for this visit.   There is no height or weight on file to calculate BMI.        History     Past Medical History:  Past Medical History:   Diagnosis Date    Cholelithiasis     DDD (degenerative disc disease), lumbar     Fatty liver     - noted on U/S 11/2017    Goiter, nodular     HTN (hypertension)      Hyperglycemia     Hyperlipidemia     Hypothyroidism     Hypovitaminosis D     Meningioma 2010    ~2010; right frontal lobe ; followed by Dr. Enciso once a year    Obesity     Prediabetes     Urinary bladder disorder        Past Surgical History:  Past Surgical History:   Procedure Laterality Date    BLADDER SUSPENSION  1986    LIVER BIOPSY  2020    Per pt, [facility] misdx her with stage I liver cancer but no tx and no sign of it one year later    thyroid biopsy  07/11/2012    TONSILLECTOMY  1959       Social History:  Social History     Socioeconomic History    Marital status:     Number of children: 1   Occupational History    Occupation: human      Employer: [employer]   Tobacco Use    Smoking status: Never     Passive exposure: Never    Smokeless tobacco: Never   Substance and Sexual Activity    Alcohol use: No    Drug use: No    Sexual activity: Yes     Partners: Male   Other Topics Concern    Are you pregnant or think you may be? No    Breast-feeding No   Social History Narrative    3 adopted children.     Social Determinants of Health     Financial Resource Strain: Low Risk  (1/13/2024)    Overall Financial Resource Strain (CARDIA)     Difficulty of Paying Living Expenses: Not very hard   Food Insecurity: No Food Insecurity (1/13/2024)    Hunger Vital Sign     Worried About Running Out of Food in the Last Year: Never true     Ran Out of Food in the Last Year: Never true   Transportation Needs: No Transportation Needs (1/13/2024)    PRAPARE - Transportation     Lack of Transportation (Medical): No     Lack of Transportation (Non-Medical): No   Physical Activity: Sufficiently Active (1/13/2024)    Exercise Vital Sign     Days of Exercise per Week: 5 days     Minutes of Exercise per Session: 60 min   Stress: No Stress Concern Present (1/13/2024)    [employer] [place] [employer] - Occupational Stress Questionnaire     Feeling of Stress : Only a little   Housing Stability: Low Risk   (1/13/2024)    Housing Stability Vital Sign     Unable to Pay for Housing in the Last Year: No     Number of Places Lived in the Last Year: 1     Unstable Housing in the Last Year: No       Family History:  Family History   Problem Relation Name Age of Onset    Hypertension Mother      Stroke Mother          2015    Coronary artery disease Father  55    Heart disease Father      Hypertension Father      Melanoma Father      No Known Problems Sister Ronda     Thyroid nodules Sister Zaynab         s/p thyroidectomy    Thyroid nodules Sister Amanda     No Known Problems Brother Mert Becker     No Known Problems Brother Kevon     No Known Problems Maternal Aunt      No Known Problems Maternal Uncle      No Known Problems Paternal Aunt      No Known Problems Paternal Uncle      Heart failure Maternal Grandmother      Pancreatic cancer Maternal Grandfather      No Known Problems Paternal Grandmother      No Known Problems Paternal Grandfather      No Known Problems Other      Diabetes Neg Hx      Thyroid cancer Neg Hx      Breast cancer Neg Hx      Ovarian cancer Neg Hx      Endometrial cancer Neg Hx      Colon cancer Neg Hx      Rectal cancer Neg Hx      Allergic rhinitis Neg Hx      Allergies Neg Hx      Angioedema Neg Hx      Asthma Neg Hx      Atopy Neg Hx      Eczema Neg Hx      Immunodeficiency Neg Hx      Rhinitis Neg Hx      Urticaria Neg Hx         Allergies and Medications: (updated and reviewed)  Review of patient's allergies indicates:   Allergen Reactions    Zostavax [zoster vaccine live (pf)] Rash     - injection site red, hot, indurated    Demerol [meperidine] Other (See Comments)     Other reaction(s): Nausea  Other reaction(s): Headache    Nsaids (non-steroidal anti-inflammatory drug) Other (See Comments)       Other reaction(s): Difficulty breathing    Other reaction(s): Difficulty breathing    Phenytoin sodium extended Other (See Comments)     Current Outpatient Medications   Medication Sig Dispense  "Refill    triamterene-hydrochlorothiazide 37.5-25 mg (MAXZIDE-25) 37.5-25 mg per tablet TAKE 1 TABLET BY MOUTH EVERY DAY 90 tablet 3    amLODIPine (NORVASC) 10 MG tablet Take 1 tablet by mouth once daily 90 tablet 1    aspirin (ECOTRIN) 81 MG EC tablet Take 1 tablet (81 mg total) by mouth once daily. 90 tablet 3    atorvastatin (LIPITOR) 80 MG tablet TAKE 1 TABLET BY MOUTH EVERY EVENING 90 tablet 3    CALCIUM CARBONATE/VITAMIN D3 (CALCIUM 500 + D, D3, ORAL) once daily.       carvediloL (COREG) 25 MG tablet TAKE 1 TABLET BY MOUTH TWICE A DAY WITH FOOD 180 tablet 1    darifenacin (ENABLEX) 15 mg 24 hr tablet Take 1 tablet (15 mg total) by mouth once daily. 30 tablet 11    ergocalciferol (ERGOCALCIFEROL) 50,000 unit Cap TAKE 1 CAPSULE BY MOUTH ONE TIME PER WEEK 12 capsule 3    estradioL (ESTRACE) 0.01 % (0.1 mg/gram) vaginal cream Place 1 g vaginally twice a week. 42.5 g 3    FOLIC ACID/MV,FE,OTHER MIN (CENTRUM ORAL) Take 1 tablet by mouth.      levothyroxine (SYNTHROID) 75 MCG tablet TAKE 1 TABLET BY MOUTH EVERY DAY 90 tablet 3    linaCLOtide (LINZESS) 72 mcg Cap capsule Take 1 capsule (72 mcg total) by mouth before breakfast. 30 capsule 0    neomycin-polymyxin-dexamethasone (DEXACINE) 3.5 mg/g-10,000 unit/g-0.1 % Oint Place into the left eye 3 (three) times daily. (Patient not taking: Reported on 5/29/2024) 1 each 0    neomycin-polymyxin-dexamethasone (MAXITROL) 3.5mg/mL-10,000 unit/mL-0.1 % DrpS Place 1 drop into the left eye every 8 (eight) hours. (Patient not taking: Reported on 5/29/2024) 5 mL 0    omega-3 fatty acids 1,000 mg Cap Take 1 capsule by mouth Daily.      pen needle, diabetic (BD ULTRA-FINE AMELIA PEN NEEDLE) 32 gauge x 5/32" Ndle Use with ozempic (Patient not taking: Reported on 5/29/2024) 30 each 3    phentermine (ADIPEX-P) 37.5 mg tablet Take 37.5 mg by mouth. (Patient not taking: Reported on 5/29/2024)      prednisoLONE acetate (PRED FORTE) 1 % DrpS Place 1 drop into both eyes 3 (three) times daily. " 5 mL 3    semaglutide (OZEMPIC) 1 mg/dose (4 mg/3 mL) Inject 1 mg into the skin every 7 days. (Patient not taking: Reported on 5/29/2024) 3 mL 11     No current facility-administered medications for this visit.       Patient Care Team:  Jessica Marquez MD as PCP - General (Internal Medicine)  Bisi Madrigal MD as Consulting Physician (Endocrinology)  Fernie Hammond MD as Consulting Physician (Neurosurgery)  Jessica Marquez MD as Hypertension Digital Medicine Responsible Provider (Internal Medicine)  Stephanie Guadalupe, PharmD as Hypertension Digital Medicine Clinician (Pharmacist)  Anirudh Selby MD (Inactive) as Consulting Physician (Obstetrics and Gynecology)  June Sherman as Digital Medicine Health   Albertina Pickens LPN as Licensed Practical Nurse  Program, Medicare Shared Savings as Hypertension Digital Medicine Contract  Jessica Marquez MD as Hyperlipidemia Digital Medicine Responsible Provider (Internal Medicine)  Stephanie Guadalupe, PharmD as Hyperlipidemia Digital Medicine Clinician (Pharmacist)       - The patient was sent an After Visit Summary virtually that lists all medications with directions, allergies, education, orders placed during this encounter and follow-up instructions.      - I have reviewed the patient's medical information including past medical, family, and social history sections including the medications and allergies.      - We discussed the patient's current medications.     This note was created by combination of typed  and MModal dictation.  Transcription errors may be present.  If there are any questions, please contact me.  Amalia Doran NP

## 2024-05-31 NOTE — TELEPHONE ENCOUNTER
----- Message from Shaniqua Harris sent at 5/31/2024  8:58 AM CDT -----  Type:  Patient Returning Call    Who Called:  self     Who Left Message for Patient:  Shira     Does the patient know what this is regarding?: yes     Would the patient rather a call back or a response via My [facility]?  call    Best Call Back Number: .[phone]

## 2024-05-31 NOTE — PATIENT INSTRUCTIONS
Medical Fitness--[phone]  Imaging, Xray, CT, MRI, Ultrasound---[phone]  Bariatrics---[phone]  Breast Surgery---[phone]  Case Management---[phone]  Colonoscopy---[phone]  DME---[phone]  Infectious Disease---[phone]  Interventional Radiology---[phone]  Medical Records---[phone]  [facility] On [facility]---[phone]  Optometry/Ophthalmology---[phone]  [facility]---[phone]  Physical Therapy---[phone]  Psychiatry---[phone] or [phone]  Plastic Surgery---[phone]  Recovery--[phone] option 2, or [phone].  Sleep Study---[phone]  Smoking Cessation---[phone]  Wound Care---[phone]  Referral Desk---[phone]

## 2024-05-31 NOTE — TELEPHONE ENCOUNTER
Refill Decision Note   Claudia Rodriguez  is requesting a refill authorization.  Brief Assessment and Rationale for Refill:  Approve     Medication Therapy Plan:        Comments:     Note composed:8:15 PM 05/30/2024

## 2024-05-31 NOTE — TELEPHONE ENCOUNTER
No care due was identified.  Health [facility] Embedded Care Due Messages. Reference number: [id number].   5/31/2024 10:16:49 AM CDT

## 2024-06-04 ENCOUNTER — LAB VISIT (OUTPATIENT)
Dept: LAB | Facility: HOSPITAL | Age: 69
End: 2024-06-04
Attending: INTERNAL MEDICINE
Payer: MEDICARE

## 2024-06-04 DIAGNOSIS — E03.9 ACQUIRED HYPOTHYROIDISM: ICD-10-CM

## 2024-06-04 LAB
A ALTERNATA IGE QN: <0.1 KU/L
A FUMIGATUS IGE QN: <0.1 KU/L
ALBUMIN SERPL BCP-MCNC: 4 G/DL (ref 3.5–5.2)
ALLERGEN WHITE PINE TREE IGE: <0.1 KU/L
ALP SERPL-CCNC: 84 U/L (ref 55–135)
ALT SERPL W/O P-5'-P-CCNC: 29 U/L (ref 10–44)
ANION GAP SERPL CALC-SCNC: 10 MMOL/L (ref 8–16)
AST SERPL-CCNC: 39 U/L (ref 10–40)
BERMUDA GRASS IGE QN: <0.1 KU/L
BILIRUB SERPL-MCNC: 0.7 MG/DL (ref 0.1–1)
BUN SERPL-MCNC: 22 MG/DL (ref 8–23)
CALCIUM SERPL-MCNC: 10.1 MG/DL (ref 8.7–10.5)
CAT DANDER IGE QN: <0.1 KU/L
CEDAR IGE QN: <0.1 KU/L
CHLORIDE SERPL-SCNC: 106 MMOL/L (ref 95–110)
CO2 SERPL-SCNC: 24 MMOL/L (ref 23–29)
CREAT SERPL-MCNC: 0.8 MG/DL (ref 0.5–1.4)
D FARINAE IGE QN: <0.1 KU/L
D PTERONYSS IGE QN: <0.1 KU/L
DEPRECATED A ALTERNATA IGE RAST QL: NORMAL
DEPRECATED A FUMIGATUS IGE RAST QL: NORMAL
DEPRECATED BERMUDA GRASS IGE RAST QL: NORMAL
DEPRECATED CAT DANDER IGE RAST QL: NORMAL
DEPRECATED CEDAR IGE RAST QL: NORMAL
DEPRECATED D FARINAE IGE RAST QL: NORMAL
DEPRECATED D PTERONYSS IGE RAST QL: NORMAL
DEPRECATED DOG DANDER IGE RAST QL: NORMAL
DEPRECATED ELDER IGE RAST QL: NORMAL
DEPRECATED ENGL PLANTAIN IGE RAST QL: NORMAL
DEPRECATED PECAN/HICK TREE IGE RAST QL: NORMAL
DEPRECATED ROACH IGE RAST QL: NORMAL
DEPRECATED TIMOTHY IGE RAST QL: NORMAL
DEPRECATED WEST RAGWEED IGE RAST QL: NORMAL
DEPRECATED WHITE OAK IGE RAST QL: NORMAL
DOG DANDER IGE QN: <0.1 KU/L
ELDER IGE QN: <0.1 KU/L
ENGL PLANTAIN IGE QN: <0.1 KU/L
EST. GFR  (NO RACE VARIABLE): >60 ML/MIN/1.73 M^2
GLUCOSE SERPL-MCNC: 88 MG/DL (ref 70–110)
PECAN/HICK TREE IGE QN: <0.1 KU/L
POTASSIUM SERPL-SCNC: 3.9 MMOL/L (ref 3.5–5.1)
PROT SERPL-MCNC: 6.7 G/DL (ref 6–8.4)
ROACH IGE QN: <0.1 KU/L
SODIUM SERPL-SCNC: 140 MMOL/L (ref 136–145)
TIMOTHY IGE QN: <0.1 KU/L
TSH SERPL DL<=0.005 MIU/L-ACNC: 1.42 UIU/ML (ref 0.4–4)
WEST RAGWEED IGE QN: <0.1 KU/L
WHITE OAK IGE QN: <0.1 KU/L
WHITE PINE CLASS: NORMAL

## 2024-06-04 PROCEDURE — 36415 COLL VENOUS BLD VENIPUNCTURE: CPT | Mod: PO | Performed by: INTERNAL MEDICINE

## 2024-06-04 PROCEDURE — 80053 COMPREHEN METABOLIC PANEL: CPT | Performed by: INTERNAL MEDICINE

## 2024-06-04 PROCEDURE — 84443 ASSAY THYROID STIM HORMONE: CPT | Performed by: INTERNAL MEDICINE

## 2024-06-04 RX ORDER — ATORVASTATIN CALCIUM 80 MG/1
80 TABLET, FILM COATED ORAL NIGHTLY
Qty: 90 TABLET | Refills: 3 | Status: SHIPPED | OUTPATIENT
Start: 2024-06-04

## 2024-06-05 ENCOUNTER — OFFICE VISIT (OUTPATIENT)
Dept: ALLERGY | Facility: CLINIC | Age: 69
End: 2024-06-05
Payer: MEDICARE

## 2024-06-05 VITALS — WEIGHT: 176.38 LBS | HEIGHT: 62 IN | BODY MASS INDEX: 32.46 KG/M2

## 2024-06-05 DIAGNOSIS — J31.0 CHRONIC RHINITIS: ICD-10-CM

## 2024-06-05 DIAGNOSIS — H10.403 CHRONIC CONJUNCTIVITIS OF BOTH EYES, UNSPECIFIED CHRONIC CONJUNCTIVITIS TYPE: ICD-10-CM

## 2024-06-05 PROCEDURE — 99999 PR PBB SHADOW E&M-EST. PATIENT-LVL III: CPT | Mod: PBBFAC,,, | Performed by: ALLERGY & IMMUNOLOGY

## 2024-06-05 PROCEDURE — 95004 PERQ TESTS W/ALRGNC XTRCS: CPT | Mod: PBBFAC | Performed by: ALLERGY & IMMUNOLOGY

## 2024-06-05 PROCEDURE — 99213 OFFICE O/P EST LOW 20 MIN: CPT | Mod: 25,S$PBB,, | Performed by: ALLERGY & IMMUNOLOGY

## 2024-06-05 PROCEDURE — 99213 OFFICE O/P EST LOW 20 MIN: CPT | Mod: PBBFAC,25 | Performed by: ALLERGY & IMMUNOLOGY

## 2024-06-05 PROCEDURE — 95004 PERQ TESTS W/ALRGNC XTRCS: CPT | Mod: S$PBB,,, | Performed by: ALLERGY & IMMUNOLOGY

## 2024-06-05 NOTE — PROGRESS NOTES
Claudia Rodriguez returns to clinic today for continued evaluation of conjunctivitis.  She is here alone.  She was last seen May 29, 2024.    Since her last visit, she has been using steroid eyedrops.  Her conjunctivitis has resolved.  She is having some mild redness around the eyes.    She has not had any discharge from her eyes.    She has not had any rhinitis.     She has not had any cough, wheezing, or shortness of breath.        5/31/2024     9:21 AM   [facility] ALLERGY QUESTIONNAIRE SHORT   Head or facial pain: No symptoms   Ears: No symptoms   Nose: No symptoms   Throat: No symptoms   eye itching Yes   eye redness Yes   eye discharge Yes   eye pain Yes   Light sensitivity / light hurts the eyes? No   Lungs: No symptoms   Skin: No symptoms      Physical Examination:  General: Well-developed, well-nourished, no acute distress.    Head: No sinus tenderness.  Eyes: Conjunctivae:  No bulbar or palpebral conjunctival injection.  Ears: EAC's clear.  TM's clear.  No pre-auricular nodes.  Nose: Nasal Mucosa:  Pink.  Septum: No apparent deviation.  Turbinates:  No significant edema.  Polyps/Mass:  None visible.  Teeth/Gums:  No bleeding noted.  Oropharynx: No exudates.  Neck: Supple without thyromegaly. No cervical lymphadenopathy.    Respiratory/Chest: Effort: Good.  Auscultation:  Clear bilaterally.  Skin: Good turgor.  No urticaria or angioedema.  Neuro/Psych: Oriented x 3.    Laboratory 5/29/2024:  ImmunoCAP:  Negative.     Inhalant skin tests 06/05/2024: 3+ histamine control.  All tests were negative.    Assessment:  1. Bilateral conjunctivitis, without allergic component, improved.   2. Mild rhinitis with throat clearing, consider allergic component, improved.  3. History of GERD, now controlled.   4. Hypothyroidism on levothyroxine.   5. Recent weight loss on Ozempic.   6. Hypertension on triamterene-hydrochlorothiazide, carvedilol, and amlodipine.   7. Hyperlipidemia on Lipitor.    Recommendations:  1. Continue  steroid eyedrops for now.   2. Follow-up with Dr. Bianchi.   3. Return to clinic as needed.    This includes face to face time and non-face to face time preparing to see the patient (eg, review of tests), obtaining and/or reviewing separately obtained history, documenting clinical information in the electronic or other health record, independently interpreting results and communicating results to the patient/family/caregiver, or care coordinator.  20 minutes was spent with this patient in addition to skin testing.

## 2024-06-06 LAB — TSH RECEP AB SER-ACNC: <1.1 IU/L (ref 0–1.75)

## 2024-06-11 ENCOUNTER — PATIENT MESSAGE (OUTPATIENT)
Dept: ENDOCRINOLOGY | Facility: CLINIC | Age: 69
End: 2024-06-11
Payer: MEDICARE

## 2024-06-12 LAB — FUNGUS SPEC CULT: NORMAL

## 2024-06-12 NOTE — TELEPHONE ENCOUNTER
Spoke with patient, asked her to reach program and see if they can delivery her medication to her house because we do not accept medication in our office.  She states she will contact them.

## 2024-06-21 ENCOUNTER — OFFICE VISIT (OUTPATIENT)
Dept: OPHTHALMOLOGY | Facility: CLINIC | Age: 69
End: 2024-06-21
Payer: MEDICARE

## 2024-06-21 DIAGNOSIS — H10.13 CONJUNCTIVITIS, ALLERGIC, BILATERAL: Primary | ICD-10-CM

## 2024-06-21 PROCEDURE — 99999 PR PBB SHADOW E&M-EST. PATIENT-LVL III: CPT | Mod: PBBFAC,,, | Performed by: OPHTHALMOLOGY

## 2024-06-21 PROCEDURE — 99213 OFFICE O/P EST LOW 20 MIN: CPT | Mod: PBBFAC | Performed by: OPHTHALMOLOGY

## 2024-06-21 PROCEDURE — G2211 COMPLEX E/M VISIT ADD ON: HCPCS | Mod: S$PBB,,, | Performed by: OPHTHALMOLOGY

## 2024-06-21 PROCEDURE — 99214 OFFICE O/P EST MOD 30 MIN: CPT | Mod: S$PBB,,, | Performed by: OPHTHALMOLOGY

## 2024-06-21 NOTE — PROGRESS NOTES
HPI    Referred by Dr. Kvng Rodas conj - chronic    Gtt's:  Pred forte BID OU     Patient is here for allergic conjunctivitis f/u.  Pt. Was seen by an allergist as recommended but was told that she is not   allergic to anything.  Pt. States symptoms cleared up a few day after she started using steroid   drops and have only had one flare up since then. The flare up happened   after using ocusoft cleaning wipes. Pt. Stated vision have remained   stable. Pt. Denies itching, discharge, redness, pain or discomfort.  Last edited by Virginia Dominguez on 6/21/2024  8:36 AM.            Assessment /Plan     For exam results, see Encounter Report.    Conjunctivitis, allergic, bilateral                   Red swollen eyes since march 23rd.     Sx wax and wane, some improvement with steroids.    Question allergy to pine trees --> NO ALLERGIES NOTED WITH ALLERGY TESTING.    Cx today to r/out infectious etiology --> NO GROWTH    Doing better on steroids -- okay to continue for now BID, IOP check 3 mo, then if still doing well, can decrease to QD long term ....      Today's visit is associated with current and anticipated ongoing medical care related to this patient's single serious/complex condition (chronic conjunctivis). Follow up is to be continued indefinitely to monitor the condition.

## 2024-06-23 ENCOUNTER — PATIENT MESSAGE (OUTPATIENT)
Dept: ENDOCRINOLOGY | Facility: CLINIC | Age: 69
End: 2024-06-23
Payer: MEDICARE

## 2024-06-24 ENCOUNTER — TELEPHONE (OUTPATIENT)
Dept: OPTOMETRY | Facility: CLINIC | Age: 69
End: 2024-06-24
Payer: MEDICARE

## 2024-06-24 NOTE — TELEPHONE ENCOUNTER
Left message stating per Dr. Bianchi pt to follow up with Dr. Calderon for IOP ck.    SARA OU: USE ARTIFICIAL TEARS 2-3 TIMES A DAY. RETURN FOR FOLLOW-UP AS SCHEDULED.

## 2024-06-24 NOTE — TELEPHONE ENCOUNTER
----- Message from Patt Lopez MA sent at 6/21/2024  5:17 PM CDT -----  Regarding: RE: Please schedule 3 month follow up with Dr. Calderon per Dr. Bianchi, thanks!  Pt states that  told her to follow up in 3 months with  not   ----- Message -----  From: Génesis Holland  Sent: 6/21/2024   4:59 PM CDT  To: Patt Lopez MA  Subject: FW: Please schedule 3 month follow up with DENIA      ----- Message -----  From: Isela Maurer MA  Sent: 6/21/2024  12:00 PM CDT  To: [facility] Staff  Subject: Please schedule 3 month follow up with Dr. HESS

## 2024-06-27 ENCOUNTER — TELEPHONE (OUTPATIENT)
Dept: ENDOCRINOLOGY | Facility: CLINIC | Age: 69
End: 2024-06-27
Payer: MEDICARE

## 2024-06-27 NOTE — TELEPHONE ENCOUNTER
Spoke with patient informed her per pharmacy that her application for Ozempic will be cancelled due to her not meeting criteria, patient verbalized understanding.

## 2024-07-31 ENCOUNTER — HOSPITAL ENCOUNTER (OUTPATIENT)
Dept: ENDOCRINOLOGY | Facility: CLINIC | Age: 69
Discharge: HOME | End: 2024-07-31
Attending: INTERNAL MEDICINE
Payer: MEDICARE

## 2024-07-31 DIAGNOSIS — E04.9 GOITER, NODULAR: ICD-10-CM

## 2024-07-31 DIAGNOSIS — E03.9 ACQUIRED HYPOTHYROIDISM: ICD-10-CM

## 2024-07-31 DIAGNOSIS — R73.03 PREDIABETES: ICD-10-CM

## 2024-07-31 PROCEDURE — 76536 US EXAM OF HEAD AND NECK: CPT | Mod: 26,,, | Performed by: INTERNAL MEDICINE

## 2024-08-01 ENCOUNTER — LAB VISIT (OUTPATIENT)
Dept: LAB | Facility: HOSPITAL | Age: 69
End: 2024-08-01
Attending: INTERNAL MEDICINE
Payer: MEDICARE

## 2024-08-01 DIAGNOSIS — R73.03 PREDIABETES: ICD-10-CM

## 2024-08-01 DIAGNOSIS — E03.9 ACQUIRED HYPOTHYROIDISM: ICD-10-CM

## 2024-08-01 DIAGNOSIS — E04.9 GOITER, NODULAR: ICD-10-CM

## 2024-08-01 LAB
ALBUMIN SERPL BCP-MCNC: 3.9 G/DL (ref 3.5–5.2)
ALP SERPL-CCNC: 95 U/L (ref 55–135)
ALT SERPL W/O P-5'-P-CCNC: 20 U/L (ref 10–44)
ANION GAP SERPL CALC-SCNC: 8 MMOL/L (ref 8–16)
AST SERPL-CCNC: 27 U/L (ref 10–40)
BILIRUB SERPL-MCNC: 0.8 MG/DL (ref 0.1–1)
BUN SERPL-MCNC: 27 MG/DL (ref 8–23)
CALCIUM SERPL-MCNC: 9.7 MG/DL (ref 8.7–10.5)
CHLORIDE SERPL-SCNC: 105 MMOL/L (ref 95–110)
CO2 SERPL-SCNC: 28 MMOL/L (ref 23–29)
CORTIS SERPL-MCNC: 11.9 UG/DL (ref 4.3–22.4)
CREAT SERPL-MCNC: 0.8 MG/DL (ref 0.5–1.4)
EST. GFR  (NO RACE VARIABLE): >60 ML/MIN/1.73 M^2
ESTIMATED AVG GLUCOSE: 105 MG/DL (ref 68–131)
GLUCOSE SERPL-MCNC: 103 MG/DL (ref 70–110)
HBA1C MFR BLD: 5.3 % (ref 4–5.6)
POTASSIUM SERPL-SCNC: 4 MMOL/L (ref 3.5–5.1)
PROT SERPL-MCNC: 6.7 G/DL (ref 6–8.4)
SODIUM SERPL-SCNC: 141 MMOL/L (ref 136–145)
TSH SERPL DL<=0.005 MIU/L-ACNC: 1.07 UIU/ML (ref 0.4–4)

## 2024-08-01 PROCEDURE — 80053 COMPREHEN METABOLIC PANEL: CPT | Performed by: INTERNAL MEDICINE

## 2024-08-01 PROCEDURE — 82533 TOTAL CORTISOL: CPT | Performed by: INTERNAL MEDICINE

## 2024-08-01 PROCEDURE — 83036 HEMOGLOBIN GLYCOSYLATED A1C: CPT | Performed by: INTERNAL MEDICINE

## 2024-08-01 PROCEDURE — 84443 ASSAY THYROID STIM HORMONE: CPT | Performed by: INTERNAL MEDICINE

## 2024-08-01 PROCEDURE — 82024 ASSAY OF ACTH: CPT | Performed by: INTERNAL MEDICINE

## 2024-08-02 LAB — ACTH PLAS-MCNC: 11 PG/ML (ref 0–46)

## 2024-08-07 ENCOUNTER — PATIENT MESSAGE (OUTPATIENT)
Dept: ENDOCRINOLOGY | Facility: CLINIC | Age: 69
End: 2024-08-07
Payer: MEDICARE

## 2024-08-08 ENCOUNTER — OFFICE VISIT (OUTPATIENT)
Dept: DERMATOLOGY | Facility: CLINIC | Age: 69
End: 2024-08-08
Payer: MEDICARE

## 2024-08-08 DIAGNOSIS — M35.9 COLLAGEN DISEASE: ICD-10-CM

## 2024-08-08 PROCEDURE — 99213 OFFICE O/P EST LOW 20 MIN: CPT | Mod: PBBFAC | Performed by: STUDENT IN AN ORGANIZED HEALTH CARE EDUCATION/TRAINING PROGRAM

## 2024-08-08 PROCEDURE — 99999 PR PBB SHADOW E&M-EST. PATIENT-LVL III: CPT | Mod: PBBFAC,,, | Performed by: STUDENT IN AN ORGANIZED HEALTH CARE EDUCATION/TRAINING PROGRAM

## 2024-08-14 ENCOUNTER — OFFICE VISIT (OUTPATIENT)
Dept: UROGYNECOLOGY | Facility: CLINIC | Age: 69
End: 2024-08-14
Payer: MEDICARE

## 2024-08-14 ENCOUNTER — PATIENT MESSAGE (OUTPATIENT)
Dept: FAMILY MEDICINE | Facility: CLINIC | Age: 69
End: 2024-08-14
Payer: MEDICARE

## 2024-08-14 VITALS
HEART RATE: 63 BPM | SYSTOLIC BLOOD PRESSURE: 111 MMHG | HEIGHT: 62 IN | WEIGHT: 176.81 LBS | BODY MASS INDEX: 32.54 KG/M2 | DIASTOLIC BLOOD PRESSURE: 66 MMHG

## 2024-08-14 DIAGNOSIS — E78.49 OTHER HYPERLIPIDEMIA: ICD-10-CM

## 2024-08-14 DIAGNOSIS — Z12.31 SCREENING MAMMOGRAM FOR BREAST CANCER: ICD-10-CM

## 2024-08-14 DIAGNOSIS — N81.6 RECTOCELE: ICD-10-CM

## 2024-08-14 DIAGNOSIS — Z12.11 ENCOUNTER FOR SCREENING COLONOSCOPY: Primary | ICD-10-CM

## 2024-08-14 DIAGNOSIS — N95.2 VAGINAL ATROPHY: ICD-10-CM

## 2024-08-14 DIAGNOSIS — K59.00 CONSTIPATION, UNSPECIFIED CONSTIPATION TYPE: ICD-10-CM

## 2024-08-14 DIAGNOSIS — R39.15 URINARY URGENCY: Primary | ICD-10-CM

## 2024-08-14 PROCEDURE — 99213 OFFICE O/P EST LOW 20 MIN: CPT | Mod: S$PBB,,, | Performed by: NURSE PRACTITIONER

## 2024-08-14 PROCEDURE — 99214 OFFICE O/P EST MOD 30 MIN: CPT | Mod: PBBFAC | Performed by: NURSE PRACTITIONER

## 2024-08-14 PROCEDURE — 99999 PR PBB SHADOW E&M-EST. PATIENT-LVL IV: CPT | Mod: PBBFAC,,, | Performed by: NURSE PRACTITIONER

## 2024-08-14 RX ORDER — DARIFENACIN 15 MG/1
15 TABLET, EXTENDED RELEASE ORAL DAILY
Qty: 90 TABLET | Refills: 3 | Status: SHIPPED | OUTPATIENT
Start: 2024-08-14 | End: 2025-09-14

## 2024-08-14 RX ORDER — ESTRADIOL 0.1 MG/G
1 CREAM VAGINAL
Qty: 42.5 G | Refills: 3 | Status: SHIPPED | OUTPATIENT
Start: 2024-08-15

## 2024-08-14 NOTE — PATIENT INSTRUCTIONS
1)  Mixed urinary incontinence, urge/spontaneous > stress:    --continue pelvic floor home exercise program  --URGE: continue enablex 15 mg daily  --STRESS:  Pessary vs. Sling.      2)  Vaginal atrophy (dryness):    --Continue 1 gm vaginal estrogen cream twice weekly  --sent to [facility]-- let me know if there is a problem getting it     3)  Nocturia (nighttime urination): stop fluids 2 hours before bed/no water by bed.  If have leg swelling:  Elevate feet above chest x 1 hour before bed to get excess fluid off.  Can also use support hose (knee highs).    --work on sleep pattern (sleep hygiene sheet)       4) history of  UTI:  --If you feel like you have a UTI, please call our office so that we can place an order for you to drop off a urine specimen for urine culture at the closest [facility] lab (we will arrange).                --We can call in antibiotics for you to start right after you drop off specimen.                --In this way, we can determine:                           1)  Do you have a UTI?                            2) If you have a UTI, is it sensitive to the antibiotics we prescribed?   --follow UTI prevention tips (see attached)  --control bowel movements/fecal cross-contamination: continue daily fiber gummies              --can use stool softener as needed for flares  --treat vaginal atrophy (dryness)  --empty bladder before and after intercourse  --consider need for further evaluation (pelvic imaging and cystoscopy) if issue persists     5)  rectocele stage 2/ Constipation:  --hydrate well  --take stool softener twice daily  --add magnesium oxide 400 mg daily   --add 1/2- 1 capful of miralax daily    6. RTC 1 year for annual

## 2024-08-14 NOTE — PROGRESS NOTES
Urogyn follow up  08/14/2024  .  [facility]  [address]  [facility] [id number]    Claudia Rodriguez  [id number]  1955      Claudia Rodriguez is a 69 y.o. here for a urogyn follow up of mixed urinary incontinence and urinary urgency      HPI: 2017.      1)  UI:  (+) GARY < (+) UUI but mostly just small, spontaneous leaks, alcira with movement.  Was having some U/F/UI, then had bladder lift in her 30s--better.  UI returned after some years, now having more U/F x 2-3 months.   (+) pads (liners):1/day, usually severe wetness and 1/night usually (liner) minimum-moderate wetness.  Daytime frequency: Q 1 hours after taking diuretic; once that wears off Q2h.  Nocturia: Yes: 4-7/night. Does have a little insomnia--started taking 1/4 benadryl.  Has decreased liquid consumption before bed--stops at 6PM, no water by bed.  Has improved nocturia some.  No LE swelling.  Pelvic floor PT:  Has scheduled for next week with [facility].  Is going to work on weight loss. Is emotional crutch for her.   (--) dysuria,  (--) hematuria,  (--) frequent UTIs.  (--) complete bladder emptying. Has to PV to help with moderate 2nd void.       2)  POP:  Absent.  (--) vaginal bleeding. (--) vaginal discharge. (+) sexually active.  (--) dyspareunia.  (+)  Vaginal dryness. Uses lube during intercourse.  (--) vaginal estrogen use.      3)  BM:  (--) constipation/straining.  (--) chronic diarrhea. Does have some IBS--controlled with avoiding dietary triggers.  Has diarrhea with triggers.  (--) hematochezia.  (--) fecal incontinence.  (--) fecal smearing/urgency.  (+) complete evacuation.      09/22/2020  1)  Mixed urinary incontinence, urge/spontaneous > stress:    --had UTI a few months ago; finished ABX with neg ANNIA, still feels U/F increased              --urine C&S 6/2020 + ecoli--took macrobid x 2 rounds              --urine C&S 9/2020 mixed orgs  --baseline UI: (+) pads (liners):1/day, usually severe wetness and 1/night usually  (liner) minimum-moderate wetness.  Daytime frequency: Q 1 hours after taking diuretic; once that wears off Q2h.  Nocturia: Yes: 4-7/night  --currently: +liner/day--damp by end of day; +liner/night, very damp by AM; daytime is stable but nighttime worse; feels like very sensitive to urine being in urethra--makes her feel increased U/F: freq Qh; nocturia: 7x/night, alcira since UTI; ? Complete emptying--has PV urgency with small volume void  --weight loss +: has lost 20 lbs  --pre-DM: was taking metformin but was able to stop        Lab Results   Component Value Date     HGBA1C 5.4 06/26/2020   --did PT in past but had trouble with regular visits; used heating pad x 30 min--not sure she got as much as possible     2)  Vaginal atrophy (dryness):    --was taking estrace  --was changed to imvexxy--didn't like  --now restarted estrace x 2 weeks with applicator     3)  Nocturia (nighttime urination): stop fluids 2 hours before bed/no water by bed.  If have leg swelling:  Elevate feet above chest x 1 hour before bed to get excess fluid off.  Can also use support hose (knee highs).    --work on sleep pattern (sleep hygiene sheet)  --continue to watch snoring--may need sleep apnea evaluation     4)  Constipation:  --uncommon--mosly when diet off  --takes daily fiber  --takes stool softeners PRN    01/29/2021  1)  Mixed urinary incontinence, urge/spontaneous > stress:    --completed pelvic floor with Abimbola Wheat.   --using oxybutynin xl 10 mg  --voiding every 1 1/2- 2 hours  --nocturia 3/ night (limits fluids prior to bedtime most night)  --rare GARY, UUI at night mostly--very scant-- reports 75 % improvement.  --feels like she emptying her bladder      2)  Vaginal atrophy (dryness):    --using estrogen cream twice weekly  --did not like imvexxy     3)  Nocturia (nighttime urination):   --improved to 3/ night  --has lost 25 pounds  --she does snore     4)  Recent UTI:  --denies symptoms at this time  --symptoms improved with  oxybutynin    01/19/2022  1)  Mixed urinary incontinence, urge/spontaneous > stress:    --completed pelvic floor with Abimbola Wheat--doing HEP  --using oxybutynin xl 10 mg  --voiding every 1 1/2- 2 hours  --nocturia 3/ night (limits fluids prior to bedtime most night)  --using mini pads with scant UUI  --minimal GARY     2)  Vaginal atrophy (dryness):    --using estrogen cream twice weekly  --estrace was > $200  --did not like imvexxy     3)  Nocturia (nighttime urination):   --improved to 3/ night       4)  Recent UTI:  --denies symptoms at this time  --symptoms improved with oxybutynin    03/03/2023--virtual visit  1)  Mixed urinary incontinence, urge/spontaneous > stress:    --has not been doing pelvic floor PT HEP x 6-7 months -- recently restarted  --using oxybutynin xl 10 mg  --voiding every 1 hours  --nocturia 6-7/ night (limits fluids prior to bedtime most night)  --using mini pads with scant UUI--still improved  --minimal GARY     2)  Vaginal atrophy (dryness):    --using estrogen cream twice weekly  --estrace was > $200  --did not like imvexxy     3)  Nocturia (nighttime urination):   --6-7/ night     4)  Recent UTI:  --having urinary frequency x 3 days  --concerned she has a uti  --symptoms improved with oxybutynin    06/22/2023  1)  Mixed urinary incontinence, urge/spontaneous > stress:    --oxybutynin xl 10 mg-- did not help urinary urgency  --taking enablex 15 mg--initially helped until recent uti  --was voiding every 2 1/2-3 hours during the day and 3/ night  --since uti-- voiding every hour during the day and 7-8/ night  --not limiting fluids prior to bedtime     2)  Vaginal atrophy (dryness):    --using vaginal estrogen cream once / week--refill was expensive    4) history of  UTI:  --treated for uti recently-- culture not available     Changes since last visit:  +GARY minimal  Denies UUI  Darifenacin 15 mg daily  Using vatginal estrogen cream twice weekly  + constipation since on terzepitide.  Taking  miralax as needed  Denies uti symptoms         Past Medical History:   Diagnosis Date    Cholelithiasis     DDD (degenerative disc disease), lumbar     Fatty liver     - noted on U/S 11/2017    Goiter, nodular     HTN (hypertension)     Hyperglycemia     Hyperlipidemia     Hypothyroidism     Hypovitaminosis D     Meningioma 2010    ~2010; right frontal lobe ; followed by Dr. Enciso once a year    Obesity     Prediabetes     Urinary bladder disorder        Past Surgical History:   Procedure Laterality Date    BLADDER SUSPENSION  1986    LIVER BIOPSY  2020    Per pt, [facility] misdx her with stage I liver cancer but no tx and no sign of it one year later    thyroid biopsy  07/11/2012    TONSILLECTOMY  1959       Family History   Problem Relation Name Age of Onset    Hypertension Mother      Stroke Mother          2015    Coronary artery disease Father  55    Heart disease Father      Hypertension Father      Melanoma Father      No Known Problems Sister Ronda     Thyroid nodules Sister Zaynab         s/p thyroidectomy    Thyroid nodules Sister Amanda     Allergies Brother Mert Becker     No Known Problems Brother Kevon     No Known Problems Maternal Aunt      No Known Problems Maternal Uncle      No Known Problems Paternal Aunt      No Known Problems Paternal Uncle      Heart failure Maternal Grandmother      Pancreatic cancer Maternal Grandfather      No Known Problems Paternal Grandmother      No Known Problems Paternal Grandfather      No Known Problems Other      Diabetes Neg Hx      Thyroid cancer Neg Hx      Breast cancer Neg Hx      Ovarian cancer Neg Hx      Endometrial cancer Neg Hx      Colon cancer Neg Hx      Rectal cancer Neg Hx      Allergic rhinitis Neg Hx      Angioedema Neg Hx      Asthma Neg Hx      Atopy Neg Hx      Eczema Neg Hx      Immunodeficiency Neg Hx      Rhinitis Neg Hx      Urticaria Neg Hx         Social History     Socioeconomic History    Marital status:     Number of children: 1    Occupational History    Occupation: human      Employer: [employer]   Tobacco Use    Smoking status: Never     Passive exposure: Never    Smokeless tobacco: Never   Substance and Sexual Activity    Alcohol use: No    Drug use: No    Sexual activity: Yes     Partners: Male   Other Topics Concern    Are you pregnant or think you may be? No    Breast-feeding No   Social History Narrative    3 adopted children.     Social Determinants of Health     Financial Resource Strain: Low Risk  (1/13/2024)    Overall Financial Resource Strain (CARDIA)     Difficulty of Paying Living Expenses: Not very hard   Food Insecurity: No Food Insecurity (1/13/2024)    Hunger Vital Sign     Worried About Running Out of Food in the Last Year: Never true     Ran Out of Food in the Last Year: Never true   Transportation Needs: No Transportation Needs (1/13/2024)    PRAPARE - Transportation     Lack of Transportation (Medical): No     Lack of Transportation (Non-Medical): No   Physical Activity: Sufficiently Active (1/13/2024)    Exercise Vital Sign     Days of Exercise per Week: 5 days     Minutes of Exercise per Session: 60 min   Stress: No Stress Concern Present (1/13/2024)    [employer] [place] [employer] - Occupational Stress Questionnaire     Feeling of Stress : Only a little   Housing Stability: Low Risk  (1/13/2024)    Housing Stability Vital Sign     Unable to Pay for Housing in the Last Year: No     Number of Places Lived in the Last Year: 1     Unstable Housing in the Last Year: No       Current Outpatient Medications   Medication Sig Dispense Refill    amLODIPine (NORVASC) 10 MG tablet Take 1 tablet by mouth once daily 90 tablet 1    aspirin (ECOTRIN) 81 MG EC tablet Take 1 tablet (81 mg total) by mouth once daily. 90 tablet 3    atorvastatin (LIPITOR) 80 MG tablet TAKE 1 TABLET BY MOUTH EVERY DAY IN THE EVENING 90 tablet 3    CALCIUM CARBONATE/VITAMIN D3 (CALCIUM 500 + D, D3, ORAL) once daily.        carvediloL (COREG) 25 MG tablet TAKE 1 TABLET BY MOUTH TWICE A DAY WITH FOOD 180 tablet 1    ergocalciferol (ERGOCALCIFEROL) 50,000 unit Cap TAKE 1 CAPSULE BY MOUTH ONE TIME PER WEEK 12 capsule 3    FOLIC ACID/MV,FE,OTHER MIN (CENTRUM ORAL) Take 1 tablet by mouth.      levothyroxine (SYNTHROID) 75 MCG tablet TAKE 1 TABLET BY MOUTH EVERY DAY 90 tablet 3    omega-3 fatty acids 1,000 mg Cap Take 1 capsule by mouth Daily.      triamterene-hydrochlorothiazide 37.5-25 mg (MAXZIDE-25) 37.5-25 mg per tablet TAKE 1 TABLET BY MOUTH EVERY DAY 90 tablet 3    darifenacin (ENABLEX) 15 mg 24 hr tablet Take 1 tablet (15 mg total) by mouth once daily. 90 tablet 3    [START ON 8/15/2024] estradioL (ESTRACE) 0.01 % (0.1 mg/gram) vaginal cream Place 1 g vaginally twice a week. 42.5 g 3     No current facility-administered medications for this visit.       Review of patient's allergies indicates:   Allergen Reactions    Atropine-demerol Shortness Of Breath    Zostavax [zoster vaccine live (pf)] Rash     - injection site red, hot, indurated    Meperidine Other (See Comments)     Other reaction(s): Nausea    Other reaction(s): Headache    Patient unsure of reaction, but she reports that her OB informed her to never to let anyone give her Demerol in the future.      Other reaction(s): Nausea Other reaction(s): Headache    Nsaids (non-steroidal anti-inflammatory drug) Other (See Comments)       Other reaction(s): Difficulty breathing    Other reaction(s): Difficulty breathing    Phenytoin sodium extended Other (See Comments)       Well woman:  Pap test: 2017, normal per report.  History of abnormal paps: No.  History of STIs:  No  Mammogram: Date of last:06/2022--normal per patient report at [facility]  Colonoscopy: none.  Worried since had bladder rupture.  Was told by [facility] she should never have invasive procedure.  Does have stool CA screening with PCP.   DEXA:  Date of last:09/2023 normal       ROS:  As per HPI.      Exam  BP  "111/66 (BP Location: Left arm, Patient Position: Sitting, BP Method: Medium (Automatic))   Pulse 63   Ht 5' 1.93" (1.573 m)   Wt 80.2 kg (176 lb 12.9 oz)   BMI 32.41 kg/m²   General: alert and oriented, no acute distress  Respiratory: normal respiratory effort  Abd: soft, non-tender, non-distended    PELVIC EXAM: deferred      Sterile cath urine obtained      Impression  1. Urinary urgency  darifenacin (ENABLEX) 15 mg 24 hr tablet      2. Screening mammogram for breast cancer  Mammo Digital Screening Bilat w/ [facility]    Mammo Digital Screening Bilat w/ [facility]      3. Vaginal atrophy  estradioL (ESTRACE) 0.01 % (0.1 mg/gram) vaginal cream      4. Rectocele        5. Constipation, unspecified constipation type              We reviewed the above issues and discussed options for short-term versus long-term management of her problems.   Plan:      1)  Mixed urinary incontinence, urge/spontaneous > stress:    --continue pelvic floor home exercise program  --URGE: continue enablex 15 mg daily  --STRESS:  Pessary vs. Sling.      2)  Vaginal atrophy (dryness):    --Continue 1 gm vaginal estrogen cream twice weekly  --[facility]-- let me know if there is a problem getting it     3)  Nocturia (nighttime urination): stop fluids 2 hours before bed/no water by bed.  If have leg swelling:  Elevate feet above chest x 1 hour before bed to get excess fluid off.  Can also use support hose (knee highs).    --work on sleep pattern (sleep hygiene sheet)       4) history of  UTI:  --If you feel like you have a UTI, please call our office so that we can place an order for you to drop off a urine specimen for urine culture at the closest [facility] lab (we will arrange).                --We can call in antibiotics for you to start right after you drop off specimen.                --In this way, we can determine:                           1)  Do you have a UTI?                            2) If you have a UTI, is it sensitive to the " antibiotics we prescribed?   --follow UTI prevention tips (see attached)  --control bowel movements/fecal cross-contamination: continue daily fiber gummies              --can use stool softener as needed for flares  --treat vaginal atrophy (dryness)  --empty bladder before and after intercourse  --consider need for further evaluation (pelvic imaging and cystoscopy) if issue persists     5)  rectocele stage 2/ Constipation:  --hydrate well  --take stool softener twice daily  --add magnesium oxide 400 mg daily   --add 1/2- 1 capful of miralax daily    6. RTC 1 year for annual    I spent a total of 20 minutes on the day of the visit.  This includes face to face time and non-face to face time preparing to see the patient (eg, review of tests), obtaining and/or reviewing separately obtained history, documenting clinical information in the electronic or other health record, independently interpreting results and communicating results to the patient/family/caregiver, or care coordinator.     Kimberlyn Ledesma, JEANNE-BC  [facility]  Division of Female Pelvic Medicine and Reconstructive Surgery  Department of Obstetrics & Gynecology

## 2024-08-27 ENCOUNTER — TELEPHONE (OUTPATIENT)
Dept: ENDOCRINOLOGY | Facility: CLINIC | Age: 69
End: 2024-08-27

## 2024-08-27 ENCOUNTER — PATIENT MESSAGE (OUTPATIENT)
Dept: ENDOCRINOLOGY | Facility: CLINIC | Age: 69
End: 2024-08-27

## 2024-08-27 ENCOUNTER — OFFICE VISIT (OUTPATIENT)
Dept: ENDOCRINOLOGY | Facility: CLINIC | Age: 69
End: 2024-08-27
Payer: MEDICARE

## 2024-08-27 DIAGNOSIS — E27.8 ADRENAL INCIDENTALOMA: ICD-10-CM

## 2024-08-27 DIAGNOSIS — R73.03 PREDIABETES: ICD-10-CM

## 2024-08-27 DIAGNOSIS — E03.9 ACQUIRED HYPOTHYROIDISM: ICD-10-CM

## 2024-08-27 DIAGNOSIS — E66.3 OVERWEIGHT (BMI 25.0-29.9): ICD-10-CM

## 2024-08-27 DIAGNOSIS — E04.9 GOITER, NODULAR: Primary | ICD-10-CM

## 2024-08-27 DIAGNOSIS — E55.9 VITAMIN D DEFICIENCY DISEASE: ICD-10-CM

## 2024-08-27 PROCEDURE — G2211 COMPLEX E/M VISIT ADD ON: HCPCS | Mod: 95,,, | Performed by: INTERNAL MEDICINE

## 2024-08-27 PROCEDURE — 99214 OFFICE O/P EST MOD 30 MIN: CPT | Mod: 95,,, | Performed by: INTERNAL MEDICINE

## 2024-08-27 RX ORDER — DEXAMETHASONE 1 MG/1
1 TABLET ORAL ONCE
Qty: 1 TABLET | Refills: 0 | Status: SHIPPED | OUTPATIENT
Start: 2024-08-27 | End: 2024-08-27

## 2024-08-27 NOTE — TELEPHONE ENCOUNTER
Called patient cell phone with no answer and no voice mail.  Needs to have labs after dexamethasone order.

## 2024-08-27 NOTE — PROGRESS NOTES
Subjective:      Patient ID: Claudia Rodriguez is a 69 y.o. female.    Chief Complaint:  mng,  adrenal incidentaloma, hypothyroidism, prediabetes    History of Present Illness          With regards to the MNG  She initially presented with neck fullness  U/s confirmed the mng    2 FNAs done in 2012- both nondianostic  FNA L/isthmus 10/2013-benign     FNA  Done 12/18/18  THYROID, LEFT MID (ASPIRATION):  [facility] Thyroid Cytology Category: Benign  [place] follicular cells and colloid        Last ultrasound  7/31/24    COMPARISON:  Neck ultrasound dated 6/2023.  When compared to the prior study the above nodules are stable.     Impression:     1.) Thyroid gland is normal in size with heterogeneous echotexture and normal vascularity     2.) 0.7 x 0.4 x 0.8 cm solid, isoechoic nodule is seen in the right superior pole     3.) 1.0 x 1.0 x 0.9 cm solid, isoechoic nodule is seen in the right mid lobe     4.) 0.5 x 0.5 x 0.6 cm solid, heterogeneous nodule is seen in the right inferior pole     5.) 1.7 x 0.9 x 1.0 cm solid, slightly hypoechoic nodule is seen in the left mid lobe     6.) 0.6 x 0.6 x 0.7 cm solid, heterogeneous predominately isoechoic nodule is seen in the left inferior pole     7.) 1.1 x 0.7 x 1.3 cm solid, hypoechoic nodule is seen in the isthmus     RECOMMENDATIONS:  Recommend repeat thyroid ultrasound in 1-2 years.         2 Sister with nodules -1 had thyroidectomy - benign      Patient denies any neck enlargement.   No dysphagia, no dyspnea.   Energy level good.  sleeps well.   No skin/nail hair changes  Denies constipation       With regards to the adrenal incidentaloma    Found on MRI   Fat containing 1.8 cm right adrenal lesion with signal loss on opposed phase imaging      Dedicated imaging 01/23/2023    A 1.5 cm right adrenal nodule with noncontrast attenuation, most in keeping with adenoma (series 2, image 27).  Additional smaller hypoattenuating adrenal nodule on the right, probable additional  adenoma (series 2, image 34).    Renin detectable      Normal dexamethasone suppression test 1/1/23  and 7/3/23   Normal 24 hour urine cortisol 08/29/2023    Basal labs    Latest Reference Range & Units 06/23/23 07:40   Cortisol, 8 AM 4.30 - 22.40 ug/dL 12.90   ACTH 0 - 46 pg/mL 11   DHEA-SO4 33.6 - 78.9 ug/dL 25.9 (L)   (L): Data is abnormally low            With regards to the Hypothyroidism   She is on lt4 75 mcg qd   Takes medication in the morning without food or medication.        With regards to the Vit D deficiency   Taking ergo 50k q   week      With regards to the IGT and obesity   Off metformin    Down 45 lbs -- on/off ozempic -->changed to compounded  terzepatide  -  initially had a lot more side effects but is doing better with the currently       2023 nl bmd       Review of Systems  As above       Objective:   Physical Exam  Constitutional:       Appearance: Normal appearance.   Psychiatric:         Attention and Perception: Attention normal.         Mood and Affect: Mood normal.         Speech: Speech normal.         Behavior: Behavior normal.         Thought Content: Thought content normal.         Judgment: Judgment normal.         There is no height or weight on file to calculate BMI.    Lab Review:   Lab Results   Component Value Date    HGBA1C 5.3 08/01/2024     Lab Results   Component Value Date    CHOL 134 06/23/2023    HDL 44 06/23/2023    LDLCALC 74.4 06/23/2023    TRIG 78 06/23/2023    CHOLHDL 32.8 06/23/2023     Lab Results   Component Value Date     08/01/2024    K 4.0 08/01/2024     08/01/2024    CO2 28 08/01/2024     08/01/2024    BUN 27 (H) 08/01/2024    CREATININE 0.8 08/01/2024    CALCIUM 9.7 08/01/2024    PROT 6.7 08/01/2024    ALBUMIN 3.9 08/01/2024    BILITOT 0.8 08/01/2024    ALKPHOS 95 08/01/2024    AST 27 08/01/2024    ALT 20 08/01/2024    ANIONGAP 8 08/01/2024    ESTGFRAFRICA >60.0 07/11/2022    EGFRNONAA >60.0 07/11/2022    TSH 1.074 08/01/2024        Assessment and Plan     Goiter, nodular   I have reviewed management options       Discussed indications to repeat thyroid nodule biopsy as well as  surgical indications (abnormal FNA, compressive symptoms or interval change)     plan follow up in 1 year with TSH and thyroid u/s prior        Acquired hypothyroidism  Clinically and biochemically euthyroid  Goal is a normal TSH  Continue lt4 dose at this time  Avoid exogenous hyperthyroidism as this can accelerate bone loss and increase risk of CV complications.         Prediabetes  Resolved with weight loss.    Overweight (BMI 25.0-29.9)  Doing great    Vitamin D deficiency disease  Continue prescription vitamin-D  Recheck levels     Adrenal incidentaloma  DST x2 was normal....  Although, her baseline ACTH and DHEA-S are on the lower side.  Recheck dst.   It is reassuring that she has been able to lose weight with incretin      The patient location is: [place]  The chief complaint leading to consultation is: above     Visit type: audiovisual    Level of service is based on medical decision-making and not time      Each patient to whom he or she provides medical services by telemedicine is:  (1) informed of the relationship between the physician and patient and the respective role of any other health care provider with respect to management of the patient; and (2) notified that he or she may decline to receive medical services by telemedicine and may withdraw from such care at any time.

## 2024-08-27 NOTE — PATIENT INSTRUCTIONS
Thank you for completing a virtual visit with me!     Per our conversation,  we will plan for 8:00 a.m. labs to be done after you take the dexamethasone.  If all looks okay we will see you back in 1 year    Please let me know if you have any other questions.    Thank you,  Bisi Madrigal MD

## 2024-08-27 NOTE — ASSESSMENT & PLAN NOTE
DST x2 was normal....  Although, her baseline ACTH and DHEA-S are on the lower side.  Recheck dst.   It is reassuring that she has been able to lose weight with incretin

## 2024-08-29 ENCOUNTER — PATIENT MESSAGE (OUTPATIENT)
Dept: ENDOCRINOLOGY | Facility: CLINIC | Age: 69
End: 2024-08-29
Payer: MEDICARE

## 2024-09-09 ENCOUNTER — PATIENT MESSAGE (OUTPATIENT)
Dept: ENDOCRINOLOGY | Facility: CLINIC | Age: 69
End: 2024-09-09
Payer: MEDICARE

## 2024-09-10 ENCOUNTER — LAB VISIT (OUTPATIENT)
Dept: LAB | Facility: HOSPITAL | Age: 69
End: 2024-09-10
Attending: INTERNAL MEDICINE
Payer: MEDICARE

## 2024-09-10 DIAGNOSIS — E27.8 ADRENAL INCIDENTALOMA: ICD-10-CM

## 2024-09-10 DIAGNOSIS — E55.9 VITAMIN D DEFICIENCY DISEASE: ICD-10-CM

## 2024-09-10 LAB
25(OH)D3+25(OH)D2 SERPL-MCNC: 58 NG/ML (ref 30–96)
CORTIS SERPL-MCNC: 6.7 UG/DL (ref 4.3–22.4)

## 2024-09-10 PROCEDURE — 82542 COL CHROMOTOGRAPHY QUAL/QUAN: CPT | Performed by: INTERNAL MEDICINE

## 2024-09-10 PROCEDURE — 82533 TOTAL CORTISOL: CPT | Performed by: INTERNAL MEDICINE

## 2024-09-10 PROCEDURE — 36415 COLL VENOUS BLD VENIPUNCTURE: CPT | Mod: PO | Performed by: INTERNAL MEDICINE

## 2024-09-10 PROCEDURE — 82306 VITAMIN D 25 HYDROXY: CPT | Performed by: INTERNAL MEDICINE

## 2024-09-11 ENCOUNTER — PATIENT MESSAGE (OUTPATIENT)
Dept: ENDOCRINOLOGY | Facility: CLINIC | Age: 69
End: 2024-09-11
Payer: MEDICARE

## 2024-09-11 DIAGNOSIS — E27.8 ADRENAL INCIDENTALOMA: Primary | ICD-10-CM

## 2024-09-20 LAB — DEXAMETHASONE SERPL-MCNC: <20 NG/DL

## 2024-10-08 ENCOUNTER — LAB VISIT (OUTPATIENT)
Dept: LAB | Facility: HOSPITAL | Age: 69
End: 2024-10-08
Attending: INTERNAL MEDICINE
Payer: MEDICARE

## 2024-10-08 DIAGNOSIS — E27.8 ADRENAL INCIDENTALOMA: ICD-10-CM

## 2024-10-08 LAB — CORTIS SERPL-MCNC: 1.4 UG/DL (ref 4.3–22.4)

## 2024-10-08 PROCEDURE — 82533 TOTAL CORTISOL: CPT | Performed by: INTERNAL MEDICINE

## 2024-10-08 PROCEDURE — 36415 COLL VENOUS BLD VENIPUNCTURE: CPT | Mod: PO | Performed by: INTERNAL MEDICINE

## 2024-10-08 PROCEDURE — 82542 COL CHROMOTOGRAPHY QUAL/QUAN: CPT | Performed by: INTERNAL MEDICINE

## 2024-10-17 LAB — DEXAMETHASONE SERPL-MCNC: 529 NG/DL

## 2024-11-11 NOTE — PROGRESS NOTES
1/22/2024     3:54 PM   Rapid3 Question Responses and Scores   MDHAQ Score 0   Psychologic Score 1.1   Pain Score 0   When you awakened in the morning OVER THE LAST WEEK, did you feel stiff? No   Fatigue Score 4   Global Health Score 1   RAPID3 Score 0.33     Answers submitted by the patient for this visit:  Rheumatology Questionnaire (Submitted on 1/22/2024)  fever: No  eye redness: No  mouth sores: No  headaches: No  shortness of breath: No  chest pain: No  trouble swallowing: No  diarrhea: No  constipation: No  unexpected weight change: No  genital sore: No  dysuria: No  During the last 3 days, have you had a skin rash?: Yes  Bruises or bleeds easily: Yes  cough: No       Iman Cheema MD patient.    Please see note below.  Please see refill request, will eprescribe upon approval.  Last ov 1/4/18.   History of PE in 2022, has been on Xarelto 15mg     - hold Xarelto 15mg

## 2024-11-18 ENCOUNTER — PATIENT MESSAGE (OUTPATIENT)
Dept: ADMINISTRATIVE | Facility: HOSPITAL | Age: 69
End: 2024-11-18
Payer: MEDICARE

## 2024-11-21 ENCOUNTER — PATIENT MESSAGE (OUTPATIENT)
Dept: FAMILY MEDICINE | Facility: CLINIC | Age: 69
End: 2024-11-21
Payer: MEDICARE

## 2024-11-22 DIAGNOSIS — I10 ESSENTIAL HYPERTENSION: ICD-10-CM

## 2024-11-22 RX ORDER — AMLODIPINE BESYLATE 10 MG/1
10 TABLET ORAL
Qty: 90 TABLET | Refills: 0 | Status: SHIPPED | OUTPATIENT
Start: 2024-11-22

## 2024-11-22 NOTE — TELEPHONE ENCOUNTER
Care Due:                  Date            Visit Type   Department     Provider  --------------------------------------------------------------------------------                                SAME DAY -   [facility]                              ESTABLISHED   MED/ INTERNAL  Last Visit: 04-      PATIENT      MED/ PEDS      Carole PARNELL -         [facility]                              PRIMARY      MED/ INTERNAL  Giana France  Next Visit: 01-      CARE ([facility])   MED/ PEDS      Jw Marquez                                                            Last  Test          Frequency    Reason                     Performed    Due Date  --------------------------------------------------------------------------------    Lipid Panel.  12 months..  atorvastatin.............  06- 06-    [facility] Embedded Care Due Messages. Reference number: [id number].   11/22/2024 9:15:01 AM CST

## 2024-11-22 NOTE — TELEPHONE ENCOUNTER
Refill Decision Note   Claudialady Rodriguez  is requesting a refill authorization.  Brief Assessment and Rationale for Refill:  Approve     Medication Therapy Plan: FLOS      Comments:     Note composed:11:11 AM 11/22/2024

## 2024-12-24 ENCOUNTER — PATIENT OUTREACH (OUTPATIENT)
Dept: ADMINISTRATIVE | Facility: HOSPITAL | Age: 69
End: 2024-12-24
Payer: MEDICARE

## 2024-12-27 DIAGNOSIS — Z00.6 RESEARCH STUDY PATIENT: Primary | ICD-10-CM

## 2024-12-30 ENCOUNTER — RESEARCH ENCOUNTER (OUTPATIENT)
Dept: RESEARCH | Facility: HOSPITAL | Age: 69
End: 2024-12-30
Payer: MEDICARE

## 2024-12-30 ENCOUNTER — LAB VISIT (OUTPATIENT)
Dept: LAB | Facility: HOSPITAL | Age: 69
End: 2024-12-30
Attending: INTERNAL MEDICINE
Payer: MEDICARE

## 2024-12-30 DIAGNOSIS — E78.49 OTHER HYPERLIPIDEMIA: ICD-10-CM

## 2024-12-30 DIAGNOSIS — Z00.6 RESEARCH STUDY PATIENT: ICD-10-CM

## 2024-12-30 LAB
CHOLEST SERPL-MCNC: 139 MG/DL (ref 120–199)
CHOLEST/HDLC SERPL: 2.8 {RATIO} (ref 2–5)
HDLC SERPL-MCNC: 50 MG/DL (ref 40–75)
HDLC SERPL: 36 % (ref 20–50)
LDLC SERPL CALC-MCNC: 71.6 MG/DL (ref 63–159)
NONHDLC SERPL-MCNC: 89 MG/DL
RESEARCH LAB: NORMAL
TRIGL SERPL-MCNC: 87 MG/DL (ref 30–150)

## 2024-12-30 PROCEDURE — 80061 LIPID PANEL: CPT | Performed by: INTERNAL MEDICINE

## 2024-12-30 PROCEDURE — 36415 COLL VENOUS BLD VENIPUNCTURE: CPT | Mod: PO | Performed by: INTERNAL MEDICINE

## 2024-12-30 NOTE — PROGRESS NOTES
RESEARCH STUDY SPECIMEN COLLECTION ENCOUNTER  ORGAN TRANSPLANT  [facility]    Study Title: Role of Tumor-Induced Immune Tolerance in the Patient Response to Locoregional Therapy: Implications in Assessment Risk of Hepatocellular Carcinoma Recurrence Following Liver Transplantation    IRB #: 2016.131.B    IRB Approval Date: 6/8/2016    : Daniel Thurman MD  Sub-investigator: Stan Hannah, PhD    Patient Number: [id number]    In accordance with the study protocol, [facility] Lab orders were placed and follow-up specimens were collected on (date: 12/30/2024) in:  [facility] VNP: YES/NO: No  [facility] LABTX: YES/NO: No  [facility] IM: YES/NO: No  Other [facility] location: YES/NO: Yes   Location: [facility]    A  was used to transport blood specimens to [facility] for processing: YES/NO: Yes  Blood specimens were transported to [facility] for processing: YES/NO: Yes    Domingo Jenkins  Admin Research- Liver Transplant

## 2024-12-31 DIAGNOSIS — I10 ESSENTIAL HYPERTENSION: ICD-10-CM

## 2024-12-31 RX ORDER — CARVEDILOL 25 MG/1
25 TABLET ORAL 2 TIMES DAILY WITH MEALS
Qty: 180 TABLET | Refills: 0 | Status: SHIPPED | OUTPATIENT
Start: 2024-12-31

## 2024-12-31 NOTE — TELEPHONE ENCOUNTER
Refill Decision Note   Claudia Rodriguez  is requesting a refill authorization.  Brief Assessment and Rationale for Refill:  Approve     Medication Therapy Plan:         Comments:     Note composed:1:11 PM 12/31/2024

## 2024-12-31 NOTE — TELEPHONE ENCOUNTER
No care due was identified.  [facility] Embedded Care Due Messages. Reference number: [id number].   12/31/2024 12:56:00 AM CST

## 2025-01-06 ENCOUNTER — PATIENT MESSAGE (OUTPATIENT)
Dept: FAMILY MEDICINE | Facility: CLINIC | Age: 70
End: 2025-01-06

## 2025-01-06 ENCOUNTER — OFFICE VISIT (OUTPATIENT)
Dept: FAMILY MEDICINE | Facility: CLINIC | Age: 70
End: 2025-01-06
Payer: MEDICARE

## 2025-01-06 VITALS
DIASTOLIC BLOOD PRESSURE: 70 MMHG | SYSTOLIC BLOOD PRESSURE: 116 MMHG | BODY MASS INDEX: 25.4 KG/M2 | OXYGEN SATURATION: 98 % | WEIGHT: 171.5 LBS | HEART RATE: 70 BPM | TEMPERATURE: 99 F | HEIGHT: 69 IN

## 2025-01-06 DIAGNOSIS — Z23 NEED FOR COVID-19 VACCINE: ICD-10-CM

## 2025-01-06 DIAGNOSIS — E03.9 ACQUIRED HYPOTHYROIDISM: ICD-10-CM

## 2025-01-06 DIAGNOSIS — Z29.11 NEED FOR PROPHYLACTIC VACCINATION AND INOCULATION AGAINST RESPIRATORY SYNCYTIAL VIRUS (RSV): ICD-10-CM

## 2025-01-06 DIAGNOSIS — D32.9 MENINGIOMA: ICD-10-CM

## 2025-01-06 DIAGNOSIS — E78.49 OTHER HYPERLIPIDEMIA: ICD-10-CM

## 2025-01-06 DIAGNOSIS — Z87.898 HISTORY OF PREDIABETES: ICD-10-CM

## 2025-01-06 DIAGNOSIS — Z00.00 ROUTINE MEDICAL EXAM: Primary | ICD-10-CM

## 2025-01-06 DIAGNOSIS — I70.0 ATHEROSCLEROSIS OF ABDOMINAL AORTA: ICD-10-CM

## 2025-01-06 DIAGNOSIS — E27.8 ADRENAL INCIDENTALOMA: ICD-10-CM

## 2025-01-06 DIAGNOSIS — I10 ESSENTIAL HYPERTENSION: ICD-10-CM

## 2025-01-06 DIAGNOSIS — M35.9 COLLAGEN DISEASE: ICD-10-CM

## 2025-01-06 DIAGNOSIS — E66.3 OVERWEIGHT (BMI 25.0-29.9): ICD-10-CM

## 2025-01-06 DIAGNOSIS — K76.0 FATTY LIVER: ICD-10-CM

## 2025-01-06 DIAGNOSIS — N39.46 URINARY INCONTINENCE, MIXED: ICD-10-CM

## 2025-01-06 DIAGNOSIS — Z78.9 STATIN INTOLERANCE: ICD-10-CM

## 2025-01-06 PROCEDURE — 99999 PR PBB SHADOW E&M-EST. PATIENT-LVL IV: CPT | Mod: PBBFAC,,, | Performed by: INTERNAL MEDICINE

## 2025-01-06 PROCEDURE — 99214 OFFICE O/P EST MOD 30 MIN: CPT | Mod: PBBFAC,PO | Performed by: INTERNAL MEDICINE

## 2025-01-06 NOTE — PROGRESS NOTES
CHIEF COMPLAINT:   Chief Complaint   Patient presents with    Annual Exam          HISTORY OF PRESENT ILLNESS:  Claudia Rodriguez is a 69 y.o. female who presents to the clinic today for a routine physical exam. Her last physical exam was approximately 1 years(s) ago.          Patient reports a history of connective tissue disease from 40 years ago when her bladder ruptured during surgery. Over the past year, she has consulted numerous specialists within [facility], including dermatologists, cancer , and a colon surgeon, to determine if she still has the condition. These consultations were inconclusive, with no clear diagnosis or treatment plan established.    Patient had a positive Cologuard test 9/2023 which prompted need for colonoscopy - but patient reluctant to do so in light of her previous hx. She consulted with Dr. Velasquez, a colon surgeon, who advised against colonoscopy due to her medical history. Alternative colon cancer screening methods were completed (CT colonography and barium enema) which were negative. She also repeated the Cologuard test with a negative result. Recommendation is to continue with Cologuard screening only q3 years for now.    Patient reports a history of irritable bowel syndrome for many years but states symptoms resolved after changing her eating habits. She has had weight fluctuations, initially losing 60 lbs with semaglutide (GLP-1 agonist), followed by a 15-pound regain. She now takes tirzepatide intermittently, reporting an adjustment period of 2-3 months to this new medication.    Patient attends the gym daily for exercise to maintain her weight loss and overall health. She reports improved energy levels and overall quality of life since losing weight.             Subjective    PAST MEDICAL HISTORY:  Past Medical History:   Diagnosis Date    Cholelithiasis     DDD (degenerative disc disease), lumbar     Fatty liver     - noted on U/S 11/2017    Goiter, nodular      HTN (hypertension)     Hyperglycemia     Hyperlipidemia     Hypothyroidism     Hypovitaminosis D     Meningioma 2010    ~2010; right frontal lobe ; followed by Dr. Enciso once a year    Obesity     Prediabetes     Urinary bladder disorder        PAST SURGICAL HISTORY:  Past Surgical History:   Procedure Laterality Date    BLADDER SUSPENSION  1986    LIVER BIOPSY  2020    Per pt, [facility] misdx her with stage I liver cancer but no tx and no sign of it one year later    thyroid biopsy  07/11/2012    TONSILLECTOMY  1959       SOCIAL HISTORY:  Social History     Socioeconomic History    Marital status:     Number of children: 1   Occupational History    Occupation: human      Employer: [employer]   Tobacco Use    Smoking status: Never     Passive exposure: Never    Smokeless tobacco: Never   Substance and Sexual Activity    Alcohol use: No    Drug use: No    Sexual activity: Yes     Partners: Male   Other Topics Concern    Are you pregnant or think you may be? No    Breast-feeding No   Social History Narrative    3 adopted children.     Social Drivers of Health     Financial Resource Strain: Low Risk  (1/13/2024)    Overall Financial Resource Strain (CARDIA)     Difficulty of Paying Living Expenses: Not very hard   Food Insecurity: No Food Insecurity (1/13/2024)    Hunger Vital Sign     Worried About Running Out of Food in the Last Year: Never true     Ran Out of Food in the Last Year: Never true   Transportation Needs: No Transportation Needs (1/13/2024)    PRAPARE - Transportation     Lack of Transportation (Medical): No     Lack of Transportation (Non-Medical): No   Physical Activity: Sufficiently Active (1/13/2024)    Exercise Vital Sign     Days of Exercise per Week: 5 days     Minutes of Exercise per Session: 60 min   Stress: No Stress Concern Present (1/13/2024)    [employer] [place] [employer] - Occupational Stress Questionnaire     Feeling of Stress : Only a little   Housing  Stability: Low Risk  (1/13/2024)    Housing Stability Vital Sign     Unable to Pay for Housing in the Last Year: No     Number of Places Lived in the Last Year: 1     Unstable Housing in the Last Year: No       FAMILY HISTORY:  Family History   Problem Relation Name Age of Onset    Hypertension Mother      Stroke Mother          2015    Coronary artery disease Father  55    Heart disease Father      Hypertension Father      Melanoma Father      No Known Problems Sister Ronda     Thyroid nodules Sister Zaynab         s/p thyroidectomy    Thyroid nodules Sister Amanda     Allergies Brother Mert Jr     No Known Problems Brother Kevon     No Known Problems Maternal Aunt      No Known Problems Maternal Uncle      No Known Problems Paternal Aunt      No Known Problems Paternal Uncle      Heart failure Maternal Grandmother      Pancreatic cancer Maternal Grandfather      No Known Problems Paternal Grandmother      No Known Problems Paternal Grandfather      No Known Problems Other      Diabetes Neg Hx      Thyroid cancer Neg Hx      Breast cancer Neg Hx      Ovarian cancer Neg Hx      Endometrial cancer Neg Hx      Colon cancer Neg Hx      Rectal cancer Neg Hx      Allergic rhinitis Neg Hx      Angioedema Neg Hx      Asthma Neg Hx      Atopy Neg Hx      Eczema Neg Hx      Immunodeficiency Neg Hx      Rhinitis Neg Hx      Urticaria Neg Hx         ALLERGIES AND MEDICATIONS: updated and reviewed.  Review of patient's allergies indicates:   Allergen Reactions    Atropine-demerol Shortness Of Breath    Zostavax [zoster vaccine live (pf)] Rash     - injection site red, hot, indurated    Meperidine Other (See Comments)     Other reaction(s): Nausea    Other reaction(s): Headache    Patient unsure of reaction, but she reports that her OB informed her to never to let anyone give her Demerol in the future.      Other reaction(s): Nausea Other reaction(s): Headache    Nsaids (non-steroidal anti-inflammatory drug) Other (See  Comments)       Other reaction(s): Difficulty breathing    Other reaction(s): Difficulty breathing    Phenytoin sodium extended Other (See Comments)     Medication List with Changes/Refills   Current Medications    AMLODIPINE (NORVASC) 10 MG TABLET    Take 1 tablet by mouth once daily    ASPIRIN (ECOTRIN) 81 MG EC TABLET    Take 1 tablet (81 mg total) by mouth once daily.    ATORVASTATIN (LIPITOR) 80 MG TABLET    TAKE 1 TABLET BY MOUTH EVERY DAY IN THE EVENING    CALCIUM CARBONATE/VITAMIN D3 (CALCIUM 500 + D, D3, ORAL)    once daily.     CARVEDILOL (COREG) 25 MG TABLET    TAKE 1 TABLET BY MOUTH TWICE A DAY WITH FOOD    DARIFENACIN (ENABLEX) 15 MG 24 HR TABLET    Take 1 tablet (15 mg total) by mouth once daily.    ERGOCALCIFEROL (ERGOCALCIFEROL) 50,000 UNIT CAP    TAKE 1 CAPSULE BY MOUTH ONE TIME PER WEEK    ESTRADIOL (ESTRACE) 0.01 % (0.1 MG/GRAM) VAGINAL CREAM    Place 1 g vaginally twice a week.    FOLIC ACID/MV,FE,OTHER MIN (CENTRUM ORAL)    Take 1 tablet by mouth.    LEVOTHYROXINE (SYNTHROID) 75 MCG TABLET    TAKE 1 TABLET BY MOUTH EVERY DAY    OMEGA-3 FATTY ACIDS 1,000 MG CAP    Take 1 capsule by mouth Daily.    TRIAMTERENE-HYDROCHLOROTHIAZIDE 37.5-25 MG (MAXZIDE-25) 37.5-25 MG PER TABLET    TAKE 1 TABLET BY MOUTH EVERY DAY          CARE TEAM:  Patient Care Team:  Jessica Marquez MD as PCP - General (Internal Medicine)  Bisi Madrigal MD as Consulting Physician (Endocrinology)  Fernie Hammond MD as Consulting Physician (Neurosurgery)  Jessica Marquez MD as Hypertension Digital Medicine Responsible Provider (Internal Medicine)  Stephanie Guadalupe, PharmD as Hypertension Digital Medicine Clinician (Pharmacist)  Anirudh Selby MD (Inactive) as Consulting Physician (Obstetrics and Gynecology)  Albertina Pickens LPN as Licensed Practical Nurse  Program, Medicare Shared Savings as Hypertension Digital Medicine Contract  Jessica Marquez MD as Hyperlipidemia Digital Medicine Responsible Provider  "(Internal Medicine)  Stephanie Guadalupe, PharmD as Hyperlipidemia Digital Medicine Clinician (Pharmacist)       SCREENING HISTORY:  Health Maintenance         Date Due Completion Date    RSV Vaccine (Age 60+ and Pregnant patients) (1 - Risk 60-74 years 1-dose series) Never done ---    COVID-19 Vaccine (5 - 2024-25 season) 09/01/2024 6/2/2022    Hemoglobin A1c (Prediabetes) 08/01/2025 8/1/2024    Mammogram 12/11/2025 12/11/2023    Override on 5/25/2016: Done    Override on 2/3/2014: Done (DIS - normal)    Override on 12/11/2012: Done    Override on 10/27/2010: Done    Lipid Panel 12/30/2025 12/30/2024    High Dose Statin 01/06/2026 1/6/2025    Colorectal Cancer Screening 02/16/2027 2/16/2024    DEXA Scan 09/11/2028 9/11/2023    TETANUS VACCINE 05/31/2031 5/31/2021              REVIEW OF SYSTEMS:   The patient reports : good dietary habits.  The patient reports  : that they exercise regularly.  Review of Systems   Constitutional:  Negative for chills and fever.   Respiratory:  Negative for cough and shortness of breath.    Cardiovascular:  Negative for chest pain and leg swelling.   Gastrointestinal:  Negative for abdominal pain.   Genitourinary:  Negative for dysuria.       ROS (Optional)-: no pelvic pain  Breast ROS (Optional)-: negative for breast lumps/discharge          Objective    PHYSICAL EXAMINATION/VITALS:  Vitals:    01/06/25 0924   BP: 116/70   Pulse: 70   Temp: 98.7 °F (37.1 °C)   TempSrc: Oral   SpO2: 98%   Weight: 77.8 kg (171 lb 8.3 oz)   Height: 5' 2" (1.575 m)        Body mass index is 31.37 kg/m².      General appearance - alert, well appearing, and in no distress, overweight  Psychiatric - alert, oriented to person, place, and time, normal behavior, speech, dress, motor activity and thought process  Eyes - pupils equal and reactive, extraocular eye movements intact, sclera anicteric  Neck - supple, no significant adenopathy, carotids upstroke normal bilaterally, no bruits  Lymphatics - no palpable " cervical lymphadenopathy  Chest - clear to auscultation, no wheezes, rales or rhonchi, symmetric air entry  Heart - normal rate and regular rhythm  Neurological - alert, normal speech, no focal findings; cranial nerves II through XII intact  Musculoskeletal - no joint tenderness, deformity or swelling, no muscular tenderness noted  Extremities - no pedal edema noted  Skin - normal coloration, no suspicious skin lesions      LABS:  Results for orders placed or performed in visit on 12/30/24   [facility]    Collection Time: 12/30/24  7:28 AM   Result Value Ref Range    [facility] No result necessary    LIPID PANEL    Collection Time: 12/30/24  7:35 AM   Result Value Ref Range    Cholesterol 139 120 - 199 mg/dL    Triglycerides 87 30 - 150 mg/dL    HDL 50 40 - 75 mg/dL    LDL Cholesterol 71.6 63.0 - 159.0 mg/dL    HDL/Cholesterol Ratio 36.0 20.0 - 50.0 %    Total Cholesterol/HDL Ratio 2.8 2.0 - 5.0    Non-HDL Cholesterol 89 mg/dL     *Note: Due to a large number of results and/or encounters for the requested time period, some results have not been displayed. A complete set of results can be found in Results Review.                ASSESSMENT AND PLAN:   1. Routine medical exam  Counseled on age appropriate medical preventative services including age appropriate cancer screenings, age appropriate eye and dental exams, over all nutritional health, need for a consistent exercise regimen, and an over all push towards maintaining a vigorous and active lifestyle.  Counseled on age appropriate vaccines and discussed upcoming health care needs based on age/gender. Discussed good sleep hygiene and stress management.    2. Essential hypertension  BP Readings from Last 1 Encounters:   01/06/25 116/70      The current medical regimen is effective;  continue present plan and medications. Recommended patient to check home readings to monitor and see me for followup as scheduled or sooner as needed.   Discussed sodium restriction,  maintaining ideal body weight and regular exercise program as physiologic means to continue to achieve blood pressure control in addition to medication compliance.  Patient was educated that both decongestant and anti-inflammatory medication may raise blood pressure.  The patient is active on the digital hypertension program.    3. Other hyperlipidemia/5. Statin intolerance  Lab Results   Component Value Date    CHOL 139 12/30/2024     Lab Results   Component Value Date    HDL 50 12/30/2024     Lab Results   Component Value Date    LDLCALC 71.6 12/30/2024     Lab Results   Component Value Date    TRIG 87 12/30/2024     Currently taking statin and tolerating well.    4. Atherosclerosis of abdominal aorta  Patient with Atherosclerosis of the Aorta.  Stable/asymptomatic. Currently stable on lipid lowering medication and blood pressure monitoring.  Overview:  - mild - noted on CT scan of abdomen 1/2023        6. Acquired hypothyroidism  Lab Results   Component Value Date    TSH 1.074 08/01/2024     Patient is clinically euthyroid. Continue current regimen.    7. History of prediabetes  Lab Results   Component Value Date    HGBA1C 5.3 08/01/2024     Stable. We discussed low sugar/low carbohydrate diet and regular exercise to prevent progression. No need for prescription medication at this time.  Check A1c yearly.    8. Urinary incontinence, mixed  Stable. Observe.    9. Fatty liver  Lab Results   Component Value Date    ALT 20 08/01/2024    AST 27 08/01/2024    ALKPHOS 95 08/01/2024    BILITOT 0.8 08/01/2024     Asymptomatic. We discussed the need for weight loss to prevent progression to cirrhosis of the liver.   LFTs normal.  Patient is followed by hepatology.  Overview:  - noted on U/S 11/2017  - 2/2018 - fibroscan F4, NAFLD fibrosis score indeterminate  - 12/2021 - Fibroscan: Fibrosis Stage:  F 0-1; Steatosis Grade:  S3      10. Overweight (BMI 25.0-29.9)  BMI Readings from Last 3 Encounters:   01/06/25 25.33 kg/m²  "  24 32.41 kg/m²   24 32.33 kg/m²     The patient is asked to continue to make an attempt to improve diet and exercise patterns to aid in medical management of this problem.     11. Need for COVID-19 vaccine  Declined.    12. Need for prophylactic vaccination and inoculation against respiratory syncytial virus (RSV)  Patient was advised to get immunization at the pharmacy.    13. Collagen disease  Stable. Observe.  Overview:  - she had a baby -he - tetrology of fallot  - bladder had fallen- she needed bladder suspension  When bladder was lifted, she had a tear in the bladder and needed 150 stitches  While in the surgery- she was told that internal tissues were sensitive  " Don't do any invasive procedure " was the recommendation     She then went to head of dermatology-  Biopsy of vagina vs cervix vs internal female organ- too much estrogen in her body  Body was not making collagen  " Never have an invasive procedure in the abdomen and pelvis" was his recommendation     Cologuard was positive 2023 - needed colonoscopy  Saw colon rectal surgeon who recommended:  Barium enema and CT abdomen and pelvis and rp cologuard - both negative and ok to continue Cologuard screening only      14. Meningioma  Stable. Asymptomatic. Observe.    15. Adrenal incidentaloma  Stable. Asymptomatic. Observe.               PATIENT EDUCATION:  Explained that pneumonia vaccine received in  provides lifelong protection, barring future recommendation changes  Discussed mixed evidence on probiotic benefits, noting natural sources from food are typically sufficient  Provided information on incidental findings and potential drawbacks of the Galleri test    ACTION ITEMS/LIFESTYLE:  Patient to continue current exercise routine, including daily gym visits  Patient to obtain RSV vaccine at pharmacy at convenience          No orders of the defined types were placed in this encounter.     FOLLOW UP: No follow-ups on file. " or sooner as needed.    This note was generated with the assistance of ambient listening technology. Verbal consent was obtained by the patient and accompanying visitor(s) for the recording of patient appointment to facilitate this note. I attest to having reviewed and edited the generated note for accuracy, though some syntax or spelling errors may persist. Please contact the author of this note for any clarification.

## 2025-01-09 DIAGNOSIS — E03.9 ACQUIRED HYPOTHYROIDISM: ICD-10-CM

## 2025-01-09 RX ORDER — LEVOTHYROXINE SODIUM 75 UG/1
TABLET ORAL
Qty: 90 TABLET | Refills: 3 | Status: SHIPPED | OUTPATIENT
Start: 2025-01-09

## 2025-01-13 ENCOUNTER — LAB VISIT (OUTPATIENT)
Dept: LAB | Facility: HOSPITAL | Age: 70
End: 2025-01-13
Payer: MEDICARE

## 2025-01-13 DIAGNOSIS — K76.0 FATTY LIVER: ICD-10-CM

## 2025-01-13 LAB
ALBUMIN SERPL BCP-MCNC: 3.8 G/DL (ref 3.5–5.2)
ALP SERPL-CCNC: 98 U/L (ref 40–150)
ALT SERPL W/O P-5'-P-CCNC: 40 U/L (ref 10–44)
AST SERPL-CCNC: 30 U/L (ref 10–40)
BILIRUB DIRECT SERPL-MCNC: 0.2 MG/DL (ref 0.1–0.3)
BILIRUB SERPL-MCNC: 0.5 MG/DL (ref 0.1–1)
PROT SERPL-MCNC: 6.8 G/DL (ref 6–8.4)

## 2025-01-13 PROCEDURE — 80076 HEPATIC FUNCTION PANEL: CPT | Performed by: NURSE PRACTITIONER

## 2025-01-13 PROCEDURE — 36415 COLL VENOUS BLD VENIPUNCTURE: CPT | Mod: PO | Performed by: NURSE PRACTITIONER

## 2025-01-16 ENCOUNTER — TELEPHONE (OUTPATIENT)
Dept: HEPATOLOGY | Facility: CLINIC | Age: 70
End: 2025-01-16

## 2025-01-16 ENCOUNTER — PROCEDURE VISIT (OUTPATIENT)
Dept: HEPATOLOGY | Facility: CLINIC | Age: 70
End: 2025-01-16
Payer: MEDICARE

## 2025-01-16 ENCOUNTER — OFFICE VISIT (OUTPATIENT)
Dept: HEPATOLOGY | Facility: CLINIC | Age: 70
End: 2025-01-16
Payer: MEDICARE

## 2025-01-16 VITALS — BODY MASS INDEX: 25.83 KG/M2 | HEIGHT: 69 IN | WEIGHT: 174.38 LBS

## 2025-01-16 DIAGNOSIS — K76.0 FATTY LIVER: ICD-10-CM

## 2025-01-16 DIAGNOSIS — I10 ESSENTIAL HYPERTENSION: ICD-10-CM

## 2025-01-16 DIAGNOSIS — Z87.19 HISTORY OF FATTY INFILTRATION OF LIVER: Primary | ICD-10-CM

## 2025-01-16 DIAGNOSIS — Z87.898 HISTORY OF PREDIABETES: ICD-10-CM

## 2025-01-16 DIAGNOSIS — E78.49 OTHER HYPERLIPIDEMIA: ICD-10-CM

## 2025-01-16 PROCEDURE — 91200 LIVER ELASTOGRAPHY: CPT | Mod: PBBFAC | Performed by: NURSE PRACTITIONER

## 2025-01-16 PROCEDURE — 99213 OFFICE O/P EST LOW 20 MIN: CPT | Mod: PBBFAC | Performed by: NURSE PRACTITIONER

## 2025-01-16 PROCEDURE — 99214 OFFICE O/P EST MOD 30 MIN: CPT | Mod: S$PBB,,, | Performed by: NURSE PRACTITIONER

## 2025-01-16 PROCEDURE — 99999 PR PBB SHADOW E&M-EST. PATIENT-LVL III: CPT | Mod: PBBFAC,,, | Performed by: NURSE PRACTITIONER

## 2025-01-16 PROCEDURE — 91200 LIVER ELASTOGRAPHY: CPT | Mod: 26,S$PBB,, | Performed by: NURSE PRACTITIONER

## 2025-01-16 NOTE — PROGRESS NOTES
Hepatology Consultation: [facility]    ESTABLISHED PATIENT   PCP: Jessica Marquez MD    Subjective      Ms. Rodriguez is a 69 y.o. 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 The patient was previously followed by King Braga PA-C.    The patient was initially evaluated for fatty liver noted on US and intermittently elevated liver enzymes ALT>AST; on Fibroscan the patient was found to have F4 fibrosis, S3 steatosis on 12/2017.   Per chart review, she has declined further investigation into cirrhosis staging and continues with HCC screening.   Her RF for fatty liver include HTN, HLD, pre-diabetes, Body mass index is 32.75 kg/m²., hypothyroid  Her previous serologic workup was negative for genetic, autoimmune, and viral causes of liver disease.    Interval history 07/26/2022:  She is here today alone. She reports she has maintained her weight loss since last visit. She is eating better and exercising.     doing okay   She is cooking at home and conscious of her meals.     Etoh history:  Denies   Family hx liver disease or cancer: Denies     Interval history 01/10/2023:  Claudia Rodriguez arrives today alone.   Since our last visit, she has lost weight with Ozempic & underwent MRE as well.   Initially w/ ozempic and had some side effects (palpations) from increasing ozempic dose   0.25mg third shot to 0.5mg no side effects now back to 0.5mg   There is a shortage so she is concerned about running out but its worked very well for decreasing her appetite. Her goal is 175 lbs   She is selective about what she is eating these days and Ozempic is helping a lot with her appetite  Vegetables, fruit, yogurt every day, mostly chicken     Denies symptoms of hepatic decompensation including jaundice, abdominal distention or lower extremity swelling, hematemesis, melena, or periods of confusion suggestive of  hepatic encephalopathy.    L knee pain after a fall around [place], seeing an orthopedist. She cannot do her daily walking. Otherwise doing well.   She complains of a red itchy spot on her R breast, tried ketoconazole without relief.     Occupation: retired traveling   Social: lives at home with      Interval history 1/9/2024:  Claudia Rodriguez arrives today alone. She was last seen in clinic by Karina Chester PA-C in 1/2023.  She is currently undergoing work up for positive Cologuard test. She remains on Ozempic, and is at a healthy weight (BMI 25).   LFT's are normal. B/P, blood sugar and lipids are normal/well controlled on current regimen.   MR Elastography in 12/2022 was suggestive of no fibrosis (remote Fibroscan in 2017 was suggestive of F4 fibrosis, but more recent Fibroscan in 2021 was suggestive of F0-F1 fibrosis).     FIB-4 Calculation: 1.02 at 1/15/2024  4:09 PM   Calculated from:  30  Last SGPT/ALT: 21 at 1/15/2024  4:09 PM   341   Age: 69 y.o.     FIB-4 below 1.30 is considered as low-risk for advanced fibrosis  FIB-4 over 2.67 is considered as high-risk for advanced fibrosis  FIB-4 values between 1.30 and 2.67 are considered as intermediate-risk of advanced fibrosis for ages 36-64.     For ages > 64 the cut-off for low-risk goes to < 2.  This is a screening tool and clinical judgement should be used in the interpretation of these results.    She is well appearing, and denies any signs or symptoms of hepatic decompensation including jaundice, abdominal distention or lower extremity swelling, hematemesis, melena, or periods of confusion suggestive of hepatic encephalopathy.      Interval history 1/16/2025:  Claudia Rodriguez arrives today alone. She was last seen in clinic by myself in 1/2024.  Remote Fibroscan in 2017 was suggestive of F4 fibrosis, however, she has lost a significant amount of weight since then (over 60 lbs).  MR Elastography in 12/2022 was suggestive of no fibrosis;  Fibroscan in 2021 was also suggestive of F0-F1 fibrosis.     She has never undergone a liver biopsy.   FIB-4 Calculation: 1.02 at 1/15/2024  4:09 PM   Calculated from:  30  Last SGPT/ALT: 21 at 1/15/2024  4:09 PM   341   Age: 69 y.o.     FIB-4 below 1.30 is considered as low-risk for advanced fibrosis  FIB-4 over 2.67 is considered as high-risk for advanced fibrosis  FIB-4 values between 1.30 and 2.67 are considered as intermediate-risk of advanced fibrosis for ages 36-64.     For ages > 64 the cut-off for low-risk goes to < 2.  This is a screening tool and clinical judgement should be used in the interpretation of these results.    She remains at a healthy weight (BMI 25). She periodically takes Mounjaro for 2-3 months every 3-6 months.  LFT's remain normal. B/P, blood sugar and lipids are normal/well controlled on current regimen.   Follow up Fibroscan today is again suggestive of no significant fatty infiltration of the liver (<S1), with F0-F1 fibrosis, and a low likelihood of cirrhosis.    She is well appearing, and has no signs or symptoms of hepatic decompensation including jaundice, abdominal distention or lower extremity swelling, hematemesis, melena, or periods of confusion suggestive of hepatic encephalopathy.    [facility] Claudia has a past medical history of Cholelithiasis, DDD (degenerative disc disease), lumbar, Fatty liver, Goiter, nodular, HTN (hypertension), Hyperglycemia, Hyperlipidemia, Hypothyroidism, Hypovitaminosis D, Meningioma (2010), Obesity, Prediabetes, and Urinary bladder disorder.   PSX Claudia has a past surgical history that includes Bladder suspension (1986); thyroid biopsy (07/11/2012); Tonsillectomy (1959); and Liver biopsy (2020).    Claudia's family history includes Allergies in her brother; Coronary artery disease (age of onset: 55) in her father; Heart disease in her father; Heart failure in her maternal grandmother; Hypertension in her father and mother; Melanoma in her father; No Known  "Problems in her brother, maternal aunt, maternal uncle, paternal aunt, paternal grandfather, paternal grandmother, paternal uncle, sister, and another family member; Pancreatic cancer in her maternal grandfather; Stroke in her mother; Thyroid nodules in her sister and sister.   KAILEY Taylor reports that she has never smoked. She has never been exposed to tobacco smoke. She has never used smokeless tobacco. She reports that she does not drink alcohol and does not use drugs.   ELDER Taylor is allergic to atropine-demerol, zostavax [zoster vaccine live (pf)], meperidine, nsaids (non-steroidal anti-inflammatory drug), and phenytoin sodium extended.   ADELE Taylor has a current medication list which includes the following prescription(s): amlodipine, aspirin, atorvastatin, calcium carbonate/vitamin d3, carvedilol, darifenacin, ergocalciferol, estradiol, multivitamin/iron/folic acid, levothyroxine, omega-3 fatty acids, and triamterene-hydrochlorothiazide 37.5-25 mg.           ROS:   As per hpi    Objective     Ht 5' 9" (1.753 m)   Wt 79.1 kg (174 lb 6.1 oz)   BMI 25.75 kg/m²     PHYSICAL EXAM:   Friendly woman, in no acute distress; alert and oriented to person, place and time  EYES: Sclerae anicteric  GI: Non-distended. No obvious ascites.  EXTREMITIES:  No edema.  SKIN: Warm and dry. No jaundice. No telangectasias noted. No palmar erythema.  NEURO:  Normal gait. No asterixis.  PSYCH:  Memory intact. Thought and speech pattern appropriate. Behavior normal    DIAGNOSTICS  Lab Results   Component Value Date    ALT 40 01/13/2025    AST 30 01/13/2025    ALKPHOS 98 01/13/2025    BILITOT 0.5 01/13/2025    ALBUMIN 3.8 01/13/2025    INR 1.1 12/14/2022     12/14/2022     Lab Results   Component Value Date    AFP 3.5 12/14/2022     In relation to metabolic risk factors:   Lab Results   Component Value Date    HGBA1C 5.3 08/01/2024    CHOL 139 12/30/2024    TSH 1.074 08/01/2024     Body mass index is 25.75 kg/m².    Prior serologic " workup:   Lab Results   Component Value Date    SMOOTHMUSCAB Negative 1:40 12/26/2017    AMAIFA Negative 1:40 12/26/2017    IGGSERUM 591 (L) 12/26/2017    ANASCREEN Negative <1:160 12/26/2017    FESATURATED 20 12/26/2017    PETH Negative 12/26/2017    CERULOPLSM 30.0 12/26/2017    HEPBSAG Negative 12/26/2017    HEPCAB Negative 05/23/2014     DIAGNOSTIC STUDIES:    FIBROSCAN 1/16/2025:    Findings  Median liver stiffness score:  2.5  CAP Reading: dB/m:  229     IQR/med %:  7  Interpretation  Fibrosis interpretation is based on medial liver stiffness - Kilopascal (kPa).     Fibrosis Stage:  F 0-1  Steatosis interpretation is based on controlled attenuation parameter - (dB/m).     Steatosis Grade:  <S1    US ABDOMEN LIMITED 1/4/2024:    FINDINGS:    Pancreas: The visualized portions of pancreas appear normal.     Liver: Normal in size, measuring 16 cm. Diffuse increased echogenicity.  No focal hepatic lesions.     Gallbladder: 2.2 cm gallstone.  No wall thickening, hypervascularity, pericholecystic fluid, or sonographic Ascencio sign to suggest acute cholecystitis.     Biliary system: The common duct is not dilated, measuring 4 mm.  No intrahepatic ductal dilatation.     Spleen: Normal in size with a homogeneous echotexture, measuring 9.0 x 2.5 cm.     Miscellaneous: No ascites.     Impression:     Hepatic steatosis.     Cholelithiasis without cholecystitis.     Abdominal Ultrasound 7/11/2022:    Liver: Enlarged, measuring 19.3 cm. Homogeneous echotexture with diffusely increased echogenicity.  HRI measures 1.42. no focal hepatic lesions.     Gallbladder: There is a 2.4 cm mobile gallstone.  No wall thickening or pericholecystic fluid.  No sonographic Ascencio's sign.     Biliary system: The common duct is not dilated, measuring 3 mm.  No intrahepatic ductal dilatation.     Spleen: Normal in size with a homogeneous echotexture, measuring 9.4 x 4.9 cm.     Pancreas: The visualized portions of pancreas appear normal.      IVC: The visualized IVC appears unremarkable.     Miscellaneous: No ascites.     Impression:     Hepatomegaly with hepatic steatosis.     Cholelithiasis without evidence for cholecystitis.     Electronically signed by resident: Latanya Stapleton  Date:                                            07/11/2022  Time:                                           09:13     Electronically signed by: Landon Sin MD  Date:                                            07/11/2022  Time:                                           09:23    MR ELASTOGRAPHY 12/19/2022:    CLINICAL HISTORY:  Claustrophobia     TECHNIQUE:  MRI abdomen performed without contrast per MR elastography protocol.     COMPARISON:  MR elastography 12/14/2022     FINDINGS:  Liver is enlarged with normal attenuation.  Few subcentimeter hepatic T2 hyperintensities, likely cysts.  Single large gallstone.  Common bile duct is mildly prominent size measuring 0.8 cm with distal tapering to the ampulla.  No intrahepatic biliary dilatation.  The spleen and pancreas are unremarkable.  Fat containing 1.8 cm right adrenal lesion with signal loss on opposed phase imaging.  Bilateral simple renal cysts.  No hydronephrosis.  No bowel obstruction.  No lymphadenopathy.  Bone marrow signal intensity is unremarkable.  L1 hemangiomas.     Liver fat fraction measurement as follows:     MR spectroscopy Voxel-3.7%     Liver elasticity measures 1.77 kPa consistent with Normal or F0 fibrosis.     Liver R2* value is within normal limits measuring 41.5 Hz consistent with no are an overload.     Impression:     1. Liver fat fraction measures 3.7%%, within normal limits.  2. Liver elasticity measures 1.77 kPa consistent with Normal or F0 fibrosis.  3. Hepatomegaly.  4. Cholelithiasis.  5. Right adrenal adenoma versus myelolipoma.  6. Additional findings as above.  REFERENCE RANGES:     Fat fraction analysis:     <5%: Normal.  5 to 15%: Mild steatosis.  >15%: Moderate or severe  steatosis.     Elastography:     <2.93 kPa: Normal or F0 fibrosis.  2.93 to 4.15 kPa: F1 or F2 fibrosis.  >4.15 kPa: F3 or F4 fibrosis.     * If steatosis is present, elevated liver stiffness (>2.74 kPa) may be secondary to inflammation (MOURA).     R2*:     > 65 Hz: Mild iron overload  > 215 Hz: Moderate  > 440 Hz: Severe iron overload     Estimated LIC=0.032 * R2*     Electronically signed by resident: Germán Streeter MD  Date:                                            2022  Time:                                           08:37     Electronically signed by: Glen Velasquez MD  Date:                                            2022  Time:                                           19:23    Fibroscan 2021:  Median liver stiffness score:  6.1  CAP Reading: dB/m:  373  IQR/med %:  13  Interpretation  Fibrosis interpretation is based on medial liver stiffness - Kilopascal (kPa).  Fibrosis Stage:  F 0-1  Steatosis interpretation is based on controlled attenuation parameter - (dB/m).  Steatosis Grade:  S3    Fibroscan 2017:  Diagnosis: NAFLD  Probe: XL  Fibroscan readin.7 KPa  Fibrosis:F4   CAP readin dB/m  Steatosis: :S3     Assessment/Plan     69 y.o. female with:    1. History of fatty infiltration of liver  Hepatic Function Panel    FibroScan Transplant Hepatology(Vibration Controlled Transient Elastography)      2. History of prediabetes  Hepatic Function Panel    FibroScan Transplant Hepatology(Vibration Controlled Transient Elastography)      3. Other hyperlipidemia  Hepatic Function Panel    FibroScan Transplant Hepatology(Vibration Controlled Transient Elastography)      4. Essential hypertension  Hepatic Function Panel    FibroScan Transplant Hepatology(Vibration Controlled Transient Elastography)        - Fibroscan today to non-invasively restage liver disease.  - Repeat liver function tests annually through PCP.  - Maintain a healthy weight, through diet and exercise.  -  Recommend good control of cholesterol, blood pressure, & blood sugar levels.  - Avoid alcohol and herbal supplements/alternative remedies.  - Return to clinic in 2 year with labs and Fibroscan prior to visit.    I spent 30 minutes on the day of this encounter preparing for, evaluating, treating, and managing this patient.     Thank you for allowing me to participate in the care of Claudia Rodriguez       Hepatology Nurse Practitioner  [facility] [place]

## 2025-01-16 NOTE — Clinical Note
Please set recall for RTC in 2 years with labs and Fibroscan same day as visit. Future orders in Epic. Thanks!

## 2025-01-16 NOTE — PROCEDURES
FibroScan [place] (Vibration Controlled Transient Elastography)    Date/Time: 1/16/2025 9:15 AM    Performed by: Philomena Puckett NP  Authorized by: Philomena Puckett NP    Diagnosis:  NAFLD    Probe:  XL    Universal Protocol: Patient's identity, procedure and site were verified, confirmatory pause was performed.  Discussed procedure including risks and potential complications.  Questions answered.  Patient verbalizes understanding and wishes to proceed with VCTE.     Procedure: After providing explanations of the procedure, patient was placed in the supine position with right arm in maximum abduction to allow optimal exposure of right lateral abdomen.  Patient was briefly assessed, Testing was performed in the mid-axillary location, 50Hz Shear Wave pulses were applied and the resulting Shear Wave and Propagation Speed detected with a 3.5 MHz ultrasonic signal, using the FibroScan probe, Skin to liver capsule distance and liver parenchyma were accessed during the entire examination with the FibroScan probe, Patient was instructed to breathe normally and to abstain from sudden movements during the procedure, allowing for random measurements of liver stiffness. At least 10 Shear Waves were produced, Individual measurements of each Shear Wave were calculated.  Patient tolerated the procedure well with no complications.  Meets discharge criteria as was dismissed.  Rates pain 0 out of 10.  Patient will follow up with ordering provider to review results.    Findings  Median liver stiffness score:  2.5  CAP Reading: dB/m:  229    IQR/med %:  7  Interpretation  Fibrosis interpretation is based on medial liver stiffness - Kilopascal (kPa).    Fibrosis Stage:  F 0-1  Steatosis interpretation is based on controlled attenuation parameter - (dB/m).    Steatosis Grade:  <S1

## 2025-01-16 NOTE — TELEPHONE ENCOUNTER
----- Message from Phliomena Puckett NP sent at 1/16/2025 12:01 PM CST -----  Please set recall for RTC in 2 years with labs and Fibroscan same day as visit. Future orders in Epic. Thanks!

## 2025-02-03 DIAGNOSIS — I10 ESSENTIAL HYPERTENSION: ICD-10-CM

## 2025-02-03 NOTE — TELEPHONE ENCOUNTER
No care due was identified.  [facility] Embedded Care Due Messages. Reference number: [id number].   2/03/2025 4:17:24 PM CST

## 2025-02-04 RX ORDER — CARVEDILOL 25 MG/1
25 TABLET ORAL 2 TIMES DAILY WITH MEALS
Qty: 180 TABLET | Refills: 3 | Status: SHIPPED | OUTPATIENT
Start: 2025-02-04

## 2025-02-04 NOTE — TELEPHONE ENCOUNTER
Refill Decision Note   Claudia Rodriguez  is requesting a refill authorization.  Brief Assessment and Rationale for Refill:  Approve     Medication Therapy Plan:         Comments:     Note composed:2:41 AM 02/04/2025

## 2025-02-17 ENCOUNTER — PATIENT MESSAGE (OUTPATIENT)
Dept: SURGERY | Facility: CLINIC | Age: 70
End: 2025-02-17
Payer: MEDICARE

## 2025-02-24 DIAGNOSIS — Z00.00 ENCOUNTER FOR MEDICARE ANNUAL WELLNESS EXAM: ICD-10-CM

## 2025-03-04 DIAGNOSIS — I10 ESSENTIAL HYPERTENSION: ICD-10-CM

## 2025-03-05 DIAGNOSIS — R39.15 URINARY URGENCY: ICD-10-CM

## 2025-03-05 RX ORDER — DARIFENACIN 15 MG/1
15 TABLET, EXTENDED RELEASE ORAL DAILY
Qty: 90 TABLET | Refills: 3 | Status: SHIPPED | OUTPATIENT
Start: 2025-03-05 | End: 2026-04-05

## 2025-03-05 RX ORDER — AMLODIPINE BESYLATE 10 MG/1
10 TABLET ORAL
Qty: 90 TABLET | Refills: 3 | Status: SHIPPED | OUTPATIENT
Start: 2025-03-05

## 2025-03-05 NOTE — TELEPHONE ENCOUNTER
No care due was identified.  [facility] Embedded Care Due Messages. Reference number: [id number].   3/04/2025 8:13:29 PM CST

## 2025-03-05 NOTE — TELEPHONE ENCOUNTER
Refill Routing Note   Medication(s) are not appropriate for processing by [facility] for the following reason(s):        Drug-disease interactionDrug-Disease: amLODIPine and Hepatomegaly     [facility] action(s):  Defer        Medication Therapy Plan: Drug-Disease: amLODIPine and Hepatomegaly    Pharmacist review requested: Yes     Appointments  past 12m or future 3m with PCP    Date Provider   Last Visit   1/6/2025 Jessica Marquez MD   Next Visit   Visit date not found Jessica Marquez MD   ED visits in past 90 days: 0        Note composed:1:52 PM 03/05/2025

## 2025-03-05 NOTE — TELEPHONE ENCOUNTER
Refill Decision Note   Claudia Rodriguez  is requesting a refill authorization.  Brief Assessment and Rationale for Refill:  Approve     Medication Therapy Plan:         Pharmacist review requested: Yes   Extended chart review required: Yes   Comments:     Note composed:2:24 PM 03/05/2025

## 2025-05-19 ENCOUNTER — TELEPHONE (OUTPATIENT)
Dept: ENDOCRINOLOGY | Facility: CLINIC | Age: 70
End: 2025-05-19
Payer: MEDICARE

## 2025-05-19 DIAGNOSIS — E04.9 GOITER, NODULAR: Primary | ICD-10-CM

## 2025-05-19 DIAGNOSIS — E03.9 ACQUIRED HYPOTHYROIDISM: ICD-10-CM

## 2025-05-19 DIAGNOSIS — E27.8 ADRENAL INCIDENTALOMA: ICD-10-CM

## 2025-05-19 RX ORDER — DEXAMETHASONE 1 MG/1
1 TABLET ORAL ONCE
Qty: 1 TABLET | Refills: 0 | Status: SHIPPED | OUTPATIENT
Start: 2025-05-19 | End: 2025-05-19

## 2025-05-19 NOTE — TELEPHONE ENCOUNTER
----- Message from ERROL Abdi sent at 5/16/2025  4:21 PM CDT -----  Regarding: FW: Scheduling Request  Contact: [phone] via portal  Lab orders please  ----- Message -----  From: Inna Hobbs  Sent: 5/16/2025   9:45 AM CDT  To: Kaitlin DICKINSON Staff  Subject: Scheduling Request                               Scheduling Request   Appt Type:  ep Date/Time Preference:10am-2pm next available Caller Name:pt Contact Preference:[phone] via portal Comment: 1 year follow up and US with labs. Would like US and Labs in the same visit. Please call to advise. Thank you

## 2025-05-20 ENCOUNTER — PATIENT MESSAGE (OUTPATIENT)
Dept: NEUROSURGERY | Facility: CLINIC | Age: 70
End: 2025-05-20
Payer: MEDICARE

## 2025-05-20 ENCOUNTER — PATIENT MESSAGE (OUTPATIENT)
Dept: ENDOCRINOLOGY | Facility: CLINIC | Age: 70
End: 2025-05-20
Payer: MEDICARE

## 2025-06-02 DIAGNOSIS — I10 ESSENTIAL HYPERTENSION: ICD-10-CM

## 2025-06-02 RX ORDER — TRIAMTERENE AND HYDROCHLOROTHIAZIDE 37.5; 25 MG/1; MG/1
1 TABLET ORAL
Qty: 90 TABLET | Refills: 0 | Status: SHIPPED | OUTPATIENT
Start: 2025-06-02

## 2025-06-30 ENCOUNTER — PATIENT MESSAGE (OUTPATIENT)
Dept: CARDIOLOGY | Facility: CLINIC | Age: 70
End: 2025-06-30
Payer: MEDICARE

## 2025-06-30 RX ORDER — ATORVASTATIN CALCIUM 80 MG/1
80 TABLET, FILM COATED ORAL NIGHTLY
Qty: 90 TABLET | Refills: 0 | Status: SHIPPED | OUTPATIENT
Start: 2025-06-30

## 2025-06-30 NOTE — TELEPHONE ENCOUNTER
Please confirm follow up office visit 09/18/2025.  No further refills will be authorized without being seen.  Alternatively, the patient can have her primary care physician refill this medication moving forward.

## 2025-07-07 ENCOUNTER — OFFICE VISIT (OUTPATIENT)
Dept: FAMILY MEDICINE | Facility: CLINIC | Age: 70
End: 2025-07-07
Payer: MEDICARE

## 2025-07-07 VITALS
OXYGEN SATURATION: 99 % | WEIGHT: 164.25 LBS | SYSTOLIC BLOOD PRESSURE: 116 MMHG | BODY MASS INDEX: 24.33 KG/M2 | HEIGHT: 69 IN | HEART RATE: 66 BPM | DIASTOLIC BLOOD PRESSURE: 70 MMHG | TEMPERATURE: 99 F

## 2025-07-07 DIAGNOSIS — D32.9 MENINGIOMA: Primary | ICD-10-CM

## 2025-07-07 DIAGNOSIS — Z91.09 ENVIRONMENTAL ALLERGIES: ICD-10-CM

## 2025-07-07 DIAGNOSIS — Z91.030 BEE STING ALLERGY: Primary | ICD-10-CM

## 2025-07-07 PROCEDURE — G2211 COMPLEX E/M VISIT ADD ON: HCPCS | Mod: ,,,

## 2025-07-07 PROCEDURE — 99214 OFFICE O/P EST MOD 30 MIN: CPT | Mod: PBBFAC,PO

## 2025-07-07 PROCEDURE — 99999 PR PBB SHADOW E&M-EST. PATIENT-LVL IV: CPT | Mod: PBBFAC,,,

## 2025-07-07 PROCEDURE — 99214 OFFICE O/P EST MOD 30 MIN: CPT | Mod: S$PBB,,,

## 2025-07-07 RX ORDER — METHYLPREDNISOLONE 4 MG/1
TABLET ORAL
Qty: 21 EACH | Refills: 0 | Status: SHIPPED | OUTPATIENT
Start: 2025-07-07 | End: 2025-07-28

## 2025-07-07 NOTE — PROGRESS NOTES
"             [facility]      Patient Name: Claudia Rodriguez  MRN: [id number]  : 1955    PCP: Jessica Marquez MD       HPI      Claudia Rodriguez is a 70 y.o. female with multiple medical diagnoses as listed in the medical history and problem list that presents for   Chief Complaint   Patient presents with    Facial Swelling         History of Present Illness    Patient presents today for facial swelling after wasp sting. She was stung by a wasp while cutting crape myrtles on Wednesday around 7:00 PM. Initial swelling occurred around the eye at the sting site, progressing bilaterally the following day with formation of "sacks." She experiences significant morning swelling where eyes are almost completely closed, which improves somewhat with Claritin. She denies pain or itching. The swelling has not completely resolved, affecting her ability to wear makeup. She expresses concern about lingering wasp venom effects. She has been using prednisone eye drops 3 times daily since Thursday morning without improvement. She attempted lid gel for one day without benefit. Claritin provides some relief of morning swelling. She denies a wasp sting since age 4, and this is her first significant allergic reaction to a wasp sting since childhood. She has seasonal allergies typically occurring in April, characterized by eye redness resembling pink eye. She notes her current reaction differs from her typical spring allergy presentation, with skin involvement rather than eye inflammation. Previously, only steroid medication has successfully resolved prior eye conditions among multiple eye drops prescribed by several physicians.      ROS:  General: -fever, -chills, -fatigue, -weight gain, -weight loss  Eyes: -vision changes, -redness, -discharge, +eye swelling, +swelling around eyes  ENT: -ear pain, -nasal congestion, -sore throat  Cardiovascular: -chest pain, -palpitations, -lower extremity edema  Respiratory: -cough, -shortness " of breath  Gastrointestinal: -abdominal pain, -nausea, -vomiting, -diarrhea, -constipation, -blood in stool  Genitourinary: -dysuria, -hematuria, -frequency  Musculoskeletal: -joint pain, -muscle pain  Skin: -rash, -lesion  Neurological: -headache, -dizziness, -numbness, -tingling  Psychiatric: -anxiety, -depression, -sleep difficulty  Allergic: +seasonal allergies              History      Past Medical History:  Past Medical History:   Diagnosis Date    Cholelithiasis     DDD (degenerative disc disease), lumbar     Fatty liver     - noted on U/S 11/2017    Goiter, nodular     HTN (hypertension)     Hyperglycemia     Hyperlipidemia     Hypothyroidism     Hypovitaminosis D     Meningioma 2010    ~2010; right frontal lobe ; followed by Dr. Enciso once a year    Obesity     Prediabetes     Urinary bladder disorder        Past Surgical History:  Past Surgical History:   Procedure Laterality Date    BLADDER SUSPENSION  1986    LIVER BIOPSY  2020    Per pt, [facility] misdx her with stage I liver cancer but no tx and no sign of it one year later    thyroid biopsy  07/11/2012    TONSILLECTOMY  1959       Social History:  Social History[1]    Family History:  Family History   Problem Relation Name Age of Onset    Hypertension Mother      Stroke Mother          2015    Coronary artery disease Father  55    Heart disease Father      Hypertension Father      Melanoma Father      No Known Problems Sister Ronda     Thyroid nodules Sister Zaynab         s/p thyroidectomy    Thyroid nodules Sister Amanda     Allergies Brother Mert Jr     No Known Problems Brother Kevon     No Known Problems Maternal Aunt      No Known Problems Maternal Uncle      No Known Problems Paternal Aunt      No Known Problems Paternal Uncle      Heart failure Maternal Grandmother      Pancreatic cancer Maternal Grandfather      No Known Problems Paternal Grandmother      No Known Problems Paternal Grandfather      No Known Problems Other      Diabetes Neg  Hx      Thyroid cancer Neg Hx      Breast cancer Neg Hx      Ovarian cancer Neg Hx      Endometrial cancer Neg Hx      Colon cancer Neg Hx      Rectal cancer Neg Hx      Allergic rhinitis Neg Hx      Angioedema Neg Hx      Asthma Neg Hx      Atopy Neg Hx      Eczema Neg Hx      Immunodeficiency Neg Hx      Rhinitis Neg Hx      Urticaria Neg Hx         Allergies and Medications: (updated and reviewed)  Review of patient's allergies indicates:   Allergen Reactions    Atropine-demerol Shortness Of Breath    Zostavax [zoster vaccine live (pf)] Rash     - injection site red, hot, indurated    Meperidine Other (See Comments)     Other reaction(s): Nausea    Other reaction(s): Headache    Patient unsure of reaction, but she reports that her OB informed her to never to let anyone give her Demerol in the future.      Other reaction(s): Nausea Other reaction(s): Headache    Nsaids (non-steroidal anti-inflammatory drug) Other (See Comments)       Other reaction(s): Difficulty breathing    Other reaction(s): Difficulty breathing    Phenytoin sodium extended Other (See Comments)     Current Medications[2]    Patient Care Team:  Jessica Marquez MD as PCP - General (Internal Medicine)  Bisi Madrigal MD as Consulting Physician (Endocrinology)  Fernie Hammond MD as Consulting Physician (Neurosurgery)  Jessica Marquez MD as Hypertension Digital Medicine Responsible Provider (Internal Medicine)  Stephanie Guadalupe, PharmD as Hypertension Digital Medicine Clinician (Pharmacist)  Anirudh Selby MD (Inactive) as Consulting Physician (Obstetrics and Gynecology)  Albertina Pickens LPN as Licensed Practical Nurse  Program, Medicare Shared Savings as Hypertension Digital Medicine Contract  Jessica Marquez MD as Hyperlipidemia Digital Medicine Responsible Provider (Internal Medicine)  Stephanie Guadalupe, PharmD as Hyperlipidemia Digital Medicine Clinician (Pharmacist)         Exam      Review of Systems:  (as noted  "above)      Physical Exam:  Physical Exam  Vitals reviewed.   Constitutional:       General: She is not in acute distress.     Appearance: Normal appearance. She is normal weight. She is not ill-appearing.   HENT:      Head: Normocephalic and atraumatic.   Eyes:      Extraocular Movements: Extraocular movements intact.      Pupils: Pupils are equal, round, and reactive to light.      Comments: Swelling noted under eyelids bilaterally. L>R.   Cardiovascular:      Rate and Rhythm: Normal rate.      Pulses: Normal pulses.   Pulmonary:      Effort: Pulmonary effort is normal.   Abdominal:      General: Abdomen is flat.   Musculoskeletal:         General: Normal range of motion.      Cervical back: Normal range of motion.   Neurological:      Mental Status: She is alert and oriented to person, place, and time. Mental status is at baseline.   Psychiatric:         Mood and Affect: Mood normal.         Behavior: Behavior normal.       Vitals:    07/07/25 1306   BP: 116/70   Patient Position: Sitting   Pulse: 66   Temp: 98.8 °F (37.1 °C)   TempSrc: Oral   SpO2: 99%   Weight: 74.5 kg (164 lb 3.9 oz)   Height: 5' 9" (1.753 m)      Body mass index is 24.25 kg/m².             Assessment & Plan      1. Bee sting allergy  - methylPREDNISolone (MEDROL DOSEPACK) 4 mg tablet; use as directed  Dispense: 21 each; Refill: 0    2. Environmental allergies  - methylPREDNISolone (MEDROL DOSEPACK) 4 mg tablet; use as directed  Dispense: 21 each; Refill: 0       Assessment & Plan    IMPRESSION:  - Assessed allergic reaction to wasp sting, noting persistent swelling around eyes and cheeks.  - Determined swelling likely due to prolonged allergic response and soft tissue inflammation.  - Considered combination of wasp sting allergy and existing environmental allergies.  - Decided against topical steroid treatment due to facial location of swelling.  - Opted for oral steroid treatment to reduce inflammation.  - Reassured patient that condition is " not infected.    TOXIC EFFECT OF WASP VENOM:  - Patient reports swelling and allergic response after wasp sting, with swelling more pronounced on the left side where the sting occurred.  - Explained that allergic reactions to wasp stings are common and vary in severity.  - Clarified that swelling is in the surrounding soft tissues, not in the sinuses, and can affect muscles and glands around the eyes.  - Prescribed Medrol Dosepak (oral steroid) to reduce inflammation, with instructions to take 6 pills on day 1, then decrease by 1 pill each day for 5 days (5, 4, 3, 2, 1).  - Advised to allow approximately 10 days for swelling to resolve.    ALLERGY MANAGEMENT:  - Patient reports having seasonal allergies that typically occur in April, causing red eyes similar to conjunctivitis.  - Current symptoms are different, affecting skin around the eyes rather than the eyeballs themselves.  - Previous prednisone eye drops were ineffective for current symptoms.  - Advised to continue taking Claritin as needed for allergy symptoms.    ANAPHYLAXIS PRECAUTIONS:  - Advised the patient to monitor for signs of anaphylaxis, such as difficulty breathing, as a precaution.    FOLLOW-UP INSTRUCTIONS:  - Instructed to follow up in 10 days if symptoms do not fully resolve.  - Directed to contact the office if symptoms worsen or do not improve after completing the Medrol Dosepak.           --------------------------------------------      Health Maintenance:  Health Maintenance         Date Due Completion Date    COVID-19 Vaccine (5 - 2024-25 season) 09/01/2024 6/2/2022    Hemoglobin A1c (Prediabetes) 08/01/2025 8/1/2024    Influenza Vaccine (1) 09/01/2025 10/25/2024    Mammogram 12/11/2025 12/11/2023    Override on 5/25/2016: Done    Override on 2/3/2014: Done (DIS - normal)    Override on 12/11/2012: Done    Override on 10/27/2010: Done    Lipid Panel 12/30/2025 12/30/2024    High Dose Statin 07/07/2026 7/7/2025    Colorectal Cancer  Screening 02/16/2027 2/16/2024    DEXA Scan 09/11/2028 9/11/2023    TETANUS VACCINE 05/31/2031 5/31/2021            Health maintenance reviewed    Follow Up:  No follow-ups on file.       - The patient is given an After Visit Summary that lists all medications with directions, allergies, education, orders placed during this encounter and follow-up instructions.      - I have reviewed the patient's medical information including past medical, family, and social history sections including the medications and allergies.      - We discussed the patient's current medications.     This note was generated with the assistance of ambient listening technology. Verbal consent was obtained by the patient and accompanying visitor(s) for the recording of patient appointment to facilitate this note. I attest to having reviewed and edited the generated note for accuracy, though some syntax or spelling errors may persist. Please contact the author of this note for any clarification.      This note was created by combination of typed  and MModal dictation.  Transcription errors may be present.  If there are any questions, please contact me.     Judy Astorga PA-C  [facility]               [1]   Social History  Socioeconomic History    Marital status:     Number of children: 1   Occupational History    Occupation: human      Employer: [employer]   Tobacco Use    Smoking status: Never     Passive exposure: Never    Smokeless tobacco: Never   Substance and Sexual Activity    Alcohol use: No    Drug use: No    Sexual activity: Yes     Partners: Male   Other Topics Concern    Are you pregnant or think you may be? No    Breast-feeding No   Social History Narrative    3 adopted children.     Social Drivers of Health     Financial Resource Strain: Low Risk  (7/7/2025)    Overall Financial Resource Strain (CARDIA)     Difficulty of Paying Living Expenses: Not hard at all   Food  Insecurity: No Food Insecurity (7/7/2025)    Hunger Vital Sign     Worried About Running Out of Food in the Last Year: Never true     Ran Out of Food in the Last Year: Never true   Transportation Needs: No Transportation Needs (7/7/2025)    PRAPARE - Transportation     Lack of Transportation (Medical): No     Lack of Transportation (Non-Medical): No   Physical Activity: Sufficiently Active (7/7/2025)    Exercise Vital Sign     Days of Exercise per Week: 4 days     Minutes of Exercise per Session: 60 min   Stress: Stress Concern Present (7/7/2025)    [facility]ational [facility] - Occupational Stress Questionnaire     Feeling of Stress : To some extent   Housing Stability: Low Risk  (7/7/2025)    Housing Stability Vital Sign     Unable to Pay for Housing in the Last Year: No     Homeless in the Last Year: No   [2]   Current Outpatient Medications   Medication Sig Dispense Refill    amLODIPine (NORVASC) 10 MG tablet Take 1 tablet by mouth once daily 90 tablet 3    aspirin (ECOTRIN) 81 MG EC tablet Take 1 tablet (81 mg total) by mouth once daily. 90 tablet 3    atorvastatin (LIPITOR) 80 MG tablet Take 1 tablet (80 mg total) by mouth every evening. 90 tablet 0    CALCIUM CARBONATE/VITAMIN D3 (CALCIUM 500 + D, D3, ORAL) once daily.       carvediloL (COREG) 25 MG tablet TAKE 1 TABLET BY MOUTH TWICE A DAY WITH FOOD 180 tablet 3    darifenacin (ENABLEX) 15 mg 24 hr tablet Take 1 tablet (15 mg total) by mouth once daily. 90 tablet 3    ergocalciferol (ERGOCALCIFEROL) 50,000 unit Cap TAKE 1 CAPSULE BY MOUTH ONE TIME PER WEEK 12 capsule 3    estradioL (ESTRACE) 0.01 % (0.1 mg/gram) vaginal cream Place 1 g vaginally twice a week. 42.5 g 3    FOLIC ACID/MV,FE,OTHER MIN (CENTRUM ORAL) Take 1 tablet by mouth.      levothyroxine (SYNTHROID) 75 MCG tablet TAKE 1 TABLET BY MOUTH EVERY DAY 90 tablet 3    omega-3 fatty acids 1,000 mg Cap Take 1 capsule by mouth Daily.      triamterene-hydrochlorothiazide 37.5-25 mg  (MAXZIDE-25) 37.5-25 mg per tablet TAKE 1 TABLET BY MOUTH EVERY DAY 90 tablet 0    methylPREDNISolone (MEDROL DOSEPACK) 4 mg tablet use as directed 21 each 0     No current facility-administered medications for this visit.

## 2025-07-19 DIAGNOSIS — Z86.39 HISTORY OF VITAMIN D DEFICIENCY: ICD-10-CM

## 2025-07-22 RX ORDER — ERGOCALCIFEROL 1.25 MG/1
50000 CAPSULE ORAL
Qty: 12 CAPSULE | Refills: 0 | Status: SHIPPED | OUTPATIENT
Start: 2025-07-22

## 2025-07-22 NOTE — TELEPHONE ENCOUNTER
Medication Refill Request: Vitamin D2    Last endocrinology visit- Dr. Madrigal 8/27/2024  Vitamin D deficiency taking ergo 50k q week  Per last note-   Continue prescription vitamin-D  Recheck levels   Follow-up appt scheduled- 8/20/2025    Lab Results   Component Value Date    [id number] 58 09/10/2024    [id number] 56 02/07/2023    [id number] 45 06/26/2020

## 2025-08-12 ENCOUNTER — OFFICE VISIT (OUTPATIENT)
Dept: UROGYNECOLOGY | Facility: CLINIC | Age: 70
End: 2025-08-12
Payer: MEDICARE

## 2025-08-12 VITALS
BODY MASS INDEX: 24.09 KG/M2 | DIASTOLIC BLOOD PRESSURE: 77 MMHG | WEIGHT: 162.69 LBS | SYSTOLIC BLOOD PRESSURE: 115 MMHG | HEIGHT: 69 IN | HEART RATE: 69 BPM

## 2025-08-12 DIAGNOSIS — N81.89 PELVIC FLOOR WEAKNESS: ICD-10-CM

## 2025-08-12 DIAGNOSIS — R39.15 URINARY URGENCY: ICD-10-CM

## 2025-08-12 DIAGNOSIS — N95.2 VAGINAL ATROPHY: ICD-10-CM

## 2025-08-12 DIAGNOSIS — N39.46 MIXED INCONTINENCE URGE AND STRESS: Primary | ICD-10-CM

## 2025-08-12 PROCEDURE — 99214 OFFICE O/P EST MOD 30 MIN: CPT | Mod: PBBFAC | Performed by: NURSE PRACTITIONER

## 2025-08-12 PROCEDURE — 99999 PR PBB SHADOW E&M-EST. PATIENT-LVL IV: CPT | Mod: PBBFAC,,, | Performed by: NURSE PRACTITIONER

## 2025-08-12 RX ORDER — ESTRADIOL 0.1 MG/G
1 CREAM VAGINAL
Qty: 42.5 G | Refills: 3 | Status: SHIPPED | OUTPATIENT
Start: 2025-08-14

## 2025-08-12 RX ORDER — DARIFENACIN 15 MG/1
15 TABLET, EXTENDED RELEASE ORAL DAILY
Qty: 90 TABLET | Refills: 3 | Status: SHIPPED | OUTPATIENT
Start: 2025-08-12 | End: 2026-09-12

## 2025-08-13 ENCOUNTER — TELEPHONE (OUTPATIENT)
Dept: UROGYNECOLOGY | Facility: CLINIC | Age: 70
End: 2025-08-13
Payer: MEDICARE

## 2025-08-13 ENCOUNTER — PATIENT MESSAGE (OUTPATIENT)
Dept: NEUROSURGERY | Facility: CLINIC | Age: 70
End: 2025-08-13
Payer: MEDICARE

## 2025-08-16 DIAGNOSIS — R39.15 URINARY URGENCY: ICD-10-CM

## 2025-08-16 DIAGNOSIS — Z12.31 ENCOUNTER FOR SCREENING MAMMOGRAM FOR BREAST CANCER: Primary | ICD-10-CM

## 2025-08-18 RX ORDER — OXYBUTYNIN CHLORIDE 10 MG/1
10 TABLET, EXTENDED RELEASE ORAL
Qty: 90 TABLET | Refills: 3 | OUTPATIENT
Start: 2025-08-18

## 2025-08-20 ENCOUNTER — HOSPITAL ENCOUNTER (OUTPATIENT)
Dept: ENDOCRINOLOGY | Facility: CLINIC | Age: 70
Discharge: HOME | End: 2025-08-20
Attending: INTERNAL MEDICINE
Payer: MEDICARE

## 2025-08-20 DIAGNOSIS — E03.9 ACQUIRED HYPOTHYROIDISM: ICD-10-CM

## 2025-08-20 DIAGNOSIS — E27.8 ADRENAL INCIDENTALOMA: ICD-10-CM

## 2025-08-20 DIAGNOSIS — E04.9 GOITER, NODULAR: ICD-10-CM

## 2025-08-20 PROCEDURE — 76536 US EXAM OF HEAD AND NECK: CPT | Mod: ,,, | Performed by: INTERNAL MEDICINE

## 2025-08-21 ENCOUNTER — HOSPITAL ENCOUNTER (OUTPATIENT)
Dept: RADIOLOGY | Facility: HOSPITAL | Age: 70
Discharge: HOME | End: 2025-08-21
Attending: NEUROLOGICAL SURGERY
Payer: MEDICARE

## 2025-08-21 ENCOUNTER — TELEPHONE (OUTPATIENT)
Dept: UROGYNECOLOGY | Facility: CLINIC | Age: 70
End: 2025-08-21
Payer: MEDICARE

## 2025-08-21 ENCOUNTER — PATIENT MESSAGE (OUTPATIENT)
Dept: NEUROSURGERY | Facility: CLINIC | Age: 70
End: 2025-08-21

## 2025-08-21 ENCOUNTER — OFFICE VISIT (OUTPATIENT)
Dept: NEUROSURGERY | Facility: CLINIC | Age: 70
End: 2025-08-21
Payer: MEDICARE

## 2025-08-21 DIAGNOSIS — D32.0 BENIGN MENINGIOMA OF BRAIN: Primary | ICD-10-CM

## 2025-08-21 DIAGNOSIS — D32.9 MENINGIOMA: ICD-10-CM

## 2025-08-21 PROCEDURE — 70553 MRI BRAIN STEM W/O & W/DYE: CPT | Mod: TC

## 2025-08-21 PROCEDURE — 99214 OFFICE O/P EST MOD 30 MIN: CPT | Mod: S$PBB,,, | Performed by: NEUROLOGICAL SURGERY

## 2025-08-21 PROCEDURE — A9585 GADOBUTROL INJECTION: HCPCS | Performed by: NEUROLOGICAL SURGERY

## 2025-08-21 PROCEDURE — 99213 OFFICE O/P EST LOW 20 MIN: CPT | Mod: PBBFAC,25 | Performed by: NEUROLOGICAL SURGERY

## 2025-08-21 PROCEDURE — 25500020 PHARM REV CODE 255: Performed by: NEUROLOGICAL SURGERY

## 2025-08-21 PROCEDURE — 99999 PR PBB SHADOW E&M-EST. PATIENT-LVL III: CPT | Mod: PBBFAC,,, | Performed by: NEUROLOGICAL SURGERY

## 2025-08-21 PROCEDURE — 70553 MRI BRAIN STEM W/O & W/DYE: CPT | Mod: 26,,, | Performed by: RADIOLOGY

## 2025-08-21 RX ORDER — GADOBUTROL 604.72 MG/ML
8 INJECTION INTRAVENOUS
Status: COMPLETED | OUTPATIENT
Start: 2025-08-21 | End: 2025-08-21

## 2025-08-21 RX ADMIN — GADOBUTROL 8 ML: 604.72 INJECTION INTRAVENOUS at 09:08

## 2025-08-25 ENCOUNTER — TELEPHONE (OUTPATIENT)
Dept: UROGYNECOLOGY | Facility: CLINIC | Age: 70
End: 2025-08-25
Payer: MEDICARE

## 2025-08-25 ENCOUNTER — LAB VISIT (OUTPATIENT)
Dept: LAB | Facility: HOSPITAL | Age: 70
End: 2025-08-25
Attending: INTERNAL MEDICINE
Payer: MEDICARE

## 2025-08-25 DIAGNOSIS — E03.9 ACQUIRED HYPOTHYROIDISM: ICD-10-CM

## 2025-08-25 DIAGNOSIS — E27.8 ADRENAL INCIDENTALOMA: ICD-10-CM

## 2025-08-25 DIAGNOSIS — R39.15 URINARY URGENCY: ICD-10-CM

## 2025-08-25 DIAGNOSIS — E04.9 GOITER, NODULAR: ICD-10-CM

## 2025-08-25 LAB
ALBUMIN SERPL BCP-MCNC: 3.9 G/DL (ref 3.5–5.2)
ALP SERPL-CCNC: 78 UNIT/L (ref 40–150)
ALT SERPL W/O P-5'-P-CCNC: 39 UNIT/L (ref 0–55)
ANION GAP (OHS): 9 MMOL/L (ref 8–16)
AST SERPL-CCNC: 32 UNIT/L (ref 0–50)
BILIRUB SERPL-MCNC: 0.5 MG/DL (ref 0.1–1)
BUN SERPL-MCNC: 19 MG/DL (ref 8–23)
CALCIUM SERPL-MCNC: 9.3 MG/DL (ref 8.7–10.5)
CHLORIDE SERPL-SCNC: 107 MMOL/L (ref 95–110)
CO2 SERPL-SCNC: 25 MMOL/L (ref 23–29)
CORTIS SERPL-MCNC: 12.1 UG/DL (ref 4.3–22.4)
CREAT SERPL-MCNC: 0.6 MG/DL (ref 0.5–1.4)
GFR SERPLBLD CREATININE-BSD FMLA CKD-EPI: >60 ML/MIN/1.73/M2
GLUCOSE SERPL-MCNC: 95 MG/DL (ref 70–110)
POTASSIUM SERPL-SCNC: 4 MMOL/L (ref 3.5–5.1)
PROT SERPL-MCNC: 6.3 GM/DL (ref 6–8.4)
SODIUM SERPL-SCNC: 141 MMOL/L (ref 136–145)
TSH SERPL-ACNC: 1.42 UIU/ML (ref 0.4–4)

## 2025-08-25 PROCEDURE — 36415 COLL VENOUS BLD VENIPUNCTURE: CPT | Mod: PO

## 2025-08-25 PROCEDURE — 82542 COL CHROMOTOGRAPHY QUAL/QUAN: CPT

## 2025-08-25 PROCEDURE — 82374 ASSAY BLOOD CARBON DIOXIDE: CPT

## 2025-08-25 PROCEDURE — 84443 ASSAY THYROID STIM HORMONE: CPT

## 2025-08-25 PROCEDURE — 82533 TOTAL CORTISOL: CPT

## 2025-08-26 RX ORDER — DARIFENACIN 15 MG/1
15 TABLET, EXTENDED RELEASE ORAL
Qty: 90 TABLET | Refills: 3 | OUTPATIENT
Start: 2025-08-26

## 2025-09-05 ENCOUNTER — TELEPHONE (OUTPATIENT)
Dept: UROGYNECOLOGY | Facility: CLINIC | Age: 70
End: 2025-09-05
Payer: MEDICARE